# Patient Record
Sex: MALE | Race: WHITE
[De-identification: names, ages, dates, MRNs, and addresses within clinical notes are randomized per-mention and may not be internally consistent; named-entity substitution may affect disease eponyms.]

---

## 2017-04-18 ENCOUNTER — CHART COPY (OUTPATIENT)
Age: 62
End: 2017-04-18

## 2017-04-25 ENCOUNTER — APPOINTMENT (OUTPATIENT)
Dept: INTERNAL MEDICINE | Facility: CLINIC | Age: 62
End: 2017-04-25

## 2017-05-16 ENCOUNTER — APPOINTMENT (OUTPATIENT)
Dept: CARDIOLOGY | Facility: CLINIC | Age: 62
End: 2017-05-16

## 2017-06-07 ENCOUNTER — OUTPATIENT (OUTPATIENT)
Dept: OUTPATIENT SERVICES | Facility: HOSPITAL | Age: 62
LOS: 1 days | Discharge: HOME | End: 2017-06-07

## 2017-06-07 DIAGNOSIS — R41.82 ALTERED MENTAL STATUS, UNSPECIFIED: ICD-10-CM

## 2017-06-07 DIAGNOSIS — J15.9 UNSPECIFIED BACTERIAL PNEUMONIA: ICD-10-CM

## 2017-06-28 DIAGNOSIS — F20.9 SCHIZOPHRENIA, UNSPECIFIED: ICD-10-CM

## 2017-06-28 DIAGNOSIS — Z79.899 OTHER LONG TERM (CURRENT) DRUG THERAPY: ICD-10-CM

## 2017-07-05 ENCOUNTER — OUTPATIENT (OUTPATIENT)
Dept: OUTPATIENT SERVICES | Facility: HOSPITAL | Age: 62
LOS: 1 days | Discharge: HOME | End: 2017-07-05

## 2017-07-05 DIAGNOSIS — J15.9 UNSPECIFIED BACTERIAL PNEUMONIA: ICD-10-CM

## 2017-07-05 DIAGNOSIS — F20.9 SCHIZOPHRENIA, UNSPECIFIED: ICD-10-CM

## 2017-07-05 DIAGNOSIS — Z79.899 OTHER LONG TERM (CURRENT) DRUG THERAPY: ICD-10-CM

## 2017-07-05 DIAGNOSIS — R41.82 ALTERED MENTAL STATUS, UNSPECIFIED: ICD-10-CM

## 2017-07-18 ENCOUNTER — APPOINTMENT (OUTPATIENT)
Dept: CARDIOLOGY | Facility: CLINIC | Age: 62
End: 2017-07-18

## 2017-08-02 ENCOUNTER — OUTPATIENT (OUTPATIENT)
Dept: OUTPATIENT SERVICES | Facility: HOSPITAL | Age: 62
LOS: 1 days | Discharge: HOME | End: 2017-08-02

## 2017-08-02 DIAGNOSIS — Z79.899 OTHER LONG TERM (CURRENT) DRUG THERAPY: ICD-10-CM

## 2017-08-02 DIAGNOSIS — J15.9 UNSPECIFIED BACTERIAL PNEUMONIA: ICD-10-CM

## 2017-08-02 DIAGNOSIS — F20.9 SCHIZOPHRENIA, UNSPECIFIED: ICD-10-CM

## 2017-08-02 DIAGNOSIS — R41.82 ALTERED MENTAL STATUS, UNSPECIFIED: ICD-10-CM

## 2017-08-03 ENCOUNTER — INPATIENT (INPATIENT)
Facility: HOSPITAL | Age: 62
LOS: 3 days | Discharge: HOME | End: 2017-08-07
Attending: INTERNAL MEDICINE

## 2017-08-03 DIAGNOSIS — R41.82 ALTERED MENTAL STATUS, UNSPECIFIED: ICD-10-CM

## 2017-08-03 DIAGNOSIS — I63.8 OTHER CEREBRAL INFARCTION: ICD-10-CM

## 2017-08-03 DIAGNOSIS — J15.9 UNSPECIFIED BACTERIAL PNEUMONIA: ICD-10-CM

## 2017-08-09 ENCOUNTER — OUTPATIENT (OUTPATIENT)
Dept: OUTPATIENT SERVICES | Facility: HOSPITAL | Age: 62
LOS: 1 days | Discharge: HOME | End: 2017-08-09

## 2017-08-09 DIAGNOSIS — Z79.899 OTHER LONG TERM (CURRENT) DRUG THERAPY: ICD-10-CM

## 2017-08-09 DIAGNOSIS — J18.9 PNEUMONIA, UNSPECIFIED ORGANISM: ICD-10-CM

## 2017-08-09 DIAGNOSIS — R41.82 ALTERED MENTAL STATUS, UNSPECIFIED: ICD-10-CM

## 2017-08-09 DIAGNOSIS — J15.9 UNSPECIFIED BACTERIAL PNEUMONIA: ICD-10-CM

## 2017-08-09 DIAGNOSIS — F20.9 SCHIZOPHRENIA, UNSPECIFIED: ICD-10-CM

## 2017-08-10 DIAGNOSIS — I10 ESSENTIAL (PRIMARY) HYPERTENSION: ICD-10-CM

## 2017-08-10 DIAGNOSIS — E78.5 HYPERLIPIDEMIA, UNSPECIFIED: ICD-10-CM

## 2017-08-10 DIAGNOSIS — I63.8 OTHER CEREBRAL INFARCTION: ICD-10-CM

## 2017-08-10 DIAGNOSIS — C94.80 OTHER SPECIFIED LEUKEMIAS NOT HAVING ACHIEVED REMISSION: ICD-10-CM

## 2017-08-10 DIAGNOSIS — Z79.84 LONG TERM (CURRENT) USE OF ORAL HYPOGLYCEMIC DRUGS: ICD-10-CM
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
## 2017-08-10 DIAGNOSIS — E11.9 TYPE 2 DIABETES MELLITUS WITHOUT COMPLICATIONS: ICD-10-CM

## 2017-08-10 DIAGNOSIS — J18.9 PNEUMONIA, UNSPECIFIED ORGANISM: ICD-10-CM
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
## 2017-08-10 DIAGNOSIS — F20.9 SCHIZOPHRENIA, UNSPECIFIED: ICD-10-CM
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
## 2017-08-10 DIAGNOSIS — Z91.81 HISTORY OF FALLING: ICD-10-CM

## 2017-08-10 DIAGNOSIS — R47.1 DYSARTHRIA AND ANARTHRIA: ICD-10-CM

## 2017-08-10 DIAGNOSIS — C61 MALIGNANT NEOPLASM OF PROSTATE: ICD-10-CM

## 2017-08-10 DIAGNOSIS — I42.9 CARDIOMYOPATHY, UNSPECIFIED: ICD-10-CM

## 2017-08-10 DIAGNOSIS — C73 MALIGNANT NEOPLASM OF THYROID GLAND: ICD-10-CM

## 2017-08-10 DIAGNOSIS — R53.83 OTHER FATIGUE: ICD-10-CM

## 2017-08-11 ENCOUNTER — OUTPATIENT (OUTPATIENT)
Dept: OUTPATIENT SERVICES | Facility: HOSPITAL | Age: 62
LOS: 1 days | Discharge: HOME | End: 2017-08-11

## 2017-08-11 DIAGNOSIS — J15.9 UNSPECIFIED BACTERIAL PNEUMONIA: ICD-10-CM

## 2017-08-11 DIAGNOSIS — J18.9 PNEUMONIA, UNSPECIFIED ORGANISM: ICD-10-CM

## 2017-08-11 DIAGNOSIS — R41.82 ALTERED MENTAL STATUS, UNSPECIFIED: ICD-10-CM

## 2017-08-30 ENCOUNTER — OUTPATIENT (OUTPATIENT)
Dept: OUTPATIENT SERVICES | Facility: HOSPITAL | Age: 62
LOS: 1 days | Discharge: HOME | End: 2017-08-30

## 2017-08-30 DIAGNOSIS — F20.9 SCHIZOPHRENIA, UNSPECIFIED: ICD-10-CM

## 2017-08-30 DIAGNOSIS — R41.82 ALTERED MENTAL STATUS, UNSPECIFIED: ICD-10-CM

## 2017-08-30 DIAGNOSIS — J15.9 UNSPECIFIED BACTERIAL PNEUMONIA: ICD-10-CM

## 2017-08-30 DIAGNOSIS — Z79.899 OTHER LONG TERM (CURRENT) DRUG THERAPY: ICD-10-CM

## 2017-09-19 ENCOUNTER — OUTPATIENT (OUTPATIENT)
Dept: OUTPATIENT SERVICES | Facility: HOSPITAL | Age: 62
LOS: 1 days | Discharge: HOME | End: 2017-09-19

## 2017-09-19 DIAGNOSIS — C91.Z0 OTHER LYMPHOID LEUKEMIA NOT HAVING ACHIEVED REMISSION: ICD-10-CM

## 2017-09-19 DIAGNOSIS — R41.82 ALTERED MENTAL STATUS, UNSPECIFIED: ICD-10-CM

## 2017-09-19 DIAGNOSIS — J15.9 UNSPECIFIED BACTERIAL PNEUMONIA: ICD-10-CM

## 2017-09-27 ENCOUNTER — OUTPATIENT (OUTPATIENT)
Dept: OUTPATIENT SERVICES | Facility: HOSPITAL | Age: 62
LOS: 1 days | Discharge: HOME | End: 2017-09-27

## 2017-09-27 DIAGNOSIS — R41.82 ALTERED MENTAL STATUS, UNSPECIFIED: ICD-10-CM

## 2017-09-27 DIAGNOSIS — F20.9 SCHIZOPHRENIA, UNSPECIFIED: ICD-10-CM

## 2017-09-27 DIAGNOSIS — Z79.899 OTHER LONG TERM (CURRENT) DRUG THERAPY: ICD-10-CM

## 2017-09-27 DIAGNOSIS — J15.9 UNSPECIFIED BACTERIAL PNEUMONIA: ICD-10-CM

## 2017-09-28 ENCOUNTER — OUTPATIENT (OUTPATIENT)
Dept: OUTPATIENT SERVICES | Facility: HOSPITAL | Age: 62
LOS: 1 days | Discharge: HOME | End: 2017-09-28

## 2017-09-28 ENCOUNTER — APPOINTMENT (OUTPATIENT)
Dept: PODIATRY | Facility: CLINIC | Age: 62
End: 2017-09-28

## 2017-09-28 VITALS — WEIGHT: 180 LBS | BODY MASS INDEX: 28.25 KG/M2 | HEIGHT: 67 IN

## 2017-09-28 VITALS — DIASTOLIC BLOOD PRESSURE: 78 MMHG | HEART RATE: 74 BPM | SYSTOLIC BLOOD PRESSURE: 136 MMHG

## 2017-09-28 DIAGNOSIS — G89.29 PAIN IN RIGHT FOOT: ICD-10-CM

## 2017-09-28 DIAGNOSIS — J15.9 UNSPECIFIED BACTERIAL PNEUMONIA: ICD-10-CM

## 2017-09-28 DIAGNOSIS — G89.29 PAIN IN LEFT FOOT: ICD-10-CM

## 2017-09-28 DIAGNOSIS — C91.10 CHRONIC LYMPHOCYTIC LEUKEMIA OF B-CELL TYPE NOT HAVING ACHIEVED REMISSION: ICD-10-CM

## 2017-09-28 DIAGNOSIS — B35.1 TINEA UNGUIUM: ICD-10-CM

## 2017-09-28 DIAGNOSIS — R41.82 ALTERED MENTAL STATUS, UNSPECIFIED: ICD-10-CM

## 2017-09-28 DIAGNOSIS — M79.671 PAIN IN RIGHT FOOT: ICD-10-CM

## 2017-09-28 DIAGNOSIS — M79.672 PAIN IN LEFT FOOT: ICD-10-CM

## 2017-09-28 DIAGNOSIS — L85.3 XEROSIS CUTIS: ICD-10-CM

## 2017-09-28 DIAGNOSIS — E11.9 TYPE 2 DIABETES MELLITUS W/OUT COMPLICATIONS: ICD-10-CM

## 2017-09-29 DIAGNOSIS — B35.1 TINEA UNGUIUM: ICD-10-CM

## 2017-09-29 DIAGNOSIS — M79.672 PAIN IN LEFT FOOT: ICD-10-CM

## 2017-09-29 DIAGNOSIS — L85.3 XEROSIS CUTIS: ICD-10-CM

## 2017-09-29 DIAGNOSIS — M79.671 PAIN IN RIGHT FOOT: ICD-10-CM

## 2017-10-25 ENCOUNTER — OUTPATIENT (OUTPATIENT)
Dept: OUTPATIENT SERVICES | Facility: HOSPITAL | Age: 62
LOS: 1 days | Discharge: HOME | End: 2017-10-25

## 2017-10-25 DIAGNOSIS — J15.9 UNSPECIFIED BACTERIAL PNEUMONIA: ICD-10-CM

## 2017-10-25 DIAGNOSIS — F20.9 SCHIZOPHRENIA, UNSPECIFIED: ICD-10-CM

## 2017-10-25 DIAGNOSIS — R41.82 ALTERED MENTAL STATUS, UNSPECIFIED: ICD-10-CM

## 2017-10-25 DIAGNOSIS — Z79.899 OTHER LONG TERM (CURRENT) DRUG THERAPY: ICD-10-CM

## 2017-11-08 ENCOUNTER — OUTPATIENT (OUTPATIENT)
Dept: OUTPATIENT SERVICES | Facility: HOSPITAL | Age: 62
LOS: 1 days | Discharge: HOME | End: 2017-11-08

## 2017-11-08 DIAGNOSIS — E55.9 VITAMIN D DEFICIENCY, UNSPECIFIED: ICD-10-CM

## 2017-11-08 DIAGNOSIS — R41.82 ALTERED MENTAL STATUS, UNSPECIFIED: ICD-10-CM

## 2017-11-08 DIAGNOSIS — J15.9 UNSPECIFIED BACTERIAL PNEUMONIA: ICD-10-CM

## 2017-11-21 ENCOUNTER — OUTPATIENT (OUTPATIENT)
Dept: OUTPATIENT SERVICES | Facility: HOSPITAL | Age: 62
LOS: 1 days | Discharge: HOME | End: 2017-11-21

## 2017-11-21 DIAGNOSIS — Z79.899 OTHER LONG TERM (CURRENT) DRUG THERAPY: ICD-10-CM

## 2017-11-21 DIAGNOSIS — F20.9 SCHIZOPHRENIA, UNSPECIFIED: ICD-10-CM

## 2017-11-21 DIAGNOSIS — J15.9 UNSPECIFIED BACTERIAL PNEUMONIA: ICD-10-CM

## 2017-11-21 DIAGNOSIS — R41.82 ALTERED MENTAL STATUS, UNSPECIFIED: ICD-10-CM

## 2017-12-20 ENCOUNTER — OUTPATIENT (OUTPATIENT)
Dept: OUTPATIENT SERVICES | Facility: HOSPITAL | Age: 62
LOS: 1 days | Discharge: HOME | End: 2017-12-20

## 2017-12-20 DIAGNOSIS — R41.82 ALTERED MENTAL STATUS, UNSPECIFIED: ICD-10-CM

## 2017-12-20 DIAGNOSIS — C91.10 CHRONIC LYMPHOCYTIC LEUKEMIA OF B-CELL TYPE NOT HAVING ACHIEVED REMISSION: ICD-10-CM

## 2017-12-20 DIAGNOSIS — J15.9 UNSPECIFIED BACTERIAL PNEUMONIA: ICD-10-CM

## 2017-12-20 DIAGNOSIS — Z79.899 OTHER LONG TERM (CURRENT) DRUG THERAPY: ICD-10-CM

## 2017-12-20 DIAGNOSIS — F20.9 SCHIZOPHRENIA, UNSPECIFIED: ICD-10-CM

## 2018-01-17 ENCOUNTER — OUTPATIENT (OUTPATIENT)
Dept: OUTPATIENT SERVICES | Facility: HOSPITAL | Age: 63
LOS: 1 days | Discharge: HOME | End: 2018-01-17

## 2018-01-17 DIAGNOSIS — R41.82 ALTERED MENTAL STATUS, UNSPECIFIED: ICD-10-CM

## 2018-01-17 DIAGNOSIS — F20.9 SCHIZOPHRENIA, UNSPECIFIED: ICD-10-CM

## 2018-01-17 DIAGNOSIS — Z79.899 OTHER LONG TERM (CURRENT) DRUG THERAPY: ICD-10-CM

## 2018-01-17 DIAGNOSIS — J15.9 UNSPECIFIED BACTERIAL PNEUMONIA: ICD-10-CM

## 2018-02-14 ENCOUNTER — OUTPATIENT (OUTPATIENT)
Dept: OUTPATIENT SERVICES | Facility: HOSPITAL | Age: 63
LOS: 1 days | Discharge: HOME | End: 2018-02-14

## 2018-02-14 DIAGNOSIS — Z79.899 OTHER LONG TERM (CURRENT) DRUG THERAPY: ICD-10-CM

## 2018-02-14 DIAGNOSIS — F20.9 SCHIZOPHRENIA, UNSPECIFIED: ICD-10-CM

## 2018-03-19 ENCOUNTER — INPATIENT (INPATIENT)
Facility: HOSPITAL | Age: 63
LOS: 3 days | Discharge: PSYCHIATRIC OR SUBSTANCE USE FACILITY | End: 2018-03-23
Attending: HOSPITALIST

## 2018-03-19 VITALS
OXYGEN SATURATION: 99 % | SYSTOLIC BLOOD PRESSURE: 125 MMHG | HEART RATE: 94 BPM | TEMPERATURE: 98 F | DIASTOLIC BLOOD PRESSURE: 55 MMHG | RESPIRATION RATE: 18 BRPM

## 2018-03-19 DIAGNOSIS — F20.0 PARANOID SCHIZOPHRENIA: ICD-10-CM

## 2018-03-19 DIAGNOSIS — D72.820 LYMPHOCYTOSIS (SYMPTOMATIC): ICD-10-CM

## 2018-03-19 DIAGNOSIS — Z86.73 PERSONAL HISTORY OF TRANSIENT ISCHEMIC ATTACK (TIA), AND CEREBRAL INFARCTION WITHOUT RESIDUAL DEFICITS: ICD-10-CM

## 2018-03-19 DIAGNOSIS — E11.9 TYPE 2 DIABETES MELLITUS WITHOUT COMPLICATIONS: ICD-10-CM

## 2018-03-19 LAB
ALBUMIN SERPL ELPH-MCNC: 4.3 G/DL — SIGNIFICANT CHANGE UP (ref 3.5–5.2)
ALP SERPL-CCNC: 67 U/L — SIGNIFICANT CHANGE UP (ref 30–115)
ALT FLD-CCNC: 32 U/L — SIGNIFICANT CHANGE UP (ref 0–41)
ANION GAP SERPL CALC-SCNC: 16 MMOL/L — HIGH (ref 7–14)
APAP SERPL-MCNC: <5 UG/ML — LOW (ref 10–30)
APPEARANCE UR: (no result)
AST SERPL-CCNC: 54 U/L — HIGH (ref 0–41)
BACTERIA # UR AUTO: SIGNIFICANT CHANGE UP
BASOPHILS NFR BLD AUTO: 0 % — SIGNIFICANT CHANGE UP (ref 0–1)
BILIRUB SERPL-MCNC: 0.5 MG/DL — SIGNIFICANT CHANGE UP (ref 0.2–1.2)
BILIRUB UR-MCNC: (no result)
BUN SERPL-MCNC: 21 MG/DL — HIGH (ref 10–20)
CALCIUM SERPL-MCNC: 8.7 MG/DL — SIGNIFICANT CHANGE UP (ref 8.5–10.1)
CHLORIDE SERPL-SCNC: 103 MMOL/L — SIGNIFICANT CHANGE UP (ref 98–110)
CO2 SERPL-SCNC: 22 MMOL/L — SIGNIFICANT CHANGE UP (ref 17–32)
COLOR SPEC: SIGNIFICANT CHANGE UP
COMMENT - URINE: SIGNIFICANT CHANGE UP
CREAT SERPL-MCNC: 0.9 MG/DL — SIGNIFICANT CHANGE UP (ref 0.7–1.5)
DIFF PNL FLD: NEGATIVE — SIGNIFICANT CHANGE UP
EOSINOPHIL NFR BLD AUTO: 0 % — SIGNIFICANT CHANGE UP (ref 0–8)
ETHANOL SERPL-MCNC: <10 MG/DL — HIGH
GLUCOSE SERPL-MCNC: 92 MG/DL — SIGNIFICANT CHANGE UP (ref 70–110)
GLUCOSE UR QL: NEGATIVE MG/DL — SIGNIFICANT CHANGE UP
HCT VFR BLD CALC: 34.8 % — LOW (ref 42–52)
HGB BLD-MCNC: 10.5 G/DL — LOW (ref 14–18)
KETONES UR-MCNC: 15
LEUKOCYTE ESTERASE UR-ACNC: NEGATIVE — SIGNIFICANT CHANGE UP
LYMPHOCYTES # BLD AUTO: 73 % — HIGH (ref 20.5–51.1)
LYMPHOCYTES # BLD AUTO: 84.07 K/UL — HIGH (ref 1.2–3.4)
MCHC RBC-ENTMCNC: 29 PG — SIGNIFICANT CHANGE UP (ref 27–31)
MCHC RBC-ENTMCNC: 30.2 G/DL — LOW (ref 32–37)
MCV RBC AUTO: 96.1 FL — HIGH (ref 80–94)
MONOCYTES NFR BLD AUTO: 2 % — SIGNIFICANT CHANGE UP (ref 1.7–9.3)
NEUTROPHILS # BLD AUTO: 19.58 K/UL — HIGH (ref 1.4–6.5)
NEUTROPHILS NFR BLD AUTO: 17 % — LOW (ref 42.2–75.2)
NITRITE UR-MCNC: NEGATIVE — SIGNIFICANT CHANGE UP
NRBC # BLD: 0 /100 — SIGNIFICANT CHANGE UP (ref 0–0)
NRBC # BLD: SIGNIFICANT CHANGE UP /100 WBCS (ref 0–0)
PH UR: 5.5 — SIGNIFICANT CHANGE UP (ref 5–8)
PLAT MORPH BLD: NORMAL — SIGNIFICANT CHANGE UP
PLATELET # BLD AUTO: 170 K/UL — SIGNIFICANT CHANGE UP (ref 130–400)
POTASSIUM SERPL-MCNC: 4.2 MMOL/L — SIGNIFICANT CHANGE UP (ref 3.5–5)
POTASSIUM SERPL-SCNC: 4.2 MMOL/L — SIGNIFICANT CHANGE UP (ref 3.5–5)
PROT SERPL-MCNC: 6 G/DL — SIGNIFICANT CHANGE UP (ref 6–8)
PROT UR-MCNC: NEGATIVE MG/DL — SIGNIFICANT CHANGE UP
RBC # BLD: 3.62 M/UL — LOW (ref 4.7–6.1)
RBC # FLD: 15.7 % — HIGH (ref 11.5–14.5)
RBC BLD AUTO: NORMAL — SIGNIFICANT CHANGE UP
RBC CASTS # UR COMP ASSIST: SIGNIFICANT CHANGE UP /HPF
SALICYLATES SERPL-MCNC: 0.3 MG/DL — LOW (ref 4–30)
SODIUM SERPL-SCNC: 141 MMOL/L — SIGNIFICANT CHANGE UP (ref 135–146)
SP GR SPEC: 1.02 — SIGNIFICANT CHANGE UP (ref 1.01–1.03)
UROBILINOGEN FLD QL: 0.2 MG/DL — SIGNIFICANT CHANGE UP (ref 0.2–0.2)
VARIANT LYMPHS # BLD: 8 % — HIGH (ref 0–5)
WBC # BLD: 115.16 K/UL — CRITICAL HIGH (ref 4.8–10.8)
WBC # FLD AUTO: 115.16 K/UL — CRITICAL HIGH (ref 4.8–10.8)
WBC UR QL: SIGNIFICANT CHANGE UP /HPF

## 2018-03-19 RX ORDER — ASPIRIN/CALCIUM CARB/MAGNESIUM 324 MG
81 TABLET ORAL DAILY
Qty: 0 | Refills: 0 | Status: DISCONTINUED | OUTPATIENT
Start: 2018-03-19 | End: 2018-03-23

## 2018-03-19 RX ORDER — HALOPERIDOL DECANOATE 100 MG/ML
5 INJECTION INTRAMUSCULAR EVERY 6 HOURS
Qty: 0 | Refills: 0 | Status: DISCONTINUED | OUTPATIENT
Start: 2018-03-19 | End: 2018-03-23

## 2018-03-19 RX ORDER — INSULIN LISPRO 100/ML
4 VIAL (ML) SUBCUTANEOUS
Qty: 0 | Refills: 0 | Status: DISCONTINUED | OUTPATIENT
Start: 2018-03-19 | End: 2018-03-23

## 2018-03-19 RX ORDER — DOCUSATE SODIUM 100 MG
300 CAPSULE ORAL DAILY
Qty: 0 | Refills: 0 | Status: DISCONTINUED | OUTPATIENT
Start: 2018-03-19 | End: 2018-03-23

## 2018-03-19 RX ORDER — SODIUM CHLORIDE 9 MG/ML
1000 INJECTION, SOLUTION INTRAVENOUS
Qty: 0 | Refills: 0 | Status: DISCONTINUED | OUTPATIENT
Start: 2018-03-19 | End: 2018-03-23

## 2018-03-19 RX ORDER — LACTULOSE 10 G/15ML
20 SOLUTION ORAL DAILY
Qty: 0 | Refills: 0 | Status: COMPLETED | OUTPATIENT
Start: 2018-03-19 | End: 2018-03-20

## 2018-03-19 RX ORDER — METFORMIN HYDROCHLORIDE 850 MG/1
750 TABLET ORAL
Qty: 0 | Refills: 0 | Status: DISCONTINUED | OUTPATIENT
Start: 2018-03-19 | End: 2018-03-19

## 2018-03-19 RX ORDER — HALOPERIDOL DECANOATE 100 MG/ML
5 INJECTION INTRAMUSCULAR EVERY 12 HOURS
Qty: 0 | Refills: 0 | Status: DISCONTINUED | OUTPATIENT
Start: 2018-03-19 | End: 2018-03-23

## 2018-03-19 RX ORDER — DEXTROSE 50 % IN WATER 50 %
25 SYRINGE (ML) INTRAVENOUS ONCE
Qty: 0 | Refills: 0 | Status: DISCONTINUED | OUTPATIENT
Start: 2018-03-19 | End: 2018-03-23

## 2018-03-19 RX ORDER — ENOXAPARIN SODIUM 100 MG/ML
40 INJECTION SUBCUTANEOUS EVERY 24 HOURS
Qty: 0 | Refills: 0 | Status: DISCONTINUED | OUTPATIENT
Start: 2018-03-19 | End: 2018-03-23

## 2018-03-19 RX ORDER — GLUCAGON INJECTION, SOLUTION 0.5 MG/.1ML
1 INJECTION, SOLUTION SUBCUTANEOUS ONCE
Qty: 0 | Refills: 0 | Status: DISCONTINUED | OUTPATIENT
Start: 2018-03-19 | End: 2018-03-23

## 2018-03-19 RX ORDER — CEFTRIAXONE 500 MG/1
1 INJECTION, POWDER, FOR SOLUTION INTRAMUSCULAR; INTRAVENOUS ONCE
Qty: 0 | Refills: 0 | Status: COMPLETED | OUTPATIENT
Start: 2018-03-19 | End: 2018-03-19

## 2018-03-19 RX ORDER — TRIHEXYPHENIDYL HCL 2 MG
2 TABLET ORAL
Qty: 0 | Refills: 0 | Status: DISCONTINUED | OUTPATIENT
Start: 2018-03-19 | End: 2018-03-23

## 2018-03-19 RX ORDER — INSULIN GLARGINE 100 [IU]/ML
12 INJECTION, SOLUTION SUBCUTANEOUS AT BEDTIME
Qty: 0 | Refills: 0 | Status: DISCONTINUED | OUTPATIENT
Start: 2018-03-19 | End: 2018-03-23

## 2018-03-19 RX ORDER — DEXTROSE 50 % IN WATER 50 %
1 SYRINGE (ML) INTRAVENOUS ONCE
Qty: 0 | Refills: 0 | Status: DISCONTINUED | OUTPATIENT
Start: 2018-03-19 | End: 2018-03-23

## 2018-03-19 RX ORDER — LEVOTHYROXINE SODIUM 125 MCG
137 TABLET ORAL DAILY
Qty: 0 | Refills: 0 | Status: DISCONTINUED | OUTPATIENT
Start: 2018-03-19 | End: 2018-03-23

## 2018-03-19 RX ORDER — DIPHENHYDRAMINE HCL 50 MG
50 CAPSULE ORAL EVERY 6 HOURS
Qty: 0 | Refills: 0 | Status: DISCONTINUED | OUTPATIENT
Start: 2018-03-19 | End: 2018-03-23

## 2018-03-19 RX ORDER — FLUTICASONE PROPIONATE 220 MCG
1 AEROSOL WITH ADAPTER (GRAM) INHALATION
Qty: 0 | Refills: 0 | Status: DISCONTINUED | OUTPATIENT
Start: 2018-03-19 | End: 2018-03-21

## 2018-03-19 RX ORDER — DEXTROSE 50 % IN WATER 50 %
12.5 SYRINGE (ML) INTRAVENOUS ONCE
Qty: 0 | Refills: 0 | Status: DISCONTINUED | OUTPATIENT
Start: 2018-03-19 | End: 2018-03-23

## 2018-03-19 RX ORDER — CLOPIDOGREL BISULFATE 75 MG/1
75 TABLET, FILM COATED ORAL DAILY
Qty: 0 | Refills: 0 | Status: DISCONTINUED | OUTPATIENT
Start: 2018-03-19 | End: 2018-03-23

## 2018-03-19 RX ORDER — AZITHROMYCIN 500 MG/1
500 TABLET, FILM COATED ORAL ONCE
Qty: 0 | Refills: 0 | Status: COMPLETED | OUTPATIENT
Start: 2018-03-19 | End: 2018-03-19

## 2018-03-19 RX ADMIN — AZITHROMYCIN 255 MILLIGRAM(S): 500 TABLET, FILM COATED ORAL at 19:10

## 2018-03-19 RX ADMIN — CEFTRIAXONE 100 GRAM(S): 500 INJECTION, POWDER, FOR SOLUTION INTRAMUSCULAR; INTRAVENOUS at 21:10

## 2018-03-19 NOTE — PROGRESS NOTE ADULT - SUBJECTIVE AND OBJECTIVE BOX
Discussed care with primary ED attending Dr. Segura. Discussed medical clearance for this patient. Upon review of records, patient has history of . At this time, patient has a WBC of 115, up from prior WBC during 8/2017 admission - 8/3/2017 41.23, 8/4/2017 29.51. At that time patient was found to have acute/subacute cortical infarcts left frontal and right posterior parietal on CT Head,  MRI WO Contrast showed the following:  Motion artifiact limits examination.  Third and lateral ventricles mildly enlarged as are the cortical sulci consistent with a mild degree of cortical atrophy. The fourth ventricle is normal in size and position.  There is no shift of the midline structures.  Mild thinning of the body of the corpus callosum consistent with mild corpus callosal atrophy.  Small wedge-shaped areas of abnormal signal intensity in the left frontal and right parietal regions consistent with chronic infarcts.   No MRI evidence of acute ischemic change.  Multiple lesions arising in the parotid glands incompletely evaluated on this examination.   The left petrous apex is not pneumatized.   IMPRESSION: Study limited by motion artifact. No MRI evidence to suggest acute ischemic change.    At time of admission, patient was found to have bacterial pneumonia along with chronic infarcts, patient was to follow-up with neurology.    Discussed results with Dr. Segura. Given that patient has WBC count significantly elevated from prior admission and a history of chronic infarcts, Dr. Segura will continue medical workup at this time. Dr. Segura reports that she will likely admit patient for further workup.    Discussed case and plan with Dr. Alexander Rider. Discussed care with primary ED attending Dr. Segura. Discussed medical clearance for this patient. Upon review of records, patient presented to hospital in August 2017. At this time, patient has a WBC of 115, up from prior WBC during 8/2017 admission - 8/3/2017 41.23, 8/4/2017 29.51. At that time patient was found to have acute/subacute cortical infarcts left frontal and right posterior parietal on CT Head,  MRI WO Contrast showed the following:  Motion artifiact limits examination.  Third and lateral ventricles mildly enlarged as are the cortical sulci consistent with a mild degree of cortical atrophy. The fourth ventricle is normal in size and position.  There is no shift of the midline structures.  Mild thinning of the body of the corpus callosum consistent with mild corpus callosal atrophy.  Small wedge-shaped areas of abnormal signal intensity in the left frontal and right parietal regions consistent with chronic infarcts.   No MRI evidence of acute ischemic change.  Multiple lesions arising in the parotid glands incompletely evaluated on this examination.   The left petrous apex is not pneumatized.   IMPRESSION: Study limited by motion artifact. No MRI evidence to suggest acute ischemic change.    At time of admission, patient was found to have bacterial pneumonia along with chronic infarcts, patient was to follow-up with neurology.    Discussed results with Dr. Segura. Given that patient has WBC count significantly elevated from prior admission and a history of chronic infarcts, Dr. Segura will continue medical workup at this time. Dr. Segura reports that she will likely admit patient for further workup.    Discussed case and plan with Dr. Alexander Rider. Discussed care with primary ED attending Dr. Segura. Discussed medical clearance for this patient. Upon review of records, patient presented to hospital in August 2017. At this time, patient has a WBC of 115, up from prior WBC during 8/2017 admission - 8/3/2017 41.23, 8/4/2017 29.51. At that time patient was found to have acute/subacute cortical infarcts left frontal and right posterior parietal on CT Head,  MRI WO Contrast showed the following:  Motion artifiact limits examination.  Third and lateral ventricles mildly enlarged as are the cortical sulci consistent with a mild degree of cortical atrophy. The fourth ventricle is normal in size and position.  There is no shift of the midline structures.  Mild thinning of the body of the corpus callosum consistent with mild corpus callosal atrophy.  Small wedge-shaped areas of abnormal signal intensity in the left frontal and right parietal regions consistent with chronic infarcts.   No MRI evidence of acute ischemic change.  Multiple lesions arising in the parotid glands incompletely evaluated on this examination.   The left petrous apex is not pneumatized.   IMPRESSION: Study limited by motion artifact. No MRI evidence to suggest acute ischemic change.    At time of admission, patient was found to have bacterial pneumonia along with chronic infarcts, patient was to follow-up with neurology.    Discussed results with Dr. Segura. Given that patient has WBC count significantly elevated from prior admission and a history of chronic infarcts, Dr. Segura will continue medical workup at this time. Dr. Segura reports that she may admit patient for further workup.    Discussed case and plan with Dr. Alexander Rider.

## 2018-03-19 NOTE — ED PROVIDER NOTE - PHYSICAL EXAMINATION
VITAL SIGNS: AFebrile, vital signs stable  CONSTITUTIONAL: Well-developed; well-nourished; in no acute distress. Odd affect.  SKIN: Skin exam is warm and dry, no acute rash.  HEAD: Normocephalic; atraumatic.  EYES: Pupils equal round reactive to light, Extraocular movements intact; conjunctiva and sclera clear.  ENT: No nasal discharge; airway clear. Moist mucus membranes.  NECK: Supple; non tender. No rigidity  CARD: Regular rate and rhythm. Normal S1, S2; no murmurs, gallops, or rubs.  RESP: Lungs clear to auscultation bilaterally. No wheezes, rales or rhonchi.  ABD: Abdomen soft; non-tender; non-distended;  no hepatosplenomegaly. No costovertebral angle tenderness.   EXT: Normal ROM. No clubbing, cyanosis or edema. No calf tenderness to palpation.  NEURO: Alert, cn 2-12 intact, 5/5 motor x 4 ext, silt x 4 ext, normal gait, no nystagmus, no slurred speech or facial droop  PSYCH: Odd affect, occasionally will answer appropriately, sometimes will not answer at all, just stares, sometimes makes inappropriate comments, not violent in ED, calm, no innapropriate behavior, sits quietly with 1:1 if not bothered.

## 2018-03-19 NOTE — ED PROVIDER NOTE - CARE PLAN
Principal Discharge DX:	Pneumonia  Secondary Diagnosis:	Lymphocytosis  Secondary Diagnosis:	Paranoid schizophrenia

## 2018-03-19 NOTE — H&P ADULT - ATTENDING COMMENTS
Pt seen and examined independently.  He ate lunch and had no complaints.  He is now compliant with his medications - actually requesting Artane.   EMR reviewed.  He is on ASA and Plavix for chronic infarcts seen on CT scan (he was already on Plavix for cardiomyopathy).   Leukocytosis more significant now (>100K) and with lymphocyte predominance - rule out worsening CLL vs infection.   Check blood cultures. CXR officially no infiltrate. U/A unremarkable.   No abx for now.   Awaiting [facility] eval.  Psychiatry following - pt will need IPP when medically stable.  I reviewed the resident's note and I agree with the physical exam, assessment and plan with additions as above/below.   Resume oral diabetic meds if pt refuses insulin - DM type 2 well controlled.  Can hold metformin if contrast studies are planned.

## 2018-03-19 NOTE — ED PROVIDER NOTE - MEDICAL DECISION MAKING DETAILS
AMS, psychosis, CLL, psych consulted and would like to admit to IPP however needs to be stabilized first from medical perspective, leukocytosis likely 2/2 CLL however worsened from 8/17, noted to have infiltrate L base lung, covered for PNA, CTH no acute infarcts like last admission, will admit to medicine, needs heme consult, ?ID consult, psych to follow and admit when medically cleared

## 2018-03-19 NOTE — ED PROVIDER NOTE - PROGRESS NOTE DETAILS
Dr Antoine consulted of psychiatry. Pt not taking meds x1 mo per [place] paperwork, will order basic labs, EKG as pt has PMH DM. psych initially wanted to admit however reviewed labs WBC >100k, pt has hx of CLL, however last ED visit 8/2017 had WBC 47, found to have L opacity, acute infarcts, will get CXR, CTH, psych requesting medical admission for stabilization/clearance from hematology before IPP stay. pt unable to provide any reliable hx or prior hx.

## 2018-03-19 NOTE — ED BEHAVIORAL HEALTH ASSESSMENT NOTE - RISK ASSESSMENT
At this time, patient is considered a danger to himself and others and needs inpatient psychiatric hospitalization. Patient will be restarted on Haldol 5mg p.o BID and Artane 1mg BID to target psychotic symptoms . Patient just received invega sustenna about 2 weeks ago and thus has some mood stabilization in his system.

## 2018-03-19 NOTE — ED BEHAVIORAL HEALTH ASSESSMENT NOTE - PSYCHIATRIC ISSUES AND PLAN (INCLUDE STANDING AND PRN MEDICATION)
Psychosis - admit to Inpatient psychiatry involuntarily , Start haldol 5mg p.o Daily, Haldol 5mg p.o Q 6 hrs PRN and Atican 1mg P.O Q 6 hrs PRN for anxiety

## 2018-03-19 NOTE — H&P ADULT - HISTORY OF PRESENT ILLNESS
62 year old M with h/o schizophrenia, parathyriod ca, CLL, sent in from [facility] for worsening mental status since 1 month. Pt reported to have increasing disorganized behaviors, self isolation, and combative at times and was deemed a danger to self and to others, so sent in to ED today for further eval. According to chart, pt had been refusing all his regular medications for past 1 month. Pt found to have elevated WBC count on initial labwork (115 up from 47 on 1/2018). No reports of any other symptoms including fever, chills, cough, chest pain, urinary sx, or diarrhea.

## 2018-03-19 NOTE — ED BEHAVIORAL HEALTH ASSESSMENT NOTE - CURRENT MEDICATION
Patient is reportedly non complaint with his medication;  psychotropics; Lamictal 25mg daily, haldol 10mg P.O BID, Artane 2mg BID, Invega Sustenna 78mg I.M ( 1st dose on March 13th, 2018),  Medical meds: Synthroid 0.137mcg daily, trichor 145mg daily, januvia 100mg daily, ASA 81 mg daily, plavix 75mg daily, Colace 300mg daily, lactulose 30mls bedtime, metformine 750mg BID, Lipitor 20mg bedtime, Flovent 44 mcg daily

## 2018-03-19 NOTE — H&P ADULT - NSHPSOCIALHISTORY_GEN_ALL_CORE
Social History:    Marital Status:  (   )    (  x ) Single    (   )    (  )   Occupation:   Lives with: (  ) alone  (  ) children   (  ) spouse   (  ) parents  ( x ) other    Substance Use (street drugs): ( x ) never used  (  ) other:  Tobacco Usage:  (  x ) never smoked   (   ) former smoker   (   ) current smoker  (     ) pack year  (        ) last cigarette date  Alcohol Usage: none  Sexual History: not sexually active

## 2018-03-19 NOTE — ED BEHAVIORAL HEALTH ASSESSMENT NOTE - SUMMARY
Mr Deshpande is a 62 year old man with a history of Paranoid Schizophrenia who was brought to the ER because of disorganised behavior. Psychiatry consult was called for a mental health evaluation.   Patient is non compliant with medication, is disorganised in behavior and thought , has paranoid delusions and is not able to take care of himself.

## 2018-03-19 NOTE — ED ADULT NURSE NOTE - CHPI ED SYMPTOMS NEG
no paranoia/no change in level of consciousness/no hallucinations/no homicidal/no agitation/no confusion/no suicidal

## 2018-03-19 NOTE — PROGRESS NOTE ADULT - ASSESSMENT
62 year old male domiciled at [facility] with history of schizophrenia, paranoid type and chronic CVA presents for bizarre behavior, disorganized thoughts in the context of nonadherence to psychiatric and medical pharmacotherapy. At this time, plan to admit to psychiatry is not to be continued. Patient has recent noncompliance with psychiatry and medical pharmacotherapy which is likely contributing to current presentation. Further medical evaluation is to be continued at this time given elevated WBC and history of chronic CVA.

## 2018-03-19 NOTE — ED PROVIDER NOTE - OBJECTIVE STATEMENT
62M PMH schizophrenia, DM, prostate/parathyroid ca, CLL, sent from [place] psych for AMS. Per paperwork from [place], pt has refused all meds x 1 mo, has been more isolated, withdrawn. Today was found "naked" "playing with himself" "bathing in Listerine." when asked why pt in ED, pt states "I am dry, I need sperm." Pt admits to dry cough. Denies trauma. Denies pain anywhere to body. Denies ha, cp, sob, abd pain, n/v. pt is poor historian, unable to provide further hx. Per [facility], pt admitted 8/2017 for AMS/lethargy, WBC 47, found to have PNA and acute brain infarcts, was admitted to IPP after stabilized on medicine.

## 2018-03-19 NOTE — ED BEHAVIORAL HEALTH ASSESSMENT NOTE - PAST PSYCHOTROPIC MEDICATION
As per nurse , patient was on Clozaril ( discontinued as patient was non compliant and Prolixin Decanoate in the past

## 2018-03-19 NOTE — ED BEHAVIORAL HEALTH ASSESSMENT NOTE - HPI (INCLUDE ILLNESS QUALITY, SEVERITY, DURATION, TIMING, CONTEXT, MODIFYING FACTORS, ASSOCIATED SIGNS AND SYMPTOMS)
Mr Deshpande is a 62 year old  man, single , unemployed , resides at [facility] in [facility] with a history of Paranoid Schizophrenia who  presented to the ER for disorganised behavior.   On approach of patient , he was sitting calmly , not agitated however , does not appear to be a reliable historian and seems to be minimizing his symptoms . He reports that he does not know why he was brought to the ER but that it must be for a regular check up. he reports that he has never been  but has 233 children . When asked what happened today at his residence , he stated " where is [facility]?" he denies hearing voices but reports that he think another client at the residence wants to harm him. he denies symptoms of acuter josé antonio or major depression at this time. he denies current use of illicit drugs or alcohol.   Collateral information was obtained from nurse Shwetha ( [phone]). She reports that patient has been refusing to take his medications both medical and psychiatric for the past month. She reports that he used to be on Clozaril but it was discontinued because of non compliance. She reports that this morning, patient was found jumping on the washing machine and then sent to his room. She report that following this episode, she and other staff member went to patient's room, knocked  on the door , was asked to come in  and found sitting on the floor, naked , covered with Listerine and pulling on his genitals. She reports that patient has been increasingly disheveled and paranoid , saying that staff is against him and want to harm him. She reports that patient has poor insight into his illness and does not believe that he has a psychiatric illness or medical problems . She reports that patient has this presentation whenever he decompensates.

## 2018-03-19 NOTE — H&P ADULT - NSHPLABSRESULTS_GEN_ALL_CORE
10.5   115.16 )-----------( 170      ( 19 Mar 2018 16:13 )             34.8     141  |  103  |  21<H>  ----------------------------<  92  4.2   |  22  |  0.9    Ca    8.7      19 Mar 2018 16:13    TPro  6.0  /  Alb  4.3  /  TBili  0.5  /  DBili  x   /  AST  54<H>  /  ALT  32  /  AlkPhos  67          Urinalysis Basic - ( 19 Mar 2018 16:13 )    Color: Dark Yellow / Appearance: Cloudy / S.025 / pH: x  Gluc: x / Ketone: 15  / Bili: Small / Urobili: 0.2 mg/dL   Blood: x / Protein: Negative mg/dL / Nitrite: Negative   Leuk Esterase: Negative / RBC: 1-2 /HPF / WBC 3-5 /HPF   Sq Epi: x / Non Sq Epi: x / Bacteria: See Note    < from: CT Head No Cont (18 @ 19:53) >    1. No CT evidence for acute intracranial pathology.      2. Stable chronic infarcts and chronic microvascular ischemic changes.    < end of copied text >    CXR:  questionnable left lower lobe opacity

## 2018-03-19 NOTE — PROGRESS NOTE ADULT - PROBLEM SELECTOR PLAN 1
- further medical evaluation  - on reassessment patient may be determined to meet criteria for inpatient psychiatry hospitalization - further medical evaluation  - on reassessment patient may be determined to meet criteria for inpatient psychiatry hospitalization  - at this time, continue with pharmacologic management as determined during initial psychiatric consult: "Patient will be restarted on Haldol 5mg p.o BID and Artane 1mg BID to target psychotic symptoms."

## 2018-03-19 NOTE — H&P ADULT - PROBLEM SELECTOR PLAN 1
r/o underlying infectious etiology  will treat empirically with IV antibx  urine/blood culture  trend CBC  hematology eval

## 2018-03-19 NOTE — ED BEHAVIORAL HEALTH ASSESSMENT NOTE - DESCRIPTION
Patient reports thart he want to [place] and grew up in [facility] Patient was not agitated Prostate cancer, CLL, Diabetes, hypothyroidism

## 2018-03-19 NOTE — H&P ADULT - NSHPPHYSICALEXAM_GEN_ALL_CORE
PHYSICAL EXAM:    T(F): 100.3, Max: 100.3 (03-19-18 @ 17:14)  HR: 83  BP: 109/60  BP(mean): --  RR: 18  SpO2: 97%  GENERAL: NAD, well-developed  HEAD:  Atraumatic, Normocephalic  EYES: EOMI, PERRLA, conjunctiva and sclera clear  NECK: Supple, No JVD  CHEST/LUNG: Clear to auscultation bilaterally; No wheeze  HEART: Regular rate and rhythm; No murmurs, rubs, or gallops  ABDOMEN: Soft, Nontender, Nondistended; Bowel sounds present  EXTREMITIES:  2+ Peripheral Pulses, No clubbing, cyanosis, or edema  PSYCH: AAOx2,  NEUROLOGY: non-focal  SKIN: No rashes or lesions

## 2018-03-19 NOTE — ED BEHAVIORAL HEALTH ASSESSMENT NOTE - OTHER PAST PSYCHIATRIC HISTORY (INCLUDE DETAILS REGARDING ONSET, COURSE OF ILLNESS, INPATIENT/OUTPATIENT TREATMENT)
As per patient , he has never been admitted to the psychiatric floor in the past nor has he had any past suicide attempts . patient is however a poor historian and minimise his history

## 2018-03-19 NOTE — H&P ADULT - ASSESSMENT
61 yo M with schizophrenia, DM, CLL, admitted for AMS, found to have elevated WBC count r/o underlying infectious etiology

## 2018-03-20 LAB
ALBUMIN SERPL ELPH-MCNC: 4 G/DL — SIGNIFICANT CHANGE UP (ref 3.5–5.2)
ALP SERPL-CCNC: 63 U/L — SIGNIFICANT CHANGE UP (ref 30–115)
ALT FLD-CCNC: 30 U/L — SIGNIFICANT CHANGE UP (ref 0–41)
ANION GAP SERPL CALC-SCNC: 16 MMOL/L — HIGH (ref 7–14)
AST SERPL-CCNC: 44 U/L — HIGH (ref 0–41)
BILIRUB SERPL-MCNC: 0.6 MG/DL — SIGNIFICANT CHANGE UP (ref 0.2–1.2)
BUN SERPL-MCNC: 20 MG/DL — SIGNIFICANT CHANGE UP (ref 10–20)
CALCIUM SERPL-MCNC: 8.2 MG/DL — LOW (ref 8.5–10.1)
CHLORIDE SERPL-SCNC: 103 MMOL/L — SIGNIFICANT CHANGE UP (ref 98–110)
CO2 SERPL-SCNC: 23 MMOL/L — SIGNIFICANT CHANGE UP (ref 17–32)
CREAT SERPL-MCNC: 0.6 MG/DL — LOW (ref 0.7–1.5)
CULTURE RESULTS: SIGNIFICANT CHANGE UP
GLUCOSE SERPL-MCNC: 132 MG/DL — HIGH (ref 70–110)
HCT VFR BLD CALC: 34.8 % — LOW (ref 42–52)
HGB BLD-MCNC: 10.6 G/DL — LOW (ref 14–18)
LDH SERPL L TO P-CCNC: 300 U/L — HIGH (ref 50–242)
MCHC RBC-ENTMCNC: 29 PG — SIGNIFICANT CHANGE UP (ref 27–31)
MCHC RBC-ENTMCNC: 30.5 G/DL — LOW (ref 32–37)
MCV RBC AUTO: 95.3 FL — HIGH (ref 80–94)
NRBC # BLD: 0 /100 WBCS — SIGNIFICANT CHANGE UP (ref 0–0)
PLATELET # BLD AUTO: 155 K/UL — SIGNIFICANT CHANGE UP (ref 130–400)
POTASSIUM SERPL-MCNC: 4.1 MMOL/L — SIGNIFICANT CHANGE UP (ref 3.5–5)
POTASSIUM SERPL-SCNC: 4.1 MMOL/L — SIGNIFICANT CHANGE UP (ref 3.5–5)
PROT SERPL-MCNC: 5.8 G/DL — LOW (ref 6–8)
RBC # BLD: 3.35 M/UL — LOW (ref 4.7–6.1)
RBC # BLD: 3.65 M/UL — LOW (ref 4.7–6.1)
RBC # BLD: 3.65 M/UL — LOW (ref 4.7–6.1)
RBC # FLD: 15.9 % — HIGH (ref 11.5–14.5)
RETICS #: 66 K/UL — SIGNIFICANT CHANGE UP (ref 25–125)
RETICS #: 76.7 K/UL — SIGNIFICANT CHANGE UP (ref 25–125)
RETICS/RBC NFR: 2 % — HIGH (ref 0.5–1.5)
RETICS/RBC NFR: 2.1 % — HIGH (ref 0.5–1.5)
SODIUM SERPL-SCNC: 142 MMOL/L — SIGNIFICANT CHANGE UP (ref 135–146)
SPECIMEN SOURCE: SIGNIFICANT CHANGE UP
URATE SERPL-MCNC: 5.1 MG/DL — SIGNIFICANT CHANGE UP (ref 3.4–8.8)
WBC # BLD: 105.02 K/UL — CRITICAL HIGH (ref 4.8–10.8)
WBC # FLD AUTO: 105.02 K/UL — CRITICAL HIGH (ref 4.8–10.8)

## 2018-03-20 RX ADMIN — Medication 2 MILLIGRAM(S): at 18:34

## 2018-03-20 RX ADMIN — HALOPERIDOL DECANOATE 5 MILLIGRAM(S): 100 INJECTION INTRAMUSCULAR at 06:26

## 2018-03-20 RX ADMIN — CLOPIDOGREL BISULFATE 75 MILLIGRAM(S): 75 TABLET, FILM COATED ORAL at 11:35

## 2018-03-20 RX ADMIN — HALOPERIDOL DECANOATE 5 MILLIGRAM(S): 100 INJECTION INTRAMUSCULAR at 18:34

## 2018-03-20 RX ADMIN — INSULIN GLARGINE 12 UNIT(S): 100 INJECTION, SOLUTION SUBCUTANEOUS at 22:03

## 2018-03-20 RX ADMIN — Medication 4 UNIT(S): at 18:34

## 2018-03-20 RX ADMIN — Medication 2 MILLIGRAM(S): at 06:26

## 2018-03-20 RX ADMIN — Medication 81 MILLIGRAM(S): at 11:35

## 2018-03-20 RX ADMIN — ENOXAPARIN SODIUM 40 MILLIGRAM(S): 100 INJECTION SUBCUTANEOUS at 11:35

## 2018-03-20 RX ADMIN — Medication 300 MILLIGRAM(S): at 11:35

## 2018-03-20 RX ADMIN — Medication 4 UNIT(S): at 08:48

## 2018-03-20 RX ADMIN — Medication 137 MICROGRAM(S): at 06:26

## 2018-03-20 NOTE — PROGRESS NOTE BEHAVIORAL HEALTH - SUMMARY
Mr Deshpande is a 62 yr old man with a history of Paranoid Schizophrenia who presented to the ER for increasing disorganised behavior and thought. Patient was admitted to the inpatient psychiatric floor for medication management and symptoms stabilization, however the psychiatric hospitalization was cancelled as patient was found to have elevated white count , probable a progression of his CLL. Patient continues to be disorganised in thought and behavior with paranoid delusions. He appears to be thought blocking and has unpredictable impulse control.

## 2018-03-20 NOTE — PROGRESS NOTE BEHAVIORAL HEALTH - NSBHCONSULTMEDAGITATION_PSY_A_CORE FT
Haldol 5mg P.o Q 6 hrs PRN for agitation   Ativan 2mg P.o Q 6 hrs PRN for agitation  Benadryl 50mg P.o Q 6 hrs PRN  for agitation

## 2018-03-20 NOTE — CONSULT NOTE ADULT - ASSESSMENT
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   Plan:  -F/U OP  -Immature granulocyte count  -Peripheral smear  -Flow cytometry

## 2018-03-20 NOTE — CONSULT NOTE ADULT - SUBJECTIVE AND OBJECTIVE BOX
Patient is a 62y old male who presents with a chief complaint of Altered mental status (19 Mar 2018 23:15)      HPI:  62 year old M with h/o schizophrenia, parathyriod ca, CLL, sent in from [facility] for worsening mental status since 1 month. Pt reported to have increasing disorganized behaviors, self isolation, and combative at times and was deemed a danger to self and to others, so sent in to ED today for further eval. According to chart, pt had been refusing all his regular medications for past 1 month. Pt found to have elevated WBC count on initial labwork (115 up from 47 on 1/2018). No reports of any other symptoms including fever, chills, cough, chest pain, urinary sx, or diarrhea. (19 Mar 2018 23:15). History obtained from patient who is not a reliable historian. According to patient he only sees a psychiatrist and does not see a PCP or Oncologist. Family could not be reached to verify.       ROS  General: no fever, weight loss or fatigue  Skin/Breast: No itching, burning, rashes or lesions, no pain, masses or nipple discharge  Opthamologic: no eye pain, visual disturbance or discharge  ENMT: no difficulty hearing, tinnitus or vertigo; No sinus or throat pain, no pain or stiffness in neck  Respiratory and Thorax: no current SOB or wheezing  Cardiovascular: No chest pain, palpitations, dizziness or leg swelling  Gastrointestinal: No abdominal or epigastric pain. No nausea, vomiting, hematemesis, diarrhea or constipation. No melena or hematochezia.   Musculoskeletal: No joint pain or swelling; No muscle, back or extremity pain.  Neurological: No headaches, memory loss, loss of strength, numbness or tremors  Hematology/Lymphatics: No enlarged glands, no easy bruising, or bleeding gums  Endocrine: No heat/cold intolerance, no hair loss  Allergic/Immunologic: No hives or eczema  Allergies: NKDA    PAST MEDICAL & SURGICAL HISTORY:  CLL (chronic lymphocytic leukemia)  Parathyroid cancer  DM (diabetes mellitus)  Schizophrenia  No significant past surgical history      SOCIAL HISTORY: No EtOH, no smoke, no illicit drugs    FAMILY HISTORY:  No pertinent family history in first degree relatives      MEDICATIONS  (STANDING):  aspirin  chewable 81 milliGRAM(s) Oral daily  clopidogrel Tablet 75 milliGRAM(s) Oral daily  dextrose 5%. 1000 milliLiter(s) (50 mL/Hr) IV Continuous <Continuous>  dextrose 50% Injectable 12.5 Gram(s) IV Push once  dextrose 50% Injectable 25 Gram(s) IV Push once  dextrose 50% Injectable 25 Gram(s) IV Push once  docusate sodium 300 milliGRAM(s) Oral daily  enoxaparin Injectable 40 milliGRAM(s) SubCutaneous every 24 hours  fluticasone propionate   44 MICROgram(s) HFA Inhaler 1 Puff(s) Inhalation two times a day  haloperidol     Tablet 5 milliGRAM(s) Oral every 12 hours  insulin glargine Injectable (LANTUS) 12 Unit(s) SubCutaneous at bedtime  insulin lispro Injectable (HumaLOG) 4 Unit(s) SubCutaneous three times a day before meals  lactulose Syrup 20 Gram(s) Oral daily  levothyroxine 137 MICROGram(s) Oral daily  trihexyphenidyl 2 milliGRAM(s) Oral two times a day    MEDICATIONS  (PRN):  dextrose Gel 1 Dose(s) Oral once PRN Blood Glucose LESS THAN 70 milliGRAM(s)/deciliter  diphenhydrAMINE   Injectable 50 milliGRAM(s) IntraMuscular every 6 hours PRN Agitation  glucagon  Injectable 1 milliGRAM(s) IntraMuscular once PRN Glucose LESS THAN 70 milligrams/deciliter  haloperidol    Injectable 5 milliGRAM(s) IntraMuscular every 6 hours PRN Agitation  LORazepam   Injectable 2 milliGRAM(s) IntraMuscular every 6 hours PRN Agitation    Height (cm): 170 (03-19-18 @ 23:35)  Weight (kg): 70 (03-19-18 @ 23:35)  BMI (kg/m2): 24.2 (03-19-18 @ 23:35)  BSA (m2): 1.81 (03-19-18 @ 23:35)  Allergies    No Known Allergies    Intolerances        Vital Signs Last 24 Hrs  T(C): 36.6 (20 Mar 2018 07:22), Max: 37.9 (19 Mar 2018 17:14)  T(F): 97.9 (20 Mar 2018 07:22), Max: 100.3 (19 Mar 2018 17:14)  HR: 92 (20 Mar 2018 07:22) (83 - 94)  BP: 133/70 (20 Mar 2018 07:22) (109/60 - 133/70)  BP(mean): --  RR: 18 (20 Mar 2018 07:22) (18 - 18)  SpO2: 94% (20 Mar 2018 07:22) (94% - 99%)    PHYSICAL EXAM  General: NAD, well groomed, well developed  HEENT: Nontraumatic, normocephalic, No tonsillar erythema, exudates or enlargement;  Moist Mucous membranes, good dentition, no lesions  Lymph: no lymphadenopathy noted  Neck: supple, no JVD, normal thyroid  CV: normal S1/S2 with no murmur rubs or gallops  Lungs: Bilateral air entry, clear to auscultation, no wheezes, no rales, no stridor  Abdomen: soft non-tender non-distended, no hepatosplenomegaly, bowel sounds present  Ext: 2+ Peripheral pulses, no clubbing cyanosis or edema  Skin: no rashes, bruising on L knee and elbow from likely fall, patient could not verify  Neuro: alert and oriented X 3, no focal deficits, good concentration      LABS:                          10.6   x     )-----------( 155      ( 20 Mar 2018 09:07 )             34.8         Mean Cell Volume : 95.3 fL  Mean Cell Hemoglobin : 29.0 pg  Mean Cell Hemoglobin Concentration : 30.5 g/dL  Auto Neutrophil # : x  Auto Lymphocyte # : x  Auto Monocyte # : x  Auto Eosinophil # : x  Auto Basophil # : x  Auto Neutrophil % : x  Auto Lymphocyte % : x  Auto Monocyte % : x  Auto Eosinophil % : x  Auto Basophil % : x      Serial CBC's  03-20 @ 09:07  Hct-34.8 / Hgb-10.6 / Plat-155 / RBC-3.65 / WBC---  Serial CBC's  03-19 @ 16:13  Hct-34.8 / Hgb-10.5 / Plat-170 / RBC-3.62 / WBC-115.16      03-19    141  |  103  |  21<H>  ----------------------------<  92  4.2   |  22  |  0.9    Ca    8.7      19 Mar 2018 16:13    TPro  6.0  /  Alb  4.3  /  TBili  0.5  /  DBili  x   /  AST  54<H>  /  ALT  32  /  AlkPhos  67  03-19          Reticulocyte Percent: 2.1 % (03-20 @ 09:07)              BLOOD SMEAR INTERPRETATION:       RADIOLOGY & ADDITIONAL STUDIES:  CT Head No contrast: No acute intracranial pathology  Stable chronic infarcts and chronic microvascular ischemic changes.

## 2018-03-21 LAB
ALBUMIN SERPL ELPH-MCNC: 3.8 G/DL — SIGNIFICANT CHANGE UP (ref 3.5–5.2)
ALP SERPL-CCNC: 63 U/L — SIGNIFICANT CHANGE UP (ref 30–115)
ALT FLD-CCNC: 27 U/L — SIGNIFICANT CHANGE UP (ref 0–41)
ANION GAP SERPL CALC-SCNC: 13 MMOL/L — SIGNIFICANT CHANGE UP (ref 7–14)
AST SERPL-CCNC: 35 U/L — SIGNIFICANT CHANGE UP (ref 0–41)
BASOPHILS NFR BLD AUTO: 2.7 % — HIGH (ref 0–1)
BILIRUB DIRECT SERPL-MCNC: <0.2 MG/DL — SIGNIFICANT CHANGE UP (ref 0–0.2)
BILIRUB INDIRECT FLD-MCNC: >0.2 MG/DL — SIGNIFICANT CHANGE UP (ref 0.2–1.2)
BILIRUB SERPL-MCNC: 0.4 MG/DL — SIGNIFICANT CHANGE UP (ref 0.2–1.2)
BUN SERPL-MCNC: 15 MG/DL — SIGNIFICANT CHANGE UP (ref 10–20)
CALCIUM SERPL-MCNC: 8.2 MG/DL — LOW (ref 8.5–10.1)
CHLORIDE SERPL-SCNC: 102 MMOL/L — SIGNIFICANT CHANGE UP (ref 98–110)
CO2 SERPL-SCNC: 26 MMOL/L — SIGNIFICANT CHANGE UP (ref 17–32)
CREAT SERPL-MCNC: 0.7 MG/DL — SIGNIFICANT CHANGE UP (ref 0.7–1.5)
DIR ANTIGLOB POLYSPECIFIC INTERPRETATION: SIGNIFICANT CHANGE UP
EOSINOPHIL NFR BLD AUTO: 0 % — SIGNIFICANT CHANGE UP (ref 0–8)
FERRITIN SERPL-MCNC: 63 NG/ML — SIGNIFICANT CHANGE UP (ref 30–400)
FOLATE SERPL-MCNC: 11.5 NG/ML — SIGNIFICANT CHANGE UP (ref 4.8–24.2)
GLUCOSE SERPL-MCNC: 223 MG/DL — HIGH (ref 70–110)
HAPTOGLOB SERPL-MCNC: 146 MG/DL — SIGNIFICANT CHANGE UP (ref 34–200)
HCT VFR BLD CALC: 34.8 % — LOW (ref 42–52)
HGB BLD-MCNC: 10.6 G/DL — LOW (ref 14–18)
IRON SATN MFR SERPL: 25 UG/DL — LOW (ref 35–150)
IRON SATN MFR SERPL: 9 % — LOW (ref 15–50)
LDH SERPL L TO P-CCNC: 285 U/L — HIGH (ref 50–242)
LYMPHOCYTES # BLD AUTO: 77 % — HIGH (ref 20.5–51.1)
LYMPHOCYTES # BLD AUTO: 81.85 K/UL — HIGH (ref 1.2–3.4)
MCHC RBC-ENTMCNC: 29.3 PG — SIGNIFICANT CHANGE UP (ref 27–31)
MCHC RBC-ENTMCNC: 30.5 G/DL — LOW (ref 32–37)
MCV RBC AUTO: 96.1 FL — HIGH (ref 80–94)
MONOCYTES NFR BLD AUTO: 0 % — LOW (ref 1.7–9.3)
NEUTROPHILS # BLD AUTO: 11.48 K/UL — HIGH (ref 1.4–6.5)
NEUTROPHILS NFR BLD AUTO: 10.8 % — LOW (ref 42.2–75.2)
PLATELET # BLD AUTO: 157 K/UL — SIGNIFICANT CHANGE UP (ref 130–400)
POTASSIUM SERPL-MCNC: 4.9 MMOL/L — SIGNIFICANT CHANGE UP (ref 3.5–5)
POTASSIUM SERPL-SCNC: 4.9 MMOL/L — SIGNIFICANT CHANGE UP (ref 3.5–5)
PROT SERPL-MCNC: 5.5 G/DL — LOW (ref 6–8)
RBC # BLD: 3.62 M/UL — LOW (ref 4.7–6.1)
RBC # FLD: 15.7 % — HIGH (ref 11.5–14.5)
SODIUM SERPL-SCNC: 141 MMOL/L — SIGNIFICANT CHANGE UP (ref 135–146)
TIBC SERPL-MCNC: 292 UG/DL — SIGNIFICANT CHANGE UP (ref 220–430)
UIBC SERPL-MCNC: 267 UG/DL — SIGNIFICANT CHANGE UP (ref 110–370)
VIT B12 SERPL-MCNC: 1731 PG/ML — HIGH (ref 232–1245)
WBC # BLD: 106.3 K/UL — CRITICAL HIGH (ref 4.8–10.8)
WBC # FLD AUTO: 106.3 K/UL — CRITICAL HIGH (ref 4.8–10.8)

## 2018-03-21 RX ORDER — BUDESONIDE AND FORMOTEROL FUMARATE DIHYDRATE 160; 4.5 UG/1; UG/1
2 AEROSOL RESPIRATORY (INHALATION)
Qty: 0 | Refills: 0 | Status: DISCONTINUED | OUTPATIENT
Start: 2018-03-21 | End: 2018-03-23

## 2018-03-21 RX ADMIN — CLOPIDOGREL BISULFATE 75 MILLIGRAM(S): 75 TABLET, FILM COATED ORAL at 11:58

## 2018-03-21 RX ADMIN — Medication 2 MILLIGRAM(S): at 18:18

## 2018-03-21 RX ADMIN — BUDESONIDE AND FORMOTEROL FUMARATE DIHYDRATE 2 PUFF(S): 160; 4.5 AEROSOL RESPIRATORY (INHALATION) at 08:15

## 2018-03-21 RX ADMIN — Medication 2 MILLIGRAM(S): at 05:20

## 2018-03-21 RX ADMIN — Medication 137 MICROGRAM(S): at 05:19

## 2018-03-21 RX ADMIN — Medication 4 UNIT(S): at 08:15

## 2018-03-21 RX ADMIN — HALOPERIDOL DECANOATE 5 MILLIGRAM(S): 100 INJECTION INTRAMUSCULAR at 17:40

## 2018-03-21 RX ADMIN — Medication 81 MILLIGRAM(S): at 11:58

## 2018-03-21 RX ADMIN — BUDESONIDE AND FORMOTEROL FUMARATE DIHYDRATE 2 PUFF(S): 160; 4.5 AEROSOL RESPIRATORY (INHALATION) at 21:06

## 2018-03-21 RX ADMIN — HALOPERIDOL DECANOATE 5 MILLIGRAM(S): 100 INJECTION INTRAMUSCULAR at 05:20

## 2018-03-21 NOTE — PROGRESS NOTE ADULT - ASSESSMENT
62 year old M with h/o schizophrenia, parathyroid ca, CLL, sent in from [facility] for worsening mental status since 1 month in the context of medication non-adherence. Patient was to be admitted to P, however, was found to have  and admitted for Lymphocytosis.     1) Lymphocytosis:  -Urine Cx negative, CXR unremarkable, no evidence of acute underlying infectious etiology  -Empirically with IV abx  -Trend CBC with diff  -Heme-onc consult appreciated: They evaluated the patient, however, patient was seeing Dr. Clifton at [facility] in the past so consult placed for them.      2) DM II:  -Hold Januvia and Metformin   -Carb consistent diet     3) Schizophrenia, paranoid type:  -C/w with outpatient medications   -Continue with 1:1, Haldol PRN  -Psych consult appreciated, will follow    4) On Plavix/Asa  -Neurology consult to see if these medications are indicated as patient has poor compliance-risk of bleed if patient takes incorrectly 62 year old M with h/o schizophrenia, parathyroid ca, CLL, sent in from [facility] for worsening mental status since 1 month in the context of medication non-adherence. Patient was to be admitted to P, however, was found to have  and admitted for Lymphocytosis.     1) Chronic Lymphocytic Lymphoma:   2) Lymphocytosis:  -Urine Cx negative, CXR unremarkable, no evidence of acute underlying infectious etiology  -Trend CBC with diff  -Heme-onc consult appreciated: They evaluated the patient, however, patient was seeing Dr. Clifton at [facility] in the past so consult placed for them.      2) DM II:  -Hold Januvia and Metformin   -Carb consistent diet     3) Schizophrenia, paranoid type:  -C/w with outpatient medications   -Continue with 1:1, Haldol PRN  -Psych consult appreciated, will follow    4) Hx ofTIA:   -Neurology consult appreciated, Brain Ct showed evidence of microvascular diease,  continue with ASA and Plavix

## 2018-03-21 NOTE — PROGRESS NOTE ADULT - ASSESSMENT
1) Lymphocytosis:  -R/o underlying infectious etiology  -Empirically with IV abx  -F/u urine/blood culture  -Trend CBC with diff  -Heme-onc consult appreciated: They ordered labs, will follow patient     2) DM II:  -Hold Januvia and Metformin   -Carb consistent diet     3) Schizophrenia, paranoid type:  -C/w with outpatient medications   -Continue with 1:1, Haldol PRN  -Psych consult appreciated, will follow    4) On Plavix/Asa  -Neurology consult to see if these medications are indicated as patient has poor compliance-risk of bleed if patient takes incorrectly

## 2018-03-21 NOTE — CONSULT NOTE ADULT - ASSESSMENT
61 yo M with hx of schizophrenia, parathyroid cancer, and CLL who was sent from [facility] facility for worsening mental status of 1 month of duration. While at [facility], he had been refusing his regular meds. Neurology consulted to evaluate for clinical indication for ASA and plavix.     # Hx of CVA   - CT head significant for stable areas of chronic infarction within the right parietal and left frontal lobes. 61 yo M with hx of schizophrenia, parathyroid cancer, and CLL who was sent from [facility] facility for worsening mental status of 1 month of duration. While at [facility], he had been refusing his regular meds. Neurology consulted to evaluate for clinical indication for ASA and plavix.     # Hx of CVA   - CT head significant for stable areas of chronic infarction within the right parietal and left frontal lobes.    - Documents received from [facility] did not reveal any specific clinical reason for plavix and ASA.    - Pt does not have hx of HTN and not currently on any BP medications. Has hx of DLD. Due to CT of head showing multiple chronic infarcts, recommend continuing ASA and plavix for now. Attempt to contact PMD and psychiatrist at [facility] tomorrow for more information. 61 yo M with hx of schizophrenia, parathyroid cancer, and CLL who was sent from [facility] facility for worsening mental status of 1 month of duration. While at [facility], he had been refusing his regular meds. Neurology consulted to evaluate for clinical indication for ASA and plavix.     # Hx of CVA   - CT head significant for stable areas of chronic infarction within the right parietal and left frontal lobes.    - Documents received from [facility] did not reveal any specific clinical reason for plavix and ASA.    - Pt does not have hx of HTN and not currently on any BP medications. Has hx of DLD. Due to CT of head showing multiple chronic infarcts, recommend continuing antiplatelet therapy. Attempt to contact PMD and psychiatrist at [facility] tomorrow for more information.

## 2018-03-21 NOTE — CONSULT NOTE ADULT - ATTENDING COMMENTS
Patient is a 63 yo resident of [facility] who is a very poor historian. He carries prior h/o of CLL and likely was seen by oncology in the past. He appears to be asymptomatic. His WBC count is 105K with lymphocyte predominance. Exam reveals mobile cervical adenopathy.   Recommend obtaining flow and FISH for CLL, US abdomen and work up for anemia, including hemolytic panel, iron studies, B12, folate and MMA.   No indication to treat at this time. Will benefit from outpatient oncology follow up. May follow with oncologist he prior saw.   Recommend clarifying indications for ASA and Plavix and verifying necessity for combined antiplatelet therapy, consider neurology eval.
Patient interviewed and examined.  He is not aware of prior strokes but imaging including recent CT and prior brain MRI show two cortical infarcts in different vascular territories.  I do not have records to know if he failed a single antiplatelet agent and was subsequently started on a second or if he was place on both together.  I do not see mention made of atrial fibrillation but this should be checked for given the distribution of the strokes.  In the interim if there is significant risk for bleeding (theoretical or has he had bleeds in past?) one could give for example aspirin 81 mg daily to minimize bleeding risk, at expense of increased stroke risk.

## 2018-03-21 NOTE — PROGRESS NOTE ADULT - SUBJECTIVE AND OBJECTIVE BOX
SUBJECTIVE:    Patient is a 61 y/o Male who presents with a chief complaint of disorganized behavior, decompensated mental illness in the context of medication non-adherence. Found to have lymphocytosis and admitted to medicine for work up.    Patient is a poor historian due to psychiatric illness, unable to illicit reliable information. No acute overnight events, patient afebrile. Continues to be on 1:1.          PAST MEDICAL & SURGICAL HISTORY  CLL (chronic lymphocytic leukemia)  Parathyroid cancer  DM (diabetes mellitus)  Schizophrenia  No significant past surgical history    SOCIAL HISTORY:  Negative for smoking/alcohol/drug use.     ALLERGIES:  No Known Allergies    MEDICATIONS:  STANDING MEDICATIONS  aspirin  chewable 81 milliGRAM(s) Oral daily  buDESOnide 160 MICROgram(s)/formoterol 4.5 MICROgram(s) Inhaler 2 Puff(s) Inhalation two times a day  clopidogrel Tablet 75 milliGRAM(s) Oral daily  dextrose 5%. 1000 milliLiter(s) IV Continuous <Continuous>  dextrose 50% Injectable 12.5 Gram(s) IV Push once  dextrose 50% Injectable 25 Gram(s) IV Push once  dextrose 50% Injectable 25 Gram(s) IV Push once  docusate sodium 300 milliGRAM(s) Oral daily  enoxaparin Injectable 40 milliGRAM(s) SubCutaneous every 24 hours  haloperidol     Tablet 5 milliGRAM(s) Oral every 12 hours  insulin glargine Injectable (LANTUS) 12 Unit(s) SubCutaneous at bedtime  insulin lispro Injectable (HumaLOG) 4 Unit(s) SubCutaneous three times a day before meals  levothyroxine 137 MICROGram(s) Oral daily  trihexyphenidyl 2 milliGRAM(s) Oral two times a day    PRN MEDICATIONS  dextrose Gel 1 Dose(s) Oral once PRN  diphenhydrAMINE   Injectable 50 milliGRAM(s) IntraMuscular every 6 hours PRN  glucagon  Injectable 1 milliGRAM(s) IntraMuscular once PRN  haloperidol    Injectable 5 milliGRAM(s) IntraMuscular every 6 hours PRN  LORazepam   Injectable 2 milliGRAM(s) IntraMuscular every 6 hours PRN    VITALS:   T(F): 98.4  HR: 88  BP: 131/60  RR: 18  SpO2: 94%    LABS:                        10.6   106.30 )-----------( 157      ( 21 Mar 2018 09:03 )             34.8     03-21    141  |  102  |  15  ----------------------------<  223<H>  4.9   |  26  |  0.7    Ca    8.2<L>      21 Mar 2018 09:03    TPro  5.5<L>  /  Alb  3.8  /  TBili  0.4  /  DBili  <0.2  /  AST  35  /  ALT  27  /  AlkPhos  63  03-21      Urinalysis Basic - ( 19 Mar 2018 16:13 )    Color: Dark Yellow / Appearance: Cloudy / S.025 / pH: x  Gluc: x / Ketone: 15  / Bili: Small / Urobili: 0.2 mg/dL   Blood: x / Protein: Negative mg/dL / Nitrite: Negative   Leuk Esterase: Negative / RBC: 1-2 /HPF / WBC 3-5 /HPF   Sq Epi: x / Non Sq Epi: x / Bacteria: See Note            Culture - Urine (collected 19 Mar 2018 16:13)  Source: .Urine Clean Catch (Midstream)  Final Report (20 Mar 2018 20:49):    <10,000 CFU/ml    Normal Urogenital shimon present          RADIOLOGY:    PHYSICAL EXAM:  GEN: No acute distress  LUNGS: Clear to auscultation bilaterally   HEART: S1/S2 present. RRR.   ABD: Soft, non-tender, non-distended. Bowel sounds present  EXT: NC/NC/NE/2+PP/BOWDEN  NEURO: AAOX3

## 2018-03-21 NOTE — CONSULT NOTE ADULT - SUBJECTIVE AND OBJECTIVE BOX
61 yo M with hx of schizophrenia, parathyroid cancer, and CLL who was sent from [facility] for worsening mental status of 1 month of duration. While at [facility], he had been refusing his regular meds.         PAST MEDICAL & SURGICAL HISTORY:  CLL (chronic lymphocytic leukemia)  Parathyroid cancer  DM (diabetes mellitus)  Schizophrenia  No significant past surgical history      Lab:                          10.6   105.02 )-----------( 155      ( 20 Mar 2018 09:07 )             34.8     03-20    142  |  103  |  20  ----------------------------<  132<H>  4.1   |  23  |  0.6<L>    Ca    8.2<L>      20 Mar 2018 09:07    TPro  5.8<L>  /  Alb  4.0  /  TBili  0.6  /  DBili  x   /  AST  44<H>  /  ALT  30  /  AlkPhos  63  03-20      Imaging:    < from: CT Head No Cont (03.19.18 @ 19:53) >  1. No CT evidence for acute intracranial pathology.      2. Stable chronic infarcts and chronic microvascular ischemic changes.    < end of copied text > 63 yo M with hx of schizophrenia, parathyroid cancer, and CLL who was sent from [facility] for worsening mental status of 1 month of duration. While at [facility], he had been refusing his regular meds. Neurology was consulted to evaluate pt for clinical indication for ASA and plavix.     As per pt, he does not have hx of ischemic strokes or cardiac cath with stent placement; although, pt has been documented to be a poor historian. Also discussed about pt with RN at [facility] at [phone]. Due to inclement weather, was not able to speak with pt's medical doctor or psychiatrist at [facility] regarding the reason why pt was started on ASA and plavix. Documents that were faxed from [facility] also did not reveal any reasons for ASA and plavix.       Physical Exam:  General: Lying comfortably in the bed and in no acute distress.   Neuro: Awake and alert. 5/5 UE and 5/5 LE strength. No dysmetria or pronator drift present.   Cardiac: Regular rate and rhythm  Pulmonary: Clear to auscultation bilaterally.   Abdomen: Non-distended, soft, and nontender.   Peripheral: 2+ radial pulses and no peripheral edema present.     PAST MEDICAL & SURGICAL HISTORY:  CLL (chronic lymphocytic leukemia)  Parathyroid cancer  DM (diabetes mellitus)  Schizophrenia  No significant past surgical history      Lab:                          10.6   105.02 )-----------( 155      ( 20 Mar 2018 09:07 )             34.8     03-20    142  |  103  |  20  ----------------------------<  132<H>  4.1   |  23  |  0.6<L>    Ca    8.2<L>      20 Mar 2018 09:07    TPro  5.8<L>  /  Alb  4.0  /  TBili  0.6  /  DBili  x   /  AST  44<H>  /  ALT  30  /  AlkPhos  63  03-20      Imaging:    < from: CT Head No Cont (03.19.18 @ 19:53) >  1. No CT evidence for acute intracranial pathology.      2. Stable chronic infarcts and chronic microvascular ischemic changes.    < end of copied text >

## 2018-03-22 RX ORDER — NYSTATIN CREAM 100000 [USP'U]/G
1 CREAM TOPICAL
Qty: 0 | Refills: 0 | Status: DISCONTINUED | OUTPATIENT
Start: 2018-03-22 | End: 2018-03-23

## 2018-03-22 RX ADMIN — INSULIN GLARGINE 12 UNIT(S): 100 INJECTION, SOLUTION SUBCUTANEOUS at 21:16

## 2018-03-22 RX ADMIN — Medication 2 MILLIGRAM(S): at 17:17

## 2018-03-22 RX ADMIN — Medication 2 MILLIGRAM(S): at 05:16

## 2018-03-22 RX ADMIN — Medication 4 UNIT(S): at 17:18

## 2018-03-22 RX ADMIN — NYSTATIN CREAM 1 APPLICATION(S): 100000 CREAM TOPICAL at 17:17

## 2018-03-22 RX ADMIN — NYSTATIN CREAM 1 APPLICATION(S): 100000 CREAM TOPICAL at 05:17

## 2018-03-22 NOTE — PROGRESS NOTE ADULT - ASSESSMENT
62 year old M with h/o schizophrenia, parathyroid ca, CLL, sent in from [facility] for worsening mental status since 1 month in the context of medication non-adherence. Patient was to be admitted to P, however, was found to have  and admitted for Lymphocytosis. Hemodynamically stable, refusing meds.     1) Chronic Lymphocytic Lymphoma:   -Urine Cx negative, CXR unremarkable, no evidence of acute underlying infectious etiology  -Trend CBC with diff  -Heme-onc consult with Dr. Clifton's group pending.       2) DM II:  -Hold Januvia and Metformin   -Carb consistent diet     3) Schizophrenia, paranoid type:  -C/w with outpatient medications   -Continue with 1:1, Haldol PRN  -Psych consult appreciated, will follow    4) Hx of TIA:   -Neurology consult appreciated, Brain Ct showed evidence of microvascular diease,  continue with ASA and Plavix

## 2018-03-22 NOTE — PROGRESS NOTE ADULT - ASSESSMENT
1- CLL stage 0 most likely.  recently elev close to 50% in lymphocytosis  PB with blasts few  please send flow cytometry    2- no cytopenia    3- Schizophrenia.. not compliant to meds  on 1 to 1    4- will follow.

## 2018-03-22 NOTE — PROGRESS NOTE ADULT - SUBJECTIVE AND OBJECTIVE BOX
Patient is a 62y old  Male who presents with a chief complaint of Altered mental status (19 Mar 2018 23:15)  and noncompliant to his psychiatric meds. In course of hosptial, pt had elev wbc.  He is known for CLL base line wbc at 50K.. Now elev to >100K  Rest of CBC is unremarkable except PB show blasts.  No anemia or thrombocytopenia noted.  no sign of sepsis    PMHX   CLL (chronic lymphocytic leukemia)  Parathyroid cancer  DM (diabetes mellitus)  Schizophrenia    No Known Allergies     MEDICATIONS  (STANDING):  aspirin  chewable 81 milliGRAM(s) Oral daily  buDESOnide 160 MICROgram(s)/formoterol 4.5 MICROgram(s) Inhaler 2 Puff(s) Inhalation two times a day  clopidogrel Tablet 75 milliGRAM(s) Oral daily  dextrose 5%. 1000 milliLiter(s) (50 mL/Hr) IV Continuous <Continuous>  dextrose 50% Injectable 12.5 Gram(s) IV Push once  dextrose 50% Injectable 25 Gram(s) IV Push once  dextrose 50% Injectable 25 Gram(s) IV Push once  docusate sodium 300 milliGRAM(s) Oral daily  enoxaparin Injectable 40 milliGRAM(s) SubCutaneous every 24 hours  haloperidol     Tablet 5 milliGRAM(s) Oral every 12 hours  insulin glargine Injectable (LANTUS) 12 Unit(s) SubCutaneous at bedtime  insulin lispro Injectable (HumaLOG) 4 Unit(s) SubCutaneous three times a day before meals  levothyroxine 137 MICROGram(s) Oral daily  nystatin Powder 1 Application(s) Topical two times a day  trihexyphenidyl 2 milliGRAM(s) Oral two times a day    MEDICATIONS  (PRN):  dextrose Gel 1 Dose(s) Oral once PRN Blood Glucose LESS THAN 70 milliGRAM(s)/deciliter  diphenhydrAMINE   Injectable 50 milliGRAM(s) IntraMuscular every 6 hours PRN Agitation  glucagon  Injectable 1 milliGRAM(s) IntraMuscular once PRN Glucose LESS THAN 70 milligrams/deciliter  haloperidol    Injectable 5 milliGRAM(s) IntraMuscular every 6 hours PRN Agitation  LORazepam   Injectable 2 milliGRAM(s) IntraMuscular every 6 hours PRN Agitation      PHYSICAL EXAM:      Constitutional:NAD    Eyes: wnl     Respiratory:clear    Cardiovascular:nl s1 and s2     Gastrointestinal: soft     Extremities:no c/c/e    Neurological:    Skin:    Lymph Nodes:    Musculoskeletal:        CBC Full  -  ( 21 Mar 2018 09:03 )  WBC Count : 106.30 K/uL  Hemoglobin : 10.6 g/dL  Hematocrit : 34.8 %  Platelet Count - Automated : 157 K/uL  Mean Cell Volume : 96.1 fL  Mean Cell Hemoglobin : 29.3 pg  Mean Cell Hemoglobin Concentration : 30.5 g/dL  Auto Neutrophil # : 11.48 K/uL  Auto Lymphocyte # : 81.85 K/uL  Auto Monocyte # : x  Auto Eosinophil # : x  Auto Basophil # : x  Auto Neutrophil % : 10.8 %  Auto Lymphocyte % : 77.0 %  Auto Monocyte % : 0.0 %  Auto Eosinophil % : 0.0 %  Auto Basophil % : 2.7 %  03-21    141  |  102  |  15  ----------------------------<  223<H>  4.9   |  26  |  0.7    Ca    8.2<L>      21 Mar 2018 09:03    TPro  5.5<L>  /  Alb  3.8  /  TBili  0.4  /  DBili  <0.2  /  AST  35  /  ALT  27  /  AlkPhos  63  03-21  LIVER FUNCTIONS - ( 21 Mar 2018 09:03 )  Alb: 3.8 g/dL / Pro: 5.5 g/dL / ALK PHOS: 63 U/L / ALT: 27 U/L / AST: 35 U/L / GGT: x

## 2018-03-23 ENCOUNTER — TRANSCRIPTION ENCOUNTER (OUTPATIENT)
Age: 63
End: 2018-03-23

## 2018-03-23 ENCOUNTER — INPATIENT (INPATIENT)
Facility: HOSPITAL | Age: 63
LOS: 31 days | Discharge: HOME | End: 2018-04-24
Attending: PSYCHIATRY & NEUROLOGY | Admitting: PSYCHIATRY & NEUROLOGY

## 2018-03-23 VITALS
DIASTOLIC BLOOD PRESSURE: 72 MMHG | SYSTOLIC BLOOD PRESSURE: 139 MMHG | HEART RATE: 105 BPM | TEMPERATURE: 99 F | RESPIRATION RATE: 18 BRPM

## 2018-03-23 VITALS — TEMPERATURE: 96 F | HEIGHT: 67 IN | WEIGHT: 162.26 LBS | RESPIRATION RATE: 18 BRPM

## 2018-03-23 DIAGNOSIS — E11.9 TYPE 2 DIABETES MELLITUS WITHOUT COMPLICATIONS: ICD-10-CM

## 2018-03-23 DIAGNOSIS — F20.9 SCHIZOPHRENIA, UNSPECIFIED: ICD-10-CM

## 2018-03-23 LAB
ANION GAP SERPL CALC-SCNC: 12 MMOL/L — SIGNIFICANT CHANGE UP (ref 7–14)
ANISOCYTOSIS BLD QL: SLIGHT — SIGNIFICANT CHANGE UP
BASOPHILS # BLD AUTO: 0 K/UL — SIGNIFICANT CHANGE UP (ref 0–0.2)
BASOPHILS NFR BLD AUTO: 0 % — SIGNIFICANT CHANGE UP (ref 0–1)
BLASTS # FLD: 2.1 % — CRITICAL HIGH (ref 0–0)
BUN SERPL-MCNC: 17 MG/DL — SIGNIFICANT CHANGE UP (ref 10–20)
CALCIUM SERPL-MCNC: 8.8 MG/DL — SIGNIFICANT CHANGE UP (ref 8.5–10.1)
CHLORIDE SERPL-SCNC: 104 MMOL/L — SIGNIFICANT CHANGE UP (ref 98–110)
CO2 SERPL-SCNC: 27 MMOL/L — SIGNIFICANT CHANGE UP (ref 17–32)
CREAT SERPL-MCNC: 0.7 MG/DL — SIGNIFICANT CHANGE UP (ref 0.7–1.5)
EOSINOPHIL # BLD AUTO: 0 K/UL — SIGNIFICANT CHANGE UP (ref 0–0.7)
EOSINOPHIL NFR BLD AUTO: 0 % — SIGNIFICANT CHANGE UP (ref 0–8)
GIANT PLATELETS BLD QL SMEAR: PRESENT — SIGNIFICANT CHANGE UP
GLUCOSE SERPL-MCNC: 147 MG/DL — HIGH (ref 70–110)
HCT VFR BLD CALC: 38.2 % — LOW (ref 42–52)
HGB BLD-MCNC: 11.5 G/DL — LOW (ref 14–18)
LYMPHOCYTES # BLD AUTO: 71 % — HIGH (ref 20.5–51.1)
LYMPHOCYTES # BLD AUTO: 79.23 K/UL — HIGH (ref 1.2–3.4)
LYMPHOCYTES # SPEC AUTO: 1.1 % — HIGH (ref 0–0)
MCHC RBC-ENTMCNC: 28.8 PG — SIGNIFICANT CHANGE UP (ref 27–31)
MCHC RBC-ENTMCNC: 30.1 G/DL — LOW (ref 32–37)
MCV RBC AUTO: 95.7 FL — HIGH (ref 80–94)
MICROCYTES BLD QL: SLIGHT — SIGNIFICANT CHANGE UP
MONOCYTES # BLD AUTO: 0 K/UL — LOW (ref 0.1–0.6)
MONOCYTES NFR BLD AUTO: 0 % — LOW (ref 1.7–9.3)
MYELOCYTES NFR BLD: 4.3 % — HIGH (ref 0–0)
NEUTROPHILS # BLD AUTO: 15.62 K/UL — HIGH (ref 1.4–6.5)
NEUTROPHILS NFR BLD AUTO: 14 % — LOW (ref 42.2–75.2)
NRBC # BLD: 0 /100 WBCS — SIGNIFICANT CHANGE UP (ref 0–0)
PLAT MORPH BLD: NORMAL — SIGNIFICANT CHANGE UP
PLATELET # BLD AUTO: 186 K/UL — SIGNIFICANT CHANGE UP (ref 130–400)
POIKILOCYTOSIS BLD QL AUTO: SLIGHT — SIGNIFICANT CHANGE UP
POLYCHROMASIA BLD QL SMEAR: SIGNIFICANT CHANGE UP
POTASSIUM SERPL-MCNC: 5.4 MMOL/L — HIGH (ref 3.5–5)
POTASSIUM SERPL-SCNC: 5.4 MMOL/L — HIGH (ref 3.5–5)
PROMYELOCYTES # FLD: 2.1 % — HIGH (ref 0–0)
RBC # BLD: 3.99 M/UL — LOW (ref 4.7–6.1)
RBC # FLD: 15.8 % — HIGH (ref 11.5–14.5)
RBC BLD AUTO: SIGNIFICANT CHANGE UP
SMUDGE CELLS # BLD: PRESENT — SIGNIFICANT CHANGE UP
SODIUM SERPL-SCNC: 143 MMOL/L — SIGNIFICANT CHANGE UP (ref 135–146)
VARIANT LYMPHS # BLD: 5.4 % — HIGH (ref 0–5)
WBC # BLD: 111.59 K/UL — CRITICAL HIGH (ref 4.8–10.8)
WBC # FLD AUTO: 111.59 K/UL — CRITICAL HIGH (ref 4.8–10.8)

## 2018-03-23 RX ORDER — LEVOTHYROXINE SODIUM 125 MCG
137 TABLET ORAL DAILY
Qty: 0 | Refills: 0 | Status: DISCONTINUED | OUTPATIENT
Start: 2018-03-23 | End: 2018-03-24

## 2018-03-23 RX ORDER — LEVOTHYROXINE SODIUM 125 MCG
1 TABLET ORAL
Qty: 0 | Refills: 0 | COMMUNITY
Start: 2018-03-23

## 2018-03-23 RX ORDER — DOCUSATE SODIUM 100 MG
1 CAPSULE ORAL
Qty: 0 | Refills: 0 | COMMUNITY

## 2018-03-23 RX ORDER — HALOPERIDOL DECANOATE 100 MG/ML
1 INJECTION INTRAMUSCULAR
Qty: 0 | Refills: 0 | COMMUNITY
Start: 2018-03-23

## 2018-03-23 RX ORDER — HALOPERIDOL DECANOATE 100 MG/ML
5 INJECTION INTRAMUSCULAR EVERY 6 HOURS
Qty: 0 | Refills: 0 | Status: DISCONTINUED | OUTPATIENT
Start: 2018-03-23 | End: 2018-03-26

## 2018-03-23 RX ORDER — METOPROLOL TARTRATE 50 MG
25 TABLET ORAL DAILY
Qty: 0 | Refills: 0 | Status: DISCONTINUED | OUTPATIENT
Start: 2018-03-23 | End: 2018-04-24

## 2018-03-23 RX ORDER — INSULIN LISPRO 100/ML
VIAL (ML) SUBCUTANEOUS
Qty: 0 | Refills: 0 | Status: DISCONTINUED | OUTPATIENT
Start: 2018-03-23 | End: 2018-03-23

## 2018-03-23 RX ORDER — TRIHEXYPHENIDYL HCL 2 MG
1 TABLET ORAL
Qty: 0 | Refills: 0 | COMMUNITY
Start: 2018-03-23

## 2018-03-23 RX ORDER — TRIHEXYPHENIDYL HCL 2 MG
2 TABLET ORAL
Qty: 0 | Refills: 0 | Status: DISCONTINUED | OUTPATIENT
Start: 2018-03-23 | End: 2018-03-26

## 2018-03-23 RX ORDER — HALOPERIDOL DECANOATE 100 MG/ML
5 INJECTION INTRAMUSCULAR EVERY 12 HOURS
Qty: 0 | Refills: 0 | Status: DISCONTINUED | OUTPATIENT
Start: 2018-03-23 | End: 2018-03-26

## 2018-03-23 RX ORDER — CLOPIDOGREL BISULFATE 75 MG/1
75 TABLET, FILM COATED ORAL DAILY
Qty: 0 | Refills: 0 | Status: DISCONTINUED | OUTPATIENT
Start: 2018-03-23 | End: 2018-04-24

## 2018-03-23 RX ORDER — BUDESONIDE AND FORMOTEROL FUMARATE DIHYDRATE 160; 4.5 UG/1; UG/1
2 AEROSOL RESPIRATORY (INHALATION)
Qty: 0 | Refills: 0 | COMMUNITY
Start: 2018-03-23

## 2018-03-23 RX ORDER — LEVOTHYROXINE SODIUM 125 MCG
1 TABLET ORAL
Qty: 0 | Refills: 0 | COMMUNITY

## 2018-03-23 RX ORDER — DOCUSATE SODIUM 100 MG
3 CAPSULE ORAL
Qty: 0 | Refills: 0 | COMMUNITY
Start: 2018-03-23

## 2018-03-23 RX ADMIN — BUDESONIDE AND FORMOTEROL FUMARATE DIHYDRATE 2 PUFF(S): 160; 4.5 AEROSOL RESPIRATORY (INHALATION) at 08:25

## 2018-03-23 RX ADMIN — CLOPIDOGREL BISULFATE 75 MILLIGRAM(S): 75 TABLET, FILM COATED ORAL at 12:28

## 2018-03-23 RX ADMIN — Medication 4 UNIT(S): at 08:24

## 2018-03-23 RX ADMIN — NYSTATIN CREAM 1 APPLICATION(S): 100000 CREAM TOPICAL at 17:06

## 2018-03-23 RX ADMIN — Medication 4 UNIT(S): at 17:04

## 2018-03-23 RX ADMIN — HALOPERIDOL DECANOATE 5 MILLIGRAM(S): 100 INJECTION INTRAMUSCULAR at 05:17

## 2018-03-23 RX ADMIN — Medication 137 MICROGRAM(S): at 05:17

## 2018-03-23 RX ADMIN — Medication 2 MILLIGRAM(S): at 05:17

## 2018-03-23 RX ADMIN — Medication 300 MILLIGRAM(S): at 12:28

## 2018-03-23 RX ADMIN — Medication 4 UNIT(S): at 12:29

## 2018-03-23 RX ADMIN — Medication 2 MILLIGRAM(S): at 17:05

## 2018-03-23 RX ADMIN — ENOXAPARIN SODIUM 40 MILLIGRAM(S): 100 INJECTION SUBCUTANEOUS at 12:29

## 2018-03-23 RX ADMIN — Medication 2 MILLIGRAM(S): at 20:59

## 2018-03-23 RX ADMIN — Medication 81 MILLIGRAM(S): at 12:28

## 2018-03-23 RX ADMIN — NYSTATIN CREAM 1 APPLICATION(S): 100000 CREAM TOPICAL at 05:20

## 2018-03-23 NOTE — PROGRESS NOTE BEHAVIORAL HEALTH - NSBHCHARTREVIEWLAB_PSY_A_CORE FT
10.5   115.16 )-----------( 170      ( 19 Mar 2018 16:13 )             34.8     03-    141  |  103  |  21<H>  ----------------------------<  92  4.2   |  22  |  0.9    Ca    8.7      19 Mar 2018 16:13    TPro  6.0  /  Alb  4.3  /  TBili  0.5  /  DBili  x   /  AST  54<H>  /  ALT  32  /  AlkPhos  67        Urinalysis Basic - ( 19 Mar 2018 16:13 )    Color: Dark Yellow / Appearance: Cloudy / S.025 / pH: x  Gluc: x / Ketone: 15  / Bili: Small / Urobili: 0.2 mg/dL   Blood: x / Protein: Negative mg/dL / Nitrite: Negative   Leuk Esterase: Negative / RBC: 1-2 /HPF / WBC 3-5 /HPF   Sq Epi: x / Non Sq Epi: x / Bacteria: See Note
11.5   x     )-----------( 186      ( 23 Mar 2018 08:13 )             38.2

## 2018-03-23 NOTE — PROGRESS NOTE BEHAVIORAL HEALTH - RISK ASSESSMENT
At this time, pt is considered to be at risk to himself and others due to disorganization of thought, paranoia, unpredictable impulse control and poor insight. Pt will be transferred to [facility] when medically cleared pending bed availability.
At this time, patient is considered a danger to himself and others and needs inpatient psychiatric hospitalization upon medical and oncology clearance . In the mean time, patient will continue Haldol 5mg P.O BID for psychosis  and Artane 2mg P.o BID for EPS.

## 2018-03-23 NOTE — DISCHARGE NOTE ADULT - PLAN OF CARE
Heme/Onc evaluation 1) Patient evaluated by Heme/onc, no acute intervention at this time  2) Lymphocytosis without significant organomegaly, likely stage 0  3) Outpatient follow up with heme-onc. Patient used to follow with Dr. Cl[facility]  4) Spoke to Dr. Rivas, who is covering for Dr. Agee. As per her, they can follow up flow cytometry outpatient

## 2018-03-23 NOTE — PROGRESS NOTE BEHAVIORAL HEALTH - CASE SUMMARY
At this time, patient continues to be disorganised in thought and behavior, seems internally preoccupied, responding to internal stimuli . Although patient has not been agitated on the medical floor , he continues to be considered a danger to himself and others as his impulse control is unpredictable. Patient needs inpatient psychiatric hospitalization  for medication management and symptom stabilization.

## 2018-03-23 NOTE — PROGRESS NOTE BEHAVIORAL HEALTH - SUMMARY
62 yr old man with a history of Paranoid Schizophrenia who presented to ED for increasing disorganized behavior and thought in the setting of poor med adherence. Patient was to be admitted to IPP, however, was found to have  and admitted for Lymphocytosis. Per medicine team, pt is now hemodynamically stable. Patient continues to be disorganized in thought and behavior with paranoid delusions. He appears to be thought blocking and has unpredictable impulse control. Pt will be transferred to IPP when medically cleared pending bed availability. Continue Haldol and Artane at current doses. 62 yr old man with a history of Paranoid Schizophrenia who presented to ED for increasing disorganized behavior and thought in the setting of poor med adherence. Patient was to be admitted to IPP, however, was found to have  ( has CLL)  and admitted for Lymphocytosis. Per medicine team, pt is now hemodynamically stable. Patient continues to be disorganized in thought and behavior with paranoid delusions. He appears to be thought blocking and has unpredictable impulse control. Pt will be transferred to [facility] when medically cleared pending bed availability. Continue Haldol and Artane at current doses.

## 2018-03-23 NOTE — PATIENT PROFILE BEHAVIORAL HEALTH - NSBHHALLU__PSY_A_CORE
unable to assess pt. sleeping most of nite wakes up laughing to himself  loudly and then returns to sleep

## 2018-03-23 NOTE — PROGRESS NOTE BEHAVIORAL HEALTH - NSBHFUPINTERVALHXFT_PSY_A_CORE
Patient seen and interviewed. He reports that he had good sleep and ate breakfast this morning. Patient was observed to be rubbing his fingers and when asked why he was doing this , he states " I need penicillin". When asked why he needs penicillin. patient did not give an answer , but continued to stare at writer. Patient did not continue to engage in the interview.  According to medical team, patient was admitted because his white count had increased to 115 from his baseline of 4-60. patient has a diagnosis of CLL and may be having a decompensation or progression and is currently awaiting Hem onc consult.
Pt took Haldol and Artane this morning. Per medicine team, pt is medically stable for transfer to [facility] pending bed availability. Pt remains disorganized, poorly adherent with meds, with loosening of associations and is a poor historian.    Heme onc and Neuro consults reviewed.

## 2018-03-23 NOTE — DISCHARGE NOTE ADULT - CARE PROVIDER_API CALL
Amari Clifton), Hematology; Medical Oncology  [id number]  [place] [address]  Phone: [phone]  Fax: [phone]

## 2018-03-23 NOTE — DISCHARGE NOTE ADULT - HOSPITAL COURSE
62 year old M with h/o schizophrenia, parathyroid ca, CLL, sent in from [facility] for worsening mental status since 1 month in the context of medication non-adherence. Patient was to be admitted to IPP, however, was found to have  and admitted for Lymphocytosis.     1) Lymphocytosis:  -Urine Cx negative, CXR unremarkable, no evidence of acute underlying infectious etiology  -Heme-onc consult appreciated: Cleared by them, can follow up outpatient     2) DM II:  -C/w home medications Januvia and Metformin   -Carb consistent diet     3) Schizophrenia, paranoid type:  -Patient to be transferred to IPP at [facility]     4) H/o CVA:  - C/w  Plavix/Asa given evidence of multiple chronic infarcts on head CT 62 year old M with h/o schizophrenia, parathyroid ca, CLL, sent in from [facility] for worsening mental status since 1 month in the context of medication non-adherence. Patient was to be admitted to IPP, however, was found to have  and admitted for Lymphocytosis.     1) Lymphocytosis:  -Urine Cx negative, CXR unremarkable, no evidence of acute underlying infectious etiology  -Heme-onc consult appreciated: Cleared by them, can follow up outpatient     2) DM II:  -C/w home medications Januvia and Metformin   -Carb consistent diet     3) Schizophrenia, paranoid type:  -Patient to be transferred to IPP at [facility] today     4) H/o CVA:  - C/w  Plavix/Asa given evidence of multiple chronic infarcts on head CT

## 2018-03-23 NOTE — DISCHARGE NOTE ADULT - PATIENT PORTAL LINK FT
You can access the [facility] Patient Portal, offered by [facility], by registering with the following website: [email or web address]

## 2018-03-23 NOTE — H&P ADULT - HISTORY OF PRESENT ILLNESS
62 years old male , patient has pmh of schizophrenia , patient admitted to [facility] for pneumonia and d/zuleyma today to admit to [facility] ipp floor , patient was refused to take all his psychiatric medications for one month prior to admit to [facility] .

## 2018-03-23 NOTE — PROGRESS NOTE ADULT - SUBJECTIVE AND OBJECTIVE BOX
Discharge note  TEDDY ADAIR  [facility] T2-3A 017 [address] ([facility] T2-3A)            Patient was evaluated and examined  by bedside, no active issues, patient remains medically stable.  awaiting to be d/c to inpatient psychiatric facility today        REVIEW OF SYSTEMS:  please see pertinent positives mentioned above, all other 12 ROS negative      T(C): , Max: 37.2 (03-22-18 @ 21:08)  HR: 84 (03-23-18 @ 05:36)  BP: 131/60 (03-23-18 @ 05:36)  RR: 20 (03-23-18 @ 05:36)  SpO2: --  CAPILLARY BLOOD GLUCOSE  99 (23 Mar 2018 11:10)  127 (23 Mar 2018 07:13)  120 (22 Mar 2018 21:08)          PHYSICAL EXAM:  General: NAD, Awake, following verbal commands,  patient is laying comfortably in bed  HEENT: AT, NC, Supple, NO JVD, NO CB  Lungs: CTA B/L, no wheezing, no rhonchi  CVS: normal S1, S2, RRR, NO M/G/R  Abdomen: soft, bowel sounds present, non-tender, non-distended  Extremities: no edema, no clubbing, no cyanosis, positive peripheral pulses b/l  Neuro: no acute focal neurological deficits  Skin: no rush, no ecchymosis      LAB  CBC  Date: 03-23-18 @ 08:13  Mean cell Exjmalthbg27.8  Mean cell Hemoglobin Conc30.1  Mean cell Volum 95.7  Platelet count-Automate 186  RBC Count 3.99  Red Cell Distrib Width15.8  WBC Jfnxs237.59  % Albumin, Urine--  Hematocrit 38.2  Hemoglobin 11.5  CBC  Date: 03-21-18 @ 09:03  Mean cell Mypinlxwnw02.3  Mean cell Hemoglobin Conc30.5  Mean cell Volum 96.1  Platelet count-Automate 157  RBC Count 3.62  Red Cell Distrib Width15.7  WBC Xzimh924.30  % Albumin, Urine--  Hematocrit 34.8  Hemoglobin 10.6  CBC  Date: 03-20-18 @ 23:08  Mean cell Hemoglobin--  Mean cell Hemoglobin Conc--  Mean cell Volum --  Platelet count-Automate --  RBC Count 3.35  Red Cell Distrib Width--  WBC Count--  % Albumin, Urine--  Hematocrit --  Hemoglobin --  CBC  Date: 03-20-18 @ 09:07  Mean cell Nozfknedmn50.0  Mean cell Hemoglobin Conc30.5  Mean cell Volum 95.3  Platelet count-Automate 155  RBC Count 3.65  Red Cell Distrib Width15.9  WBC Ljimk105.02  % Albumin, Urine--  Hematocrit 34.8  Hemoglobin 10.6  CBC  Date: 03-19-18 @ 16:13  Mean cell Ewhbolsgff64.0  Mean cell Hemoglobin Conc30.2  Mean cell Volum 96.1  Platelet count-Automate 170  RBC Count 3.62  Red Cell Distrib Width15.7  WBC Rlacm642.16  % Albumin, Urine--  Hematocrit 34.8  Hemoglobin 10.5    [facility]  03-23-18 @ 08:13  Blood Gas Arterial-Calcium,Ionized--  Blood Urea Nitrogen, Serum 17 mg/dL [10 - 20]  Carbon Dioxide, Serum27 mmol/L [17 - 32]  Chloride, Whvgv661 mmol/L [98 - 110]  Creatinie, Serum0.7 mg/dL [0.7 - 1.5]  Glucose, Gubwj536 mg/dL<H> [70 - 110]  Potassium, Serum5.4 mmol/L<H> [3.5 - 5.0]  Sodium, Serum 143 mmol/L [135 - 146]  [facility]  03-21-18 @ 09:03  Blood Gas Arterial-Calcium,Ionized--  Blood Urea Nitrogen, Serum 15 mg/dL [10 - 20]  Carbon Dioxide, Serum26 mmol/L [17 - 32]  Chloride, Ghvhg244 mmol/L [98 - 110]  Creatinie, Serum0.7 mg/dL [0.7 - 1.5]  Glucose, Fuufd852 mg/dL<H> [70 - 110]  Potassium, Serum4.9 mmol/L [3.5 - 5.0]  Sodium, Serum 141 mmol/L [135 - 146]  [facility]  03-20-18 @ 09:07  Blood Gas Arterial-Calcium,Ionized--  Blood Urea Nitrogen, Serum 20 mg/dL [10 - 20]  Carbon Dioxide, Serum23 mmol/L [17 - 32]  Chloride, Zckzk086 mmol/L [98 - 110]  Creatinie, Serum0.6 mg/dL<L> [0.7 - 1.5]  Glucose, Jkjgv915 mg/dL<H> [70 - 110]  Potassium, Serum4.1 mmol/L [3.5 - 5.0]  Sodium, Serum 142 mmol/L [135 - 146]  [facility]  03-19-18 @ 16:13  Blood Gas Arterial-Calcium,Ionized--  Blood Urea Nitrogen, Serum 21 mg/dL<H> [10 - 20]  Carbon Dioxide, Serum22 mmol/L [17 - 32]  Chloride, Iomnv860 mmol/L [98 - 110]  Creatinie, Serum0.9 mg/dL [0.7 - 1.5]  Glucose, Serum92 mg/dL [70 - 110]  Potassium, Serum4.2 mmol/L [3.5 - 5.0]  Sodium, Serum 141 mmol/L [135 - 146]              Microbiology:    Culture - Blood (collected 03-20-18 @ 09:07)  Source: .Blood None  Preliminary Report (03-21-18 @ 15:04):    No growth to date.    Culture - Urine (collected 03-19-18 @ 16:13)  Source: .Urine Clean Catch (Midstream)  Final Report (03-20-18 @ 20:49):    <10,000 CFU/ml    Normal Urogenital shimon present              Medications:  aspirin  chewable 81 milliGRAM(s) Oral daily  buDESOnide 160 MICROgram(s)/formoterol 4.5 MICROgram(s) Inhaler 2 Puff(s) Inhalation two times a day  clopidogrel Tablet 75 milliGRAM(s) Oral daily  dextrose 5%. 1000 milliLiter(s) IV Continuous <Continuous>  dextrose 50% Injectable 12.5 Gram(s) IV Push once  dextrose 50% Injectable 25 Gram(s) IV Push once  dextrose 50% Injectable 25 Gram(s) IV Push once  dextrose Gel 1 Dose(s) Oral once PRN  diphenhydrAMINE   Injectable 50 milliGRAM(s) IntraMuscular every 6 hours PRN  docusate sodium 300 milliGRAM(s) Oral daily  enoxaparin Injectable 40 milliGRAM(s) SubCutaneous every 24 hours  glucagon  Injectable 1 milliGRAM(s) IntraMuscular once PRN  haloperidol     Tablet 5 milliGRAM(s) Oral every 12 hours  haloperidol    Injectable 5 milliGRAM(s) IntraMuscular every 6 hours PRN  insulin glargine Injectable (LANTUS) 12 Unit(s) SubCutaneous at bedtime  insulin lispro (HumaLOG) corrective regimen sliding scale   SubCutaneous three times a day before meals  insulin lispro Injectable (HumaLOG) 4 Unit(s) SubCutaneous three times a day before meals  levothyroxine 137 MICROGram(s) Oral daily  LORazepam   Injectable 2 milliGRAM(s) IntraMuscular every 6 hours PRN  nystatin Powder 1 Application(s) Topical two times a day  trihexyphenidyl 2 milliGRAM(s) Oral two times a day        Assessment and Plan:  1) Chronic Lymphocytic Lymphoma:   -Urine Cx negative, CXR unremarkable, no evidence of acute underlying infectious etiology  -Trend CBC with diff post d/c   -Heme-onc consult with Dr. Clifton's group has evaluated pt. flow cytometry results pending ,will be followed as outpatient.       2) DM II:  -on insulin tx.  -Carb consistent diet     3) Schizophrenia, paranoid type:  -C/w with outpatient medications   -Continue with 1:1, Haldol PRN  -Psych evaluation completed patient is being d/c to inpatient psychiatric facility.    4) Hx of TIA:   - Brain Ct showed evidence of microvascular diease,  continue with ASA and Plavix    Patient is medically stable to be d/c to inpatient psychiatric facility

## 2018-03-23 NOTE — DISCHARGE NOTE ADULT - CARE PLAN
Principal Discharge DX:	CLL (chronic lymphocytic leukemia)  Goal:	Heme/Onc evaluation  Assessment and plan of treatment:	1) Patient evaluated by Heme/onc, no acute intervention at this time  2) Lymphocytosis without significant organomegaly, likely stage 0  3) Outpatient follow up with heme-onc. Patient used to follow with Dr. Clifton at [facility]  4) Spoke to Dr. Rivas, who is covering for Dr. Agee. As per her, they can follow up flow cytometry outpatient

## 2018-03-23 NOTE — DISCHARGE NOTE ADULT - MEDICATION SUMMARY - MEDICATIONS TO TAKE
I will START or STAY ON the medications listed below when I get home from the hospital:    aspirin 81 mg oral tablet, chewable  -- 1 tab(s) by mouth once a day  -- Indication: For Cerebrovascular accident, old    Januvia 100 mg oral tablet  -- 1 tab(s) by mouth once a day  -- Indication: For DM (diabetes mellitus)    Lipitor 10 mg oral tablet  -- 1 tab(s) by mouth once a day  -- Indication: For HLD    trihexyphenidyl 2 mg oral tablet  -- 1 tab(s) by mouth 2 times a day  -- Indication: For EPS    Plavix 75 mg oral tablet  -- 1 tab(s) by mouth once a day  -- Indication: For Cerebrovascular accident, old    haloperidol 5 mg oral tablet  -- 1 tab(s) by mouth every 12 hours  -- Indication: For Schizophrenia    haloperidol  -- 1 tab(s) by mouth 2 times a day  -- Indication: For Schizophrenia    metoprolol succinate 25 mg oral tablet, extended release  -- 1 tab(s) by mouth once a day  -- Indication: For HTN    budesonide-formoterol 160 mcg-4.5 mcg/inh inhalation aerosol  -- 2 puff(s) inhaled 2 times a day  -- Indication: For Asthma    docusate sodium 100 mg oral capsule  -- 3 cap(s) by mouth once a day  -- Indication: For Stool softner     levothyroxine 137 mcg (0.137 mg) oral tablet  -- 1 tab(s) by mouth once a day  -- Indication: For Hypothyroid

## 2018-03-23 NOTE — PROGRESS NOTE BEHAVIORAL HEALTH - NSBHCHARTREVIEWVS_PSY_A_CORE FT
Vital Signs Last 24 Hrs  T(C): 36.6 (20 Mar 2018 07:22), Max: 37.9 (19 Mar 2018 17:14)  T(F): 97.9 (20 Mar 2018 07:22), Max: 100.3 (19 Mar 2018 17:14)  HR: 92 (20 Mar 2018 07:22) (83 - 94)  BP: 133/70 (20 Mar 2018 07:22) (109/60 - 133/70)  BP(mean): --  RR: 18 (20 Mar 2018 07:22) (18 - 18)  SpO2: 94% (20 Mar 2018 07:22) (94% - 99%)
ICU Vital Signs Last 24 Hrs  T(C): 36.3 (23 Mar 2018 05:36), Max: 37.2 (22 Mar 2018 10:55)  T(F): 97.3 (23 Mar 2018 05:36), Max: 99 (22 Mar 2018 10:55)  HR: 84 (23 Mar 2018 05:36) (84 - 96)  BP: 131/60 (23 Mar 2018 05:36) (113/59 - 158/72)  BP(mean): --  ABP: --  ABP(mean): --  RR: 20 (23 Mar 2018 05:36) (18 - 20)  SpO2: --

## 2018-03-24 DIAGNOSIS — E11.9 TYPE 2 DIABETES MELLITUS WITHOUT COMPLICATIONS: ICD-10-CM

## 2018-03-24 DIAGNOSIS — F20.9 SCHIZOPHRENIA, UNSPECIFIED: ICD-10-CM

## 2018-03-24 DIAGNOSIS — C91.10 CHRONIC LYMPHOCYTIC LEUKEMIA OF B-CELL TYPE NOT HAVING ACHIEVED REMISSION: ICD-10-CM

## 2018-03-24 RX ORDER — LEVOTHYROXINE SODIUM 125 MCG
137 TABLET ORAL DAILY
Qty: 0 | Refills: 0 | Status: DISCONTINUED | OUTPATIENT
Start: 2018-03-24 | End: 2018-04-01

## 2018-03-24 RX ORDER — LEVOTHYROXINE SODIUM 125 MCG
150 TABLET ORAL ONCE
Qty: 0 | Refills: 0 | Status: DISCONTINUED | OUTPATIENT
Start: 2018-03-24 | End: 2018-03-24

## 2018-03-24 RX ADMIN — Medication 2 MILLIGRAM(S): at 09:04

## 2018-03-24 RX ADMIN — Medication 2 MILLIGRAM(S): at 20:20

## 2018-03-24 RX ADMIN — CLOPIDOGREL BISULFATE 75 MILLIGRAM(S): 75 TABLET, FILM COATED ORAL at 09:03

## 2018-03-24 RX ADMIN — Medication 137 MICROGRAM(S): at 06:15

## 2018-03-24 RX ADMIN — Medication 25 MILLIGRAM(S): at 09:04

## 2018-03-24 NOTE — PROGRESS NOTE ADULT - SUBJECTIVE AND OBJECTIVE BOX
seen , discussed with staff, chart reviewed,  unable to document on Behavior health progress note  due computer problem. no acute event overnight   pt is doing well , compliant with medications and denies side effects.    mood is neutral , affect  constricted  .denies suicidal/homicidal thoughts. denies auditory /visual hallucinations   thought process linear    plan : continue current treatment plan .             psychoeducation

## 2018-03-24 NOTE — CONSULT NOTE ADULT - SUBJECTIVE AND OBJECTIVE BOX
TEDDY ADAIR  62y  Male      Patient is a 62y old  Male who presents with a chief complaint of pt. has not taken his meds. at [facility] for 1 month. pt. was violent and aggressive at (23 Mar 2018 21:26)    HPI:  62 years old male , patient has pmh of schizophrenia , patient admitted to [facility] for pneumonia and d/zuleyma today to admit to [facility] ipp floor , patient was refused to take all his psychiatric medications for one month prior to admit to [facility] . (23 Mar 2018 19:56)  stabel sitting up no complinats in nad    INTERVAL HPI/OVERNIGHT EVENTS:  HEALTH ISSUES - PROBLEM Dx:  Schizophrenia: Schizophrenia  DM (diabetes mellitus): DM (diabetes mellitus)        PAST MEDICAL & SURGICAL HISTORY:  CLL (chronic lymphocytic leukemia)  Parathyroid cancer  DM (diabetes mellitus)  Schizophrenia  No significant past surgical history    FAMILY HISTORY:  No pertinent family history in first degree relatives    MEDICATIONS  (STANDING):  clopidogrel Tablet 75 milliGRAM(s) Oral daily  haloperidol     Tablet 5 milliGRAM(s) Oral every 12 hours  metoprolol succinate ER 25 milliGRAM(s) Oral daily  trihexyphenidyl 2 milliGRAM(s) Oral two times a day    MEDICATIONS  (PRN):  haloperidol     Tablet 5 milliGRAM(s) Oral every 6 hours PRN agitation      Allergies    No Known Allergies    Intolerances    REVIEW OF SYSTEMS:  CONSTITUTIONAL: No fever, weight loss, or fatigue  EYES: No eye pain, visual disturbances, or discharge  ENMT:  No difficulty hearing, tinnitus, vertigo; No sinus or throat pain  NECK: No pain or stiffness  BREASTS: No pain, masses, or nipple discharge  RESPIRATORY: No cough, wheezing, chills or hemoptysis; No shortness of breath  CARDIOVASCULAR: No chest pain, palpitations, dizziness, or leg swelling  GASTROINTESTINAL: No abdominal or epigastric pain. No nausea, vomiting, or hematemesis; No diarrhea or constipation. No melena or hematochezia.  GENITOURINARY: No dysuria, frequency, hematuria, or incontinence  NEUROLOGICAL: No headaches, memory loss, loss of strength, numbness, or tremors  SKIN: No itching, burning, rashes, or lesions   LYMPH NODES: No enlarged glands  ENDOCRINE: No heat or cold intolerance; No hair loss  MUSCULOSKELETAL: No joint pain or swelling; No muscle, back, or extremity pain  PSYCHIATRIC: No depression, anxiety, mood swings, or difficulty sleeping  HEME/LYMPH: No easy bruising, or bleeding gums  ALLERY AND IMMUNOLOGIC: No hives or eczema    T(C): 36.9 (03-24-18 @ 06:28), Max: 37.1 (03-23-18 @ 13:26)  HR: 90 (03-24-18 @ 06:28) (90 - 105)  BP: 140/63 (03-24-18 @ 06:28) (139/72 - 140/63)  RR: 20 (03-24-18 @ 06:28) (18 - 20)  SpO2: --  Wt(kg): --Vital Signs Last 24 Hrs  T(C): 36.9 (24 Mar 2018 06:28), Max: 37.1 (23 Mar 2018 13:26)  T(F): 98.4 (24 Mar 2018 06:28), Max: 98.7 (23 Mar 2018 13:26)  HR: 90 (24 Mar 2018 06:28) (90 - 105)  BP: 140/63 (24 Mar 2018 06:28) (139/72 - 140/63)  BP(mean): --  RR: 20 (24 Mar 2018 06:28) (18 - 20)  SpO2: --    PHYSICAL EXAM:  GENERAL: NAD, well-groomed, well-developed  HEAD:  Atraumatic, Normocephalic  EYES: EOMI, PERRLA, conjunctiva and sclera clear  ENMT: No tonsillar erythema, exudates, or enlargement; Moist mucous membranes, Good dentition, No lesions  NECK: Supple, No JVD, Normal thyroid  NERVOUS SYSTEM:  Alert & Oriented X3, Good concentration; Motor Strength 5/5 B/L upper and lower extremities; DTRs 2+ intact and symmetric  CHEST/LUNG: Clear to percussion bilaterally; No rales, rhonchi, wheezing, or rubs  HEART: Regular rate and rhythm; No murmurs, rubs, or gallops  ABDOMEN: Soft, Nontender, Nondistended; Bowel sounds present  EXTREMITIES:  2+ Peripheral Pulses, No clubbing, cyanosis, or edema  LYMPH: No lymphadenopathy noted  SKIN: No rashes or lesions    Consultant(s) Notes Reviewed:  [x ] YES  [ ] NO  Care Discussed with Consultants/Other Providers [ x] YES  [ ] NO    LABS:                        11.5   111.59 )-----------( 186      ( 23 Mar 2018 08:13 )             38.2     03-23    143  |  104  |  17  ----------------------------<  147<H>  5.4<H>   |  27  |  0.7    Ca    8.8      23 Mar 2018 08:13          CAPILLARY BLOOD GLUCOSE  142 (23 Mar 2018 17:00)  99 (23 Mar 2018 11:10)                RADIOLOGY & ADDITIONAL TESTS:    Imaging Personally Reviewed:  [ ] YES  [ ] NO

## 2018-03-25 LAB
CULTURE RESULTS: SIGNIFICANT CHANGE UP
SPECIMEN SOURCE: SIGNIFICANT CHANGE UP

## 2018-03-25 RX ADMIN — Medication 2 MILLIGRAM(S): at 08:20

## 2018-03-25 RX ADMIN — Medication 2 MILLIGRAM(S): at 20:19

## 2018-03-25 RX ADMIN — Medication 25 MILLIGRAM(S): at 08:20

## 2018-03-25 RX ADMIN — Medication 137 MICROGRAM(S): at 06:32

## 2018-03-25 RX ADMIN — CLOPIDOGREL BISULFATE 75 MILLIGRAM(S): 75 TABLET, FILM COATED ORAL at 08:20

## 2018-03-26 RX ORDER — BUDESONIDE AND FORMOTEROL FUMARATE DIHYDRATE 160; 4.5 UG/1; UG/1
2 AEROSOL RESPIRATORY (INHALATION)
Qty: 0 | Refills: 0 | Status: DISCONTINUED | OUTPATIENT
Start: 2018-03-26 | End: 2018-04-24

## 2018-03-26 RX ORDER — OLANZAPINE 15 MG/1
5 TABLET, FILM COATED ORAL AT BEDTIME
Qty: 0 | Refills: 0 | Status: DISCONTINUED | OUTPATIENT
Start: 2018-03-26 | End: 2018-03-28

## 2018-03-26 RX ORDER — ATORVASTATIN CALCIUM 80 MG/1
10 TABLET, FILM COATED ORAL AT BEDTIME
Qty: 0 | Refills: 0 | Status: DISCONTINUED | OUTPATIENT
Start: 2018-03-26 | End: 2018-04-24

## 2018-03-26 RX ORDER — ASPIRIN/CALCIUM CARB/MAGNESIUM 324 MG
81 TABLET ORAL DAILY
Qty: 0 | Refills: 0 | Status: DISCONTINUED | OUTPATIENT
Start: 2018-03-26 | End: 2018-04-24

## 2018-03-26 RX ORDER — DIPHENHYDRAMINE HCL 50 MG
50 CAPSULE ORAL EVERY 4 HOURS
Qty: 0 | Refills: 0 | Status: DISCONTINUED | OUTPATIENT
Start: 2018-03-26 | End: 2018-03-28

## 2018-03-26 RX ORDER — DOCUSATE SODIUM 100 MG
100 CAPSULE ORAL THREE TIMES A DAY
Qty: 0 | Refills: 0 | Status: DISCONTINUED | OUTPATIENT
Start: 2018-03-26 | End: 2018-04-24

## 2018-03-26 RX ORDER — HALOPERIDOL DECANOATE 100 MG/ML
5 INJECTION INTRAMUSCULAR EVERY 6 HOURS
Qty: 0 | Refills: 0 | Status: DISCONTINUED | OUTPATIENT
Start: 2018-03-26 | End: 2018-04-24

## 2018-03-26 RX ADMIN — CLOPIDOGREL BISULFATE 75 MILLIGRAM(S): 75 TABLET, FILM COATED ORAL at 08:49

## 2018-03-26 RX ADMIN — Medication 137 MICROGRAM(S): at 08:49

## 2018-03-26 RX ADMIN — Medication 100 MILLIGRAM(S): at 20:22

## 2018-03-26 RX ADMIN — Medication 2 MILLIGRAM(S): at 08:47

## 2018-03-26 RX ADMIN — OLANZAPINE 5 MILLIGRAM(S): 15 TABLET, FILM COATED ORAL at 20:21

## 2018-03-26 RX ADMIN — ATORVASTATIN CALCIUM 10 MILLIGRAM(S): 80 TABLET, FILM COATED ORAL at 20:21

## 2018-03-26 RX ADMIN — Medication 25 MILLIGRAM(S): at 08:48

## 2018-03-26 NOTE — BEHAVIORAL HEALTH ASSESSMENT NOTE - SUMMARY
62 y male with paranoid schizophrenia, CLL, multiple TIAs, brought in to the ED for disorganized behavior at home, admitted to the medical service for leukocytosis, transferred to [facility] 1 week later. Presently patient shows illogical, disorganized thought process, paranoid delusions, disinhibition and disorientation. He failed a Clozaril trial due to noncompliance, therefore will initiate a zyprexa trial as Zyprexa is similar to clozaril in structure. Will monitor for anticholinergic side effects.     Plan  Paranoid schizophrenia  -start zyprexa 5mg   -prn Haldol 5mg, benadryl 50mg for agitation  -obtain neuro consult for dementia work up as patient is disoriented and not at baseline    CLL  -follow up on flow cytometry    DM  -restart Januvia  finger sticks

## 2018-03-26 NOTE — BEHAVIORAL HEALTH ASSESSMENT NOTE - HPI (INCLUDE ILLNESS QUALITY, SEVERITY, DURATION, TIMING, CONTEXT, MODIFYING FACTORS, ASSOCIATED SIGNS AND SYMPTOMS)
62 Y , single, unemployed male, residing at [facility], with a history of paranoid schizophrenia, CLL, and history of TIA,  brought in to the ER for disorganized behavior in the context of med noncompliance, admitted to the medical service for leukocytosis (wbc of 115), and then transferred to [facility], once medical stable, for disorganized behavior. In the ER, the psychiatry team was told by the [facility] staff that patient stopped his Clozaril 1 month prior, and was seen by staff to be jumping from the washing machine, pulling on his genitals, covering himself in Listerine, and expressing beliefs that the staff would harm him.     On the medical floor, patient was evaluated by heme/onc for leukocytosis, and by neurology for assessment for anticoagulation due to history of strokes. His urine culture was negative, chest x ray was unremarkable, and a flow cytometry was done, results of which are still pending. 62 Y , single, unemployed male, residing at [facility], with a history of paranoid schizophrenia, CLL, and history of TIA, DM  brought in to the ER for disorganized behavior in the context of med noncompliance, admitted to the medical service for leukocytosis (wbc of 115), and then transferred to [facility], once medical stable, for disorganized behavior. In the ER, the psychiatry team was told by the [facility] staff that patient stopped his Clozaril 1 month prior, and was seen by staff to be jumping from the washing machine, pulling on his genitals, covering himself in Listerine, and expressing beliefs that the staff would harm him.     On the medical floor, patient was evaluated by heme/onc for leukocytosis, and by neurology for assessment for anticoagulation due to history of strokes. His urine culture was negative, chest x ray was unremarkable, and a flow cytometry was done, results of which are still pending. neurology recommended plavix and aspirin for prophylaxis due to his TIA.     On present evaluation, patient is oriented x 1. He is not able to say where he is or the year. He denies AH, VH, but endorses thoughts that there is a woman downstairs with a gun who wants to harm him. He denies depression, anxiety, and SI, HI. He says he will be "going home in a week, or maybe two days". On evaluation, he is disorganized and illogical in thought process, disinhibited, shows loose associations.    Collateral obtained from staff at [facility], Kyra, an RN, and Lisbet, the . Staff say that Chicho is oriented at baseline, and while he is isolative, he participates in activities and shows an organized thought process. Lisbet reports that she noted a decline in his mental state after he was diagnosed with CLL, he began to believe that he did not really have CLL, and stopped taking all medications.

## 2018-03-26 NOTE — BEHAVIORAL HEALTH ASSESSMENT NOTE - NSBHCHARTREVIEWVS_PSY_A_CORE FT
Vital Signs Last 24 Hrs  T(C): 35.5 (26 Mar 2018 19:57), Max: 35.9 (26 Mar 2018 06:06)  T(F): 95.9 (26 Mar 2018 19:57), Max: 96.6 (26 Mar 2018 06:06)  HR: 87 (26 Mar 2018 19:57) (78 - 97)  BP: 126/60 (26 Mar 2018 19:57) (118/58 - 142/65)  BP(mean): --  RR: 18 (26 Mar 2018 19:57) (17 - 18)  SpO2: --

## 2018-03-26 NOTE — BEHAVIORAL HEALTH ASSESSMENT NOTE - RISK ASSESSMENT
presently patient is unable to care for self due to disorganized thought process, and disorientation

## 2018-03-26 NOTE — BEHAVIORAL HEALTH ASSESSMENT NOTE - CASE SUMMARY
62 Y , single, unemployed male, residing at [facility], with a history of paranoid schizophrenia, CLL, and history of TIA,  brought in to the ER for disorganized behavior in the context of med noncompliance, admitted to the medical service for leukocytosis (wbc of 115), and then transferred to [facility], once medical stable, for disorganized behavior demonstrated at [facility] marked by jumping on walls, pouring listerine on him self. Today patient continues to exhibit disorganized speech marked by making non sensical statements. He is oriented to name only. Will obtain neuro consult to r/o cognitive impairment.  Will start Zyprexa 5mg QHs for psychosis as patient has been non compliant with Clozapine.

## 2018-03-26 NOTE — PROGRESS NOTE ADULT - SUBJECTIVE AND OBJECTIVE BOX
pt stable alert in NAD  no new complaints  ambualting on unit in nad      SCHIZOPHRENIA  ^SCHIZOPHRENIA  No h/o HF  No pertinent family history in first degree relatives  CLL (chronic lymphocytic leukemia)  Parathyroid cancer  DM (diabetes mellitus)  Schizophrenia  Psychiatric complaint  CLL (chronic lymphocytic leukemia)  Schizophrenia, unspecified type  Type 2 diabetes mellitus without complication, without long-term current use of insulin  Schizophrenia  DM (diabetes mellitus)  No significant past surgical history    HEALTH ISSUES - PROBLEM Dx:  CLL (chronic lymphocytic leukemia): CLL (chronic lymphocytic leukemia)  Schizophrenia, unspecified type: Schizophrenia, unspecified type  Type 2 diabetes mellitus without complication, without long-term current use of insulin: Type 2 diabetes mellitus without complication, without long-term current use of insulin  Schizophrenia: Schizophrenia  DM (diabetes mellitus): DM (diabetes mellitus)        PAST MEDICAL & SURGICAL HISTORY:  CLL (chronic lymphocytic leukemia)  Parathyroid cancer  DM (diabetes mellitus)  Schizophrenia  No significant past surgical history    No Known Allergies      FAMILY HISTORY:  No pertinent family history in first degree relatives      clopidogrel Tablet 75 milliGRAM(s) Oral daily  haloperidol     Tablet 5 milliGRAM(s) Oral every 12 hours  haloperidol     Tablet 5 milliGRAM(s) Oral every 6 hours PRN  levothyroxine 137 MICROGram(s) Oral daily  metoprolol succinate ER 25 milliGRAM(s) Oral daily  trihexyphenidyl 2 milliGRAM(s) Oral two times a day      T(C): 35.9 (03-26-18 @ 06:06), Max: 37.2 (03-25-18 @ 18:00)  HR: 78 (03-26-18 @ 06:06) (78 - 94)  BP: 118/58 (03-26-18 @ 06:06) (118/58 - 131/68)  RR: 17 (03-26-18 @ 06:06) (17 - 18)  SpO2: --    PE;  general:  stable    Lungs:    Heart:    EXT:    Neuro:                          CAPILLARY BLOOD GLUCOSE

## 2018-03-27 RX ADMIN — OLANZAPINE 5 MILLIGRAM(S): 15 TABLET, FILM COATED ORAL at 20:06

## 2018-03-27 RX ADMIN — Medication 25 MILLIGRAM(S): at 08:42

## 2018-03-27 RX ADMIN — BUDESONIDE AND FORMOTEROL FUMARATE DIHYDRATE 2 PUFF(S): 160; 4.5 AEROSOL RESPIRATORY (INHALATION) at 20:05

## 2018-03-27 RX ADMIN — CLOPIDOGREL BISULFATE 75 MILLIGRAM(S): 75 TABLET, FILM COATED ORAL at 08:42

## 2018-03-27 RX ADMIN — Medication 100 MILLIGRAM(S): at 08:42

## 2018-03-27 RX ADMIN — Medication 100 MILLIGRAM(S): at 20:06

## 2018-03-27 RX ADMIN — Medication 100 MILLIGRAM(S): at 13:08

## 2018-03-27 RX ADMIN — ATORVASTATIN CALCIUM 10 MILLIGRAM(S): 80 TABLET, FILM COATED ORAL at 20:05

## 2018-03-27 RX ADMIN — Medication 137 MICROGRAM(S): at 08:42

## 2018-03-27 RX ADMIN — BUDESONIDE AND FORMOTEROL FUMARATE DIHYDRATE 2 PUFF(S): 160; 4.5 AEROSOL RESPIRATORY (INHALATION) at 08:41

## 2018-03-27 RX ADMIN — Medication 81 MILLIGRAM(S): at 08:42

## 2018-03-27 NOTE — CONSULT NOTE ADULT - SUBJECTIVE AND OBJECTIVE BOX
Neurology Consultation note    Name  TEDDY ADAIR    HPI:  62 years old male , patient has pmh of schizophrenia , patient admitted to [facility] for pneumonia and d/zuleyma today to admit to [facility] ipp floor , patient was refused to take all his psychiatric medications for one month prior to admit to [facility] . (23 Mar 2018 19:56)      Interval History:    NEURO CALLED TO EVAL AMS AND DISORIENTATION. PATIENT NOT AT HIS BASELINE  HE HAS HX OF CVA IN THE PAST. NO SZ D/O. SEEN BY NEURO AT [place] FOR ? OF NEED FOR ASA AND PLAVIX AND WAS CONTINUED ON THAT REGIMEN ALTHOUGH E DONT HAVE ENOUGH PAST DOCUMENTATION AS TO THE NEED FOR DUAL ANTIPLATELEYT      Vital Signs Last 24 Hrs  T(C): 36 (27 Mar 2018 06:09), Max: 36 (27 Mar 2018 06:09)  T(F): 96.8 (27 Mar 2018 06:09), Max: 96.8 (27 Mar 2018 06:09)  HR: 83 (27 Mar 2018 06:09) (83 - 97)  BP: 130/58 (27 Mar 2018 06:09) (126/60 - 142/65)  BP(mean): --  RR: 18 (27 Mar 2018 06:09) (17 - 18)  SpO2: --    Neurological Exam:   Mental status: Awake, alert and oriented x SELF AND 2017.  ReG 3/3, RECALL 1/3 WITH CUE.  Naming, repetition and comprehension intact.  Attention/concentration intact.  No dysarthria, no aphasia.  THOUGHT CONTENT ALTERED AND ANSWERS SOME OF MY QUESTIONS INAPPROPRIATELY   Cranial nerves: Pupils equally round and reactive to light, visual fields full, no nystagmus, extraocular muscles intact, V1 through V3 intact bilaterally and symmetric, face symmetric, hearing intact to finger rub, palate elevation symmetric, tongue was midline.  Motor:  MRC grading 5/5 b/l UE/LE.   strength 5/5.  Normal tone and bulk.  No abnormal movements.    Sensation: Intact to light touch, proprioception, and pinprick.   Coordination: No dysmetria on finger-to-nose and heel-to-shin.  No dysdiadokinesia.  Reflexes: 2+ in bilateral UE/LE, downgoing toes bilaterally. (-) [place].  Gait: Narrow and steady. No ataxia.  Romberg negative  NO FRONTAL RELEASE SIGNS      Medications  aspirin  chewable 81 milliGRAM(s) Oral daily  atorvastatin 10 milliGRAM(s) Oral at bedtime  buDESOnide 160 MICROgram(s)/formoterol 4.5 MICROgram(s) Inhaler 2 Puff(s) Inhalation two times a day  clopidogrel Tablet 75 milliGRAM(s) Oral daily  diphenhydrAMINE   Capsule 50 milliGRAM(s) Oral every 4 hours PRN  docusate sodium 100 milliGRAM(s) Oral three times a day  haloperidol     Tablet 5 milliGRAM(s) Oral every 6 hours PRN  levothyroxine 137 MICROGram(s) Oral daily  metoprolol succinate ER 25 milliGRAM(s) Oral daily  OLANZapine 5 milliGRAM(s) Oral at bedtime  sitaGLIPtin 100 milliGRAM(s) Oral daily            Assessment: 61 YO MAN WITH THE AFOREMENTIONED HX WITH DISORIENTATION. PATIENT DOES NOT EXHIBIT ANY PSYCHOSIS AT PRESENT. DO NOT SUSPECT CVA OR TIA. POSSIBLE MILD UNDERLYING DEMENTIA. NON FOCAL EXAM WITHOUT FRONTAL RELEASE SIGNS.    Plan:  CTH NON CONTRAST  CHECK B12, FOLATE, THYROID PANEL  ROUTINE EEG  THE ABOVE WORKUP MAY BE DONE AS OUTPATIENT AS WELL IF D/C IS ANTICIPATED

## 2018-03-27 NOTE — CHART NOTE - NSCHARTNOTEFT_GEN_A_CORE
PE: + area of reddish hemmorhage lateral aspect of the sclera    Not affecting vision  No intervention needed  Will resolve on its own gradually PE: + area of reddish hemmorhage lateral aspect of the sclera of the left eye    Not affecting vision  No intervention needed  Will resolve on its own gradually

## 2018-03-27 NOTE — PROGRESS NOTE BEHAVIORAL HEALTH - CASE SUMMARY
62 Y , single, unemployed male, residing at [facility], with a history of paranoid schizophrenia, CLL, and history of TIA,  brought in to the ER for disorganized behavior in the context of med noncompliance, admitted to the medical service for leukocytosis (wbc of 115), and then transferred to [facility], once medical stable, for disorganized behavior demonstrated at [facility] marked by jumping on walls, pouring listerine on him self. Today patient continues to exhibit disorganized speech marked by making non sensical statements. He is oriented to name only. Will obtain neuro consult to r/o cognitive impairment.  Will increase Zyprexa 10mg QHs for psychosis as patient has been non compliant with Clozapine.

## 2018-03-27 NOTE — PROGRESS NOTE BEHAVIORAL HEALTH - SUMMARY
62 y male with paranoid schizophrenia, CLL, multiple TIAs, brought in to the ED for disorganized behavior at home, admitted to the medical service for leukocytosis, transferred to [facility] 1 week later. Presently patient shows illogical, disorganized thought process, paranoid delusions, disinhibition and disorientation. He failed a Clozaril trial due to noncompliance, therefore initiated a zyprexa trial as Zyprexa is similar to clozaril in structure. Will monitor for anticholinergic side effects.     Plan  Paranoid schizophrenia  - zyprexa 5mg   -prn Haldol 5mg, benadryl 50mg for agitation  -obtain neuro consult for dementia work up as patient is disoriented and not at baseline    CLL  -follow up on flow cytometry    DM  -restart Januvia  finger sticks

## 2018-03-28 DIAGNOSIS — F20.0 PARANOID SCHIZOPHRENIA: ICD-10-CM

## 2018-03-28 DIAGNOSIS — C91.10 CHRONIC LYMPHOCYTIC LEUKEMIA OF B-CELL TYPE NOT HAVING ACHIEVED REMISSION: ICD-10-CM

## 2018-03-28 DIAGNOSIS — J18.9 PNEUMONIA, UNSPECIFIED ORGANISM: ICD-10-CM

## 2018-03-28 DIAGNOSIS — Z85.850 PERSONAL HISTORY OF MALIGNANT NEOPLASM OF THYROID: ICD-10-CM

## 2018-03-28 DIAGNOSIS — E03.9 HYPOTHYROIDISM, UNSPECIFIED: ICD-10-CM

## 2018-03-28 DIAGNOSIS — Z91.14 PATIENT'S OTHER NONCOMPLIANCE WITH MEDICATION REGIMEN: ICD-10-CM

## 2018-03-28 DIAGNOSIS — E11.9 TYPE 2 DIABETES MELLITUS WITHOUT COMPLICATIONS: ICD-10-CM

## 2018-03-28 DIAGNOSIS — Z86.73 PERSONAL HISTORY OF TRANSIENT ISCHEMIC ATTACK (TIA), AND CEREBRAL INFARCTION WITHOUT RESIDUAL DEFICITS: ICD-10-CM

## 2018-03-28 DIAGNOSIS — I67.82 CEREBRAL ISCHEMIA: ICD-10-CM

## 2018-03-28 LAB
FOLATE SERPL-MCNC: 12.6 NG/ML — SIGNIFICANT CHANGE UP (ref 4.8–24.2)
HOMOCYSTEINE LEVEL: 8.6 UMOL/L — SIGNIFICANT CHANGE UP
METHYLMALONIC ACID LEVEL: 125 NMOL/L — SIGNIFICANT CHANGE UP (ref 87–318)
T3 SERPL-MCNC: 79 NG/DL — LOW (ref 80–200)
T4 AB SER-ACNC: 7.3 UG/DL — SIGNIFICANT CHANGE UP (ref 4.6–12)
TSH SERPL-MCNC: 22.74 UIU/ML — HIGH (ref 0.27–4.2)
VIT B12 SERPL-MCNC: 689 PG/ML — SIGNIFICANT CHANGE UP (ref 232–1245)

## 2018-03-28 RX ORDER — OLANZAPINE 15 MG/1
10 TABLET, FILM COATED ORAL AT BEDTIME
Qty: 0 | Refills: 0 | Status: DISCONTINUED | OUTPATIENT
Start: 2018-03-28 | End: 2018-03-30

## 2018-03-28 RX ORDER — DIPHENHYDRAMINE HCL 50 MG
50 CAPSULE ORAL EVERY 4 HOURS
Qty: 0 | Refills: 0 | Status: DISCONTINUED | OUTPATIENT
Start: 2018-03-28 | End: 2018-04-24

## 2018-03-28 RX ADMIN — ATORVASTATIN CALCIUM 10 MILLIGRAM(S): 80 TABLET, FILM COATED ORAL at 20:17

## 2018-03-28 RX ADMIN — CLOPIDOGREL BISULFATE 75 MILLIGRAM(S): 75 TABLET, FILM COATED ORAL at 08:43

## 2018-03-28 RX ADMIN — OLANZAPINE 10 MILLIGRAM(S): 15 TABLET, FILM COATED ORAL at 20:17

## 2018-03-28 RX ADMIN — Medication 81 MILLIGRAM(S): at 08:41

## 2018-03-28 RX ADMIN — BUDESONIDE AND FORMOTEROL FUMARATE DIHYDRATE 2 PUFF(S): 160; 4.5 AEROSOL RESPIRATORY (INHALATION) at 08:48

## 2018-03-28 RX ADMIN — Medication 25 MILLIGRAM(S): at 08:42

## 2018-03-28 RX ADMIN — Medication 100 MILLIGRAM(S): at 20:17

## 2018-03-28 RX ADMIN — Medication 100 MILLIGRAM(S): at 08:43

## 2018-03-28 RX ADMIN — Medication 137 MICROGRAM(S): at 08:45

## 2018-03-28 RX ADMIN — BUDESONIDE AND FORMOTEROL FUMARATE DIHYDRATE 2 PUFF(S): 160; 4.5 AEROSOL RESPIRATORY (INHALATION) at 20:18

## 2018-03-28 RX ADMIN — Medication 100 MILLIGRAM(S): at 12:23

## 2018-03-28 RX ADMIN — BUDESONIDE AND FORMOTEROL FUMARATE DIHYDRATE 2 PUFF(S): 160; 4.5 AEROSOL RESPIRATORY (INHALATION) at 08:39

## 2018-03-28 NOTE — PROGRESS NOTE BEHAVIORAL HEALTH - SUMMARY
62 y male with paranoid schizophrenia, CLL, multiple TIAs, brought in to the ED for disorganized behavior at home, admitted to the medical service for leukocytosis, transferred to [facility] 1 week later. Presently patient continuea to be illogical, disorganized, disinhibited and disorientated. He failed a Clozaril trial due to noncompliance, therefore initiated a zyprexa trial as Zyprexa is similar to clozaril in structure. Will monitor for anticholinergic side effects.     Plan  Paranoid schizophrenia  - zyprexa 5mg   -prn Haldol 5mg, benadryl 50mg for agitation  -neuro consult appreciated    CLL  -follow up on flow cytometry    DM  -restart Januvia  finger sticks  -will restart metformin

## 2018-03-28 NOTE — PROGRESS NOTE ADULT - SUBJECTIVE AND OBJECTIVE BOX
pt stable alert in NAD  no new complaints    SCHIZOPHRENIA  ^SCHIZOPHRENIA  No h/o HF  No pertinent family history in first degree relatives  CLL (chronic lymphocytic leukemia)  Parathyroid cancer  DM (diabetes mellitus)  Schizophrenia  Psychiatric complaint  CLL (chronic lymphocytic leukemia)  Schizophrenia, unspecified type  Type 2 diabetes mellitus without complication, without long-term current use of insulin  Schizophrenia  DM (diabetes mellitus)  No significant past surgical history    HEALTH ISSUES - PROBLEM Dx:  CLL (chronic lymphocytic leukemia): CLL (chronic lymphocytic leukemia)  Schizophrenia, unspecified type: Schizophrenia, unspecified type  Type 2 diabetes mellitus without complication, without long-term current use of insulin: Type 2 diabetes mellitus without complication, without long-term current use of insulin  Schizophrenia: Schizophrenia  DM (diabetes mellitus): DM (diabetes mellitus)        PAST MEDICAL & SURGICAL HISTORY:  CLL (chronic lymphocytic leukemia)  Parathyroid cancer  DM (diabetes mellitus)  Schizophrenia  No significant past surgical history    No Known Allergies      FAMILY HISTORY:  No pertinent family history in first degree relatives      aspirin  chewable 81 milliGRAM(s) Oral daily  atorvastatin 10 milliGRAM(s) Oral at bedtime  buDESOnide 160 MICROgram(s)/formoterol 4.5 MICROgram(s) Inhaler 2 Puff(s) Inhalation two times a day  clopidogrel Tablet 75 milliGRAM(s) Oral daily  diphenhydrAMINE   Capsule 50 milliGRAM(s) Oral every 4 hours PRN  docusate sodium 100 milliGRAM(s) Oral three times a day  haloperidol     Tablet 5 milliGRAM(s) Oral every 6 hours PRN  levothyroxine 137 MICROGram(s) Oral daily  metoprolol succinate ER 25 milliGRAM(s) Oral daily  OLANZapine 5 milliGRAM(s) Oral at bedtime  sitaGLIPtin 100 milliGRAM(s) Oral daily      T(C): 36.2 (03-28-18 @ 06:26), Max: 37.1 (03-27-18 @ 18:29)  HR: 87 (03-28-18 @ 06:26) (80 - 87)  BP: 134/61 (03-28-18 @ 06:26) (117/56 - 141/65)  RR: 18 (03-28-18 @ 06:26) (16 - 18)  SpO2: --    PE;  general:  stabel  Lungs:    Heart:    EXT:    Neuro:                          CAPILLARY BLOOD GLUCOSE      bs as noted in marco

## 2018-03-28 NOTE — PROGRESS NOTE BEHAVIORAL HEALTH - CASE SUMMARY
62 Y , single, unemployed male, residing at [facility], with a history of paranoid schizophrenia, CLL, and history of TIA,  brought in to the ER for disorganized behavior in the context of med noncompliance, admitted to the medical service for leukocytosis (wbc of 115), and then transferred to [facility], once medical stable, for disorganized behavior demonstrated at [facility] marked by jumping on walls, pouring listerine on him self.     Today patient continues to exhibit disorganized speech and behavior marked by making non sensical statements and engaging in self mutilating behavior in the form of scratching his arms.. He is oriented to name only. Will increase Zyprexa 10mg QHs for psychosis as patient has been non compliant with Clozapine. [facility] 62 Y , single, unemployed male, residing at [facility], with a history of paranoid schizophrenia, CLL, and history of TIA,  brought in to the ER for disorganized behavior in the context of med noncompliance, admitted to the medical service for leukocytosis (wbc of 115), and then transferred to [facility], once medical stable, for disorganized behavior demonstrated at [facility] marked by jumping on walls, pouring listerine on him self.     Today patient continues to exhibit disorganized speech and behavior marked by making non sensical statements and engaging in self mutilating behavior in the form of scratching his arms.. He is oriented to name only. Will increase Zyprexa 10mg QHs for psychosis as patient has been non compliant with Clozapine. Will follow graceo reccs. Patient placed on CO to monitor for continued self mutilating behavior.

## 2018-03-29 LAB
CHOLEST SERPL-MCNC: 173 MG/DL — SIGNIFICANT CHANGE UP (ref 100–200)
ESTIMATED AVERAGE GLUCOSE: 134 MG/DL — HIGH (ref 68–114)
HBA1C BLD-MCNC: 6.3 % — HIGH (ref 4–5.6)
HCT VFR BLD CALC: 34.6 % — LOW (ref 42–52)
HDLC SERPL-MCNC: 40 MG/DL — SIGNIFICANT CHANGE UP (ref 40–125)
HGB BLD-MCNC: 10.5 G/DL — LOW (ref 14–18)
LIPID PNL WITH DIRECT LDL SERPL: 120 MG/DL — SIGNIFICANT CHANGE UP (ref 4–129)
MCHC RBC-ENTMCNC: 29.1 PG — SIGNIFICANT CHANGE UP (ref 27–31)
MCHC RBC-ENTMCNC: 30.3 G/DL — LOW (ref 32–37)
MCV RBC AUTO: 95.8 FL — HIGH (ref 80–94)
NRBC # BLD: SIGNIFICANT CHANGE UP /100 WBCS (ref 0–0)
PLATELET # BLD AUTO: 200 K/UL — SIGNIFICANT CHANGE UP (ref 130–400)
RBC # BLD: 3.61 M/UL — LOW (ref 4.7–6.1)
RBC # FLD: 15.8 % — HIGH (ref 11.5–14.5)
TOTAL CHOLESTEROL/HDL RATIO MEASUREMENT: 4.3 RATIO — SIGNIFICANT CHANGE UP (ref 4–5.5)
TRIGL SERPL-MCNC: 150 MG/DL — HIGH (ref 10–149)
WBC # BLD: 108.79 K/UL — CRITICAL HIGH (ref 4.8–10.8)
WBC # FLD AUTO: 108.79 K/UL — CRITICAL HIGH (ref 4.8–10.8)

## 2018-03-29 RX ORDER — BACITRACIN ZINC 500 UNIT/G
1 OINTMENT IN PACKET (EA) TOPICAL DAILY
Qty: 0 | Refills: 0 | Status: COMPLETED | OUTPATIENT
Start: 2018-03-29 | End: 2018-04-01

## 2018-03-29 RX ORDER — METFORMIN HYDROCHLORIDE 850 MG/1
500 TABLET ORAL
Qty: 0 | Refills: 0 | Status: DISCONTINUED | OUTPATIENT
Start: 2018-03-29 | End: 2018-04-24

## 2018-03-29 RX ADMIN — BUDESONIDE AND FORMOTEROL FUMARATE DIHYDRATE 2 PUFF(S): 160; 4.5 AEROSOL RESPIRATORY (INHALATION) at 08:38

## 2018-03-29 RX ADMIN — ATORVASTATIN CALCIUM 10 MILLIGRAM(S): 80 TABLET, FILM COATED ORAL at 20:07

## 2018-03-29 RX ADMIN — Medication 81 MILLIGRAM(S): at 08:37

## 2018-03-29 RX ADMIN — Medication 137 MICROGRAM(S): at 08:36

## 2018-03-29 RX ADMIN — Medication 100 MILLIGRAM(S): at 11:50

## 2018-03-29 RX ADMIN — Medication 100 MILLIGRAM(S): at 20:07

## 2018-03-29 RX ADMIN — Medication 100 MILLIGRAM(S): at 08:37

## 2018-03-29 RX ADMIN — OLANZAPINE 10 MILLIGRAM(S): 15 TABLET, FILM COATED ORAL at 20:07

## 2018-03-29 RX ADMIN — CLOPIDOGREL BISULFATE 75 MILLIGRAM(S): 75 TABLET, FILM COATED ORAL at 08:37

## 2018-03-29 RX ADMIN — BUDESONIDE AND FORMOTEROL FUMARATE DIHYDRATE 2 PUFF(S): 160; 4.5 AEROSOL RESPIRATORY (INHALATION) at 20:08

## 2018-03-29 RX ADMIN — METFORMIN HYDROCHLORIDE 500 MILLIGRAM(S): 850 TABLET ORAL at 20:07

## 2018-03-29 RX ADMIN — Medication 25 MILLIGRAM(S): at 08:36

## 2018-03-29 NOTE — PROGRESS NOTE BEHAVIORAL HEALTH - SUMMARY
62 y male with paranoid schizophrenia, CLL, multiple TIAs, brought in to the ED for disorganized behavior at home, admitted to the medical service for leukocytosis, transferred to [facility] 1 week later. Presently patient shows illogical, disorganized thought process, paranoid delusions, disinhibition and disorientation. He failed a Clozaril trial due to noncompliance, therefore initiated a zyprexa trial as Zyprexa is similar to clozaril in structure. Will monitor for anticholinergic side effects.     Plan  Paranoid schizophrenia  - zyprexa 10mg  -prn Haldol 5mg, benadryl 50mg for agitation  -obtain neuro consult for dementia work up as patient is disoriented and not at baseline    CLL  stable    DM  -restart Januvia and metformin  finger sticks

## 2018-03-29 NOTE — CHART NOTE - NSCHARTNOTEFT_GEN_A_CORE
Called by  lab due to WBC count 108,000  - this is not new, had ,000  on approximately 3/20/18  - seen by Hematologist Dr Fuchs: no need for Rx at that time   - has Hx of CLL  -documentation found in EMR

## 2018-03-30 RX ORDER — OLANZAPINE 15 MG/1
15 TABLET, FILM COATED ORAL AT BEDTIME
Qty: 0 | Refills: 0 | Status: DISCONTINUED | OUTPATIENT
Start: 2018-03-30 | End: 2018-04-05

## 2018-03-30 RX ADMIN — Medication 100 MILLIGRAM(S): at 14:40

## 2018-03-30 RX ADMIN — OLANZAPINE 15 MILLIGRAM(S): 15 TABLET, FILM COATED ORAL at 20:19

## 2018-03-30 RX ADMIN — Medication 81 MILLIGRAM(S): at 08:29

## 2018-03-30 RX ADMIN — Medication 137 MICROGRAM(S): at 08:29

## 2018-03-30 RX ADMIN — Medication 100 MILLIGRAM(S): at 08:29

## 2018-03-30 RX ADMIN — METFORMIN HYDROCHLORIDE 500 MILLIGRAM(S): 850 TABLET ORAL at 08:29

## 2018-03-30 RX ADMIN — Medication 25 MILLIGRAM(S): at 08:29

## 2018-03-30 RX ADMIN — ATORVASTATIN CALCIUM 10 MILLIGRAM(S): 80 TABLET, FILM COATED ORAL at 20:19

## 2018-03-30 RX ADMIN — Medication 1 APPLICATION(S): at 14:40

## 2018-03-30 RX ADMIN — Medication 100 MILLIGRAM(S): at 20:20

## 2018-03-30 RX ADMIN — BUDESONIDE AND FORMOTEROL FUMARATE DIHYDRATE 2 PUFF(S): 160; 4.5 AEROSOL RESPIRATORY (INHALATION) at 08:29

## 2018-03-30 RX ADMIN — METFORMIN HYDROCHLORIDE 500 MILLIGRAM(S): 850 TABLET ORAL at 20:19

## 2018-03-30 RX ADMIN — CLOPIDOGREL BISULFATE 75 MILLIGRAM(S): 75 TABLET, FILM COATED ORAL at 08:29

## 2018-03-30 RX ADMIN — BUDESONIDE AND FORMOTEROL FUMARATE DIHYDRATE 2 PUFF(S): 160; 4.5 AEROSOL RESPIRATORY (INHALATION) at 20:26

## 2018-03-30 NOTE — PROGRESS NOTE BEHAVIORAL HEALTH - CASE SUMMARY
62 Y , single, unemployed male, residing at [facility], with a history of paranoid schizophrenia, CLL, and history of TIA,  brought in to the ER for disorganized behavior in the context of med noncompliance, admitted to the medical service for leukocytosis (wbc of 115), and then transferred to [facility], once medical stable, for disorganized behavior demonstrated at [facility] marked by jumping on walls, pouring listerine on him self. Today patient's behavior and speech is more organized in that he is not self mutilating and is amenable to redirection. He continues to demonstrate some disorganization of speech marked by making non sensical statements.  Will increase Zyprexa 15mg QHs for psychosis as patient has been non compliant with Clozapine. 62 Y , single, unemployed male, residing at [facility], with a history of paranoid schizophrenia, CLL, and history of TIA,  brought in to the ER for disorganized behavior in the context of med noncompliance, admitted to the medical service for leukocytosis (wbc of 115), and then transferred to [facility], once medical stable, for disorganized behavior demonstrated at [facility] marked by jumping on walls, pouring listerine on him self. Today patient's behavior and speech is more organized in that he is not self mutilating and is amenable to redirection. He continues to demonstrate some disorganization of speech marked by making non sensical statements.  Will increase Zyprexa 15mg QHs for psychosis. Patient has history of clozapine non compliance. 62 Y , single, unemployed male, residing at [facility], with a history of paranoid schizophrenia, CLL, and history of TIA, brought in to the ER for disorganized behavior in the context of med noncompliance, admitted to the medical service for leukocytosis (wbc of 115), and then transferred to [facility] once medical stable, for disorganized behavior demonstrated at [facility] marked by jumping on walls, pouring listerine on him self. Today patient's behavior and speech is more organized in that he is not self mutilating and is amenable to redirection. He continues to demonstrate some disorganization of speech marked by making non sensical statements.  Will increase Zyprexa 15mg QHs for psychosis. Patient has history of clozapine non compliance.

## 2018-03-30 NOTE — PROGRESS NOTE BEHAVIORAL HEALTH - SUMMARY
62 y male with paranoid schizophrenia, CLL, multiple TIAs, brought in to the ED for disorganized behavior at home, admitted to the medical service for leukocytosis, transferred to [facility] 1 week later on a 9.27. Patient is responding to the zyprexa trial, he is no longer paranoid, disorganized, disinhibited, and he is compliant with medications. He continues to be disoriented and delusional, therefore he requires further hospitalization for titration for zyprexa.     Plan  Paranoid schizophrenia  - increase zyprexa to 15mg  -prn Haldol 5mg, benadryl 50mg for agitation  discontinue 1;1 and monitor patient for scratching   -obtain neuro consult for dementia work up as patient is disoriented and not at baseline    CLL  stable    DM  -restart Januvia and metformin  finger sticks

## 2018-03-30 NOTE — PROGRESS NOTE ADULT - SUBJECTIVE AND OBJECTIVE BOX
pt stable alert in NAD  no new complaints sitting in day room    SCHIZOPHRENIA  ^SCHIZOPHRENIA  No h/o HF  No pertinent family history in first degree relatives  CLL (chronic lymphocytic leukemia)  Parathyroid cancer  DM (diabetes mellitus)  Schizophrenia  Psychiatric complaint  CLL (chronic lymphocytic leukemia)  Schizophrenia, unspecified type  Type 2 diabetes mellitus without complication, without long-term current use of insulin  Schizophrenia  DM (diabetes mellitus)  No significant past surgical history      No Known Allergies          aspirin  chewable 81 milliGRAM(s) Oral daily  atorvastatin 10 milliGRAM(s) Oral at bedtime  BACItracin   Ointment 1 Application(s) Topical daily  buDESOnide 160 MICROgram(s)/formoterol 4.5 MICROgram(s) Inhaler 2 Puff(s) Inhalation two times a day  clopidogrel Tablet 75 milliGRAM(s) Oral daily  diphenhydrAMINE   Capsule 50 milliGRAM(s) Oral every 4 hours PRN  docusate sodium 100 milliGRAM(s) Oral three times a day  haloperidol     Tablet 5 milliGRAM(s) Oral every 6 hours PRN  levothyroxine 137 MICROGram(s) Oral daily  metFORMIN 500 milliGRAM(s) Oral two times a day  metoprolol succinate ER 25 milliGRAM(s) Oral daily  OLANZapine 10 milliGRAM(s) Oral at bedtime  sitaGLIPtin 100 milliGRAM(s) Oral daily      T(C): 36.7 (03-30-18 @ 06:08), Max: 36.9 (03-29-18 @ 17:40)  HR: 77 (03-30-18 @ 06:08) (77 - 90)  BP: 112/71 (03-30-18 @ 06:08) (112/71 - 131/75)  RR: 18 (03-29-18 @ 17:40) (18 - 18)  SpO2: --    PE;  general:  stabel no acute changes  Lungs:    Heart:    EXT:    Neuro:          cbc noted with eelvatetd wbc                        10.5   108.79 )-----------( 200      ( 29 Mar 2018 09:27 )             34.6                                   10.5   108.79 )-----------( 200      ( 29 Mar 2018 09:27 )             34.6       CAPILLARY BLOOD GLUCOSE

## 2018-03-30 NOTE — CHART NOTE - NSCHARTNOTEFT_GEN_A_CORE
Social work note:    This writer spoke to  at [facility] Lisbet Jennings ([phone]) regarding admission and current treatment plan. PT will return to [facility] when stable. According to Ms. Jennings PT has no known next of kin for collateral contacts or family/social involvement.      will continue to provide support daily and make collateral contacts as necessary.

## 2018-03-31 RX ADMIN — Medication 100 MILLIGRAM(S): at 08:38

## 2018-03-31 RX ADMIN — ATORVASTATIN CALCIUM 10 MILLIGRAM(S): 80 TABLET, FILM COATED ORAL at 20:15

## 2018-03-31 RX ADMIN — Medication 25 MILLIGRAM(S): at 08:38

## 2018-03-31 RX ADMIN — Medication 1 APPLICATION(S): at 08:38

## 2018-03-31 RX ADMIN — BUDESONIDE AND FORMOTEROL FUMARATE DIHYDRATE 2 PUFF(S): 160; 4.5 AEROSOL RESPIRATORY (INHALATION) at 08:39

## 2018-03-31 RX ADMIN — CLOPIDOGREL BISULFATE 75 MILLIGRAM(S): 75 TABLET, FILM COATED ORAL at 08:37

## 2018-03-31 RX ADMIN — METFORMIN HYDROCHLORIDE 500 MILLIGRAM(S): 850 TABLET ORAL at 20:15

## 2018-03-31 RX ADMIN — OLANZAPINE 15 MILLIGRAM(S): 15 TABLET, FILM COATED ORAL at 20:14

## 2018-03-31 RX ADMIN — Medication 81 MILLIGRAM(S): at 08:37

## 2018-03-31 RX ADMIN — METFORMIN HYDROCHLORIDE 500 MILLIGRAM(S): 850 TABLET ORAL at 08:37

## 2018-03-31 RX ADMIN — Medication 137 MICROGRAM(S): at 06:53

## 2018-03-31 RX ADMIN — Medication 100 MILLIGRAM(S): at 20:15

## 2018-03-31 NOTE — PROGRESS NOTE BEHAVIORAL HEALTH - CASE SUMMARY
62 Y , single, unemployed male, residing at [facility], with a history of paranoid schizophrenia, CLL, and history of TIA, brought in to the ER for disorganized behavior in the context of med noncompliance, admitted to the medical service for leukocytosis (wbc of 115), and then transferred to [facility], once medical stable, for disorganized behavior demonstrated at [facility] marked by jumping on walls, pouring listerine on him self. Today patient's behavior and speech is more organized in that he is not self mutilating and is amenable to redirection. He continues to demonstrate some disorganization of speech marked by making non sensical statements.  Will increase Zyprexa 15mg QHs for psychosis. Patient has history of clozapine non compliance.

## 2018-03-31 NOTE — PROGRESS NOTE BEHAVIORAL HEALTH - SUMMARY
62 y male with paranoid schizophrenia, CLL, multiple TIAs, brought in to the ED for disorganized behavior at home, admitted to the medical service for leukocytosis, transferred to [facility] 1 week later on a 9.27. Patient is responding to the zyprexa trial, he is no longer paranoid, disorganized, disinhibited, and he is compliant with medications. He continues to be disoriented and delusional, therefore he requires further hospitalization for titration for zyprexa.     Plan  Paranoid schizophrenia  - increase zyprexa to 15mg  -prn Haldol 5mg, benadryl 50mg for agitation  discontinue 1;1 and monitor patient for scratching   -obtain neuro consult for dementia work up as patient is disoriented and not at baseline    CLL  stable    DM  -restart Januvia and metformin  finger sticks

## 2018-04-01 RX ORDER — LEVOTHYROXINE SODIUM 125 MCG
137 TABLET ORAL DAILY
Qty: 0 | Refills: 0 | Status: DISCONTINUED | OUTPATIENT
Start: 2018-04-01 | End: 2018-04-24

## 2018-04-01 RX ADMIN — Medication 100 MILLIGRAM(S): at 12:40

## 2018-04-01 RX ADMIN — METFORMIN HYDROCHLORIDE 500 MILLIGRAM(S): 850 TABLET ORAL at 20:31

## 2018-04-01 RX ADMIN — Medication 100 MILLIGRAM(S): at 10:37

## 2018-04-01 RX ADMIN — BUDESONIDE AND FORMOTEROL FUMARATE DIHYDRATE 2 PUFF(S): 160; 4.5 AEROSOL RESPIRATORY (INHALATION) at 10:35

## 2018-04-01 RX ADMIN — Medication 100 MILLIGRAM(S): at 20:31

## 2018-04-01 RX ADMIN — Medication 137 MICROGRAM(S): at 10:37

## 2018-04-01 RX ADMIN — Medication 25 MILLIGRAM(S): at 10:36

## 2018-04-01 RX ADMIN — Medication 1 APPLICATION(S): at 10:34

## 2018-04-01 RX ADMIN — METFORMIN HYDROCHLORIDE 500 MILLIGRAM(S): 850 TABLET ORAL at 10:35

## 2018-04-01 RX ADMIN — BUDESONIDE AND FORMOTEROL FUMARATE DIHYDRATE 2 PUFF(S): 160; 4.5 AEROSOL RESPIRATORY (INHALATION) at 20:32

## 2018-04-01 RX ADMIN — OLANZAPINE 15 MILLIGRAM(S): 15 TABLET, FILM COATED ORAL at 20:31

## 2018-04-01 RX ADMIN — CLOPIDOGREL BISULFATE 75 MILLIGRAM(S): 75 TABLET, FILM COATED ORAL at 10:37

## 2018-04-01 RX ADMIN — Medication 81 MILLIGRAM(S): at 10:38

## 2018-04-01 RX ADMIN — ATORVASTATIN CALCIUM 10 MILLIGRAM(S): 80 TABLET, FILM COATED ORAL at 20:31

## 2018-04-01 NOTE — PROGRESS NOTE BEHAVIORAL HEALTH - NSBHCONSFOLLOWNEEDS_PSY_A_CORE
Patient needs further psychiatric safety assessment prior to discharge

## 2018-04-01 NOTE — PROGRESS NOTE BEHAVIORAL HEALTH - SUMMARY
62 y male with paranoid schizophrenia, CLL, multiple TIAs, brought in to the ED for disorganized behavior at home, admitted to the medical service for leukocytosis, transferred to [facility] 1 week later on a 9.27. Patient is responding to the zyprexa trial, he is no longer paranoid, disorganized, disinhibited, and he is compliant with medications. He continues to be disoriented and delusional, therefore he requires further hospitalization for titration for zyprexa.     Plan  Paranoid schizophrenia  - increase zyprexa to 15mg  -prn Haldol 5mg, benadryl 50mg for agitation  discontinue 1;1 and monitor patient for scratching   -obtain neuro consult for dementia work up as patient is disoriented and not at baseline    CLL  stable    DM  -restart Januvia and metformin  finger sticks

## 2018-04-01 NOTE — PROGRESS NOTE BEHAVIORAL HEALTH - NSBHCONSULTOBS_PSY_A_CORE
Constant observation

## 2018-04-01 NOTE — PROGRESS NOTE BEHAVIORAL HEALTH - NSBHCHARTREVIEWLAB_PSY_A_CORE FT
a1c: 6.3  LDL; 151
a1c: 6.3  LDL; 151
CBC Full  -  ( 29 Mar 2018 09:27 )  WBC Count : 108.79 K/uL  Hemoglobin : 10.5 g/dL  Hematocrit : 34.6 %  Platelet Count - Automated : 200 K/uL  Mean Cell Volume : 95.8 fL  Mean Cell Hemoglobin : 29.1 pg  Mean Cell Hemoglobin Concentration : 30.3 g/dL  Auto Neutrophil # : x  Auto Lymphocyte # : x  Auto Monocyte # : x  Auto Eosinophil # : x  Auto Basophil # : x  Auto Neutrophil % : x  Auto Lymphocyte % : x  Auto Monocyte % : x  Auto Eosinophil % : x  Auto Basophil % : x
a1c: 6.3  LDL; 151

## 2018-04-02 RX ADMIN — Medication 137 MICROGRAM(S): at 08:42

## 2018-04-02 RX ADMIN — Medication 100 MILLIGRAM(S): at 12:03

## 2018-04-02 RX ADMIN — Medication 25 MILLIGRAM(S): at 08:46

## 2018-04-02 RX ADMIN — BUDESONIDE AND FORMOTEROL FUMARATE DIHYDRATE 2 PUFF(S): 160; 4.5 AEROSOL RESPIRATORY (INHALATION) at 20:37

## 2018-04-02 RX ADMIN — METFORMIN HYDROCHLORIDE 500 MILLIGRAM(S): 850 TABLET ORAL at 08:46

## 2018-04-02 RX ADMIN — METFORMIN HYDROCHLORIDE 500 MILLIGRAM(S): 850 TABLET ORAL at 20:37

## 2018-04-02 RX ADMIN — Medication 100 MILLIGRAM(S): at 20:37

## 2018-04-02 RX ADMIN — OLANZAPINE 15 MILLIGRAM(S): 15 TABLET, FILM COATED ORAL at 20:37

## 2018-04-02 RX ADMIN — Medication 100 MILLIGRAM(S): at 08:42

## 2018-04-02 RX ADMIN — Medication 81 MILLIGRAM(S): at 08:41

## 2018-04-02 RX ADMIN — BUDESONIDE AND FORMOTEROL FUMARATE DIHYDRATE 2 PUFF(S): 160; 4.5 AEROSOL RESPIRATORY (INHALATION) at 08:48

## 2018-04-02 RX ADMIN — ATORVASTATIN CALCIUM 10 MILLIGRAM(S): 80 TABLET, FILM COATED ORAL at 20:37

## 2018-04-02 RX ADMIN — CLOPIDOGREL BISULFATE 75 MILLIGRAM(S): 75 TABLET, FILM COATED ORAL at 08:42

## 2018-04-02 NOTE — PROGRESS NOTE BEHAVIORAL HEALTH - SUMMARY
62 Y , single, unemployed male, residing at [facility], with a history of paranoid schizophrenia, CLL, and history of TIA, brought in to the ER for disorganized behavior in the context of med noncompliance, admitted to the medical service for leukocytosis (wbc of 115), and then transferred to [facility], once medical stable, for disorganized behavior demonstrated at [facility] marked by jumping on walls, pouring listerine on him self. Today patient's behavior and speech is more organized in that he is not self mutilating and is amenable to redirection. He continues to demonstrate some disorganization of speech marked by making non sensical statements.    Will continue to discuss treatment options which patient including long lasting injectible as patient has history of noncompliance with medications at [facility]. 62 Y , single, unemployed male, residing at [facility], with a history of paranoid schizophrenia, CLL, and history of TIA, brought in to the ER for disorganized behavior in the context of med noncompliance, admitted to the medical service for leukocytosis (wbc of 115), and then transferred to [facility], once medical stable, for disorganized behavior demonstrated at [facility] marked by jumping on walls, pouring Listerine on him self. Today patient's behavior and speech is more organized in that he is not self mutilating and is amenable to redirection. He continues to demonstrate some disorganization of speech marked by making nonsensical statements.    Will continue to discuss treatment options which patient including long lasting injectable as patient has history of noncompliance with medications at [facility].

## 2018-04-02 NOTE — PROGRESS NOTE BEHAVIORAL HEALTH - CASE SUMMARY
Mr Deshpande is a 62 year old man with a history of paranoid schizophrenia  who was admitted  because of increasing paranoia and disorganised behavior and non compliance with medication. Patient is taking his medication on the in-patient floor  however , continue to have bizarre behavior at times. He is currently refusing Invega sustenna which his Outpatient psychiatrist Dr Solis had initiated because of his history of non compliance. Patient is also said to have poor insight into his medical illness thus causing non compliance with outpatient medical services.   We will continue Zyprexa 15mg bedtime for now but will continue to encourage patient to start long acting injectable for better control of psychosis and prevention of psychiatric  decompensation. Will continue to follow.

## 2018-04-02 NOTE — CHART NOTE - NSCHARTNOTEFT_GEN_A_CORE
Social Work Note:    Treatment team met with patient earlier today.  He has been isolative to his room.  Chicho is from [facility].  Plan is for return to same.      At this time patient is not psychiatrically stable for discharge.

## 2018-04-03 RX ORDER — PALIPERIDONE 1.5 MG/1
234 TABLET, EXTENDED RELEASE ORAL ONCE
Qty: 0 | Refills: 0 | Status: DISCONTINUED | OUTPATIENT
Start: 2018-04-03 | End: 2018-04-24

## 2018-04-03 RX ADMIN — METFORMIN HYDROCHLORIDE 500 MILLIGRAM(S): 850 TABLET ORAL at 08:24

## 2018-04-03 RX ADMIN — Medication 100 MILLIGRAM(S): at 20:31

## 2018-04-03 RX ADMIN — Medication 81 MILLIGRAM(S): at 08:24

## 2018-04-03 RX ADMIN — METFORMIN HYDROCHLORIDE 500 MILLIGRAM(S): 850 TABLET ORAL at 20:31

## 2018-04-03 RX ADMIN — CLOPIDOGREL BISULFATE 75 MILLIGRAM(S): 75 TABLET, FILM COATED ORAL at 08:28

## 2018-04-03 RX ADMIN — Medication 100 MILLIGRAM(S): at 08:24

## 2018-04-03 RX ADMIN — OLANZAPINE 15 MILLIGRAM(S): 15 TABLET, FILM COATED ORAL at 20:32

## 2018-04-03 RX ADMIN — BUDESONIDE AND FORMOTEROL FUMARATE DIHYDRATE 2 PUFF(S): 160; 4.5 AEROSOL RESPIRATORY (INHALATION) at 08:30

## 2018-04-03 RX ADMIN — Medication 137 MICROGRAM(S): at 08:24

## 2018-04-03 RX ADMIN — BUDESONIDE AND FORMOTEROL FUMARATE DIHYDRATE 2 PUFF(S): 160; 4.5 AEROSOL RESPIRATORY (INHALATION) at 20:31

## 2018-04-03 RX ADMIN — ATORVASTATIN CALCIUM 10 MILLIGRAM(S): 80 TABLET, FILM COATED ORAL at 20:31

## 2018-04-03 RX ADMIN — Medication 25 MILLIGRAM(S): at 08:24

## 2018-04-03 NOTE — PROGRESS NOTE BEHAVIORAL HEALTH - SUMMARY
62 Y , single, unemployed male, residing at [facility], with a history of paranoid schizophrenia, CLL, and history of TIA, brought in to the ER for disorganized behavior in the context of med noncompliance, admitted to the medical service for leukocytosis (wbc of 115), and then transferred to [facility], once medical stable, for disorganized behavior demonstrated at [facility] marked by jumping on walls, pouring Listerine on him self. Today patient's behavior and speech is more organized and  logical.     Patient agreeable to Invega Sustenna long lasting injectable.

## 2018-04-03 NOTE — PROGRESS NOTE BEHAVIORAL HEALTH - CASE SUMMARY
Mr Deshpande is a 62 year old man with a history of paranoid schizophrenia who was admitted because of increasing paranoia and disorganized behavior and noncompliance with medication. We met with patient at the team meeting today. He continues to be compliant with his Zyprexa 15mg at bedtime and today agreed to receive the Invega Sustenna. Patient continues to have bizarre behavior at time, although he is getting better appears less psychotic , is interacting better with peers . He continues to have poor insight to his medical problems and limited insight into the indication for the psychotropic medication.

## 2018-04-03 NOTE — PROGRESS NOTE BEHAVIORAL HEALTH - PROBLEM SELECTOR PLAN 1
1. Continue Zyprexa 15mg QHs for psychosis.   2. Patient to receive invega sustena 234 mg IM once today

## 2018-04-04 DIAGNOSIS — F20.0 PARANOID SCHIZOPHRENIA: ICD-10-CM

## 2018-04-04 RX ORDER — PALIPERIDONE 1.5 MG/1
234 TABLET, EXTENDED RELEASE ORAL ONCE
Qty: 0 | Refills: 0 | Status: COMPLETED | OUTPATIENT
Start: 2018-04-04 | End: 2018-04-04

## 2018-04-04 RX ADMIN — Medication 137 MICROGRAM(S): at 08:17

## 2018-04-04 RX ADMIN — ATORVASTATIN CALCIUM 10 MILLIGRAM(S): 80 TABLET, FILM COATED ORAL at 21:30

## 2018-04-04 RX ADMIN — Medication 100 MILLIGRAM(S): at 08:16

## 2018-04-04 RX ADMIN — Medication 100 MILLIGRAM(S): at 13:50

## 2018-04-04 RX ADMIN — CLOPIDOGREL BISULFATE 75 MILLIGRAM(S): 75 TABLET, FILM COATED ORAL at 08:16

## 2018-04-04 RX ADMIN — BUDESONIDE AND FORMOTEROL FUMARATE DIHYDRATE 2 PUFF(S): 160; 4.5 AEROSOL RESPIRATORY (INHALATION) at 08:15

## 2018-04-04 RX ADMIN — Medication 25 MILLIGRAM(S): at 08:17

## 2018-04-04 RX ADMIN — OLANZAPINE 15 MILLIGRAM(S): 15 TABLET, FILM COATED ORAL at 21:30

## 2018-04-04 RX ADMIN — Medication 81 MILLIGRAM(S): at 08:16

## 2018-04-04 RX ADMIN — BUDESONIDE AND FORMOTEROL FUMARATE DIHYDRATE 2 PUFF(S): 160; 4.5 AEROSOL RESPIRATORY (INHALATION) at 21:30

## 2018-04-04 RX ADMIN — PALIPERIDONE 234 MILLIGRAM(S): 1.5 TABLET, EXTENDED RELEASE ORAL at 14:26

## 2018-04-04 RX ADMIN — METFORMIN HYDROCHLORIDE 500 MILLIGRAM(S): 850 TABLET ORAL at 08:17

## 2018-04-04 RX ADMIN — METFORMIN HYDROCHLORIDE 500 MILLIGRAM(S): 850 TABLET ORAL at 21:30

## 2018-04-04 NOTE — CHART NOTE - NSCHARTNOTEFT_GEN_A_CORE
Social Work Note:    Treatment team yesterday with patient.  At that time patient was able to engage appropriately.  Treatment plan, medications, and discharge plan discussed.  Patient is now agreeable to an injectable medication.      No barriers to discharge identified at this time.  Chicho is from [facility] and will return when stable for discharge from the hospital.      At this time patient is not psychiatrically stable for discharge.

## 2018-04-04 NOTE — PROGRESS NOTE BEHAVIORAL HEALTH - PROBLEM SELECTOR PLAN 1
Continue Zyprexa 15mg P.o Bedtime for now  Please give Invega susstenelizabeth 234 I.M X 1 dose today

## 2018-04-04 NOTE — PROGRESS NOTE BEHAVIORAL HEALTH - SUMMARY
Mr Deshpande is a 62 year old man with a history of paranoid schizophrenia who was admitted because of increasing paranoia and disorganized behavior and noncompliance with medication. Patient continues to be disorganised in behavior and thought. He is ambivalent about taking the invega susstenna recommended because of his medication m=non compliance.  . Will continue to encourage then medication.

## 2018-04-05 RX ORDER — OLANZAPINE 15 MG/1
10 TABLET, FILM COATED ORAL AT BEDTIME
Qty: 0 | Refills: 0 | Status: DISCONTINUED | OUTPATIENT
Start: 2018-04-05 | End: 2018-04-06

## 2018-04-05 RX ADMIN — METFORMIN HYDROCHLORIDE 500 MILLIGRAM(S): 850 TABLET ORAL at 08:11

## 2018-04-05 RX ADMIN — METFORMIN HYDROCHLORIDE 500 MILLIGRAM(S): 850 TABLET ORAL at 20:16

## 2018-04-05 RX ADMIN — ATORVASTATIN CALCIUM 10 MILLIGRAM(S): 80 TABLET, FILM COATED ORAL at 20:16

## 2018-04-05 RX ADMIN — Medication 100 MILLIGRAM(S): at 17:14

## 2018-04-05 RX ADMIN — Medication 25 MILLIGRAM(S): at 08:11

## 2018-04-05 RX ADMIN — BUDESONIDE AND FORMOTEROL FUMARATE DIHYDRATE 2 PUFF(S): 160; 4.5 AEROSOL RESPIRATORY (INHALATION) at 08:12

## 2018-04-05 RX ADMIN — Medication 137 MICROGRAM(S): at 08:11

## 2018-04-05 RX ADMIN — CLOPIDOGREL BISULFATE 75 MILLIGRAM(S): 75 TABLET, FILM COATED ORAL at 08:11

## 2018-04-05 RX ADMIN — Medication 100 MILLIGRAM(S): at 08:11

## 2018-04-05 RX ADMIN — BUDESONIDE AND FORMOTEROL FUMARATE DIHYDRATE 2 PUFF(S): 160; 4.5 AEROSOL RESPIRATORY (INHALATION) at 20:16

## 2018-04-05 RX ADMIN — Medication 81 MILLIGRAM(S): at 08:11

## 2018-04-05 RX ADMIN — Medication 100 MILLIGRAM(S): at 20:16

## 2018-04-05 RX ADMIN — OLANZAPINE 10 MILLIGRAM(S): 15 TABLET, FILM COATED ORAL at 20:16

## 2018-04-05 NOTE — PROGRESS NOTE BEHAVIORAL HEALTH - SUMMARY
Patient is a 62 Y , single, unemployed male, residing at [facility], with a history of paranoid schizophrenia, CLL, and history of TIA, brought in to the ER for disorganized behavior in the context of med noncompliance, admitted to the medical service for leukocytosis (wbc of 115), and then transferred to [facility], once medical stable, for disorganized behavior demonstrated at [facility] marked by jumping on walls, pouring Listerine on him self. Today patient's behavior and speech is more organized and logical.

## 2018-04-05 NOTE — PROGRESS NOTE BEHAVIORAL HEALTH - PROBLEM SELECTOR PLAN 1
1. Decrease Zyprexa to 10mg QHs, cross taper.   (Patient to received invega sustena 234 mg IM 4/3/2018)

## 2018-04-05 NOTE — PROGRESS NOTE BEHAVIORAL HEALTH - CASE SUMMARY
Mr Deshpande is a 62 year old man with a history of paranoid schizophrenia who was admitted because of increasing paranoia and disorganized behavior and noncompliance with medication. Patient continues to be disorganized in behavior and thought. He received his Invega Susstenna 234mg  I.M X 1 dose yesterday, to good effect. Patient denies any side effects today ( this medication was given at this does as outpatient psychiatrist had given patient 78mg invega sustenna  about 3 weeks ago which patient tolerated well).  Will start to wean patient off Zyprexa at this time. Mr Deshpande is a 62 year old man with a history of paranoid schizophrenia who was admitted because of increasing paranoia and disorganized behavior and noncompliance with medication. Patient continues to be disorganized in behavior and thought. He received his Invega Susstenna 234mg  I.M X 1 dose yesterday, to good effect. Patient denies any side effects today ( this medication was given at this does as outpatient psychiatrist had given patient 78mg invega sustenna  about 3 weeks ago which patient tolerated well).  Will start to wean patient off Zyprexa at this time. today patient will have Zyprexa 10mg P.o bedtime .

## 2018-04-06 RX ORDER — OLANZAPINE 15 MG/1
5 TABLET, FILM COATED ORAL AT BEDTIME
Qty: 0 | Refills: 0 | Status: COMPLETED | OUTPATIENT
Start: 2018-04-06 | End: 2018-04-06

## 2018-04-06 RX ADMIN — Medication 81 MILLIGRAM(S): at 10:05

## 2018-04-06 RX ADMIN — Medication 137 MICROGRAM(S): at 10:06

## 2018-04-06 RX ADMIN — Medication 100 MILLIGRAM(S): at 10:05

## 2018-04-06 RX ADMIN — Medication 25 MILLIGRAM(S): at 10:06

## 2018-04-06 RX ADMIN — BUDESONIDE AND FORMOTEROL FUMARATE DIHYDRATE 2 PUFF(S): 160; 4.5 AEROSOL RESPIRATORY (INHALATION) at 10:12

## 2018-04-06 RX ADMIN — METFORMIN HYDROCHLORIDE 500 MILLIGRAM(S): 850 TABLET ORAL at 10:06

## 2018-04-06 RX ADMIN — ATORVASTATIN CALCIUM 10 MILLIGRAM(S): 80 TABLET, FILM COATED ORAL at 20:34

## 2018-04-06 RX ADMIN — METFORMIN HYDROCHLORIDE 500 MILLIGRAM(S): 850 TABLET ORAL at 20:34

## 2018-04-06 RX ADMIN — OLANZAPINE 5 MILLIGRAM(S): 15 TABLET, FILM COATED ORAL at 20:35

## 2018-04-06 RX ADMIN — CLOPIDOGREL BISULFATE 75 MILLIGRAM(S): 75 TABLET, FILM COATED ORAL at 10:05

## 2018-04-06 NOTE — PROGRESS NOTE BEHAVIORAL HEALTH - SUMMARY
Patient is a 62 year old man with a history of paranoid schizophrenia who was admitted because of increasing paranoia and disorganized behavior and noncompliance with medication. Patient continues to be disorganised in behavior and thought. Patient is adherent with medications at this time and is being monitored for any side effects. Patient is a 62 Y , single, unemployed male, residing at [facility], with a history of paranoid schizophrenia, CLL, and history of TIA, brought in to the ER for disorganized behavior in the context of med noncompliance, admitted to the medical service for leukocytosis (wbc of 115), and then transferred to [facility], once medical stable, for disorganized behavior demonstrated at [facility] marked by jumping on walls, pouring Listerine on him self. Today patient's behavior and speech is more organized and logical.

## 2018-04-06 NOTE — PROGRESS NOTE BEHAVIORAL HEALTH - CASE SUMMARY
Mr Deshpande is a 62 year old man with a history of paranoid schizophrenia who was admitted because of increasing paranoia and disorganized behavior and noncompliance with medication. Patient received his Invega Susstenna 234mg  I.M X 1 dose on 4/4/18. Patient denies any side effects today (this medication was given at this does as outpatient psychiatrist had given patient 78mg Invega sustenna  about 3 weeks ago which patient tolerated well).  Patient has been in good behavioral control. He continues to somewhat disorganized however is less paranoid .  Today patient will have Zyprexa 5mg P.O bedtime with the plan to discontinue.

## 2018-04-06 NOTE — CHART NOTE - NSCHARTNOTEFT_GEN_A_CORE
Social work note:    Staff reports no acute events over night, patient is compliant with medications and unit routines, his thoughts are more organized, he is visible on the unit, attending groups and engaging with peers, no behavioral issues. This writer will reach out to [facility] [place] [facility] to discuss current progress as patient is approaching baseline.  will continue to offer support daily and out reach [facility] [place] [facility] again on 4/9.

## 2018-04-06 NOTE — PROGRESS NOTE BEHAVIORAL HEALTH - PROBLEM SELECTOR PLAN 1
Decrease Zyprexa 5mg P.O. at bedtime for cross taper, this is last dose Decrease Zyprexa 5mg P.O. at bedtime for cross taper, this is last dose of taper.  (Patient to received invega sustena 234 mg IM 4/3/2018). Decrease Zyprexa 5mg P.O. at bedtime for cross taper, this is last dose of taper.  (Patient to received invega sustena 234 mg IM 4/4/2018).

## 2018-04-07 RX ADMIN — Medication 137 MICROGRAM(S): at 06:55

## 2018-04-07 RX ADMIN — BUDESONIDE AND FORMOTEROL FUMARATE DIHYDRATE 2 PUFF(S): 160; 4.5 AEROSOL RESPIRATORY (INHALATION) at 08:35

## 2018-04-07 RX ADMIN — BUDESONIDE AND FORMOTEROL FUMARATE DIHYDRATE 2 PUFF(S): 160; 4.5 AEROSOL RESPIRATORY (INHALATION) at 20:10

## 2018-04-07 RX ADMIN — Medication 25 MILLIGRAM(S): at 08:34

## 2018-04-07 RX ADMIN — Medication 100 MILLIGRAM(S): at 08:35

## 2018-04-07 RX ADMIN — Medication 100 MILLIGRAM(S): at 20:11

## 2018-04-07 RX ADMIN — ATORVASTATIN CALCIUM 10 MILLIGRAM(S): 80 TABLET, FILM COATED ORAL at 20:11

## 2018-04-07 RX ADMIN — CLOPIDOGREL BISULFATE 75 MILLIGRAM(S): 75 TABLET, FILM COATED ORAL at 08:34

## 2018-04-07 RX ADMIN — METFORMIN HYDROCHLORIDE 500 MILLIGRAM(S): 850 TABLET ORAL at 20:11

## 2018-04-07 RX ADMIN — Medication 81 MILLIGRAM(S): at 08:35

## 2018-04-07 RX ADMIN — METFORMIN HYDROCHLORIDE 500 MILLIGRAM(S): 850 TABLET ORAL at 08:34

## 2018-04-07 NOTE — PROGRESS NOTE BEHAVIORAL HEALTH - SUMMARY
Patient is a 62 Y , single, unemployed male, residing at [facility], with a history of paranoid schizophrenia, CLL, and history of TIA, brought in to the ER for disorganized behavior in the context of med noncompliance, admitted to the medical service for leukocytosis (wbc of 115), and then transferred to [facility], once medical stable, for disorganized behavior demonstrated at [facility] marked by jumping on walls, pouring Listerine on him self. Today patient's behavior and speech is more organized and logical.

## 2018-04-07 NOTE — PROGRESS NOTE BEHAVIORAL HEALTH - PROBLEM SELECTOR PLAN 1
Decrease Zyprexa 5mg P.O. at bedtime for cross taper, this is last dose of taper.  (Patient to received invega sustena 234 mg IM 4/4/2018).

## 2018-04-08 RX ADMIN — Medication 137 MICROGRAM(S): at 09:55

## 2018-04-08 RX ADMIN — METFORMIN HYDROCHLORIDE 500 MILLIGRAM(S): 850 TABLET ORAL at 09:55

## 2018-04-08 RX ADMIN — BUDESONIDE AND FORMOTEROL FUMARATE DIHYDRATE 2 PUFF(S): 160; 4.5 AEROSOL RESPIRATORY (INHALATION) at 09:54

## 2018-04-08 RX ADMIN — Medication 81 MILLIGRAM(S): at 09:54

## 2018-04-08 RX ADMIN — METFORMIN HYDROCHLORIDE 500 MILLIGRAM(S): 850 TABLET ORAL at 21:26

## 2018-04-08 RX ADMIN — Medication 25 MILLIGRAM(S): at 09:54

## 2018-04-08 RX ADMIN — CLOPIDOGREL BISULFATE 75 MILLIGRAM(S): 75 TABLET, FILM COATED ORAL at 09:55

## 2018-04-08 RX ADMIN — BUDESONIDE AND FORMOTEROL FUMARATE DIHYDRATE 2 PUFF(S): 160; 4.5 AEROSOL RESPIRATORY (INHALATION) at 21:08

## 2018-04-08 RX ADMIN — ATORVASTATIN CALCIUM 10 MILLIGRAM(S): 80 TABLET, FILM COATED ORAL at 21:26

## 2018-04-08 RX ADMIN — Medication 100 MILLIGRAM(S): at 09:54

## 2018-04-08 NOTE — PROGRESS NOTE ADULT - SUBJECTIVE AND OBJECTIVE BOX
pt stable alert in NAD  no new complaints  Lying in bed  [facility] ok  SCHIZOPHRENIA  ^SCHIZOPHRENIA  No h/o HF  No pertinent family history in first degree relatives  CLL (chronic lymphocytic leukemia)  Parathyroid cancer  DM (diabetes mellitus)  Schizophrenia  Psychiatric complaint  Paranoid schizophrenia  Paranoid schizophrenia  CLL (chronic lymphocytic leukemia)  Schizophrenia, unspecified type  Type 2 diabetes mellitus without complication, without long-term current use of insulin  Schizophrenia  DM (diabetes mellitus)  No significant past surgical history      No Known Allergies          aspirin  chewable 81 milliGRAM(s) Oral daily  atorvastatin 10 milliGRAM(s) Oral at bedtime  buDESOnide 160 MICROgram(s)/formoterol 4.5 MICROgram(s) Inhaler 2 Puff(s) Inhalation two times a day  clopidogrel Tablet 75 milliGRAM(s) Oral daily  diphenhydrAMINE   Capsule 50 milliGRAM(s) Oral every 4 hours PRN  docusate sodium 100 milliGRAM(s) Oral three times a day  haloperidol     Tablet 5 milliGRAM(s) Oral every 6 hours PRN  levothyroxine 137 MICROGram(s) Oral daily  metFORMIN 500 milliGRAM(s) Oral two times a day  metoprolol succinate ER 25 milliGRAM(s) Oral daily  paliperidone Injectable, Long Acting 234 milliGRAM(s) IntraMuscular once  sitaGLIPtin 100 milliGRAM(s) Oral daily      T(C): 36.4 (04-08-18 @ 06:00), Max: 36.4 (04-08-18 @ 06:00)  HR: 72 (04-08-18 @ 06:00) (72 - 82)  BP: 122/58 (04-08-18 @ 06:00) (118/60 - 122/58)  RR: 16 (04-08-18 @ 06:00) (16 - 16)  SpO2: --    PE;  general:  stabel in nad    Lungs:    Heart:    EXT:   from al ext  Neuro: no deficits                          CAPILLARY BLOOD GLUCOSE  120 (08 Apr 2018 06:00)

## 2018-04-09 RX ADMIN — Medication 137 MICROGRAM(S): at 08:16

## 2018-04-09 RX ADMIN — METFORMIN HYDROCHLORIDE 500 MILLIGRAM(S): 850 TABLET ORAL at 20:23

## 2018-04-09 RX ADMIN — BUDESONIDE AND FORMOTEROL FUMARATE DIHYDRATE 2 PUFF(S): 160; 4.5 AEROSOL RESPIRATORY (INHALATION) at 20:26

## 2018-04-09 RX ADMIN — ATORVASTATIN CALCIUM 10 MILLIGRAM(S): 80 TABLET, FILM COATED ORAL at 20:23

## 2018-04-09 RX ADMIN — Medication 81 MILLIGRAM(S): at 08:16

## 2018-04-09 RX ADMIN — BUDESONIDE AND FORMOTEROL FUMARATE DIHYDRATE 2 PUFF(S): 160; 4.5 AEROSOL RESPIRATORY (INHALATION) at 08:16

## 2018-04-09 RX ADMIN — METFORMIN HYDROCHLORIDE 500 MILLIGRAM(S): 850 TABLET ORAL at 08:16

## 2018-04-09 RX ADMIN — Medication 25 MILLIGRAM(S): at 08:16

## 2018-04-09 RX ADMIN — CLOPIDOGREL BISULFATE 75 MILLIGRAM(S): 75 TABLET, FILM COATED ORAL at 08:16

## 2018-04-09 NOTE — PROGRESS NOTE BEHAVIORAL HEALTH - CASE SUMMARY
Mr Deshpande is a 62 year old man with a history of paranoid schizophrenia who was admitted because of increasing paranoia and disorganized behavior and noncompliance with medication. Patient continues to be somewhat isolated, less disorganised  and less paranoid. He received his Invega Sustenna 234mg  I.M X 1 dose on 4/4/18. We discussed with patient the need to give him a lover dose on April 11th. patient verbalized understanding.    Will continue to monitor  AIMS score - 0

## 2018-04-09 NOTE — PROGRESS NOTE BEHAVIORAL HEALTH - SUMMARY
Patient is a 62 Y , single, unemployed male, residing at [facility], with a history of paranoid schizophrenia, CLL, and history of TIA, brought in to the ER for disorganized behavior in the context of med noncompliance, admitted to the medical service for leukocytosis (wbc of 115), and then transferred to [facility], once medical stable, for disorganized behavior demonstrated at [facility] marked by jumping on walls, pouring Listerine on him self. Today patient's behavior and speech is more organized and logical. 62 Y , single, unemployed male, residing at [facility], with a history of paranoid schizophrenia, CLL, and history of TIA, brought in to the ER for disorganized behavior in the context of med noncompliance, admitted to the medical service for leukocytosis (wbc of 115), and then transferred to [facility], once medical stable, for disorganized behavior demonstrated at [facility] marked by jumping on walls, pouring Listerine on himself. Today patient's behavior and speech is more organized and logical, however, patient does not engage well with examination and is unable to provide information about his own care.

## 2018-04-09 NOTE — PROGRESS NOTE BEHAVIORAL HEALTH - PROBLEM SELECTOR PLAN 1
Decrease Zyprexa 5mg P.O. at bedtime for cross taper, this is last dose of taper.  (Patient to received invega sustena 234 mg IM 4/4/2018). - Last dose of Invega sustenna 234 mg IM 4/4/2018  - next dose of Invega sustenna 156mg IM 4/11/2018

## 2018-04-10 RX ORDER — PALIPERIDONE 1.5 MG/1
156 TABLET, EXTENDED RELEASE ORAL ONCE
Qty: 0 | Refills: 0 | Status: COMPLETED | OUTPATIENT
Start: 2018-04-11 | End: 2018-04-11

## 2018-04-10 RX ADMIN — BUDESONIDE AND FORMOTEROL FUMARATE DIHYDRATE 2 PUFF(S): 160; 4.5 AEROSOL RESPIRATORY (INHALATION) at 20:22

## 2018-04-10 RX ADMIN — METFORMIN HYDROCHLORIDE 500 MILLIGRAM(S): 850 TABLET ORAL at 08:33

## 2018-04-10 RX ADMIN — ATORVASTATIN CALCIUM 10 MILLIGRAM(S): 80 TABLET, FILM COATED ORAL at 20:22

## 2018-04-10 RX ADMIN — Medication 81 MILLIGRAM(S): at 08:33

## 2018-04-10 RX ADMIN — CLOPIDOGREL BISULFATE 75 MILLIGRAM(S): 75 TABLET, FILM COATED ORAL at 08:33

## 2018-04-10 RX ADMIN — METFORMIN HYDROCHLORIDE 500 MILLIGRAM(S): 850 TABLET ORAL at 20:22

## 2018-04-10 RX ADMIN — BUDESONIDE AND FORMOTEROL FUMARATE DIHYDRATE 2 PUFF(S): 160; 4.5 AEROSOL RESPIRATORY (INHALATION) at 08:33

## 2018-04-10 RX ADMIN — Medication 137 MICROGRAM(S): at 08:33

## 2018-04-10 RX ADMIN — Medication 25 MILLIGRAM(S): at 08:33

## 2018-04-10 NOTE — PROGRESS NOTE BEHAVIORAL HEALTH - SUMMARY
62 year old , single, unemployed male, residing at [facility], with a history of paranoid schizophrenia, CLL, and history of TIA, brought in to the ER for disorganized behavior in the context of med noncompliance, admitted to the medical service for leukocytosis (wbc of 115), and then transferred to [facility], once medical stable, for disorganized behavior demonstrated at [facility] marked by jumping on walls, pouring Listerine on himself. Patient is irritable on exam today and refused extended exam. Per staff, patient had no difficulties overnight and behaved appropriately with staff.

## 2018-04-10 NOTE — CHART NOTE - NSCHARTNOTEFT_GEN_A_CORE
Social work update:    Patient continue to make progress toward baseline, compliant with medications and unit routines, will interact with treatment team with prompting. Message left on 4/10 with [facility] unit manager Lisbet Jennings [phone] to discuss readiness for discharge and process for return to TLR.  will continue to offer support daily and make collateral contacts as necessary.

## 2018-04-10 NOTE — PROGRESS NOTE BEHAVIORAL HEALTH - CASE SUMMARY
Mr Deshpande is a 62 year old man with a history of paranoid schizophrenia who was admitted because of increasing paranoia and disorganized behavior and noncompliance with medication. Patient continues to be somewhat isolated, less disorganized and less paranoid. He received his Invega Sustenna 234mg  I.M X 1 dose on 4/4/18. Patient will receive Invega sustenna 156mg I.M X 1 dose tomorrow.  Will continue to monitor  AIMS score - 0

## 2018-04-10 NOTE — PROGRESS NOTE BEHAVIORAL HEALTH - PROBLEM SELECTOR PLAN 1
- last dose of Invega sustenna 234 mg IM 4/4/2018  - next dose of Invega sustenna 156mg IM 4/11/2018 (tomorrow AM)

## 2018-04-11 RX ADMIN — Medication 25 MILLIGRAM(S): at 08:17

## 2018-04-11 RX ADMIN — CLOPIDOGREL BISULFATE 75 MILLIGRAM(S): 75 TABLET, FILM COATED ORAL at 08:17

## 2018-04-11 RX ADMIN — ATORVASTATIN CALCIUM 10 MILLIGRAM(S): 80 TABLET, FILM COATED ORAL at 20:25

## 2018-04-11 RX ADMIN — Medication 100 MILLIGRAM(S): at 20:25

## 2018-04-11 RX ADMIN — BUDESONIDE AND FORMOTEROL FUMARATE DIHYDRATE 2 PUFF(S): 160; 4.5 AEROSOL RESPIRATORY (INHALATION) at 20:24

## 2018-04-11 RX ADMIN — BUDESONIDE AND FORMOTEROL FUMARATE DIHYDRATE 2 PUFF(S): 160; 4.5 AEROSOL RESPIRATORY (INHALATION) at 08:22

## 2018-04-11 RX ADMIN — METFORMIN HYDROCHLORIDE 500 MILLIGRAM(S): 850 TABLET ORAL at 20:25

## 2018-04-11 RX ADMIN — Medication 81 MILLIGRAM(S): at 08:16

## 2018-04-11 RX ADMIN — Medication 137 MICROGRAM(S): at 08:17

## 2018-04-11 RX ADMIN — METFORMIN HYDROCHLORIDE 500 MILLIGRAM(S): 850 TABLET ORAL at 08:17

## 2018-04-11 NOTE — PROGRESS NOTE BEHAVIORAL HEALTH - SUMMARY
Mr Deshpande is a 62 year old man with a history of paranoid schizophrenia who was admitted because of increasing paranoia and disorganized behavior and noncompliance with medication. Patient's disorganised thought appears to be chronic . he is however no longer paranoid and is not disorganised in behavior. He received his Invega Sustenna 234mg  I.M X 1 dose on 4/4/18. Patient will receive Invega sustenna 156mg I.M X 1 dose today with the plan to maintain patient on a dose of 117mg monthly. Discharge planning ongoing.   Will continue to monitor  AIMS score - 0

## 2018-04-11 NOTE — PROGRESS NOTE ADULT - SUBJECTIVE AND OBJECTIVE BOX
pt stable alert in NAD  no new complaints    SCHIZOPHRENIA  ^SCHIZOPHRENIA  No h/o HF  No pertinent family history in first degree relatives  Handoff  CLL (chronic lymphocytic leukemia)  Parathyroid cancer  DM (diabetes mellitus)  Schizophrenia  Psychiatric complaint  Paranoid schizophrenia  Paranoid schizophrenia  CLL (chronic lymphocytic leukemia)  Schizophrenia, unspecified type  Type 2 diabetes mellitus without complication, without long-term current use of insulin  Schizophrenia  DM (diabetes mellitus)  No significant past surgical history    HEALTH ISSUES - PROBLEM Dx:  Paranoid schizophrenia  Paranoid schizophrenia: Paranoid schizophrenia  CLL (chronic lymphocytic leukemia): CLL (chronic lymphocytic leukemia)  Schizophrenia, unspecified type: Schizophrenia, unspecified type  Type 2 diabetes mellitus without complication, without long-term current use of insulin: Type 2 diabetes mellitus without complication, without long-term current use of insulin  Schizophrenia: Schizophrenia  DM (diabetes mellitus): DM (diabetes mellitus)        PAST MEDICAL & SURGICAL HISTORY:  CLL (chronic lymphocytic leukemia)  Parathyroid cancer  DM (diabetes mellitus)  Schizophrenia  No significant past surgical history    No Known Allergies      FAMILY HISTORY:  No pertinent family history in first degree relatives      aspirin  chewable 81 milliGRAM(s) Oral daily  atorvastatin 10 milliGRAM(s) Oral at bedtime  buDESOnide 160 MICROgram(s)/formoterol 4.5 MICROgram(s) Inhaler 2 Puff(s) Inhalation two times a day  clopidogrel Tablet 75 milliGRAM(s) Oral daily  diphenhydrAMINE   Capsule 50 milliGRAM(s) Oral every 4 hours PRN  docusate sodium 100 milliGRAM(s) Oral three times a day  haloperidol     Tablet 5 milliGRAM(s) Oral every 6 hours PRN  levothyroxine 137 MICROGram(s) Oral daily  metFORMIN 500 milliGRAM(s) Oral two times a day  metoprolol succinate ER 25 milliGRAM(s) Oral daily  paliperidone Injectable, Long Acting 156 milliGRAM(s) IntraMuscular once  paliperidone Injectable, Long Acting 234 milliGRAM(s) IntraMuscular once  sitaGLIPtin 100 milliGRAM(s) Oral daily      T(C): 37.4 (04-10-18 @ 18:00), Max: 37.4 (04-10-18 @ 18:00)  HR: 92 (04-10-18 @ 18:00) (91 - 92)  BP: 135/69 (04-10-18 @ 18:00) (110/59 - 135/69)  RR: 18 (04-10-18 @ 18:00) (16 - 18)  SpO2: --    PE;  general:   sttable  Lungs:    Heart:    EXT:    Neuro:  no deficits                          CAPILLARY BLOOD GLUCOSE

## 2018-04-12 RX ADMIN — Medication 100 MILLIGRAM(S): at 20:21

## 2018-04-12 RX ADMIN — Medication 137 MICROGRAM(S): at 08:16

## 2018-04-12 RX ADMIN — BUDESONIDE AND FORMOTEROL FUMARATE DIHYDRATE 2 PUFF(S): 160; 4.5 AEROSOL RESPIRATORY (INHALATION) at 20:36

## 2018-04-12 RX ADMIN — Medication 25 MILLIGRAM(S): at 08:16

## 2018-04-12 RX ADMIN — METFORMIN HYDROCHLORIDE 500 MILLIGRAM(S): 850 TABLET ORAL at 08:16

## 2018-04-12 RX ADMIN — BUDESONIDE AND FORMOTEROL FUMARATE DIHYDRATE 2 PUFF(S): 160; 4.5 AEROSOL RESPIRATORY (INHALATION) at 08:18

## 2018-04-12 RX ADMIN — Medication 81 MILLIGRAM(S): at 08:17

## 2018-04-12 RX ADMIN — CLOPIDOGREL BISULFATE 75 MILLIGRAM(S): 75 TABLET, FILM COATED ORAL at 08:17

## 2018-04-12 RX ADMIN — ATORVASTATIN CALCIUM 10 MILLIGRAM(S): 80 TABLET, FILM COATED ORAL at 20:21

## 2018-04-12 RX ADMIN — METFORMIN HYDROCHLORIDE 500 MILLIGRAM(S): 850 TABLET ORAL at 20:21

## 2018-04-12 NOTE — PROGRESS NOTE BEHAVIORAL HEALTH - SUMMARY
Mr Deshpande is a 62 year old man with a history of paranoid schizophrenia who was admitted because of increasing paranoia and disorganized behavior and noncompliance with medication. Patient appears to be at his baseline, with chronic disorganised thoughts . He is however no longer paranoid and is not disorganised in behavior. He received his Invega Sustenna 234mg  I.M X 1 dose on 4/4/18. Patient will receive Invega sustenna 156mg I.M X 1 dose today with the plan to maintain patient on a dose of 117mg monthly. Discharge planning ongoing.   Will continue to monitor  AIMS score - 0

## 2018-04-13 RX ORDER — HALOPERIDOL DECANOATE 100 MG/ML
5 INJECTION INTRAMUSCULAR ONCE
Qty: 0 | Refills: 0 | Status: COMPLETED | OUTPATIENT
Start: 2018-04-13 | End: 2018-04-13

## 2018-04-13 RX ORDER — DIPHENHYDRAMINE HCL 50 MG
50 CAPSULE ORAL ONCE
Qty: 0 | Refills: 0 | Status: COMPLETED | OUTPATIENT
Start: 2018-04-13 | End: 2018-04-13

## 2018-04-13 RX ADMIN — Medication 100 MILLIGRAM(S): at 08:18

## 2018-04-13 RX ADMIN — PALIPERIDONE 156 MILLIGRAM(S): 1.5 TABLET, EXTENDED RELEASE ORAL at 16:46

## 2018-04-13 RX ADMIN — Medication 50 MILLIGRAM(S): at 12:25

## 2018-04-13 RX ADMIN — BUDESONIDE AND FORMOTEROL FUMARATE DIHYDRATE 2 PUFF(S): 160; 4.5 AEROSOL RESPIRATORY (INHALATION) at 08:19

## 2018-04-13 RX ADMIN — Medication 81 MILLIGRAM(S): at 08:18

## 2018-04-13 RX ADMIN — Medication 137 MICROGRAM(S): at 08:18

## 2018-04-13 RX ADMIN — CLOPIDOGREL BISULFATE 75 MILLIGRAM(S): 75 TABLET, FILM COATED ORAL at 08:18

## 2018-04-13 RX ADMIN — BUDESONIDE AND FORMOTEROL FUMARATE DIHYDRATE 2 PUFF(S): 160; 4.5 AEROSOL RESPIRATORY (INHALATION) at 20:39

## 2018-04-13 RX ADMIN — HALOPERIDOL DECANOATE 5 MILLIGRAM(S): 100 INJECTION INTRAMUSCULAR at 12:25

## 2018-04-13 RX ADMIN — Medication 2 MILLIGRAM(S): at 12:25

## 2018-04-13 RX ADMIN — ATORVASTATIN CALCIUM 10 MILLIGRAM(S): 80 TABLET, FILM COATED ORAL at 20:39

## 2018-04-13 RX ADMIN — Medication 25 MILLIGRAM(S): at 08:18

## 2018-04-13 RX ADMIN — METFORMIN HYDROCHLORIDE 500 MILLIGRAM(S): 850 TABLET ORAL at 08:18

## 2018-04-13 RX ADMIN — METFORMIN HYDROCHLORIDE 500 MILLIGRAM(S): 850 TABLET ORAL at 20:39

## 2018-04-13 NOTE — CHART NOTE - NSCHARTNOTEFT_GEN_A_CORE
Social work update:    Patient is alert, he is irritable and often refusing to engage appropriately with treatment team despite encouragement. This writer spoke to manager of TLR Lisbet Jennings [phone] on 4/13 to discuss current presentation, treatment plan and barriers to discharge.  informed that patient is not currently ready for TLR evaluation, will contact the residence once the team feels that patient is approaching readiness for discharge.     will continue to offer support, encourage compliance and make collateral contacts as necessary.

## 2018-04-13 NOTE — PROGRESS NOTE BEHAVIORAL HEALTH - SUMMARY
Mr Deshpande is a 62 year old man with a history of paranoid schizophrenia who was admitted because of increasing paranoia and disorganized behavior and noncompliance with medication. we are awaiting patient's invega andrea,. he continues to be somewhat disorganised and at times paranoid.   Will continue to monitor  AIMS score - 0

## 2018-04-14 RX ADMIN — METFORMIN HYDROCHLORIDE 500 MILLIGRAM(S): 850 TABLET ORAL at 09:05

## 2018-04-14 RX ADMIN — Medication 81 MILLIGRAM(S): at 09:05

## 2018-04-14 RX ADMIN — CLOPIDOGREL BISULFATE 75 MILLIGRAM(S): 75 TABLET, FILM COATED ORAL at 09:05

## 2018-04-14 RX ADMIN — Medication 25 MILLIGRAM(S): at 09:05

## 2018-04-14 RX ADMIN — BUDESONIDE AND FORMOTEROL FUMARATE DIHYDRATE 2 PUFF(S): 160; 4.5 AEROSOL RESPIRATORY (INHALATION) at 20:27

## 2018-04-14 RX ADMIN — ATORVASTATIN CALCIUM 10 MILLIGRAM(S): 80 TABLET, FILM COATED ORAL at 20:26

## 2018-04-14 RX ADMIN — Medication 137 MICROGRAM(S): at 09:05

## 2018-04-14 RX ADMIN — METFORMIN HYDROCHLORIDE 500 MILLIGRAM(S): 850 TABLET ORAL at 20:26

## 2018-04-14 RX ADMIN — BUDESONIDE AND FORMOTEROL FUMARATE DIHYDRATE 2 PUFF(S): 160; 4.5 AEROSOL RESPIRATORY (INHALATION) at 09:06

## 2018-04-15 RX ADMIN — CLOPIDOGREL BISULFATE 75 MILLIGRAM(S): 75 TABLET, FILM COATED ORAL at 08:48

## 2018-04-15 RX ADMIN — Medication 137 MICROGRAM(S): at 08:47

## 2018-04-15 RX ADMIN — METFORMIN HYDROCHLORIDE 500 MILLIGRAM(S): 850 TABLET ORAL at 08:47

## 2018-04-15 RX ADMIN — Medication 81 MILLIGRAM(S): at 08:48

## 2018-04-15 RX ADMIN — Medication 25 MILLIGRAM(S): at 08:47

## 2018-04-15 RX ADMIN — BUDESONIDE AND FORMOTEROL FUMARATE DIHYDRATE 2 PUFF(S): 160; 4.5 AEROSOL RESPIRATORY (INHALATION) at 20:17

## 2018-04-15 RX ADMIN — BUDESONIDE AND FORMOTEROL FUMARATE DIHYDRATE 2 PUFF(S): 160; 4.5 AEROSOL RESPIRATORY (INHALATION) at 08:47

## 2018-04-16 RX ADMIN — Medication 81 MILLIGRAM(S): at 09:11

## 2018-04-16 RX ADMIN — ATORVASTATIN CALCIUM 10 MILLIGRAM(S): 80 TABLET, FILM COATED ORAL at 20:09

## 2018-04-16 RX ADMIN — CLOPIDOGREL BISULFATE 75 MILLIGRAM(S): 75 TABLET, FILM COATED ORAL at 09:11

## 2018-04-16 RX ADMIN — Medication 137 MICROGRAM(S): at 09:12

## 2018-04-16 RX ADMIN — Medication 100 MILLIGRAM(S): at 13:20

## 2018-04-16 RX ADMIN — BUDESONIDE AND FORMOTEROL FUMARATE DIHYDRATE 2 PUFF(S): 160; 4.5 AEROSOL RESPIRATORY (INHALATION) at 09:12

## 2018-04-16 RX ADMIN — BUDESONIDE AND FORMOTEROL FUMARATE DIHYDRATE 2 PUFF(S): 160; 4.5 AEROSOL RESPIRATORY (INHALATION) at 20:09

## 2018-04-16 RX ADMIN — METFORMIN HYDROCHLORIDE 500 MILLIGRAM(S): 850 TABLET ORAL at 20:09

## 2018-04-16 RX ADMIN — Medication 25 MILLIGRAM(S): at 09:11

## 2018-04-16 RX ADMIN — Medication 100 MILLIGRAM(S): at 09:11

## 2018-04-16 RX ADMIN — METFORMIN HYDROCHLORIDE 500 MILLIGRAM(S): 850 TABLET ORAL at 09:11

## 2018-04-16 NOTE — PROGRESS NOTE ADULT - SUBJECTIVE AND OBJECTIVE BOX
pt stable alert in NAD  no new complaints    SCHIZOPHRENIA  ^SCHIZOPHRENIA  No h/o HF  No pertinent family history in first degree relatives  Handoff  CLL (chronic lymphocytic leukemia)  Parathyroid cancer  DM (diabetes mellitus)  Schizophrenia  Psychiatric complaint  Paranoid schizophrenia  Paranoid schizophrenia  CLL (chronic lymphocytic leukemia)  Schizophrenia, unspecified type  Type 2 diabetes mellitus without complication, without long-term current use of insulin  Schizophrenia  DM (diabetes mellitus)  No significant past surgical history    HEALTH ISSUES - PROBLEM Dx:  Paranoid schizophrenia  Paranoid schizophrenia: Paranoid schizophrenia  CLL (chronic lymphocytic leukemia): CLL (chronic lymphocytic leukemia)  Schizophrenia, unspecified type: Schizophrenia, unspecified type  Type 2 diabetes mellitus without complication, without long-term current use of insulin: Type 2 diabetes mellitus without complication, without long-term current use of insulin  Schizophrenia: Schizophrenia  DM (diabetes mellitus): DM (diabetes mellitus)        PAST MEDICAL & SURGICAL HISTORY:  CLL (chronic lymphocytic leukemia)  Parathyroid cancer  DM (diabetes mellitus)  Schizophrenia  No significant past surgical history    No Known Allergies      FAMILY HISTORY:  No pertinent family history in first degree relatives      aspirin  chewable 81 milliGRAM(s) Oral daily  atorvastatin 10 milliGRAM(s) Oral at bedtime  buDESOnide 160 MICROgram(s)/formoterol 4.5 MICROgram(s) Inhaler 2 Puff(s) Inhalation two times a day  clopidogrel Tablet 75 milliGRAM(s) Oral daily  diphenhydrAMINE   Capsule 50 milliGRAM(s) Oral every 4 hours PRN  docusate sodium 100 milliGRAM(s) Oral three times a day  haloperidol     Tablet 5 milliGRAM(s) Oral every 6 hours PRN  levothyroxine 137 MICROGram(s) Oral daily  metFORMIN 500 milliGRAM(s) Oral two times a day  metoprolol succinate ER 25 milliGRAM(s) Oral daily  paliperidone Injectable, Long Acting 234 milliGRAM(s) IntraMuscular once  sitaGLIPtin 100 milliGRAM(s) Oral daily      T(C): 36.9 (04-16-18 @ 06:13), Max: 36.9 (04-16-18 @ 06:13)  HR: 86 (04-16-18 @ 06:13) (78 - 92)  BP: 131/67 (04-16-18 @ 06:13) (118/59 - 138/63)  RR: 20 (04-16-18 @ 06:13) (16 - 20)  SpO2: --    PE;  general:  remains stable    Lungs:    Heart:    EXT:    Neuro:  no deficits                        CAPILLARY BLOOD GLUCOSE  117 (16 Apr 2018 06:13)  102 (15 Apr 2018 16:40)

## 2018-04-16 NOTE — PROGRESS NOTE BEHAVIORAL HEALTH - SUMMARY
Mr Deshpande is a 62 year old man with a history of paranoid schizophrenia who was admitted because of increasing paranoia and disorganized behavior and noncompliance with medication. . he continues to be somewhat disorganised and at times paranoid. Patient however was in good behavioral control over the weekend.   Will continue to monitor  AIMS score - 0

## 2018-04-17 RX ORDER — PALIPERIDONE 1.5 MG/1
3 TABLET, EXTENDED RELEASE ORAL DAILY
Qty: 0 | Refills: 0 | Status: DISCONTINUED | OUTPATIENT
Start: 2018-04-17 | End: 2018-04-19

## 2018-04-17 RX ADMIN — BUDESONIDE AND FORMOTEROL FUMARATE DIHYDRATE 2 PUFF(S): 160; 4.5 AEROSOL RESPIRATORY (INHALATION) at 08:35

## 2018-04-17 RX ADMIN — CLOPIDOGREL BISULFATE 75 MILLIGRAM(S): 75 TABLET, FILM COATED ORAL at 08:33

## 2018-04-17 RX ADMIN — Medication 25 MILLIGRAM(S): at 08:33

## 2018-04-17 RX ADMIN — Medication 81 MILLIGRAM(S): at 08:33

## 2018-04-17 RX ADMIN — METFORMIN HYDROCHLORIDE 500 MILLIGRAM(S): 850 TABLET ORAL at 20:03

## 2018-04-17 RX ADMIN — Medication 137 MICROGRAM(S): at 08:33

## 2018-04-17 RX ADMIN — Medication 100 MILLIGRAM(S): at 08:33

## 2018-04-17 RX ADMIN — BUDESONIDE AND FORMOTEROL FUMARATE DIHYDRATE 2 PUFF(S): 160; 4.5 AEROSOL RESPIRATORY (INHALATION) at 20:02

## 2018-04-17 RX ADMIN — METFORMIN HYDROCHLORIDE 500 MILLIGRAM(S): 850 TABLET ORAL at 08:33

## 2018-04-17 RX ADMIN — ATORVASTATIN CALCIUM 10 MILLIGRAM(S): 80 TABLET, FILM COATED ORAL at 20:02

## 2018-04-17 NOTE — PROGRESS NOTE BEHAVIORAL HEALTH - SUMMARY
Mr Deshpande is a 62 year old man with a history of paranoid schizophrenia who was admitted because of increasing paranoia and disorganized behavior and noncompliance with medication. Patient seems to be paranoid with disorganised behavior and thought process , he seems to be internally preoccupoied and responding to internal stimuli. We will restart oral invega for better control of his psychosis .

## 2018-04-17 NOTE — CHART NOTE - NSCHARTNOTEFT_GEN_A_CORE
Social work update:    PT continues to present with irritability, unpredictable behavior and behavioral outbursts. He is compliant with medications with maximum encouragement from staff, he needs prompting for unit routines. PT attended team meeting today but was unable to participate fully in engage in goal setting, MD will consider medication changes.  will contact [facility] when patient is stable for discharge, continue to offer support and encouragement daily and make collateral contacts as necessary.

## 2018-04-18 RX ADMIN — Medication 100 MILLIGRAM(S): at 20:54

## 2018-04-18 RX ADMIN — BUDESONIDE AND FORMOTEROL FUMARATE DIHYDRATE 2 PUFF(S): 160; 4.5 AEROSOL RESPIRATORY (INHALATION) at 20:57

## 2018-04-18 RX ADMIN — Medication 137 MICROGRAM(S): at 08:34

## 2018-04-18 RX ADMIN — CLOPIDOGREL BISULFATE 75 MILLIGRAM(S): 75 TABLET, FILM COATED ORAL at 08:34

## 2018-04-18 RX ADMIN — METFORMIN HYDROCHLORIDE 500 MILLIGRAM(S): 850 TABLET ORAL at 20:54

## 2018-04-18 RX ADMIN — Medication 81 MILLIGRAM(S): at 08:34

## 2018-04-18 RX ADMIN — Medication 100 MILLIGRAM(S): at 12:13

## 2018-04-18 RX ADMIN — PALIPERIDONE 3 MILLIGRAM(S): 1.5 TABLET, EXTENDED RELEASE ORAL at 08:34

## 2018-04-18 RX ADMIN — Medication 25 MILLIGRAM(S): at 08:34

## 2018-04-18 RX ADMIN — BUDESONIDE AND FORMOTEROL FUMARATE DIHYDRATE 2 PUFF(S): 160; 4.5 AEROSOL RESPIRATORY (INHALATION) at 08:36

## 2018-04-18 RX ADMIN — METFORMIN HYDROCHLORIDE 500 MILLIGRAM(S): 850 TABLET ORAL at 08:34

## 2018-04-18 RX ADMIN — ATORVASTATIN CALCIUM 10 MILLIGRAM(S): 80 TABLET, FILM COATED ORAL at 20:54

## 2018-04-18 RX ADMIN — Medication 100 MILLIGRAM(S): at 08:34

## 2018-04-18 NOTE — PROGRESS NOTE ADULT - SUBJECTIVE AND OBJECTIVE BOX
pt stable alert in NAD  no new complaints    SCHIZOPHRENIA  ^SCHIZOPHRENIA  No h/o HF  No pertinent family history in first degree relatives  Handoff  CLL (chronic lymphocytic leukemia)  Parathyroid cancer  DM (diabetes mellitus)  Schizophrenia  Psychiatric complaint  Paranoid schizophrenia  Paranoid schizophrenia  CLL (chronic lymphocytic leukemia)  Schizophrenia, unspecified type  Type 2 diabetes mellitus without complication, without long-term current use of insulin  Schizophrenia  DM (diabetes mellitus)  No significant past surgical history    HEALTH ISSUES - PROBLEM Dx:  Paranoid schizophrenia  Paranoid schizophrenia: Paranoid schizophrenia  CLL (chronic lymphocytic leukemia): CLL (chronic lymphocytic leukemia)  Schizophrenia, unspecified type: Schizophrenia, unspecified type  Type 2 diabetes mellitus without complication, without long-term current use of insulin: Type 2 diabetes mellitus without complication, without long-term current use of insulin  Schizophrenia: Schizophrenia  DM (diabetes mellitus): DM (diabetes mellitus)        PAST MEDICAL & SURGICAL HISTORY:  CLL (chronic lymphocytic leukemia)  Parathyroid cancer  DM (diabetes mellitus)  Schizophrenia  No significant past surgical history    No Known Allergies      FAMILY HISTORY:  No pertinent family history in first degree relatives      aspirin  chewable 81 milliGRAM(s) Oral daily  atorvastatin 10 milliGRAM(s) Oral at bedtime  buDESOnide 160 MICROgram(s)/formoterol 4.5 MICROgram(s) Inhaler 2 Puff(s) Inhalation two times a day  clopidogrel Tablet 75 milliGRAM(s) Oral daily  diphenhydrAMINE   Capsule 50 milliGRAM(s) Oral every 4 hours PRN  docusate sodium 100 milliGRAM(s) Oral three times a day  haloperidol     Tablet 5 milliGRAM(s) Oral every 6 hours PRN  levothyroxine 137 MICROGram(s) Oral daily  metFORMIN 500 milliGRAM(s) Oral two times a day  metoprolol succinate ER 25 milliGRAM(s) Oral daily  paliperidone ER 3 milliGRAM(s) Oral daily  paliperidone Injectable, Long Acting 234 milliGRAM(s) IntraMuscular once  sitaGLIPtin 100 milliGRAM(s) Oral daily      T(C): 36.5 (04-18-18 @ 05:41), Max: 36.5 (04-18-18 @ 05:41)  HR: 97 (04-18-18 @ 05:41) (88 - 99)  BP: 158/72 (04-18-18 @ 05:41) (127/56 - 158/72)  RR: 17 (04-18-18 @ 05:41) (17 - 18)  SpO2: --    PE;  general:  stabel in bed  no acuete changes    Lungs:    Heart:    EXT:    Neuro:                          CAPILLARY BLOOD GLUCOSE

## 2018-04-18 NOTE — PROGRESS NOTE BEHAVIORAL HEALTH - SUMMARY
Mr Deshpande is a 62 year old man with a history of paranoid schizophrenia who was admitted because of increasing paranoia and disorganized behavior and noncompliance with medication. Patient seems to be paranoid with disorganised behavior and thought process. Patient appears to be tolerating the oral Invega  with no side effects.

## 2018-04-19 RX ORDER — PALIPERIDONE 1.5 MG/1
6 TABLET, EXTENDED RELEASE ORAL DAILY
Qty: 0 | Refills: 0 | Status: DISCONTINUED | OUTPATIENT
Start: 2018-04-19 | End: 2018-04-24

## 2018-04-19 RX ADMIN — BUDESONIDE AND FORMOTEROL FUMARATE DIHYDRATE 2 PUFF(S): 160; 4.5 AEROSOL RESPIRATORY (INHALATION) at 20:08

## 2018-04-19 RX ADMIN — METFORMIN HYDROCHLORIDE 500 MILLIGRAM(S): 850 TABLET ORAL at 08:45

## 2018-04-19 RX ADMIN — Medication 137 MICROGRAM(S): at 08:45

## 2018-04-19 RX ADMIN — BUDESONIDE AND FORMOTEROL FUMARATE DIHYDRATE 2 PUFF(S): 160; 4.5 AEROSOL RESPIRATORY (INHALATION) at 08:46

## 2018-04-19 RX ADMIN — METFORMIN HYDROCHLORIDE 500 MILLIGRAM(S): 850 TABLET ORAL at 20:08

## 2018-04-19 RX ADMIN — CLOPIDOGREL BISULFATE 75 MILLIGRAM(S): 75 TABLET, FILM COATED ORAL at 08:45

## 2018-04-19 RX ADMIN — Medication 25 MILLIGRAM(S): at 08:45

## 2018-04-19 RX ADMIN — ATORVASTATIN CALCIUM 10 MILLIGRAM(S): 80 TABLET, FILM COATED ORAL at 20:09

## 2018-04-19 RX ADMIN — Medication 81 MILLIGRAM(S): at 08:46

## 2018-04-19 RX ADMIN — PALIPERIDONE 3 MILLIGRAM(S): 1.5 TABLET, EXTENDED RELEASE ORAL at 08:46

## 2018-04-19 NOTE — PROGRESS NOTE BEHAVIORAL HEALTH - PROBLEM SELECTOR PLAN 1
-Invega sustenna 156mg IM received on 4/16/2018, due for next dose on 5/16/18  - increase to  Invega 6mg p.o daily

## 2018-04-19 NOTE — PROGRESS NOTE BEHAVIORAL HEALTH - SUMMARY
Mr Deshpande is a 62 year old man with a history of paranoid schizophrenia who was admitted because of increasing paranoia and disorganized behavior and noncompliance with medication. Patient 's symptoms of disorganization and some paranoia appear to be chronic . He is however in good behavioral control . Patient appears to be tolerating the oral Invega  with no side effects. Will increase to invega 6 mg daily for better mood stabilization.

## 2018-04-20 RX ADMIN — CLOPIDOGREL BISULFATE 75 MILLIGRAM(S): 75 TABLET, FILM COATED ORAL at 08:35

## 2018-04-20 RX ADMIN — PALIPERIDONE 6 MILLIGRAM(S): 1.5 TABLET, EXTENDED RELEASE ORAL at 13:57

## 2018-04-20 RX ADMIN — Medication 81 MILLIGRAM(S): at 08:35

## 2018-04-20 RX ADMIN — Medication 137 MICROGRAM(S): at 08:35

## 2018-04-20 RX ADMIN — Medication 25 MILLIGRAM(S): at 08:35

## 2018-04-20 RX ADMIN — Medication 100 MILLIGRAM(S): at 20:06

## 2018-04-20 RX ADMIN — BUDESONIDE AND FORMOTEROL FUMARATE DIHYDRATE 2 PUFF(S): 160; 4.5 AEROSOL RESPIRATORY (INHALATION) at 08:34

## 2018-04-20 RX ADMIN — METFORMIN HYDROCHLORIDE 500 MILLIGRAM(S): 850 TABLET ORAL at 08:35

## 2018-04-20 RX ADMIN — METFORMIN HYDROCHLORIDE 500 MILLIGRAM(S): 850 TABLET ORAL at 20:06

## 2018-04-20 RX ADMIN — BUDESONIDE AND FORMOTEROL FUMARATE DIHYDRATE 2 PUFF(S): 160; 4.5 AEROSOL RESPIRATORY (INHALATION) at 20:06

## 2018-04-20 RX ADMIN — ATORVASTATIN CALCIUM 10 MILLIGRAM(S): 80 TABLET, FILM COATED ORAL at 20:06

## 2018-04-20 NOTE — CHART NOTE - NSCHARTNOTEFT_GEN_A_CORE
Social work update:    PT is alert and oriented, he is more visible on the unit, less irritable and able to engage with treatment team with encouragement. PT is compliant with medications and unit routines.    This writer contacted unit manager of [facility] to inform that PT is approaching readiness for discharge, Lisbet Jennings [phone], she will come to evaluate the patient on 4/23.  will continue to offer support and make collateral contacts as necessary.

## 2018-04-20 NOTE — PROGRESS NOTE BEHAVIORAL HEALTH - PROBLEM SELECTOR PLAN 1
-Invega sustenna 156mg IM received on 4/16/2018, due for next dose on 5/16/18 when he should receive 117mg as a maintenance dose   - increase to  Invega 6mg p.o daily

## 2018-04-20 NOTE — PROGRESS NOTE BEHAVIORAL HEALTH - SUMMARY
Mr Deshpande is a 62 year old man with a history of paranoid schizophrenia who was admitted because of increasing paranoia and disorganized behavior and noncompliance with medication. Patient 's symptoms are considered chronic at this time. He is however in good behavioral control . Patient appears to be tolerating the oral Invega  with no side effects.

## 2018-04-21 RX ADMIN — PALIPERIDONE 6 MILLIGRAM(S): 1.5 TABLET, EXTENDED RELEASE ORAL at 08:58

## 2018-04-21 RX ADMIN — METFORMIN HYDROCHLORIDE 500 MILLIGRAM(S): 850 TABLET ORAL at 20:20

## 2018-04-21 RX ADMIN — CLOPIDOGREL BISULFATE 75 MILLIGRAM(S): 75 TABLET, FILM COATED ORAL at 08:56

## 2018-04-21 RX ADMIN — BUDESONIDE AND FORMOTEROL FUMARATE DIHYDRATE 2 PUFF(S): 160; 4.5 AEROSOL RESPIRATORY (INHALATION) at 20:20

## 2018-04-21 RX ADMIN — Medication 100 MILLIGRAM(S): at 20:20

## 2018-04-21 RX ADMIN — METFORMIN HYDROCHLORIDE 500 MILLIGRAM(S): 850 TABLET ORAL at 08:56

## 2018-04-21 RX ADMIN — Medication 81 MILLIGRAM(S): at 08:56

## 2018-04-21 RX ADMIN — Medication 100 MILLIGRAM(S): at 08:56

## 2018-04-21 RX ADMIN — ATORVASTATIN CALCIUM 10 MILLIGRAM(S): 80 TABLET, FILM COATED ORAL at 20:20

## 2018-04-21 RX ADMIN — BUDESONIDE AND FORMOTEROL FUMARATE DIHYDRATE 2 PUFF(S): 160; 4.5 AEROSOL RESPIRATORY (INHALATION) at 08:59

## 2018-04-21 RX ADMIN — Medication 137 MICROGRAM(S): at 06:40

## 2018-04-21 RX ADMIN — Medication 25 MILLIGRAM(S): at 08:56

## 2018-04-21 NOTE — PROGRESS NOTE ADULT - SUBJECTIVE AND OBJECTIVE BOX
pt stable alert in NAD  no new complaints    SCHIZOPHRENIA  ^SCHIZOPHRENIA  No h/o HF  No pertinent family history in first degree relatives  Handoff  CLL (chronic lymphocytic leukemia)  Parathyroid cancer  DM (diabetes mellitus)  Schizophrenia  Psychiatric complaint  Paranoid schizophrenia  Paranoid schizophrenia  CLL (chronic lymphocytic leukemia)  Schizophrenia, unspecified type  Type 2 diabetes mellitus without complication, without long-term current use of insulin  Schizophrenia  DM (diabetes mellitus)  No significant past surgical history    HEALTH ISSUES - PROBLEM Dx:  Paranoid schizophrenia  Paranoid schizophrenia: Paranoid schizophrenia  CLL (chronic lymphocytic leukemia): CLL (chronic lymphocytic leukemia)  Schizophrenia, unspecified type: Schizophrenia, unspecified type  Type 2 diabetes mellitus without complication, without long-term current use of insulin: Type 2 diabetes mellitus without complication, without long-term current use of insulin  Schizophrenia: Schizophrenia  DM (diabetes mellitus): DM (diabetes mellitus)        PAST MEDICAL & SURGICAL HISTORY:  CLL (chronic lymphocytic leukemia)  Parathyroid cancer  DM (diabetes mellitus)  Schizophrenia  No significant past surgical history    No Known Allergies      FAMILY HISTORY:  No pertinent family history in first degree relatives      aspirin  chewable 81 milliGRAM(s) Oral daily  atorvastatin 10 milliGRAM(s) Oral at bedtime  buDESOnide 160 MICROgram(s)/formoterol 4.5 MICROgram(s) Inhaler 2 Puff(s) Inhalation two times a day  clopidogrel Tablet 75 milliGRAM(s) Oral daily  diphenhydrAMINE   Capsule 50 milliGRAM(s) Oral every 4 hours PRN  docusate sodium 100 milliGRAM(s) Oral three times a day  haloperidol     Tablet 5 milliGRAM(s) Oral every 6 hours PRN  levothyroxine 137 MICROGram(s) Oral daily  metFORMIN 500 milliGRAM(s) Oral two times a day  metoprolol succinate ER 25 milliGRAM(s) Oral daily  paliperidone ER 6 milliGRAM(s) Oral daily  paliperidone Injectable, Long Acting 234 milliGRAM(s) IntraMuscular once  sitaGLIPtin 100 milliGRAM(s) Oral daily      T(C): 36.5 (04-21-18 @ 06:23), Max: 37 (04-20-18 @ 18:15)  HR: 91 (04-21-18 @ 06:23) (91 - 115)  BP: 128/60 (04-21-18 @ 06:23) (110/62 - 136/59)  RR: 16 (04-21-18 @ 06:23) (16 - 17)  SpO2: --    PE;  general:  remains stabel no acue changes    Lungs:    Heart:    EXT:    Neuro:                          CAPILLARY BLOOD GLUCOSE  106 (20 Apr 2018 16:00)  95 (20 Apr 2018 11:00)

## 2018-04-21 NOTE — PROGRESS NOTE BEHAVIORAL HEALTH - NS ED [facility] REVIEW OF ED CHART AVAILABLE LABS REVIEWED
Yes
None available
Yes
Yes
None available
Yes
Yes
None available
Yes
Yes

## 2018-04-21 NOTE — PROGRESS NOTE BEHAVIORAL HEALTH - NSBHCHARTREVIEWVS_PSY_A_CORE FT
Vital Signs Last 24 Hrs  T(C): 36.4 (30 Mar 2018 12:00), Max: 36.9 (29 Mar 2018 17:40)  T(F): 97.5 (30 Mar 2018 12:00), Max: 98.4 (29 Mar 2018 17:40)  HR: 101 (30 Mar 2018 12:00) (77 - 101)  BP: 130/70 (30 Mar 2018 12:00) (112/71 - 130/70)  BP(mean): --  RR: 18 (30 Mar 2018 12:00) (18 - 18)  SpO2: --
Vital Signs Last 24 Hrs  T(C): 36.1 (05 Apr 2018 12:00), Max: 36.1 (05 Apr 2018 12:00)  T(F): 96.9 (05 Apr 2018 12:00), Max: 96.9 (05 Apr 2018 12:00)  HR: 94 (05 Apr 2018 12:00) (75 - 94)  BP: 158/74 (05 Apr 2018 12:00) (109/52 - 158/74)  BP(mean): --  RR: 18 (05 Apr 2018 12:00) (16 - 18)  SpO2: --
Vital Signs Last 24 Hrs  T(C): 36.3 (09 Apr 2018 10:17), Max: 37.1 (08 Apr 2018 17:47)  T(F): 97.4 (09 Apr 2018 10:17), Max: 98.8 (08 Apr 2018 17:47)  HR: 86 (09 Apr 2018 10:17) (72 - 86)  BP: 140/63 (09 Apr 2018 10:17) (111/58 - 148/71)  BP(mean): --  RR: 17 (09 Apr 2018 10:17) (16 - 18)  SpO2: --
Vital Signs Last 24 Hrs  T(C): 36.2 (21 Apr 2018 10:09), Max: 37 (20 Apr 2018 18:15)  T(F): 97.2 (21 Apr 2018 10:09), Max: 98.6 (20 Apr 2018 18:15)  HR: 98 (21 Apr 2018 10:09) (91 - 115)  BP: 143/72 (21 Apr 2018 10:09) (110/62 - 143/72)  BP(mean): --  RR: 16 (21 Apr 2018 10:09) (16 - 17)  SpO2: --
T(C): 36.8 (04-15-18 @ 05:58), Max: 37.2 (04-14-18 @ 09:30)  HR: 77 (04-15-18 @ 05:58) (77 - 90)  BP: 124/63 (04-15-18 @ 05:58) (124/63 - 151/65)  RR: 18 (04-15-18 @ 05:58) (17 - 18)  SpO2: --
Vital Signs Last 24 Hrs  T(C): 35.6 (17 Apr 2018 09:48), Max: 36.7 (17 Apr 2018 06:00)  T(F): 96 (17 Apr 2018 09:48), Max: 98 (17 Apr 2018 06:00)  HR: 99 (17 Apr 2018 09:48) (84 - 99)  BP: 127/56 (17 Apr 2018 09:48) (100/54 - 127/56)  BP(mean): --  RR: 18 (17 Apr 2018 06:00) (16 - 18)  SpO2: --
Vital Signs Last 24 Hrs  T(C): 35.8 (18 Apr 2018 10:24), Max: 36.5 (18 Apr 2018 05:41)  T(F): 96.4 (18 Apr 2018 10:24), Max: 97.7 (18 Apr 2018 05:41)  HR: 115 (18 Apr 2018 10:24) (88 - 115)  BP: 132/71 (18 Apr 2018 10:24) (130/58 - 158/72)  BP(mean): --  RR: 18 (18 Apr 2018 10:24) (17 - 18)  SpO2: --
Vital Signs Last 24 Hrs  T(C): 35.9 (01 Apr 2018 10:01), Max: 37.2 (31 Mar 2018 19:09)  T(F): 96.6 (01 Apr 2018 10:01), Max: 99 (31 Mar 2018 19:09)  HR: 88 (01 Apr 2018 10:01) (76 - 90)  BP: 146/76 (01 Apr 2018 10:01) (126/58 - 146/76)  BP(mean): --  RR: 17 (01 Apr 2018 10:01) (17 - 18)  SpO2: --
Vital Signs Last 24 Hrs  T(C): 36.1 (11 Apr 2018 12:08), Max: 37.4 (10 Apr 2018 18:00)  T(F): 96.9 (11 Apr 2018 12:08), Max: 99.4 (10 Apr 2018 18:00)  HR: 102 (11 Apr 2018 12:08) (92 - 102)  BP: 143/73 (11 Apr 2018 12:08) (135/69 - 143/73)  BP(mean): --  RR: 17 (11 Apr 2018 12:08) (17 - 18)  SpO2: --
Vital Signs Last 24 Hrs  T(C): 36.1 (12 Apr 2018 10:00), Max: 36.2 (11 Apr 2018 18:10)  T(F): 97 (12 Apr 2018 10:00), Max: 97.1 (11 Apr 2018 18:10)  HR: 86 (12 Apr 2018 10:00) (80 - 90)  BP: 128/58 (12 Apr 2018 10:00) (128/58 - 139/67)  BP(mean): --  RR: 18 (12 Apr 2018 10:00) (16 - 18)  SpO2: --
Vital Signs Last 24 Hrs  T(C): 36.2 (02 Apr 2018 11:45), Max: 36.7 (01 Apr 2018 18:38)  T(F): 97.1 (02 Apr 2018 11:45), Max: 98.1 (01 Apr 2018 18:38)  HR: 96 (02 Apr 2018 11:45) (76 - 96)  BP: 157/68 (02 Apr 2018 11:45) (115/78 - 157/68)  RR: 17 (02 Apr 2018 11:45) (17 - 18)
Vital Signs Last 24 Hrs  T(C): 36.6 (19 Apr 2018 10:00), Max: 36.6 (19 Apr 2018 10:00)  T(F): 97.9 (19 Apr 2018 10:00), Max: 97.9 (19 Apr 2018 10:00)  HR: 115 (19 Apr 2018 10:00) (91 - 115)  BP: 142/67 (19 Apr 2018 10:00) (120/59 - 142/67)  BP(mean): --  RR: 17 (19 Apr 2018 10:00) (17 - 18)  SpO2: --
Vital Signs Last 24 Hrs  T(C): 36.8 (13 Apr 2018 06:00), Max: 36.8 (12 Apr 2018 18:59)  T(F): 98.2 (13 Apr 2018 06:00), Max: 98.3 (12 Apr 2018 18:59)  HR: 82 (13 Apr 2018 06:00) (77 - 82)  BP: 147/66 (13 Apr 2018 06:00) (134/67 - 147/66)  BP(mean): --  RR: 18 (13 Apr 2018 06:00) (18 - 20)  SpO2: --
Vital Signs Last 24 Hrs  T(C): 36.8 (13 Apr 2018 06:00), Max: 36.8 (12 Apr 2018 18:59)  T(F): 98.2 (13 Apr 2018 06:00), Max: 98.3 (12 Apr 2018 18:59)  HR: 82 (13 Apr 2018 06:00) (77 - 82)  BP: 147/66 (13 Apr 2018 06:00) (134/67 - 147/66)  BP(mean): --  RR: 18 (13 Apr 2018 06:00) (18 - 20)  SpO2: --
Vital Signs Last 24 Hrs  T(C): 37.4 (06 Apr 2018 11:29), Max: 37.4 (06 Apr 2018 11:29)  T(F): 99.3 (06 Apr 2018 11:29), Max: 99.3 (06 Apr 2018 11:29)  HR: 89 (06 Apr 2018 11:29) (73 - 89)  BP: 133/66 (06 Apr 2018 11:29) (122/58 - 153/74)  RR: 17 (06 Apr 2018 11:29) (17 - 18)
Vital Signs Last 24 Hrs  T(C): 36.2 (27 Mar 2018 11:26), Max: 36.2 (27 Mar 2018 11:26)  T(F): 97.1 (27 Mar 2018 11:26), Max: 97.1 (27 Mar 2018 11:26)  HR: 80 (27 Mar 2018 11:26) (80 - 87)  BP: 141/65 (27 Mar 2018 11:26) (126/60 - 141/65)  BP(mean): --  RR: 16 (27 Mar 2018 11:26) (16 - 18)  SpO2: --
Vital Signs Last 24 Hrs  T(C): 36.2 (28 Mar 2018 06:26), Max: 37.1 (27 Mar 2018 18:29)  T(F): 97.1 (28 Mar 2018 06:26), Max: 98.7 (27 Mar 2018 18:29)  HR: 87 (28 Mar 2018 06:26) (80 - 87)  BP: 134/61 (28 Mar 2018 06:26) (117/56 - 141/65)  BP(mean): --  RR: 18 (28 Mar 2018 06:26) (16 - 18)  SpO2: --
Vital Signs Last 24 Hrs  T(C): 36.6 (29 Mar 2018 06:27), Max: 36.6 (29 Mar 2018 06:27)  T(F): 97.8 (29 Mar 2018 06:27), Max: 97.8 (29 Mar 2018 06:27)  HR: 81 (29 Mar 2018 06:27) (81 - 88)  BP: 136/68 (29 Mar 2018 06:27) (127/62 - 136/68)  BP(mean): --  RR: 18 (29 Mar 2018 06:27) (18 - 18)  SpO2: --
Vital Signs Last 24 Hrs  T(C): 36.8 (16 Apr 2018 10:46), Max: 36.9 (16 Apr 2018 06:13)  T(F): 98.2 (16 Apr 2018 10:46), Max: 98.4 (16 Apr 2018 06:13)  HR: 86 (16 Apr 2018 06:13) (78 - 86)  BP: 124/80 (16 Apr 2018 10:46) (124/80 - 138/63)  BP(mean): --  RR: 20 (16 Apr 2018 06:13) (18 - 20)  SpO2: --
Vital Signs Last 24 Hrs  T(C): 36.8 (20 Apr 2018 11:00), Max: 36.8 (20 Apr 2018 11:00)  T(F): 98.2 (20 Apr 2018 11:00), Max: 98.2 (20 Apr 2018 11:00)  HR: 115 (20 Apr 2018 11:00) (113 - 115)  BP: 136/59 (20 Apr 2018 11:00) (136/59 - 155/65)  BP(mean): --  RR: 17 (20 Apr 2018 11:00) (17 - 18)  SpO2: --
Vital Signs Last 24 Hrs  T(C): 36.4 (03 Apr 2018 06:03), Max: 36.4 (03 Apr 2018 06:03)  T(F): 97.5 (03 Apr 2018 06:03), Max: 97.5 (03 Apr 2018 06:03)  HR: 73 (03 Apr 2018 06:03) (73 - 96)  BP: 118/59 (03 Apr 2018 06:03) (116/58 - 157/68)  RR: 18 (03 Apr 2018 06:03) (16 - 18)
Vital Signs Last 24 Hrs  T(C): 36.4 (30 Mar 2018 12:00), Max: 36.9 (29 Mar 2018 17:40)  T(F): 97.5 (30 Mar 2018 12:00), Max: 98.4 (29 Mar 2018 17:40)  HR: 101 (30 Mar 2018 12:00) (77 - 101)  BP: 130/70 (30 Mar 2018 12:00) (112/71 - 130/70)  BP(mean): --  RR: 18 (30 Mar 2018 12:00) (18 - 18)  SpO2: --
Vital Signs Last 24 Hrs  T(C): 36.7 (10 Apr 2018 10:18), Max: 37.2 (09 Apr 2018 18:16)  T(F): 98 (10 Apr 2018 10:18), Max: 98.9 (09 Apr 2018 18:16)  HR: 91 (10 Apr 2018 10:18) (79 - 91)  BP: 110/59 (10 Apr 2018 10:18) (110/59 - 129/62)  BP(mean): --  RR: 16 (10 Apr 2018 10:18) (16 - 20)  SpO2: --
Vital Signs Last 24 Hrs  T(C): 37.4 (06 Apr 2018 11:29), Max: 37.4 (06 Apr 2018 11:29)  T(F): 99.3 (06 Apr 2018 11:29), Max: 99.3 (06 Apr 2018 11:29)  HR: 89 (06 Apr 2018 11:29) (73 - 89)  BP: 133/66 (06 Apr 2018 11:29) (122/58 - 153/74)  RR: 17 (06 Apr 2018 11:29) (17 - 18)

## 2018-04-21 NOTE — PROGRESS NOTE BEHAVIORAL HEALTH - THOUGHT PROCESS
patient was critically ill... Patient was critically ill with a high probability of imminent or life threatening deterioration. Disorganized

## 2018-04-22 RX ADMIN — Medication 137 MICROGRAM(S): at 08:33

## 2018-04-22 RX ADMIN — METFORMIN HYDROCHLORIDE 500 MILLIGRAM(S): 850 TABLET ORAL at 20:23

## 2018-04-22 RX ADMIN — Medication 25 MILLIGRAM(S): at 08:33

## 2018-04-22 RX ADMIN — Medication 100 MILLIGRAM(S): at 08:33

## 2018-04-22 RX ADMIN — METFORMIN HYDROCHLORIDE 500 MILLIGRAM(S): 850 TABLET ORAL at 08:33

## 2018-04-22 RX ADMIN — CLOPIDOGREL BISULFATE 75 MILLIGRAM(S): 75 TABLET, FILM COATED ORAL at 08:33

## 2018-04-22 RX ADMIN — PALIPERIDONE 6 MILLIGRAM(S): 1.5 TABLET, EXTENDED RELEASE ORAL at 08:34

## 2018-04-22 RX ADMIN — ATORVASTATIN CALCIUM 10 MILLIGRAM(S): 80 TABLET, FILM COATED ORAL at 20:23

## 2018-04-22 RX ADMIN — Medication 81 MILLIGRAM(S): at 08:33

## 2018-04-22 RX ADMIN — BUDESONIDE AND FORMOTEROL FUMARATE DIHYDRATE 2 PUFF(S): 160; 4.5 AEROSOL RESPIRATORY (INHALATION) at 08:34

## 2018-04-23 RX ADMIN — BUDESONIDE AND FORMOTEROL FUMARATE DIHYDRATE 2 PUFF(S): 160; 4.5 AEROSOL RESPIRATORY (INHALATION) at 08:57

## 2018-04-23 RX ADMIN — Medication 25 MILLIGRAM(S): at 08:57

## 2018-04-23 RX ADMIN — Medication 81 MILLIGRAM(S): at 08:56

## 2018-04-23 RX ADMIN — CLOPIDOGREL BISULFATE 75 MILLIGRAM(S): 75 TABLET, FILM COATED ORAL at 08:57

## 2018-04-23 RX ADMIN — ATORVASTATIN CALCIUM 10 MILLIGRAM(S): 80 TABLET, FILM COATED ORAL at 20:19

## 2018-04-23 RX ADMIN — PALIPERIDONE 6 MILLIGRAM(S): 1.5 TABLET, EXTENDED RELEASE ORAL at 08:57

## 2018-04-23 RX ADMIN — METFORMIN HYDROCHLORIDE 500 MILLIGRAM(S): 850 TABLET ORAL at 08:57

## 2018-04-23 RX ADMIN — METFORMIN HYDROCHLORIDE 500 MILLIGRAM(S): 850 TABLET ORAL at 20:19

## 2018-04-23 RX ADMIN — Medication 137 MICROGRAM(S): at 06:31

## 2018-04-23 RX ADMIN — Medication 100 MILLIGRAM(S): at 20:19

## 2018-04-23 NOTE — PROGRESS NOTE BEHAVIORAL HEALTH - NSBHFUPINTERVALCCFT_PSY_A_CORE
" " Patient had his back turned to the treatment team and did not respond when interview was attempted
" Are you better"
" What is TLR"
" im fine"
" will it make me homosexual"
"I am feeling better."
"Is my possessions in [place]?'
"Leave me alone ?'
"What do you want?"
"im fine"
"ok"
"ok"
I am doing good. He recognized from previous hosp[facility].
Pt seen lying on bed in his room and states "He's alright with no complaints.'
I feel good
Im fine
Im ok
"okay"
Pt states that "He does not know why he is her"
I am feeling better.
Pt states "He's  fine."
"I'm brushing my teeth!"
"okay"
"I got my rest"
"Nothing, Im great"
This is my , he walks me to the bathroom
" " Patient walked up to treatment team , nodded when asked how he was feeling and did not speak .
" I don't want to talk now"
"I am doing fine"

## 2018-04-23 NOTE — PROGRESS NOTE ADULT - SUBJECTIVE AND OBJECTIVE BOX
pt stable alert in NAD  no new complaints    SCHIZOPHRENIA  ^SCHIZOPHRENIA  No h/o HF  No pertinent family history in first degree relatives  Handoff  CLL (chronic lymphocytic leukemia)  Parathyroid cancer  DM (diabetes mellitus)  Schizophrenia  Psychiatric complaint  Paranoid schizophrenia  Paranoid schizophrenia  CLL (chronic lymphocytic leukemia)  Schizophrenia, unspecified type  Type 2 diabetes mellitus without complication, without long-term current use of insulin  Schizophrenia  DM (diabetes mellitus)  No significant past surgical history    HEALTH ISSUES - PROBLEM Dx:  Paranoid schizophrenia  Paranoid schizophrenia: Paranoid schizophrenia  CLL (chronic lymphocytic leukemia): CLL (chronic lymphocytic leukemia)  Schizophrenia, unspecified type: Schizophrenia, unspecified type  Type 2 diabetes mellitus without complication, without long-term current use of insulin: Type 2 diabetes mellitus without complication, without long-term current use of insulin  Schizophrenia: Schizophrenia  DM (diabetes mellitus): DM (diabetes mellitus)        PAST MEDICAL & SURGICAL HISTORY:  CLL (chronic lymphocytic leukemia)  Parathyroid cancer  DM (diabetes mellitus)  Schizophrenia  No significant past surgical history    No Known Allergies      FAMILY HISTORY:  No pertinent family history in first degree relatives      aspirin  chewable 81 milliGRAM(s) Oral daily  atorvastatin 10 milliGRAM(s) Oral at bedtime  buDESOnide 160 MICROgram(s)/formoterol 4.5 MICROgram(s) Inhaler 2 Puff(s) Inhalation two times a day  clopidogrel Tablet 75 milliGRAM(s) Oral daily  diphenhydrAMINE   Capsule 50 milliGRAM(s) Oral every 4 hours PRN  docusate sodium 100 milliGRAM(s) Oral three times a day  haloperidol     Tablet 5 milliGRAM(s) Oral every 6 hours PRN  levothyroxine 137 MICROGram(s) Oral daily  metFORMIN 500 milliGRAM(s) Oral two times a day  metoprolol succinate ER 25 milliGRAM(s) Oral daily  paliperidone ER 6 milliGRAM(s) Oral daily  paliperidone Injectable, Long Acting 234 milliGRAM(s) IntraMuscular once  sitaGLIPtin 100 milliGRAM(s) Oral daily      T(C): 36.2 (04-23-18 @ 05:53), Max: 36.8 (04-22-18 @ 17:00)  HR: 81 (04-23-18 @ 05:53) (81 - 104)  BP: 115/56 (04-23-18 @ 05:53) (115/56 - 136/61)  RR: 20 (04-23-18 @ 05:53) (16 - 20)  SpO2: --    PE;  general: stable    Lungs:    Heart:    EXT:    Neuro:                          CAPILLARY BLOOD GLUCOSE

## 2018-04-23 NOTE — PROGRESS NOTE BEHAVIORAL HEALTH - NSBHCONSORIP_PSY_A_CORE
Consult...
Inpatient Admission...
Consult...
Inpatient Admission...
Inpatient Admission...
Consult...
Inpatient Admission...
Inpatient Admission...
Consult...
Inpatient Admission...
Consult...
Inpatient Admission...
Consult...
Inpatient Admission...

## 2018-04-23 NOTE — PROGRESS NOTE BEHAVIORAL HEALTH - NSBHADMITIPDSM_PSY_A_CORE
see above for Axis I, II, III

## 2018-04-23 NOTE — PROGRESS NOTE BEHAVIORAL HEALTH - NSBHADMITIPREASON_PSY_A_CORE
Danger to self; mental illness expected to respond to inpatient care
Danger to others; mental illness expected to respond to inpatient care
Danger to self; mental illness expected to respond to inpatient care

## 2018-04-23 NOTE — PROGRESS NOTE BEHAVIORAL HEALTH - ORIENTED TO PLACE
Other
No
Other
No
Other
Other
No
Other
Other
No
Other
No
Other
No
Yes
Other
No

## 2018-04-23 NOTE — PROGRESS NOTE BEHAVIORAL HEALTH - NSBHLOC_PSY_A_CORE
Alert

## 2018-04-23 NOTE — PROGRESS NOTE BEHAVIORAL HEALTH - NSBHADMITIPOBSFT_PSY_A_CORE
safety
clinically indicated
clinically indicated
safety
ppsychosis
safety

## 2018-04-23 NOTE — PROGRESS NOTE BEHAVIORAL HEALTH - AFFECT QUALITY
Irritable
Other
Irritable

## 2018-04-23 NOTE — PROGRESS NOTE BEHAVIORAL HEALTH - NSBHFUPTYPE_PSY_A_CORE
Inpatient
Inpatient-On Service Note
Inpatient

## 2018-04-23 NOTE — PROGRESS NOTE BEHAVIORAL HEALTH - NSBHADMITIPOBS_PSY_A_CORE
Routine observation
Constant observation
Routine observation

## 2018-04-23 NOTE — PROGRESS NOTE BEHAVIORAL HEALTH - NSBHADMITDANGERSELF_PSY_A_CORE
unable to care for self

## 2018-04-23 NOTE — PROGRESS NOTE BEHAVIORAL HEALTH - NS ED [facility] MSE SPEECH SPONTANEITY
Normal
Increased latency
Normal
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
## 2018-04-23 NOTE — PROGRESS NOTE BEHAVIORAL HEALTH - NSBHADMITIPBHPROVFT_PSY_A_CORE
as per chart
Dr. oSlis contacted by Dr. Yuan on 4/2
as per chart

## 2018-04-23 NOTE — PROGRESS NOTE BEHAVIORAL HEALTH - AXIS III
CLL, TIAs, chronic infarcts, dm
Prostate cancer, CLL, Diabetes, hypothyroidism

## 2018-04-23 NOTE — PROGRESS NOTE BEHAVIORAL HEALTH - BEHAVIOR
Cooperative
Cooperative
Other
Cooperative
Hostile/Uncooperative
Uncooperative/Hostile
Cooperative
Hostile/Uncooperative
Uncooperative/Hostile
Uncooperative/Hostile
Cooperative
Hostile/Uncooperative
Hostile/Uncooperative
Uncooperative/Hostile
Cooperative
Hostile/Uncooperative
Cooperative
Hostile/Uncooperative
Uncooperative/Hostile
Uncooperative/Hostile

## 2018-04-23 NOTE — PROGRESS NOTE BEHAVIORAL HEALTH - BODY HABITUS
Well nourished
Average build
Well nourished
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
## 2018-04-23 NOTE — PROGRESS NOTE BEHAVIORAL HEALTH - HYGIENE
Good
Good
Poor
Good
Fair
Fair
Good
Fair
Good
Fair
Good
Good
Fair

## 2018-04-23 NOTE — PROGRESS NOTE BEHAVIORAL HEALTH - NSBHFUPMEDSE_PSY_A_CORE
None known

## 2018-04-23 NOTE — PROGRESS NOTE BEHAVIORAL HEALTH - JUDGMENT (REGARDING EVERYDAY EVENTS)
Poor
Fair
Poor
Fair
Fair
Poor
Fair
Fair
Poor

## 2018-04-23 NOTE — PROGRESS NOTE BEHAVIORAL HEALTH - AFFECT CONGRUENCE
Congruent

## 2018-04-23 NOTE — PROGRESS NOTE BEHAVIORAL HEALTH - ORIENTED TO PERSON
Other
Yes
Other
Yes
Other
Other
Yes
Other
Yes
Other
Yes

## 2018-04-23 NOTE — PROGRESS NOTE BEHAVIORAL HEALTH - NS ED BHA MSE SPEECH VOLUME
Loud
Loud
Normal
Loud
Loud
Normal
Loud
Normal
Loud
Normal
Normal
Loud

## 2018-04-23 NOTE — PROGRESS NOTE BEHAVIORAL HEALTH - THOUGHT ASSOCIATIONS
Loose
Normal
Loose

## 2018-04-23 NOTE — PROGRESS NOTE BEHAVIORAL HEALTH - NSBHADMITMEDEDU_PSY_A_CORE
yes...
no
no
yes...

## 2018-04-23 NOTE — PROGRESS NOTE BEHAVIORAL HEALTH - GROOMING
Good
Good
Poor/Fair
Good
Fair
Fair
Good
Fair
Good
Fair
Good
Fair

## 2018-04-23 NOTE — PROGRESS NOTE BEHAVIORAL HEALTH - NSBHATTESTSEENBY_PSY_A_CORE
Attending Psychiatrist supervising NP/Trainee, meeting pt...
attending Psychiatrist without NP/Trainee
attending Psychiatrist without NP/Trainee
Attending Psychiatrist supervising NP/Trainee, meeting pt...
Attending Psychiatrist supervising NP/Trainee, meeting pt...
attending Psychiatrist without NP/Trainee
Attending Psychiatrist supervising NP/Trainee, meeting pt...
attending Psychiatrist without NP/Trainee
Attending Psychiatrist supervising NP/Trainee, meeting pt...
Attending Psychiatrist supervising NP/Trainee, meeting pt...
attending Psychiatrist without NP/Trainee
Attending Psychiatrist supervising NP/Trainee, meeting pt...
attending Psychiatrist without NP/Trainee
Attending Psychiatrist supervising NP/Trainee, meeting pt...
attending Psychiatrist without NP/Trainee
attending Psychiatrist without NP/Trainee
Attending Psychiatrist supervising NP/Trainee, meeting pt...
attending Psychiatrist without NP/Trainee

## 2018-04-23 NOTE — PROGRESS NOTE BEHAVIORAL HEALTH - NSBHPTASSESSDT_PSY_A_CORE
01-Apr-2018 13:42
02-Apr-2018 14:16
04-Apr-2018
06-Apr-2018 14:25
07-Apr-2018 12:04
08-Apr-2018 23:22
11-Apr-2018 09:30
12-Apr-2018 09:30
13-Apr-2018 09:30
14-Apr-2018 18:28
15-Apr-2018 07:20
17-Apr-2018 10:30
18-Apr-2018 14:00
19-Apr-2018 11:20
23-Apr-2018 14:51
27-Mar-2018 14:09
28-Mar-2018 08:57
29-Mar-2018 13:57
30-Mar-2018 00:00
20-Apr-2018 10:30
25-Mar-2018 22:01
31-Mar-2018 12:21
22-Apr-2018 21:44
10-Apr-2018 13:32
21-Apr-2018 10:31
06-Apr-2018 14:25
05-Apr-2018 15:11
16-Apr-2018 10:00
03-Apr-2018 11:36

## 2018-04-23 NOTE — PROGRESS NOTE BEHAVIORAL HEALTH - NSBHLEGALSTATUS_PSY_A_CORE
9.27 (2PC)
9.39 (Emergency)
9.27 (2PC)

## 2018-04-24 ENCOUNTER — EMERGENCY (EMERGENCY)
Facility: HOSPITAL | Age: 63
LOS: 0 days | Discharge: HOME | End: 2018-04-24
Attending: EMERGENCY MEDICINE

## 2018-04-24 VITALS
TEMPERATURE: 98 F | HEART RATE: 100 BPM | RESPIRATION RATE: 18 BRPM | SYSTOLIC BLOOD PRESSURE: 144 MMHG | DIASTOLIC BLOOD PRESSURE: 74 MMHG

## 2018-04-24 VITALS
RESPIRATION RATE: 16 BRPM | SYSTOLIC BLOOD PRESSURE: 148 MMHG | HEART RATE: 95 BPM | OXYGEN SATURATION: 100 % | TEMPERATURE: 98 F | DIASTOLIC BLOOD PRESSURE: 76 MMHG

## 2018-04-24 DIAGNOSIS — F20.0 PARANOID SCHIZOPHRENIA: ICD-10-CM

## 2018-04-24 RX ORDER — BUDESONIDE AND FORMOTEROL FUMARATE DIHYDRATE 160; 4.5 UG/1; UG/1
2 AEROSOL RESPIRATORY (INHALATION)
Qty: 1 | Refills: 0
Start: 2018-04-24

## 2018-04-24 RX ORDER — SITAGLIPTIN 50 MG/1
1 TABLET, FILM COATED ORAL
Qty: 0 | Refills: 0 | COMMUNITY

## 2018-04-24 RX ORDER — ATORVASTATIN CALCIUM 80 MG/1
1 TABLET, FILM COATED ORAL
Qty: 30 | Refills: 0
Start: 2018-04-24 | End: 2018-05-23

## 2018-04-24 RX ORDER — ASPIRIN/CALCIUM CARB/MAGNESIUM 324 MG
1 TABLET ORAL
Qty: 0 | Refills: 0 | COMMUNITY

## 2018-04-24 RX ORDER — DOCUSATE SODIUM 100 MG
3 CAPSULE ORAL
Qty: 90 | Refills: 0
Start: 2018-04-24 | End: 2018-05-23

## 2018-04-24 RX ORDER — SITAGLIPTIN 50 MG/1
1 TABLET, FILM COATED ORAL
Qty: 30 | Refills: 0
Start: 2018-04-24 | End: 2018-05-23

## 2018-04-24 RX ORDER — PALIPERIDONE 1.5 MG/1
1 TABLET, EXTENDED RELEASE ORAL
Qty: 1 | Refills: 0
Start: 2018-04-24

## 2018-04-24 RX ORDER — ATORVASTATIN CALCIUM 80 MG/1
1 TABLET, FILM COATED ORAL
Qty: 0 | Refills: 0 | COMMUNITY

## 2018-04-24 RX ORDER — METFORMIN HYDROCHLORIDE 850 MG/1
1 TABLET ORAL
Qty: 60 | Refills: 0
Start: 2018-04-24 | End: 2018-05-23

## 2018-04-24 RX ORDER — PALIPERIDONE 1.5 MG/1
1 TABLET, EXTENDED RELEASE ORAL
Qty: 30 | Refills: 0
Start: 2018-04-24 | End: 2018-05-23

## 2018-04-24 RX ORDER — ASPIRIN/CALCIUM CARB/MAGNESIUM 324 MG
1 TABLET ORAL
Qty: 30 | Refills: 0
Start: 2018-04-24 | End: 2018-05-23

## 2018-04-24 RX ORDER — LEVOTHYROXINE SODIUM 125 MCG
1 TABLET ORAL
Qty: 30 | Refills: 0
Start: 2018-04-24 | End: 2018-05-23

## 2018-04-24 RX ORDER — CLOPIDOGREL BISULFATE 75 MG/1
1 TABLET, FILM COATED ORAL
Qty: 30 | Refills: 0
Start: 2018-04-24 | End: 2018-05-23

## 2018-04-24 RX ORDER — CLOPIDOGREL BISULFATE 75 MG/1
1 TABLET, FILM COATED ORAL
Qty: 0 | Refills: 0 | COMMUNITY

## 2018-04-24 RX ORDER — METOPROLOL TARTRATE 50 MG
1 TABLET ORAL
Qty: 30 | Refills: 0
Start: 2018-04-24 | End: 2018-05-23

## 2018-04-24 RX ORDER — METOPROLOL TARTRATE 50 MG
1 TABLET ORAL
Qty: 0 | Refills: 0 | COMMUNITY

## 2018-04-24 RX ADMIN — CLOPIDOGREL BISULFATE 75 MILLIGRAM(S): 75 TABLET, FILM COATED ORAL at 08:43

## 2018-04-24 RX ADMIN — METFORMIN HYDROCHLORIDE 500 MILLIGRAM(S): 850 TABLET ORAL at 08:44

## 2018-04-24 RX ADMIN — Medication 25 MILLIGRAM(S): at 08:44

## 2018-04-24 RX ADMIN — Medication 137 MICROGRAM(S): at 08:43

## 2018-04-24 RX ADMIN — Medication 81 MILLIGRAM(S): at 08:43

## 2018-04-24 RX ADMIN — PALIPERIDONE 6 MILLIGRAM(S): 1.5 TABLET, EXTENDED RELEASE ORAL at 08:44

## 2018-04-24 RX ADMIN — BUDESONIDE AND FORMOTEROL FUMARATE DIHYDRATE 2 PUFF(S): 160; 4.5 AEROSOL RESPIRATORY (INHALATION) at 08:44
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
## 2018-04-24 NOTE — BEHAVIORAL HEALTH ASSESSMENT NOTE - RISK ASSESSMENT
Patient is future oriented and identifies reasons for living. Patient has recent change in residence and a lack of insight into his violence risk. At this time, patient has been calm and cooperative in the ED with each healthcare provider he has encountered. Patient is not considered to be an imminent risk to harm himself or others.

## 2018-04-24 NOTE — ED PROVIDER NOTE - PSYCHIATRIC, MLM
Alert and oriented to person, place, time/situation. calm and cooperative. flat mood and affect. no apparent risk to self or others.
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
## 2018-04-24 NOTE — ED PROVIDER NOTE - MEDICAL DECISION MAKING DETAILS
patient cleared by psych attending who knows patient well, had seen him in [facility]. + cooperative. will send back to [facility]. patient cleared by psych who knows patient well, had seen him in [facility]. + cooperative. will send back to [facility].
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
## 2018-04-24 NOTE — DISCHARGE NOTE BEHAVIORAL HEALTH - MEDICATION SUMMARY - MEDICATIONS TO TAKE
I will START or STAY ON the medications listed below when I get home from the hospital:    aspirin 81 mg oral tablet, chewable  -- 1 tab(s) by mouth once a day  -- Indication: For CAD    metFORMIN 500 mg oral tablet  -- 1 tab(s) by mouth 2 times a day  -- Indication: For DM (diabetes mellitus)    Januvia 100 mg oral tablet  -- 1 tab(s) by mouth once a day  -- Indication: For DM (diabetes mellitus)    Lipitor 10 mg oral tablet  -- 1 tab(s) by mouth once a day  -- Indication: For hyperlipidemia    Plavix 75 mg oral tablet  -- 1 tab(s) by mouth once a day  -- Indication: For CAD    Invega Sustenna 117 mg/0.75 mL intramuscular suspension, extended release  -- 1 application intramuscular every 30 days, next dose due 5/16  -- Indication: For Schizophrenia    Invega 6 mg oral tablet, extended release  -- 1 tab(s) by mouth once a day  -- Indication: For Schizophrenia    metoprolol succinate 25 mg oral tablet, extended release  -- 1 tab(s) by mouth once a day  -- Indication: For hypertension    budesonide-formoterol 160 mcg-4.5 mcg/inh inhalation aerosol  -- 2 puff(s) inhaled 2 times a day  -- Indication: For asthma    docusate sodium 100 mg oral capsule  -- 3 cap(s) by mouth once a day  -- Indication: For Constipation    levothyroxine 137 mcg (0.137 mg) oral tablet  -- 1 tab(s) by mouth once a day  -- Indication: For hypothyroidism

## 2018-04-24 NOTE — BEHAVIORAL HEALTH ASSESSMENT NOTE - SUMMARY
62 year old male domiciled at [facility] with longstanding history of paranoid schizophrenia, history of TIA, CLL, prostate CA who presents for agitation. Patient is not agitated on exam. Patient is calm and cooperative throughout exam and this is consistent with exams from SAMMY Calzada and Dr. Figueroa. Patient refused to take Metformin because he believed that he had taken it earlier in day. Patient had appropriate behavior on IPP unit for past two weeks. At this time, patient is denying symptoms consistent with josé antonio, psychosis. Patient denies SI/HI as well. At this time, inpatient psychiatric admission is not indicated for this patient.

## 2018-04-24 NOTE — BEHAVIORAL HEALTH ASSESSMENT NOTE - NSBHMEDSOTHERFT_PSY_A_CORE
Invega Sustenna 117mg/0.75mL IM next dose 5/16  Invega 6mg oral q24h  Levothyroxine 137mcg oral q24h  Colace 300mg oral q24h  Metoprolol succinate  Budesonide-formoterol

## 2018-04-24 NOTE — DISCHARGE NOTE BEHAVIORAL HEALTH - NSBHDCDXVALIDYESFT_PSY_A_CORE
patient presented for [facility] for disorganized thoughts. unpredictable/aggressive behavior. Pt found to have pna and was admitted for medicine, then transferred to psychiatry once he was medically cleared for safety, psychiatric stabilization and medication optimization.

## 2018-04-24 NOTE — BEHAVIORAL HEALTH ASSESSMENT NOTE - OTHER PAST PSYCHIATRIC HISTORY (INCLUDE DETAILS REGARDING ONSET, COURSE OF ILLNESS, INPATIENT/OUTPATIENT TREATMENT)
Patient is poor historian.  Multiple prior IPP admissions - most recently discharged earlier today. Was admitted for disorganized behavior.  Per chart, patient has history of medication noncompliance and bizarre/disorganized behavior when decompensating.   Chart review from chart 2017 and earlier indicate that patient was not admitted or seen in ED by psychiatry prior to 2018.

## 2018-04-24 NOTE — DISCHARGE NOTE BEHAVIORAL HEALTH - MEDICATION SUMMARY - MEDICATIONS TO STOP TAKING
I will STOP taking the medications listed below when I get home from the hospital:    haloperidol  -- 1 tab(s) by mouth 2 times a day

## 2018-04-24 NOTE — DISCHARGE NOTE BEHAVIORAL HEALTH - NSBHDCIDCAREGVRNAMEFT_PSY_A_CORE
[facility] TLR manager Lisbet Jennings came to unit on 4/23, PT approved for return to residence

## 2018-04-24 NOTE — BEHAVIORAL HEALTH ASSESSMENT NOTE - PROBLEM SELECTOR PLAN 1
- continue medications as prescribed on discharge from IPP this afternoon (Invega 6mg oral q24h and Invega sustenna IM suspension due 5/16)

## 2018-04-24 NOTE — CHART NOTE - NSCHARTNOTEFT_GEN_A_CORE
Social work discharge note:    PT alert and oriented, denies homicidal or suicidal ideation- plan ot intent, compliant with medications and unit routines, impulse control is improved. [facility] TLR manager Lisbet Jennings [phone] came to visit the PT on 4/23, they agree that the patient is at baseline and approve the discharge, [facility] also informed of discharge [phone], appointment for follow up provided.

## 2018-04-25 DIAGNOSIS — Z02.9 ENCOUNTER FOR ADMINISTRATIVE EXAMINATIONS, UNSPECIFIED: ICD-10-CM

## 2018-04-27 DIAGNOSIS — Z79.84 LONG TERM (CURRENT) USE OF ORAL HYPOGLYCEMIC DRUGS: ICD-10-CM

## 2018-04-27 DIAGNOSIS — Z91.14 PATIENT'S OTHER NONCOMPLIANCE WITH MEDICATION REGIMEN: ICD-10-CM

## 2018-04-27 DIAGNOSIS — Z79.52 LONG TERM (CURRENT) USE OF SYSTEMIC STEROIDS: ICD-10-CM

## 2018-04-27 DIAGNOSIS — F20.9 SCHIZOPHRENIA, UNSPECIFIED: ICD-10-CM

## 2018-04-27 DIAGNOSIS — Z85.89 PERSONAL HISTORY OF MALIGNANT NEOPLASM OF OTHER ORGANS AND SYSTEMS: ICD-10-CM

## 2018-04-27 DIAGNOSIS — Z79.82 LONG TERM (CURRENT) USE OF ASPIRIN: ICD-10-CM

## 2018-04-27 DIAGNOSIS — C91.10 CHRONIC LYMPHOCYTIC LEUKEMIA OF B-CELL TYPE NOT HAVING ACHIEVED REMISSION: ICD-10-CM

## 2018-04-27 DIAGNOSIS — Z87.891 PERSONAL HISTORY OF NICOTINE DEPENDENCE: ICD-10-CM

## 2018-04-27 DIAGNOSIS — E11.9 TYPE 2 DIABETES MELLITUS WITHOUT COMPLICATIONS: ICD-10-CM

## 2018-04-27 DIAGNOSIS — Z79.02 LONG TERM (CURRENT) USE OF ANTITHROMBOTICS/ANTIPLATELETS: ICD-10-CM

## 2018-04-27 DIAGNOSIS — Z86.73 PERSONAL HISTORY OF TRANSIENT ISCHEMIC ATTACK (TIA), AND CEREBRAL INFARCTION WITHOUT RESIDUAL DEFICITS: ICD-10-CM

## 2018-04-27 DIAGNOSIS — F20.0 PARANOID SCHIZOPHRENIA: ICD-10-CM

## 2018-05-23 ENCOUNTER — OUTPATIENT (OUTPATIENT)
Dept: OUTPATIENT SERVICES | Facility: HOSPITAL | Age: 63
LOS: 1 days | Discharge: HOME | End: 2018-05-23

## 2018-05-23 DIAGNOSIS — F20.9 SCHIZOPHRENIA, UNSPECIFIED: ICD-10-CM

## 2018-05-23 DIAGNOSIS — Z79.899 OTHER LONG TERM (CURRENT) DRUG THERAPY: ICD-10-CM

## 2018-08-07 NOTE — PROGRESS NOTE ADULT - PROBLEM SELECTOR PLAN 2
no pam current tx    daibeltes stabel on metformin Same Histology In Subsequent Stages Text: The pattern and morphology of the tumor is as described in the first stage.

## 2019-10-03 NOTE — PROGRESS NOTE ADULT - SUBJECTIVE AND OBJECTIVE BOX
SUBJECTIVE:    Patient is a 62y old Male who presents with a chief complaint of decompensated psychiatric illness in the context of medication non-adherence, found to have lymphocytosis and admitted to medicine. Today is hospital day 3d. This morning he is resting comfortably in bed. Patient poor historian due to psychiatric illness. Patient continues to refuse most medications.     PAST MEDICAL & SURGICAL HISTORY  CLL (chronic lymphocytic leukemia)  Parathyroid cancer  DM (diabetes mellitus)  Schizophrenia  No significant past surgical history    SOCIAL HISTORY:  Negative for smoking/alcohol/drug use.     ALLERGIES:  No Known Allergies    MEDICATIONS:  STANDING MEDICATIONS  aspirin  chewable 81 milliGRAM(s) Oral daily  buDESOnide 160 MICROgram(s)/formoterol 4.5 MICROgram(s) Inhaler 2 Puff(s) Inhalation two times a day  clopidogrel Tablet 75 milliGRAM(s) Oral daily  dextrose 5%. 1000 milliLiter(s) IV Continuous <Continuous>  dextrose 50% Injectable 12.5 Gram(s) IV Push once  dextrose 50% Injectable 25 Gram(s) IV Push once  dextrose 50% Injectable 25 Gram(s) IV Push once  docusate sodium 300 milliGRAM(s) Oral daily  enoxaparin Injectable 40 milliGRAM(s) SubCutaneous every 24 hours  haloperidol     Tablet 5 milliGRAM(s) Oral every 12 hours  insulin glargine Injectable (LANTUS) 12 Unit(s) SubCutaneous at bedtime  insulin lispro Injectable (HumaLOG) 4 Unit(s) SubCutaneous three times a day before meals  levothyroxine 137 MICROGram(s) Oral daily  nystatin Powder 1 Application(s) Topical two times a day  trihexyphenidyl 2 milliGRAM(s) Oral two times a day    PRN MEDICATIONS  dextrose Gel 1 Dose(s) Oral once PRN  diphenhydrAMINE   Injectable 50 milliGRAM(s) IntraMuscular every 6 hours PRN  glucagon  Injectable 1 milliGRAM(s) IntraMuscular once PRN  haloperidol    Injectable 5 milliGRAM(s) IntraMuscular every 6 hours PRN  LORazepam   Injectable 2 milliGRAM(s) IntraMuscular every 6 hours PRN    VITALS:   T(F): 98.4  HR: 78  BP: 118/58  RR: 18  SpO2: --    LABS:                        10.6   106.30 )-----------( 157      ( 21 Mar 2018 09:03 )             34.8     03-21    141  |  102  |  15  ----------------------------<  223<H>  4.9   |  26  |  0.7    Ca    8.2<L>      21 Mar 2018 09:03    TPro  5.5<L>  /  Alb  3.8  /  TBili  0.4  /  DBili  <0.2  /  AST  35  /  ALT  27  /  AlkPhos  63  03-21              Culture - Blood (collected 20 Mar 2018 09:07)  Source: .Blood None  Preliminary Report (21 Mar 2018 15:04):    No growth to date.    Culture - Urine (collected 19 Mar 2018 16:13)  Source: .Urine Clean Catch (Midstream)  Final Report (20 Mar 2018 20:49):    <10,000 CFU/ml    Normal Urogenital shimon present          RADIOLOGY:    PHYSICAL EXAM:  GEN: No acute distress  LUNGS: Clear to auscultation bilaterally   HEART: S1/S2 present. RRR.   ABD: Soft, non-tender, non-distended. Bowel sounds present  EXT: NC/NC/NE/2+PP/BOWDEN  NEURO: AAOX3 retired

## 2020-04-09 ENCOUNTER — INPATIENT (INPATIENT)
Facility: HOSPITAL | Age: 65
LOS: 2 days | Discharge: PSYCHIATRIC OR SUBSTANCE USE FACILITY | End: 2020-04-12
Attending: SURGERY | Admitting: SURGERY
Payer: MEDICAID

## 2020-04-09 VITALS
DIASTOLIC BLOOD PRESSURE: 56 MMHG | RESPIRATION RATE: 19 BRPM | SYSTOLIC BLOOD PRESSURE: 115 MMHG | OXYGEN SATURATION: 92 % | HEART RATE: 92 BPM | TEMPERATURE: 102 F

## 2020-04-09 DIAGNOSIS — S06.6X0S TRAUMATIC SUBARACHNOID HEMORRHAGE WITHOUT LOSS OF CONSCIOUSNESS, SEQUELA: ICD-10-CM

## 2020-04-09 DIAGNOSIS — S01.81XS LACERATION WITHOUT FOREIGN BODY OF OTHER PART OF HEAD, SEQUELA: ICD-10-CM
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
## 2020-04-09 PROBLEM — C75.0 MALIGNANT NEOPLASM OF PARATHYROID GLAND: Chronic | Status: ACTIVE | Noted: 2018-03-19

## 2020-04-09 PROBLEM — F20.9 SCHIZOPHRENIA, UNSPECIFIED: Chronic | Status: ACTIVE | Noted: 2018-03-19

## 2020-04-09 PROBLEM — E11.9 TYPE 2 DIABETES MELLITUS WITHOUT COMPLICATIONS: Chronic | Status: ACTIVE | Noted: 2018-03-19

## 2020-04-09 PROBLEM — C91.10 CHRONIC LYMPHOCYTIC LEUKEMIA OF B-CELL TYPE NOT HAVING ACHIEVED REMISSION: Chronic | Status: ACTIVE | Noted: 2018-03-19

## 2020-04-09 LAB
ALBUMIN SERPL ELPH-MCNC: 3.4 G/DL — LOW (ref 3.5–5.2)
ALP SERPL-CCNC: 78 U/L — SIGNIFICANT CHANGE UP (ref 30–115)
ALT FLD-CCNC: 26 U/L — SIGNIFICANT CHANGE UP (ref 0–41)
ANION GAP SERPL CALC-SCNC: 14 MMOL/L — SIGNIFICANT CHANGE UP (ref 7–14)
APTT BLD: 30.2 SEC — SIGNIFICANT CHANGE UP (ref 27–39.2)
AST SERPL-CCNC: 53 U/L — HIGH (ref 0–41)
BASOPHILS # BLD AUTO: 0 K/UL — SIGNIFICANT CHANGE UP (ref 0–0.2)
BASOPHILS NFR BLD AUTO: 0 % — SIGNIFICANT CHANGE UP (ref 0–1)
BILIRUB SERPL-MCNC: 0.7 MG/DL — SIGNIFICANT CHANGE UP (ref 0.2–1.2)
BUN SERPL-MCNC: 27 MG/DL — HIGH (ref 10–20)
BURR CELLS BLD QL SMEAR: SLIGHT — SIGNIFICANT CHANGE UP
CALCIUM SERPL-MCNC: 7.3 MG/DL — LOW (ref 8.5–10.1)
CHLORIDE SERPL-SCNC: 97 MMOL/L — LOW (ref 98–110)
CO2 SERPL-SCNC: 22 MMOL/L — SIGNIFICANT CHANGE UP (ref 17–32)
CREAT SERPL-MCNC: 0.9 MG/DL — SIGNIFICANT CHANGE UP (ref 0.7–1.5)
EOSINOPHIL # BLD AUTO: 0 K/UL — SIGNIFICANT CHANGE UP (ref 0–0.7)
EOSINOPHIL NFR BLD AUTO: 0 % — SIGNIFICANT CHANGE UP (ref 0–8)
GIANT PLATELETS BLD QL SMEAR: PRESENT — SIGNIFICANT CHANGE UP
GLUCOSE SERPL-MCNC: 103 MG/DL — HIGH (ref 70–99)
HCT VFR BLD CALC: 31 % — LOW (ref 42–52)
HGB BLD-MCNC: 9.7 G/DL — LOW (ref 14–18)
INR BLD: 1.31 RATIO — HIGH (ref 0.65–1.3)
LACTATE SERPL-SCNC: 2.7 MMOL/L — HIGH (ref 0.7–2)
LYMPHOCYTES # BLD AUTO: 30.19 K/UL — HIGH (ref 1.2–3.4)
LYMPHOCYTES # BLD AUTO: 45.5 % — SIGNIFICANT CHANGE UP (ref 20.5–51.1)
MANUAL SMEAR VERIFICATION: SIGNIFICANT CHANGE UP
MCHC RBC-ENTMCNC: 30 PG — SIGNIFICANT CHANGE UP (ref 27–31)
MCHC RBC-ENTMCNC: 31.3 G/DL — LOW (ref 32–37)
MCV RBC AUTO: 96 FL — HIGH (ref 80–94)
MONOCYTES # BLD AUTO: 0.66 K/UL — HIGH (ref 0.1–0.6)
MONOCYTES NFR BLD AUTO: 1 % — LOW (ref 1.7–9.3)
MYELOCYTES NFR BLD: 1 % — HIGH (ref 0–0)
NEUTROPHILS # BLD AUTO: 19.04 K/UL — HIGH (ref 1.4–6.5)
NEUTROPHILS NFR BLD AUTO: 27.7 % — LOW (ref 42.2–75.2)
NEUTS BAND # BLD: 1 % — SIGNIFICANT CHANGE UP (ref 0–6)
OVALOCYTES BLD QL SMEAR: SLIGHT — SIGNIFICANT CHANGE UP
PLAT MORPH BLD: NORMAL — SIGNIFICANT CHANGE UP
PLATELET # BLD AUTO: 92 K/UL — LOW (ref 130–400)
POIKILOCYTOSIS BLD QL AUTO: SIGNIFICANT CHANGE UP
POTASSIUM SERPL-MCNC: 4.5 MMOL/L — SIGNIFICANT CHANGE UP (ref 3.5–5)
POTASSIUM SERPL-SCNC: 4.5 MMOL/L — SIGNIFICANT CHANGE UP (ref 3.5–5)
PROT SERPL-MCNC: 5.6 G/DL — LOW (ref 6–8)
PROTHROM AB SERPL-ACNC: 15.1 SEC — HIGH (ref 9.95–12.87)
RBC # BLD: 3.23 M/UL — LOW (ref 4.7–6.1)
RBC # FLD: 14.6 % — HIGH (ref 11.5–14.5)
RBC BLD AUTO: ABNORMAL
SMUDGE CELLS # BLD: PRESENT — SIGNIFICANT CHANGE UP
SODIUM SERPL-SCNC: 133 MMOL/L — LOW (ref 135–146)
VARIANT LYMPHS # BLD: 23.8 % — HIGH (ref 0–5)
WBC # BLD: 66.35 K/UL — CRITICAL HIGH (ref 4.8–10.8)
WBC # FLD AUTO: 66.35 K/UL — CRITICAL HIGH (ref 4.8–10.8)

## 2020-04-09 PROCEDURE — 99222 1ST HOSP IP/OBS MODERATE 55: CPT | Mod: AI

## 2020-04-09 PROCEDURE — 70450 CT HEAD/BRAIN W/O DYE: CPT | Mod: 26

## 2020-04-09 PROCEDURE — 72125 CT NECK SPINE W/O DYE: CPT | Mod: 26

## 2020-04-09 PROCEDURE — 93010 ELECTROCARDIOGRAM REPORT: CPT

## 2020-04-09 PROCEDURE — 99251: CPT

## 2020-04-09 PROCEDURE — 99232 SBSQ HOSP IP/OBS MODERATE 35: CPT

## 2020-04-09 PROCEDURE — 71045 X-RAY EXAM CHEST 1 VIEW: CPT | Mod: 26

## 2020-04-09 PROCEDURE — 72170 X-RAY EXAM OF PELVIS: CPT | Mod: 26

## 2020-04-09 PROCEDURE — 99285 EMERGENCY DEPT VISIT HI MDM: CPT

## 2020-04-09 RX ORDER — CHLORHEXIDINE GLUCONATE 213 G/1000ML
1 SOLUTION TOPICAL
Refills: 0 | Status: DISCONTINUED | OUTPATIENT
Start: 2020-04-09 | End: 2020-04-12

## 2020-04-09 RX ORDER — ATORVASTATIN CALCIUM 80 MG/1
10 TABLET, FILM COATED ORAL ONCE
Refills: 0 | Status: COMPLETED | OUTPATIENT
Start: 2020-04-09 | End: 2020-04-09

## 2020-04-09 RX ORDER — LEVOTHYROXINE SODIUM 125 MCG
137 TABLET ORAL DAILY
Refills: 0 | Status: DISCONTINUED | OUTPATIENT
Start: 2020-04-09 | End: 2020-04-12

## 2020-04-09 RX ORDER — METOPROLOL TARTRATE 50 MG
25 TABLET ORAL DAILY
Refills: 0 | Status: DISCONTINUED | OUTPATIENT
Start: 2020-04-09 | End: 2020-04-12

## 2020-04-09 RX ORDER — SODIUM CHLORIDE 9 MG/ML
1000 INJECTION INTRAMUSCULAR; INTRAVENOUS; SUBCUTANEOUS
Refills: 0 | Status: DISCONTINUED | OUTPATIENT
Start: 2020-04-09 | End: 2020-04-12

## 2020-04-09 RX ORDER — HYDROXYCHLOROQUINE SULFATE 200 MG
400 TABLET ORAL EVERY 24 HOURS
Refills: 0 | Status: DISCONTINUED | OUTPATIENT
Start: 2020-04-10 | End: 2020-04-12

## 2020-04-09 RX ORDER — ACETAMINOPHEN 500 MG
650 TABLET ORAL ONCE
Refills: 0 | Status: COMPLETED | OUTPATIENT
Start: 2020-04-09 | End: 2020-04-09

## 2020-04-09 RX ORDER — BUDESONIDE AND FORMOTEROL FUMARATE DIHYDRATE 160; 4.5 UG/1; UG/1
2 AEROSOL RESPIRATORY (INHALATION)
Refills: 0 | Status: DISCONTINUED | OUTPATIENT
Start: 2020-04-09 | End: 2020-04-12

## 2020-04-09 RX ORDER — GUAIFENESIN/DEXTROMETHORPHAN 600MG-30MG
10 TABLET, EXTENDED RELEASE 12 HR ORAL EVERY 4 HOURS
Refills: 0 | Status: DISCONTINUED | OUTPATIENT
Start: 2020-04-09 | End: 2020-04-12

## 2020-04-09 RX ORDER — ACETAMINOPHEN 500 MG
650 TABLET ORAL EVERY 4 HOURS
Refills: 0 | Status: DISCONTINUED | OUTPATIENT
Start: 2020-04-09 | End: 2020-04-10

## 2020-04-09 RX ORDER — HYDROXYCHLOROQUINE SULFATE 200 MG
800 TABLET ORAL EVERY 24 HOURS
Refills: 0 | Status: COMPLETED | OUTPATIENT
Start: 2020-04-09 | End: 2020-04-09

## 2020-04-09 RX ORDER — HYDROXYCHLOROQUINE SULFATE 200 MG
TABLET ORAL
Refills: 0 | Status: DISCONTINUED | OUTPATIENT
Start: 2020-04-09 | End: 2020-04-12

## 2020-04-09 RX ORDER — LEVETIRACETAM 250 MG/1
500 TABLET, FILM COATED ORAL
Refills: 0 | Status: DISCONTINUED | OUTPATIENT
Start: 2020-04-09 | End: 2020-04-12

## 2020-04-09 RX ORDER — PANTOPRAZOLE SODIUM 20 MG/1
40 TABLET, DELAYED RELEASE ORAL DAILY
Refills: 0 | Status: DISCONTINUED | OUTPATIENT
Start: 2020-04-09 | End: 2020-04-12

## 2020-04-09 RX ORDER — SENNA PLUS 8.6 MG/1
2 TABLET ORAL AT BEDTIME
Refills: 0 | Status: DISCONTINUED | OUTPATIENT
Start: 2020-04-09 | End: 2020-04-12

## 2020-04-09 RX ADMIN — BUDESONIDE AND FORMOTEROL FUMARATE DIHYDRATE 2 PUFF(S): 160; 4.5 AEROSOL RESPIRATORY (INHALATION) at 21:59

## 2020-04-09 RX ADMIN — Medication 650 MILLIGRAM(S): at 16:15

## 2020-04-09 RX ADMIN — SENNA PLUS 2 TABLET(S): 8.6 TABLET ORAL at 22:00

## 2020-04-09 RX ADMIN — Medication 800 MILLIGRAM(S): at 22:01

## 2020-04-09 RX ADMIN — ATORVASTATIN CALCIUM 10 MILLIGRAM(S): 80 TABLET, FILM COATED ORAL at 21:59

## 2020-04-09 NOTE — ED PROVIDER NOTE - PHYSICAL EXAMINATION
GEN: Alert & Oriented x 2, No acute distress. Calm, appropriate.  Head and Neck: Normocephalic, atraumatic. No cervical lymphadenopathy.   ENT: Oral mucosa pink, moist without lesions. No pharyngeal injection noted. No exudate.  Eyes: PERRL.  No conjunctival injection. No scleral icterus.   RESP: Lungs clear to auscult bilat. no wheezes, rhonchi or rales. No retractions. Equal air entry.  CARDIO: regular rate and rhythm, no murmurs, rubs or gallops. Normal S1, S2. Radial pulses 2+ bilaterally. No lower extremity edema.  ABD: Soft, Nondistended. No rebound tenderness/guarding. No pulsatile mass. No tenderness with palpation x 4 quadrants.  MS: No midline tenderness throughout. Full ROM of neck. Moving all ext. no obvious swelling or deformities. no tenderness with palpation of extremities.   SKIN: Ecchymosis noted to right cheek. no rashes/lesions, no petechiae.  Neuro: A&O x3, CN II- XII intact, strength 5/5 throughout extremities, sensation intact. No drooping of eye or mouth. Without dysarthria or aphasia. off balance.

## 2020-04-09 NOTE — H&P ADULT - ASSESSMENT
64M with PMH of CLL, DM, parathyroid cancer, and schizophrenia presenting to ED from [facility] s/p unwitnessed fall last night w/ +HT, ?LOC, +ASA and plavix.     PLAN:   - per NSX: holding Aspirin and Plavix, Q4H neuro checks, Keppra 500mg Q12 for 7 days   - Repeat CTH if mental status changes  - c/s ID for r/o COVID  - start hydroxychloroquine -- FU qTC 64M with PMH of CLL, DM, parathyroid cancer, and schizophrenia presenting to ED from [facility] s/p unwitnessed fall last night w/ +HT, ?LOC, +ASA and plavix.     PLAN:   - per NSX: holding Aspirin and Plavix, Q4H neuro checks (ok to go to floor), Keppra 500mg Q12 for 7 days  - FU Cspine read  - Repeat CTH if mental status changes  - c/s ID for r/o COVID  - start hydroxychloroquine -- FU qTC

## 2020-04-09 NOTE — ED ADULT NURSE NOTE - CHIEF COMPLAINT QUOTE
pt BIBA from [facility]. as per EMS pt fell last night and hit his head. pt on aspirin. bruise noted to right cheek. pt also noted to have fever today, sent in for covid eval. Denies SOB.

## 2020-04-09 NOTE — ED PROVIDER NOTE - ATTENDING CONTRIBUTION TO CARE
63 yo m with pmh of dm, schizophrenia, cll, sent by [facility] for evaluation of fall.  pt had unwitnessed fall last night, noted with bruising on the R cheek today.  pt is poor historian and denies fall.  pt also found to be febrile and borderline hypotensive.  pt admits he has been coughing.  no headache, neck pain, cp, sob, abd pain, n/v/d.  exam: nad, right facial ecchymosis, small infraorbital, perrl, eomi, mmm, rrr, ctab, abd soft, nt,nd aox2, imp: pt with psych disorder, poor historian, sent for eval of fall, found febrile with cough, r/o covid, CT to eval fall

## 2020-04-09 NOTE — ED ADULT TRIAGE NOTE - CHIEF COMPLAINT QUOTE
pt BIBA from [facility]. as per EMS pt fell last night and hit his head. pt on aspirin. bruise noted to right cheek. pt also noted to have fever today, sent in for covid eval. Denies SOB. pt BIBA from [facility]. as per EMS pt fell last night and hit his head. pt on aspirin. bruise noted to right eye pt also noted to have fever today, sent in for covid eval. Denies SOB.

## 2020-04-09 NOTE — CONSULT NOTE ADULT - SUBJECTIVE AND OBJECTIVE BOX
HISTORY OF PRESENT ILLNESS:   · HPI Objective Statement: The pt is a 64y M with PMH CLL, DM, Parathyroid Ca and schizophrenia presenting to ED from [facility] with fall x 1 day. Pt is poor historian. per [place] facility pt had unwitnessed fall last night and obtained bruising to right cheek. pt denies falling. pt also found to have fever T-max 102 with BP 90/60. Pt endorsing nonproductive cough x 6 days. no aggravating or reliving factors. associated with some dizziness that started today. Pt denies n/v/d, abd pain, urinary symptoms, neck pain, HA, blurry vision, sob, cp, palpitations, back pain, ext pain and swelling.	    PAST MEDICAL & SURGICAL HISTORY:  CLL (chronic lymphocytic leukemia)  Parathyroid cancer  DM (diabetes mellitus)  Schizophrenia  No significant past surgical history    FAMILY HISTORY:  No pertinent family history in first degree relatives      SOCIAL HISTORY:  Tobacco Use:  EtOH use:   Substance:    Allergies  -No Known Allergies    Intolerances    REVIEW OF SYSTEMS  · Review of Systems:    GEN: (+) fever, (-) night sweats, (-) malaise  HEENT: (-) vision changes, (-) HA, (-) sore throat, (-) ear pain  CV: (-) chest pain, (-) palpitations, (-) edema  PULM: (+) cough, (-) wheezing, (-) dyspnea, (-) orthopnea  GI: (-) abdominal pain,(-) Nausea, (-) Vomiting, (-) Diarrhea, (-) Melena  NEURO: (-) weakness, (-) paresthesias, (-) syncope, (-) seizure, (+) Dizziness  : (-) dysuria, (-) frequency, (-) urgency  MS: (-) back pain, (-) joint pain, (-)myalgias, (-) swelling  SKIN: (-) rashes, (-) new lesions HEME: (-) bleeding, (+) ecchymosis	    MEDICATIONS:  Antibiotics:    Neuro:    Anticoagulation: Aspirin 81mg     Vital Signs Last 24 Hrs :   T(C): 38.9 (09 Apr 2020 15:11), Max: 38.9 (09 Apr 2020 15:11)  T(F): 102 (09 Apr 2020 15:11), Max: 102 (09 Apr 2020 15:11)  HR: 92 (09 Apr 2020 15:11) (92 - 92)  BP: 115/56 (09 Apr 2020 15:11) (115/56 - 115/56)  BP(mean): --  RR: 19 (09 Apr 2020 15:11) (19 - 19)  SpO2: 92% (09 Apr 2020 15:11) (92% - 92%)     Physical Examination:  General :   -A&OX2 , In no acute distress.   - Pt verbal, language appropriate   - Follows all commands   - Tongue midline, facial features symmetric, no droop  Neuro :   Eyes:   -PERRLA, EOMI, No conjunctival injection.   - NML tracking  -Bruising to the left periorbital region  Neck  - No cervical tenderness,   - Full ROM of neck  - No point tenderness   Motor :  -MAEX4  -Power 5/5 in UE & LE   Pronator Drift : absent       LABS:                        9.7    x     )-----------( x        ( 09 Apr 2020 16:08 )             31.0     04-09    133<L>  |  97<L>  |  27<H>  ----------------------------<  103<H>  4.5   |  22  |  0.9    Ca    7.3<L>      09 Apr 2020 16:08    TPro  5.6<L>  /  Alb  3.4<L>  /  TBili  0.7  /  DBili  x   /  AST  53<H>  /  ALT  26  /  AlkPhos  78  04-09    PT/INR - ( 09 Apr 2020 16:13 )   PT: 15.10 sec;   INR: 1.31 ratio         PTT - ( 09 Apr 2020 16:13 )  PTT:30.2 sec      RADIOLOGY & ADDITIONAL STUDIES:   from: CT Head No Cont (04.09.20 @ 17:03)   IMPRESSION:     1.  Acute subarachnoid hemorrhage overlying the left frontoparietal and temporal lobes. No hydrocephalus. No midline shift.  2.  Chronic infarcts.    Dr. Finn Rojas discussed preliminary findings with Dr. Starr on 4/9/2020 5:19 PM with readback.    FINN ROJAS M.D., RESIDENT RADIOLOGIST  This document has been electronically signed.  LIBRA MAC M.D., ATTENDING RADIOLOGIST  This document has been electronically signed. Apr 9 2020  5:52PM      < end of copied text > HISTORY OF PRESENT ILLNESS:   · HPI Objective Statement:        64y M on Aspririn and Plavix with PMHx of CLL, DM, Parathyroid Ca and schizophrenia presenting to ED from [facility] with fall x 1 day. Pt is poor historian. per [place] facility pt had unwitnessed fall last night and  bruising to right cheek. Unsure of LOC, pt denies falling. Pt also found to have fever of 102. Pt reports cough x 6 days and some dizziness that started today. Pt denies n/v/d,  urinary symptoms, neck pain, HA, blurry vision, sob, back pain, ext pain. 	  	    PAST MEDICAL & SURGICAL HISTORY:  CLL (chronic lymphocytic leukemia)  Parathyroid cancer  DM (diabetes mellitus)  Schizophrenia  No significant past surgical history    FAMILY HISTORY:  No pertinent family history in first degree relatives    SOCIAL HISTORY:  Tobacco Use:  EtOH use:   Substance:    Allergies  -No Known Allergies    · Review of Systems:    GEN: (+) fever, (-) night sweats, (-) malaise  HEENT: (-) vision changes, (-) HA, (-) sore throat, (-) ear pain  CV: (-) chest pain, (-) palpitations, (-) edema  PULM: (+) cough, (-) wheezing, (-) dyspnea, (-) orthopnea  GI: (-) abdominal pain,(-) Nausea, (-) Vomiting, (-) Diarrhea, (-) Melena  NEURO: (-) weakness, (-) paresthesias, (-) syncope, (-) seizure, (+) Dizziness  : (-) dysuria, (-) frequency, (-) urgency  MS: (-) back pain, (-) joint pain, (-)myalgias, (-) swelling  SKIN: (-) rashes, (-) new lesions HEME: (-) bleeding, (+) ecchymosis	    MEDICATIONS:  Antibiotics:      Anticoagulation: Aspirin 81mg, Plavix     Vital Signs Last 24 Hrs :   T(C): 38.9 (09 Apr 2020 15:11), Max: 38.9 (09 Apr 2020 15:11)  T(F): 102 (09 Apr 2020 15:11), Max: 102 (09 Apr 2020 15:11)  HR: 92 (09 Apr 2020 15:11) (92 - 92)  BP: 115/56 (09 Apr 2020 15:11) (115/56 - 115/56)  BP(mean): --  RR: 19 (09 Apr 2020 15:11) (19 - 19)  SpO2: 92% (09 Apr 2020 15:11) (92% - 92%)     Physical Examination:  General :   -A&OX2, incorrect year / time   - In no acute distress.   - Pt verbal, language appropriate   - Follows all commands   - Tongue midline, facial features symmetric, no droop  Neuro :   Eyes:   -PERRLA, EOMI, No conjunctival injection.   - NML tracking  -Bruising to the left periorbital region  Neck  - No cervical tenderness  - Full ROM of neck  - No point tenderness of neck or back  Motor :  -MAEX4  -Power 5/5 in UE & LE   -Normal tone / muscle   Pronator Drift : absent   Hoffmans : negative       LABS:                        9.7    x     )-----------( x        ( 09 Apr 2020 16:08 )             31.0     04-09    133<L>  |  97<L>  |  27<H>  ----------------------------<  103<H>  4.5   |  22  |  0.9    Ca    7.3<L>      09 Apr 2020 16:08    TPro  5.6<L>  /  Alb  3.4<L>  /  TBili  0.7  /  DBili  x   /  AST  53<H>  /  ALT  26  /  AlkPhos  78  04-09    PT/INR - ( 09 Apr 2020 16:13 )   PT: 15.10 sec;   INR: 1.31 ratio         PTT - ( 09 Apr 2020 16:13 )  PTT:30.2 sec      RADIOLOGY & ADDITIONAL STUDIES:   from: CT Head No Cont (04.09.20 @ 17:03)   IMPRESSION:     1.  Acute subarachnoid hemorrhage overlying the left frontoparietal and temporal lobes. No hydrocephalus. No midline shift.  2.  Chronic infarcts.    Dr. Finn Rojas discussed preliminary findings with Dr. Starr on 4/9/2020 5:19 PM with readback.    FINN ROJAS M.D., RESIDENT RADIOLOGIST  This document has been electronically signed.  LIBRA MAC M.D., ATTENDING RADIOLOGIST  This document has been electronically signed. Apr 9 2020  5:52PM      < end of copied text > HISTORY OF PRESENT ILLNESS:        64y M on Aspririn and Plavix with PMHx of CLL, DM, Parathyroid Ca and schizophrenia presenting to ED from [facility] with fall x 1 day. Pt is poor historian. per [place] facility pt had unwitnessed fall last night and  bruising to right cheek. Unsure of LOC, pt denies falling. Pt also found to have fever of 102. Pt reports cough x 6 days and some dizziness that started today. Pt denies n/v/d,  urinary symptoms, neck pain, HA, blurry vision, sob, back pain, ext pain. 	  	    PAST MEDICAL & SURGICAL HISTORY:  CLL (chronic lymphocytic leukemia)  Parathyroid cancer  DM (diabetes mellitus)  Schizophrenia  No significant past surgical history    FAMILY HISTORY:  No pertinent family history in first degree relatives    SOCIAL HISTORY:  Tobacco Use:  EtOH use:   Substance:    Allergies  -No Known Allergies    · Review of Systems:    GEN: (+) fever, (-) night sweats, (-) malaise  HEENT: (-) vision changes, (-) HA, (-) sore throat, (-) ear pain  CV: (-) chest pain, (-) palpitations, (-) edema  PULM: (+) cough, (-) wheezing, (-) dyspnea, (-) orthopnea  GI: (-) abdominal pain,(-) Nausea, (-) Vomiting, (-) Diarrhea, (-) Melena  NEURO: (-) weakness, (-) paresthesias, (-) syncope, (-) seizure, (+) Dizziness  : (-) dysuria, (-) frequency, (-) urgency  MS: (-) back pain, (-) joint pain, (-)myalgias, (-) swelling  SKIN: (-) rashes, (-) new lesions HEME: (-) bleeding, (+) ecchymosis	    MEDICATIONS:  levothyroxine 137 mcg (0.137 mg) oral tablet: 1 tab(s) orally once a day  docusate sodium 100 mg oral capsule: 3 cap(s) orally once a day  budesonide-formoterol 160 mcg-4.5 mcg/inh inhalation aerosol: 2 puff(s) inhaled 2 times a day  metoprolol succinate 25 mg oral tablet, extended release: 1 tab(s) orally once a day  Plavix 75 mg oral tablet: 1 tab(s) orally once a day  aspirin 81 mg oral tablet, chewable: 1 tab(s) orally once a day  Lipitor 10 mg oral tablet: 1 tab(s) orally once a day  Januvia 100 mg oral tablet: 1 tab(s) orally once a day  Invega Sustenna 117 mg/0.75 mL intramuscular suspension, extended release: 1 application intramuscular every 30 days, next dose due 5/16  metFORMIN 500 mg oral tablet: 1 tab(s) orally 2 times a day  Invega 6 mg oral tablet, extended release: 1 tab(s) orally once a day    Anticoagulation: Aspirin 81mg, Plavix     Vital Signs Last 24 Hrs :   T(C): 38.9 (09 Apr 2020 15:11), Max: 38.9 (09 Apr 2020 15:11)  T(F): 102 (09 Apr 2020 15:11), Max: 102 (09 Apr 2020 15:11)  HR: 92 (09 Apr 2020 15:11) (92 - 92)  BP: 115/56 (09 Apr 2020 15:11) (115/56 - 115/56)  BP(mean): --  RR: 19 (09 Apr 2020 15:11) (19 - 19)  SpO2: 92% (09 Apr 2020 15:11) (92% - 92%)     Physical Examination:  General :   -A&OX2, incorrect year / time   - In no acute distress.   - Pt verbal, language appropriate   - Follows all commands   - Tongue midline, facial features symmetric, no droop  Neuro :   Eyes:   -PERRLA, EOMI, No conjunctival injection.   - NML tracking  -Bruising to the left periorbital region  Neck  - No cervical tenderness  - Full ROM of neck  - No point tenderness of neck or back  Motor :  -MAEX4  -Power 5/5 in UE & LE   -Normal tone / muscle   Pronator Drift : absent   Hoffmans : negative     LABS:                        9.7    x     )-----------( x        ( 09 Apr 2020 16:08 )             31.0     04-09    133<L>  |  97<L>  |  27<H>  ----------------------------<  103<H>  4.5   |  22  |  0.9    Ca    7.3<L>      09 Apr 2020 16:08    TPro  5.6<L>  /  Alb  3.4<L>  /  TBili  0.7  /  DBili  x   /  AST  53<H>  /  ALT  26  /  AlkPhos  78  04-09    PT/INR - ( 09 Apr 2020 16:13 )   PT: 15.10 sec;   INR: 1.31 ratio         PTT - ( 09 Apr 2020 16:13 )  PTT:30.2 sec      RADIOLOGY & ADDITIONAL STUDIES:   from: CT Head No Cont (04.09.20 @ 17:03)   IMPRESSION:     1.  Acute subarachnoid hemorrhage overlying the left frontoparietal and temporal lobes. No hydrocephalus. No midline shift.  2.  Chronic infarcts.    Dr. Finn Rojas discussed preliminary findings with Dr. Starr on 4/9/2020 5:19 PM with readback.    FINN ROJAS M.D., RESIDENT RADIOLOGIST  This document has been electronically signed.  LIBRA MAC M.D., ATTENDING RADIOLOGIST  This document has been electronically signed. Apr 9 2020  5:52PM      < end of copied text > HISTORY OF PRESENT ILLNESS:        64y M on Aspririn and Plavix with PMHx of CLL, DM, Parathyroid Ca and schizophrenia presenting to ED from [facility] with fall x 1 day. Pt is poor historian. per [place] facility pt had unwitnessed fall last night and  bruising to right cheek. Unsure of LOC, pt denies falling. Pt also found to have fever of 102. Pt reports cough x 6 days and some dizziness that started today. Pt denies n/v/d,  urinary symptoms, neck pain, HA, blurry vision, sob, back pain, ext pain. 	  	    PAST MEDICAL & SURGICAL HISTORY:  CLL (chronic lymphocytic leukemia)  Parathyroid cancer  DM (diabetes mellitus)  Schizophrenia  history    FAMILY HISTORY:  No pertinent family history in first degree relatives    SOCIAL HISTORY:  Tobacco Use:  EtOH use:   Substance:    Allergies  -No Known Allergies    Review of Systems:    GEN: (+) fever, (-) night sweats, (-) malaise  HEENT: (-) vision changes, (-) HA, (-) sore throat, (-) ear pain  CV: (-) chest pain, (-) palpitations, (-) edema  PULM: (+) cough, (-) wheezing, (-) dyspnea, (-) orthopnea  GI: (-) abdominal pain,(-) Nausea, (-) Vomiting, (-) Diarrhea, (-) Melena  NEURO: (-) weakness, (-) paresthesias, (-) syncope, (-) seizure, (+) Dizziness  : (-) dysuria, (-) frequency, (-) urgency  MS: (-) back pain, (-) joint pain, (-)myalgias, (-) swelling  SKIN: (-) rashes, (-) new lesions HEME: (-) bleeding, (+) ecchymosis	    MEDICATIONS:  levothyroxine 137 mcg (0.137 mg) oral tablet: 1 tab(s) orally once a day  docusate sodium 100 mg oral capsule: 3 cap(s) orally once a day  budesonide-formoterol 160 mcg-4.5 mcg/inh inhalation aerosol: 2 puff(s) inhaled 2 times a day  metoprolol succinate 25 mg oral tablet, extended release: 1 tab(s) orally once a day  Plavix 75 mg oral tablet: 1 tab(s) orally once a day  aspirin 81 mg oral tablet, chewable: 1 tab(s) orally once a day  Lipitor 10 mg oral tablet: 1 tab(s) orally once a day  Januvia 100 mg oral tablet: 1 tab(s) orally once a day  Invega Sustenna 117 mg/0.75 mL intramuscular suspension, extended release: 1 application intramuscular every 30 days, next dose due 5/16  metFORMIN 500 mg oral tablet: 1 tab(s) orally 2 times a day  Invega 6 mg oral tablet, extended release: 1 tab(s) orally once a day    Anticoagulation: Aspirin 81mg, Plavix     Vital Signs Last 24 Hrs :   T(C): 38.9 (09 Apr 2020 15:11), Max: 38.9 (09 Apr 2020 15:11)  T(F): 102 (09 Apr 2020 15:11), Max: 102 (09 Apr 2020 15:11)  HR: 92 (09 Apr 2020 15:11) (92 - 92)  BP: 115/56 (09 Apr 2020 15:11) (115/56 - 115/56)  BP(mean): --  RR: 19 (09 Apr 2020 15:11) (19 - 19)  SpO2: 92% (09 Apr 2020 15:11) (92% - 92%)     Physical Examination:  General :   -A&OX2, incorrect year / time   - In no acute distress.   - Pt verbal, language appropriate   - Follows all commands   - Tongue midline, facial features symmetric, no droop  Neuro :   Eyes:   -PERRLA, EOMI, No conjunctival injection.   - NML tracking  -Bruising to the left periorbital region  Neck  - No cervical tenderness  - Full ROM of neck  - No point tenderness of neck or back  Motor :  -MAEX4  -Power 5/5 in UE & LE   -Normal tone / muscle   Pronator Drift : absent   Hoffmans : negative     LABS:                9.7    x     )-----------( x        ( 09 Apr 2020 16:08 )             31.0     04-09    133<L>  |  97<L>  |  27<H>  ----------------------------<  103<H>  4.5   |  22  |  0.9    Ca    7.3<L>      09 Apr 2020 16:08    TPro  5.6<L>  /  Alb  3.4<L>  /  TBili  0.7  /  DBili  x   /  AST  53<H>  /  ALT  26  /  AlkPhos  78  04-09    PT/INR - ( 09 Apr 2020 16:13 )   PT: 15.10 sec;   INR: 1.31 ratio      PTT - ( 09 Apr 2020 16:13 )  PTT:30.2 sec    RADIOLOGY & ADDITIONAL STUDIES:   from: CT Head No Cont (04.09.20 @ 17:03)   IMPRESSION:     1.  Acute subarachnoid hemorrhage overlying the left frontoparietal and temporal lobes. No hydrocephalus. No midline shift.  2.  Chronic infarcts.    Dr. Finn Rojas discussed preliminary findings with Dr. Starr on 4/9/2020 5:19 PM with readback.    FINN ROJAS M.D., RESIDENT RADIOLOGIST  This document has been electronically signed.  LIBRA MAC M.D., ATTENDING RADIOLOGIST  This document has been electronically signed. Apr 9 2020  5:52PM      < end of copied text >

## 2020-04-09 NOTE — H&P ADULT - NSICDXPASTMEDICALHX_GEN_ALL_CORE_FT
PAST MEDICAL HISTORY:  CLL (chronic lymphocytic leukemia)     DM (diabetes mellitus)     Parathyroid cancer     Schizophrenia

## 2020-04-09 NOTE — CONSULT NOTE ADULT - ASSESSMENT
Assessment / Plan :   - Hold Aspirin  - Keppra 500mg q 12hrs X 7 days   - Repeat CTH if mental status changes Assessment / Plan :   - Hold Aspirin and Plavix   - Serial Neuro checks q4   - Keppra 500mg q 12hrs X 7 days   - Repeat CTH if mental status changes Assessment / Plan :   - Hold Aspirin and Plavix   - Serial Neuro checks q4   - Keppra 500mg q 12hrs X 7 days   - Repeat CTH w/o contrast if mental status changes Assessment / Plan :   - Hold Aspirin and Plavix   - Serial Neuro checks q4   - Keppra 500mg q 12hrs X 7 days   - Repeat CTH w/o contrast if mental status changes   - D/W attending Assessment / Plan :   - No acute neurosurgical intervention indicated at this time   - Hold Aspirin and Plavix   - Serial Neuro checks q4   - Keppra 500mg q 12hrs X 7 days   - Repeat CTH w/o contrast if mental status changes   - D/W attending

## 2020-04-09 NOTE — CONSULT NOTE ADULT - ATTENDING COMMENTS
No neurosurgical intervention recommended for small left parietal traumatic subarachnoid hemorrhage after a fall. Keppra for a week.

## 2020-04-09 NOTE — H&P ADULT - ATTENDING COMMENTS
64M with PMH of CLL, DM, parathyroid cancer, and schizophrenia presenting as a level 2 trauma after unwitnessed fall last night w/ +HT, ?LOC, +ASA and plavix. Primary survey intact, secondary survey significant for ecchymosis around the right eye, small laceration on forehead. Febrile to 102. Labs were significant for WBC of 66. Imaging significant for patchy, bilateral, reticular and airspace opacities compatible with a viral pneumonia, such as COVID-19, in the appropriate clinical setting. Deformity left seventh rib compatible with old fracture, acute subarachnoid hemorrhage overlying the left frontoparietal and temporal lobes. No hydrocephalus. No midline shift. Chronic infarcts. Patient to seen by neurosurgery - holding Aspirin and Plavix, Q4H neuro checks, Keppra 500mg Q12 for 7 days.  Admit to trauma.

## 2020-04-09 NOTE — ED PROVIDER NOTE - OBJECTIVE STATEMENT
The pt is a 64y M with PMH CLL, DM, Parathyroid Ca and schizophrenia presenting to ED from [facility] with fall x 1 day. Pt is poor historian. per [place] facility pt had unwitnessed fall last night and obtained bruising to right cheek. pt denies falling. pt also found to have fever T-max 102 with BP 90/60. Pt endorsing nonproductive cough x 6 days. no aggravating or reliving factors. associated with some dizziness that started today. Pt denies n/v/d, abd pain, urinary symptoms, neck pain, HA, blurry vision, sob, cp, palpitations, back pain, ext pain and swelling.

## 2020-04-09 NOTE — H&P ADULT - NSHPLABSRESULTS_GEN_ALL_CORE
Labs:  CAPILLARY BLOOD GLUCOSE                     9.7    66.35 )-----------( 92       ( 09 Apr 2020 16:08 )             31.0       Auto Neutrophil %: 27.7 % (04-09-20 @ 16:08)  Band Neutrophils %: 1.0 % (04-09-20 @ 16:08)    04-09    133<L>  |  97<L>  |  27<H>  ----------------------------<  103<H>  4.5   |  22  |  0.9      Calcium, Total Serum: 7.3 mg/dL (04-09-20 @ 16:08)      LFTs:             5.6  | 0.7  | 53       ------------------[78      ( 09 Apr 2020 16:08 )  3.4  | x    | 26          Lipase:x      Amylase:x         Lactate, Blood: 2.7 mmol/L (04-09-20 @ 16:08)      Coags:     15.10  ----< 1.31    ( 09 Apr 2020 16:13 )     30.2      RADIOLOGY:     < from: Xray Chest 1 View- PORTABLE-Urgent (04.09.20 @ 16:46) >  Findings:    Support devices: None.    Cardiac/mediastinum/hilum: The lung volumes are low. Heart size appears within normal limits.    Lung parenchyma/Pleura: There are patchy,  bilateral, reticular and airspace opacities compatible with a viral pneumonia, such as COVID-19, in the appropriate clinical setting..     No pneumothorax. The costophrenic angles are clear.    Skeleton/soft tissues: Deformity left seventh rib compatible with old fracture..    Impression:    There are patchy,  bilateral, reticular and airspace opacities compatible with a viral pneumonia, such as COVID-19, in the appropriate clinical setting.      < from: Xray Pelvis AP only (04.09.20 @ 16:46) >    Findings/impression:   No evidence of acute fracture.   Degenerative changes of the spine and hips. Linear radiodense foci overlying the right lower quadrant, may represent surgical clips. Vascular calcifications.      < from: CT Head No Cont (04.09.20 @ 17:03) >    Findings:  Acute subarachnoid hemorrhage overlying the left frontoparietal and left temporal lobes. No midline shift. The ventricles and cortical sulci are mildly prominent, compatible with central parenchymal volume loss.No hydrocephalus.    Redemonstration of chronic infarcts within the right parietal and left frontal lobes.  Gray-white matter differentiation is maintained.     No displaced calvarial fracture.. The visualized paranasal sinuses and mastoids are well-aerated.    IMPRESSION:   1.  Acute subarachnoid hemorrhage overlying the left frontoparietal and temporal lobes. No hydrocephalus. No midline shift.  2.  Chronic infarcts. Labs:  CAPILLARY BLOOD GLUCOSE                     9.7    66.35 )-----------( 92       ( 09 Apr 2020 16:08 )             31.0       Auto Neutrophil %: 27.7 % (04-09-20 @ 16:08)  Band Neutrophils %: 1.0 % (04-09-20 @ 16:08)    04-09    133<L>  |  97<L>  |  27<H>  ----------------------------<  103<H>  4.5   |  22  |  0.9    Calcium, Total Serum: 7.3 mg/dL (04-09-20 @ 16:08)    LFTs:             5.6  | 0.7  | 53       ------------------[78      ( 09 Apr 2020 16:08 )  3.4  | x    | 26          Lactate, Blood: 2.7 mmol/L (04-09-20 @ 16:08)    Coags:     15.10  ----< 1.31    ( 09 Apr 2020 16:13 )     30.2        RADIOLOGY:     < from: Xray Chest 1 View- PORTABLE-Urgent (04.09.20 @ 16:46) >  Findings:  Cardiac/mediastinum/hilum: The lung volumes are low. Heart size appears within normal limits.    Lung parenchyma/Pleura: There are patchy, bilateral, reticular and airspace opacities compatible with a viral pneumonia, such as COVID-19, in the appropriate clinical setting..     No pneumothorax. The costophrenic angles are clear.    Skeleton/soft tissues: Deformity left seventh rib compatible with old fracture..    Impression:    There are patchy,  bilateral, reticular and airspace opacities compatible with a viral pneumonia, such as COVID-19, in the appropriate clinical setting.      < from: Xray Pelvis AP only (04.09.20 @ 16:46) >  Findings/impression:   No evidence of acute fracture.   Degenerative changes of the spine and hips. Linear radiodense foci overlying the right lower quadrant, may represent surgical clips. Vascular calcifications.      < from: CT Head No Cont (04.09.20 @ 17:03) >  Findings:  Acute subarachnoid hemorrhage overlying the left frontoparietal and left temporal lobes. No midline shift. The ventricles and cortical sulci are mildly prominent, compatible with central parenchymal volume loss.No hydrocephalus.    Redemonstration of chronic infarcts within the right parietal and left frontal lobes.  Gray-white matter differentiation is maintained.     No displaced calvarial fracture.. The visualized paranasal sinuses and mastoids are well-aerated.    IMPRESSION:   1.  Acute subarachnoid hemorrhage overlying the left frontoparietal and temporal lobes. No hydrocephalus. No midline shift.  2.  Chronic infarcts.

## 2020-04-09 NOTE — H&P ADULT - NSHPPHYSICALEXAM_GEN_ALL_CORE
PHYSICAL EXAM:  GENERAL: NAD, well-appearing  HEENT: bruising under R eye, small laceration on forehead  CHEST/LUNG: Clear to auscultation bilaterally  HEART: Regular rate and rhythm  ABDOMEN: Soft, Nontender, Nondistended;   EXTREMITIES: No clubbing, cyanosis, or edema

## 2020-04-09 NOTE — ED PROVIDER NOTE - NS ED ROS FT
GEN: (+) fever, (-) night sweats, (-) malaise  HEENT: (-) vision changes, (-) HA, (-) sore throat, (-) ear pain  CV: (-) chest pain, (-) palpitations, (-) edema  PULM: (+) cough, (-) wheezing, (-) dyspnea, (-) orthopnea  GI: (-) abdominal pain,(-) Nausea, (-) Vomiting, (-) Diarrhea, (-) Melena  NEURO: (-) weakness, (-) paresthesias, (-) syncope, (-) seizure, (+) Dizziness  : (-) dysuria, (-) frequency, (-) urgency  MS: (-) back pain, (-) joint pain, (-)myalgias, (-) swelling  SKIN: (-) rashes, (-) new lesions  HEME: (-) bleeding, (+) ecchymosis

## 2020-04-09 NOTE — ED ADULT NURSE NOTE - OBJECTIVE STATEMENT
Pt brought in from [facility] for eval of unwitnessed fall last night, unknown LOC, bruising noted to right cheek. [facility] also endorses pt has had fevers and cough x1 week prior. Pt also endorses dizziness that started today. Denies cp, sob, n/v/d, neck pain, headache, blurry vision, back pain, abd pain, urinary symptoms.

## 2020-04-09 NOTE — ED PROVIDER NOTE - CLINICAL SUMMARY MEDICAL DECISION MAKING FREE TEXT BOX
pt with psych disorder, poor historian, sent for eval of fall, found febrile with cough, r/o covid, CT to eval fall showed subarachnoid bleed, neurosurg and trauma surg consulted, pt admitted to trauma service

## 2020-04-09 NOTE — ED PROVIDER NOTE - CARE PLAN
Principal Discharge DX:	Fall  Secondary Diagnosis:	Subarachnoid bleed  Secondary Diagnosis:	Fever  Secondary Diagnosis:	Cough

## 2020-04-10 LAB
ANION GAP SERPL CALC-SCNC: 14 MMOL/L — SIGNIFICANT CHANGE UP (ref 7–14)
APPEARANCE UR: CLEAR — SIGNIFICANT CHANGE UP
BASOPHILS # BLD AUTO: 0.05 K/UL — SIGNIFICANT CHANGE UP (ref 0–0.2)
BASOPHILS NFR BLD AUTO: 0.1 % — SIGNIFICANT CHANGE UP (ref 0–1)
BILIRUB UR-MCNC: NEGATIVE — SIGNIFICANT CHANGE UP
BUN SERPL-MCNC: 20 MG/DL — SIGNIFICANT CHANGE UP (ref 10–20)
CALCIUM SERPL-MCNC: 7.2 MG/DL — LOW (ref 8.5–10.1)
CHLORIDE SERPL-SCNC: 104 MMOL/L — SIGNIFICANT CHANGE UP (ref 98–110)
CO2 SERPL-SCNC: 20 MMOL/L — SIGNIFICANT CHANGE UP (ref 17–32)
COLOR SPEC: YELLOW — SIGNIFICANT CHANGE UP
CREAT SERPL-MCNC: 0.7 MG/DL — SIGNIFICANT CHANGE UP (ref 0.7–1.5)
DIFF PNL FLD: NEGATIVE — SIGNIFICANT CHANGE UP
EOSINOPHIL # BLD AUTO: 0 K/UL — SIGNIFICANT CHANGE UP (ref 0–0.7)
EOSINOPHIL NFR BLD AUTO: 0 % — SIGNIFICANT CHANGE UP (ref 0–8)
GLUCOSE BLDC GLUCOMTR-MCNC: 102 MG/DL — HIGH (ref 70–99)
GLUCOSE BLDC GLUCOMTR-MCNC: 102 MG/DL — HIGH (ref 70–99)
GLUCOSE BLDC GLUCOMTR-MCNC: 115 MG/DL — HIGH (ref 70–99)
GLUCOSE BLDC GLUCOMTR-MCNC: 54 MG/DL — LOW (ref 70–99)
GLUCOSE BLDC GLUCOMTR-MCNC: 92 MG/DL — SIGNIFICANT CHANGE UP (ref 70–99)
GLUCOSE SERPL-MCNC: 103 MG/DL — HIGH (ref 70–99)
GLUCOSE UR QL: NEGATIVE — SIGNIFICANT CHANGE UP
HCT VFR BLD CALC: 30.9 % — LOW (ref 42–52)
HCV AB S/CO SERPL IA: 0.04 COI — SIGNIFICANT CHANGE UP
HCV AB SERPL-IMP: SIGNIFICANT CHANGE UP
HGB BLD-MCNC: 10 G/DL — LOW (ref 14–18)
IMM GRANULOCYTES NFR BLD AUTO: 0.2 % — SIGNIFICANT CHANGE UP (ref 0.1–0.3)
KETONES UR-MCNC: NEGATIVE — SIGNIFICANT CHANGE UP
LACTATE SERPL-SCNC: 1.3 MMOL/L — SIGNIFICANT CHANGE UP (ref 0.7–2)
LEUKOCYTE ESTERASE UR-ACNC: NEGATIVE — SIGNIFICANT CHANGE UP
LYMPHOCYTES # BLD AUTO: 30.53 K/UL — HIGH (ref 1.2–3.4)
LYMPHOCYTES # BLD AUTO: 86.8 % — HIGH (ref 20.5–51.1)
MAGNESIUM SERPL-MCNC: 2.1 MG/DL — SIGNIFICANT CHANGE UP (ref 1.8–2.4)
MCHC RBC-ENTMCNC: 31.7 PG — HIGH (ref 27–31)
MCHC RBC-ENTMCNC: 32.4 G/DL — SIGNIFICANT CHANGE UP (ref 32–37)
MCV RBC AUTO: 98.1 FL — HIGH (ref 80–94)
MONOCYTES # BLD AUTO: 1.17 K/UL — HIGH (ref 0.1–0.6)
MONOCYTES NFR BLD AUTO: 3.3 % — SIGNIFICANT CHANGE UP (ref 1.7–9.3)
NEUTROPHILS # BLD AUTO: 3.38 K/UL — SIGNIFICANT CHANGE UP (ref 1.4–6.5)
NEUTROPHILS NFR BLD AUTO: 9.6 % — LOW (ref 42.2–75.2)
NITRITE UR-MCNC: NEGATIVE — SIGNIFICANT CHANGE UP
NRBC # BLD: 0 /100 WBCS — SIGNIFICANT CHANGE UP (ref 0–0)
PH UR: 6 — SIGNIFICANT CHANGE UP (ref 5–8)
PHOSPHATE SERPL-MCNC: 3.8 MG/DL — SIGNIFICANT CHANGE UP (ref 2.1–4.9)
PLATELET # BLD AUTO: 70 K/UL — LOW (ref 130–400)
POTASSIUM SERPL-MCNC: 4.1 MMOL/L — SIGNIFICANT CHANGE UP (ref 3.5–5)
POTASSIUM SERPL-SCNC: 4.1 MMOL/L — SIGNIFICANT CHANGE UP (ref 3.5–5)
PROT UR-MCNC: SIGNIFICANT CHANGE UP
RBC # BLD: 3.15 M/UL — LOW (ref 4.7–6.1)
RBC # FLD: 14.7 % — HIGH (ref 11.5–14.5)
SARS-COV-2 RNA SPEC QL NAA+PROBE: DETECTED
SODIUM SERPL-SCNC: 138 MMOL/L — SIGNIFICANT CHANGE UP (ref 135–146)
SP GR SPEC: 1.02 — SIGNIFICANT CHANGE UP (ref 1.01–1.02)
UROBILINOGEN FLD QL: SIGNIFICANT CHANGE UP
WBC # BLD: 35.19 K/UL — HIGH (ref 4.8–10.8)
WBC # FLD AUTO: 35.19 K/UL — HIGH (ref 4.8–10.8)

## 2020-04-10 PROCEDURE — 93010 ELECTROCARDIOGRAM REPORT: CPT

## 2020-04-10 PROCEDURE — 99233 SBSQ HOSP IP/OBS HIGH 50: CPT

## 2020-04-10 PROCEDURE — 71045 X-RAY EXAM CHEST 1 VIEW: CPT | Mod: 26

## 2020-04-10 RX ORDER — DEXTROSE 50 % IN WATER 50 %
25 SYRINGE (ML) INTRAVENOUS ONCE
Refills: 0 | Status: DISCONTINUED | OUTPATIENT
Start: 2020-04-10 | End: 2020-04-12

## 2020-04-10 RX ORDER — INSULIN LISPRO 100/ML
VIAL (ML) SUBCUTANEOUS
Refills: 0 | Status: DISCONTINUED | OUTPATIENT
Start: 2020-04-10 | End: 2020-04-12

## 2020-04-10 RX ORDER — DEXTROSE 50 % IN WATER 50 %
12.5 SYRINGE (ML) INTRAVENOUS ONCE
Refills: 0 | Status: DISCONTINUED | OUTPATIENT
Start: 2020-04-10 | End: 2020-04-12

## 2020-04-10 RX ORDER — DEXTROSE 50 % IN WATER 50 %
15 SYRINGE (ML) INTRAVENOUS ONCE
Refills: 0 | Status: DISCONTINUED | OUTPATIENT
Start: 2020-04-10 | End: 2020-04-12

## 2020-04-10 RX ORDER — ACETAMINOPHEN 500 MG
650 TABLET ORAL EVERY 6 HOURS
Refills: 0 | Status: DISCONTINUED | OUTPATIENT
Start: 2020-04-10 | End: 2020-04-12

## 2020-04-10 RX ORDER — GABAPENTIN 400 MG/1
100 CAPSULE ORAL THREE TIMES A DAY
Refills: 0 | Status: DISCONTINUED | OUTPATIENT
Start: 2020-04-10 | End: 2020-04-12

## 2020-04-10 RX ORDER — SODIUM CHLORIDE 9 MG/ML
1000 INJECTION, SOLUTION INTRAVENOUS
Refills: 0 | Status: DISCONTINUED | OUTPATIENT
Start: 2020-04-10 | End: 2020-04-12

## 2020-04-10 RX ORDER — GLUCAGON INJECTION, SOLUTION 0.5 MG/.1ML
1 INJECTION, SOLUTION SUBCUTANEOUS ONCE
Refills: 0 | Status: DISCONTINUED | OUTPATIENT
Start: 2020-04-10 | End: 2020-04-12

## 2020-04-10 RX ADMIN — Medication 650 MILLIGRAM(S): at 12:09

## 2020-04-10 RX ADMIN — Medication 650 MILLIGRAM(S): at 22:00

## 2020-04-10 RX ADMIN — Medication 5 MILLIGRAM(S): at 21:57

## 2020-04-10 RX ADMIN — SENNA PLUS 2 TABLET(S): 8.6 TABLET ORAL at 21:57

## 2020-04-10 RX ADMIN — PANTOPRAZOLE SODIUM 40 MILLIGRAM(S): 20 TABLET, DELAYED RELEASE ORAL at 12:10

## 2020-04-10 RX ADMIN — GABAPENTIN 100 MILLIGRAM(S): 400 CAPSULE ORAL at 21:58

## 2020-04-10 RX ADMIN — LEVETIRACETAM 500 MILLIGRAM(S): 250 TABLET, FILM COATED ORAL at 16:28

## 2020-04-10 RX ADMIN — LEVETIRACETAM 500 MILLIGRAM(S): 250 TABLET, FILM COATED ORAL at 06:12

## 2020-04-10 RX ADMIN — Medication 137 MICROGRAM(S): at 06:12

## 2020-04-10 RX ADMIN — BUDESONIDE AND FORMOTEROL FUMARATE DIHYDRATE 2 PUFF(S): 160; 4.5 AEROSOL RESPIRATORY (INHALATION) at 22:01

## 2020-04-10 RX ADMIN — GABAPENTIN 100 MILLIGRAM(S): 400 CAPSULE ORAL at 13:04

## 2020-04-10 RX ADMIN — Medication 400 MILLIGRAM(S): at 21:57

## 2020-04-10 RX ADMIN — SODIUM CHLORIDE 100 MILLILITER(S): 9 INJECTION INTRAMUSCULAR; INTRAVENOUS; SUBCUTANEOUS at 00:18

## 2020-04-10 RX ADMIN — BUDESONIDE AND FORMOTEROL FUMARATE DIHYDRATE 2 PUFF(S): 160; 4.5 AEROSOL RESPIRATORY (INHALATION) at 12:10

## 2020-04-10 RX ADMIN — Medication 650 MILLIGRAM(S): at 16:27

## 2020-04-10 NOTE — PROGRESS NOTE ADULT - SUBJECTIVE AND OBJECTIVE BOX
GENERAL SURGERY PROGRESS NOTE     TEDDY ADAIR  64y  Male  Hospital day :1d  POD:  Procedure:   OVERNIGHT EVENTS: admitted to trauma, CTH revealed SAH    T(F): 99.9 (04-09-20 @ 17:30), Max: 102 (04-09-20 @ 15:11)  HR: 77 (04-09-20 @ 19:00) (77 - 92)  BP: 99/53 (04-09-20 @ 19:00) (95/50 - 115/56)  ABP: --  ABP(mean): --  RR: 18 (04-09-20 @ 19:00) (18 - 19)  SpO2: 94% (04-09-20 @ 19:00) (92% - 95%)    DIET/FLUIDS: sodium chloride 0.9%. 1000 milliLiter(s) IV Continuous <Continuous>    GI proph:  pantoprazole  Injectable 40 milliGRAM(s) IV Push daily    AC/ proph:   ABx: hydroxychloroquine   Oral   hydroxychloroquine 400 milliGRAM(s) Oral every 24 hours      PHYSICAL EXAM:  GENERAL: Right eye ecchymosis, forehead laceration (small), c-collar  CHEST/LUNG: Clear to auscultation bilaterally  HEART: Regular rate and rhythm  ABDOMEN: Soft, Nontender, Nondistended;   EXTREMITIES:  distal pulses present, no significant traumatic injuries       LABS  Labs:  CAPILLARY BLOOD GLUCOSE                              9.7    66.35 )-----------( 92       ( 09 Apr 2020 16:08 )             31.0       Auto Neutrophil %: 27.7 % (04-09-20 @ 16:08)  Band Neutrophils %: 1.0 % (04-09-20 @ 16:08)    04-09    133<L>  |  97<L>  |  27<H>  ----------------------------<  103<H>  4.5   |  22  |  0.9      Calcium, Total Serum: 7.3 mg/dL (04-09-20 @ 16:08)      LFTs:             5.6  | 0.7  | 53       ------------------[78      ( 09 Apr 2020 16:08 )  3.4  | x    | 26          Lipase:x      Amylase:x         Lactate, Blood: 2.7 mmol/L (04-09-20 @ 16:08)      Coags:     15.10  ----< 1.31    ( 09 Apr 2020 16:13 )     30.2          RADIOLOGY & ADDITIONAL TESTS:  Labs, Radiology, Cardiology, and Other Results: Labs:  CAPILLARY BLOOD GLUCOSE                     9.7    66.35 )-----------( 92       ( 09 Apr 2020 16:08 )             31.0       Auto Neutrophil %: 27.7 % (04-09-20 @ 16:08)  Band Neutrophils %: 1.0 % (04-09-20 @ 16:08)   04-09   133<L>  |  97<L>  |  27<H>  ----------------------------<  103<H>  4.5   |  22  |  0.9   Calcium, Total Serum: 7.3 mg/dL (04-09-20 @ 16:08)   LFTs:             5.6  | 0.7  | 53       ------------------[78      ( 09 Apr 2020 16:08 )  3.4  | x    | 26         Lactate, Blood: 2.7 mmol/L (04-09-20 @ 16:08)   Coags:     15.10  ----< 1.31    ( 09 Apr 2020 16:13 )     30.2       RADIOLOGY:    < from: Xray Chest 1 View- PORTABLE-Urgent (04.09.20 @ 16:46) >  Findings:  Cardiac/mediastinum/hilum: The lung volumes are low. Heart size appears within normal limits.   Lung parenchyma/Pleura: There are patchy, bilateral, reticular and airspace opacities compatible with a viral pneumonia, such as COVID-19, in the appropriate clinical setting..    No pneumothorax. The costophrenic angles are clear.   Skeleton/soft tissues: Deformity left seventh rib compatible with old fracture..   Impression:    There are patchy,  bilateral, reticular and airspace opacities compatible with a viral pneumonia, such as COVID-19, in the appropriate clinical setting.    < from: Xray Pelvis AP only (04.09.20 @ 16:46) >  Findings/impression:   No evidence of acute fracture.   Degenerative changes of the spine and hips. Linear radiodense foci overlying the right lower quadrant, may represent surgical clips. Vascular calcifications.    < from: CT Head No Cont (04.09.20 @ 17:03) >  Findings:  Acute subarachnoid hemorrhage overlying the left frontoparietal and left temporal lobes. No midline shift. The ventricles and cortical sulci are mildly prominent, compatible with central parenchymal volume loss.No hydrocephalus.   Redemonstration of chronic infarcts within the right parietal and left frontal lobes.  Gray-white matter differentiation is maintained.    No displaced calvarial fracture.. The visualized paranasal sinuses and mastoids are well-aerated.   IMPRESSION:   1.  Acute subarachnoid hemorrhage overlying the left frontoparietal and temporal lobes. No hydrocephalus. No midline shift. 2.  Chronic infarcts.	    < from: CT Cervical Spine No Cont (04.09.20 @ 21:47) >  IMPRESSION:    Degenerative changes as described above. No evidence of fracture or facet subluxation.    < end of copied text > GENERAL SURGERY PROGRESS NOTE     TEDDY ADAIR  64y  Male  Hospital day :1d  POD:  Procedure:   OVERNIGHT EVENTS: admitted to trauma, CTH revealed SAH    T(F): 99.9 (04-09-20 @ 17:30), Max: 102 (04-09-20 @ 15:11)  HR: 77 (04-09-20 @ 19:00) (77 - 92)  BP: 99/53 (04-09-20 @ 19:00) (95/50 - 115/56)  RR: 18 (04-09-20 @ 19:00) (18 - 19)  SpO2: 94% (04-09-20 @ 19:00) (92% - 95%)    DIET/FLUIDS: sodium chloride 0.9%. 1000 milliLiter(s) IV Continuous <Continuous>    GI proph:  pantoprazole  Injectable 40 milliGRAM(s) IV Push daily    AC/ proph:   ABx: hydroxychloroquine   Oral   hydroxychloroquine 400 milliGRAM(s) Oral every 24 hours      PHYSICAL EXAM:  GENERAL: Right eye ecchymosis, forehead laceration (small), c-collar  CHEST/LUNG: Clear to auscultation bilaterally  HEART: Regular rate and rhythm  ABDOMEN: Soft, Nontender, Nondistended;   EXTREMITIES:  distal pulses present, no significant traumatic injuries       LABS  Labs:  CAPILLARY BLOOD GLUCOSE                              9.7    66.35 )-----------( 92       ( 09 Apr 2020 16:08 )             31.0       Auto Neutrophil %: 27.7 % (04-09-20 @ 16:08)  Band Neutrophils %: 1.0 % (04-09-20 @ 16:08)    04-09    133<L>  |  97<L>  |  27<H>  ----------------------------<  103<H>  4.5   |  22  |  0.9      Calcium, Total Serum: 7.3 mg/dL (04-09-20 @ 16:08)      LFTs:             5.6  | 0.7  | 53       ------------------[78      ( 09 Apr 2020 16:08 )  3.4  | x    | 26          Lipase:x      Amylase:x         Lactate, Blood: 2.7 mmol/L (04-09-20 @ 16:08)      Coags:     15.10  ----< 1.31    ( 09 Apr 2020 16:13 )     30.2          RADIOLOGY & ADDITIONAL TESTS:  Labs, Radiology, Cardiology, and Other Results: Labs:  CAPILLARY BLOOD GLUCOSE                     9.7    66.35 )-----------( 92       ( 09 Apr 2020 16:08 )             31.0       Auto Neutrophil %: 27.7 % (04-09-20 @ 16:08)  Band Neutrophils %: 1.0 % (04-09-20 @ 16:08)   04-09   133<L>  |  97<L>  |  27<H>  ----------------------------<  103<H>  4.5   |  22  |  0.9   Calcium, Total Serum: 7.3 mg/dL (04-09-20 @ 16:08)   LFTs:             5.6  | 0.7  | 53       ------------------[78      ( 09 Apr 2020 16:08 )  3.4  | x    | 26         Lactate, Blood: 2.7 mmol/L (04-09-20 @ 16:08)   Coags:     15.10  ----< 1.31    ( 09 Apr 2020 16:13 )     30.2       RADIOLOGY:    < from: Xray Chest 1 View- PORTABLE-Urgent (04.09.20 @ 16:46) >  Findings:  Cardiac/mediastinum/hilum: The lung volumes are low. Heart size appears within normal limits.   Lung parenchyma/Pleura: There are patchy, bilateral, reticular and airspace opacities compatible with a viral pneumonia, such as COVID-19, in the appropriate clinical setting..    No pneumothorax. The costophrenic angles are clear.   Skeleton/soft tissues: Deformity left seventh rib compatible with old fracture..   Impression:    There are patchy,  bilateral, reticular and airspace opacities compatible with a viral pneumonia, such as COVID-19, in the appropriate clinical setting.    < from: Xray Pelvis AP only (04.09.20 @ 16:46) >  Findings/impression:   No evidence of acute fracture.   Degenerative changes of the spine and hips. Linear radiodense foci overlying the right lower quadrant, may represent surgical clips. Vascular calcifications.    < from: CT Head No Cont (04.09.20 @ 17:03) >  Findings:  Acute subarachnoid hemorrhage overlying the left frontoparietal and left temporal lobes. No midline shift. The ventricles and cortical sulci are mildly prominent, compatible with central parenchymal volume loss.No hydrocephalus.   Redemonstration of chronic infarcts within the right parietal and left frontal lobes.  Gray-white matter differentiation is maintained.    No displaced calvarial fracture.. The visualized paranasal sinuses and mastoids are well-aerated.   IMPRESSION:   1.  Acute subarachnoid hemorrhage overlying the left frontoparietal and temporal lobes. No hydrocephalus. No midline shift. 2.  Chronic infarcts.	    < from: CT Cervical Spine No Cont (04.09.20 @ 21:47) >  IMPRESSION:    Degenerative changes as described above. No evidence of fracture or facet subluxation.    < end of copied text >

## 2020-04-10 NOTE — PROGRESS NOTE ADULT - ASSESSMENT
64M with PMH of CLL, DM, parathyroid cancer, and schizophrenia presenting to ED from [facility] s/p unwitnessed fall last night w/ +HT, ?LOC, +ASA and plavix. CTH shows frontoparietal/temporal SAH. Cspine negative.     Plan:   - NSx: hold asa and plavix, q4 neuro checks, keppra, repeat CTH if change in neurostatus   - f/u COVID   - hydroxychlorquine  - pain control  - PT/rehab  -

## 2020-04-10 NOTE — CONSULT NOTE ADULT - SUBJECTIVE AND OBJECTIVE BOX
TEDDY ADAIR  64y, Male  Allergy: No Known Allergies      CHIEF COMPLAINT: s/p fall (10 Apr 2020 00:10)      LOS  1d    HPI:  64M with PMH of CLL, DM, parathyroid cancer, and schizophrenia presenting to ED from [facility] s/p unwitnessed fall last night w/ +HT, ?LOC, +ASA and plavix. Of note, pt is a very poor historian and denies falling. In the ED, pt was febrile to 102 with BP 90/60. Endorses 6 days of non-productive cough. Denies nausea, vomiting, diarrhea, any pain, HA, blurry vision, or SOB. (09 Apr 2020 18:05)      INFECTIOUS DISEASE HISTORY:  SARS-CoV-2 POSITIVE  CTH SAH    PAST MEDICAL & SURGICAL HISTORY:  CLL (chronic lymphocytic leukemia)  Parathyroid cancer  DM (diabetes mellitus)  Schizophrenia  No significant past surgical history      FAMILY HISTORY  No pertinent family history in first degree relatives      SOCIAL HISTORY  Social History:  Social History:    Marital Status:  (   )    (  x ) Single    (   )    (  )   Occupation:   Lives with: (  ) alone  (  ) children   (  ) spouse   (  ) parents  ( x ) other    Substance Use (street drugs): ( x ) never used  (  ) other:  Tobacco Usage:  (  x ) never smoked   (   ) former smoker   (   ) current smoker  (     ) pack year  (        ) last cigarette date  Alcohol Usage: none  Sexual History: not sexually active (19 Mar 2018 23:15)        ROS  unable to obtain history secondary to patient's mental status and/or sedation     VITALS:  T(F): 97.5, Max: 102 (04-09-20 @ 15:11)  HR: 74  BP: 129/58  RR: 20Vital Signs Last 24 Hrs  T(C): 36.4 (10 Apr 2020 08:22), Max: 38.9 (09 Apr 2020 15:11)  T(F): 97.5 (10 Apr 2020 08:22), Max: 102 (09 Apr 2020 15:11)  HR: 74 (10 Apr 2020 08:22) (70 - 92)  BP: 129/58 (10 Apr 2020 08:22) (91/52 - 129/58)  BP(mean): --  RR: 20 (10 Apr 2020 08:22) (18 - 20)  SpO2: 97% (10 Apr 2020 08:22) (92% - 100%)    PHYSICAL EXAM:  Gen: on RA  HEENT: NCAT  Resp: b/l chest expansion  Neuro: nonfocal     TESTS & MEASUREMENTS:                        10.0   35.19 )-----------( 70       ( 10 Apr 2020 00:42 )             30.9     04-10    138  |  104  |  20  ----------------------------<  103<H>  4.1   |  20  |  0.7    Ca    7.2<L>      10 Apr 2020 00:42  Phos  3.8     04-10  Mg     2.1     04-10    TPro  5.6<L>  /  Alb  3.4<L>  /  TBili  0.7  /  DBili  x   /  AST  53<H>  /  ALT  26  /  AlkPhos  78  04-09    eGFR if Non African American: 101 mL/min/1.73M2 (04-10-20 @ 00:42)  eGFR if : 117 mL/min/1.73M2 (04-10-20 @ 00:42)  eGFR if Non African American: 91 mL/min/1.73M2 (04-09-20 @ 16:08)  eGFR if : 106 mL/min/1.73M2 (04-09-20 @ 16:08)    LIVER FUNCTIONS - ( 09 Apr 2020 16:08 )  Alb: 3.4 g/dL / Pro: 5.6 g/dL / ALK PHOS: 78 U/L / ALT: 26 U/L / AST: 53 U/L / GGT: x                 Lactate, Blood: 1.3 mmol/L (04-10-20 @ 00:42)  Lactate, Blood: 2.7 mmol/L (04-09-20 @ 16:08)      INFECTIOUS DISEASES TESTING  COVID-19 PCR: Detected (04-09-20 @ 16:20)      RADIOLOGY & ADDITIONAL TESTS:  I have personally reviewed the last Chest xray  CXR  Xray Chest 1 View- PORTABLE-Urgent:   EXAM:  XR CHEST PORTABLE URGENT 1V            PROCEDURE DATE:  04/09/2020            INTERPRETATION:  Clinical History / Reason for exam: Cough    Comparison : Chest radiograph: Portable AP chest dated 3/19/2018    Technique/Positioning:  Portable AP chest    Findings:    Support devices: None.    Cardiac/mediastinum/hilum: The lung volumes are low. Heart size appears within normal limits.      Lung parenchyma/Pleura: There are patchy,  bilateral, reticular and airspace opacities compatible with a viral pneumonia, such as COVID-19, in the appropriate clinical setting..     No pneumothorax. The costophrenic angles are clear.    Skeleton/soft tissues: Deformity left seventh rib compatible with old fracture..    Impression:      There are patchy,  bilateral, reticular and airspace opacities compatible with a viral pneumonia, such as COVID-19, in the appropriate clinical setting.                  REN GALICIA M.D., ATTENDING RADIOLOGIST  This document has been electronically signed. Apr 9 2020  4:55PM             (04-09-20 @ 16:46)      CT      CARDIOLOGY TESTING  12 Lead ECG:   Ventricular Rate 89 BPM    Atrial Rate 89 BPM    P-R Interval 144 ms    QRS Duration 80 ms    Q-T Interval 352 ms    QTC Calculation(Bezet) 428 ms    P Axis 69 degrees    R Axis 90 degrees    T Axis 3 degrees    Diagnosis Line Normal sinus rhythm  Possible Inferior infarct , age undetermined  Anterolateral infarct , age undetermined  Abnormal ECG    Confirmed by KELSEY KIRBY MD ([id number]) on 4/10/2020 6:03:17 AM (04-09-20 @ 16:45)      MEDICATIONS  budesonide 160 MICROgram(s)/formoterol 4.5 MICROgram(s) Inhaler 2  chlorhexidine 4% Liquid 1  dextrose 5%. 1000  dextrose 50% Injectable 12.5  dextrose 50% Injectable 25  dextrose 50% Injectable 25  hydroxychloroquine   hydroxychloroquine 400  insulin lispro (HumaLOG) corrective regimen sliding scale   levETIRAcetam 500  levothyroxine 137  metoprolol succinate ER 25  pantoprazole  Injectable 40  senna 2  sodium chloride 0.9%. 1000      Weight      ANTIBIOTICS:  hydroxychloroquine   Oral   hydroxychloroquine 400 milliGRAM(s) Oral every 24 hours      ALLERGIES:  No Known Allergies

## 2020-04-10 NOTE — CONSULT NOTE ADULT - ASSESSMENT
ASSESSMENT  64M with PMH of CLL, DM, parathyroid cancer, and schizophrenia presenting to ED from [facility] s/p unwitnessed fall last night w/ +HT, ?LOC, +ASA and plavix. Of note, pt is a very poor historian and denies falling.    IMPRESSION  #Severe sepsis on admission secondary to COVID-19 PNA (T>101 P>90), WBC 66 (CLL)    CXR hazy bilateral opacities     no hypoxemia   #Lactic acidosis Lactate, Blood: 2.7 mmol/L (04-09-20 @ 16:08)  #SAH on CTH  #Transaminitis  AST  53<H>   #QTC Calculation(Bezet) 428 ms  #Thrombocytopenia    RECOMMENDATIONS  - Plaquenil 800mg PO x1, then 400mg daily PO x 4 days (we are now able to discharge patients on plaquenil)  - Supportive care  - Prone position if possible   - Trauma following    Spectra [id number]

## 2020-04-11 ENCOUNTER — TRANSCRIPTION ENCOUNTER (OUTPATIENT)
Age: 65
End: 2020-04-11

## 2020-04-11 LAB
ANION GAP SERPL CALC-SCNC: 9 MMOL/L — SIGNIFICANT CHANGE UP (ref 7–14)
BASOPHILS # BLD AUTO: 0.05 K/UL — SIGNIFICANT CHANGE UP (ref 0–0.2)
BASOPHILS NFR BLD AUTO: 0.1 % — SIGNIFICANT CHANGE UP (ref 0–1)
BUN SERPL-MCNC: 15 MG/DL — SIGNIFICANT CHANGE UP (ref 10–20)
CALCIUM SERPL-MCNC: 6.5 MG/DL — LOW (ref 8.5–10.1)
CHLORIDE SERPL-SCNC: 108 MMOL/L — SIGNIFICANT CHANGE UP (ref 98–110)
CO2 SERPL-SCNC: 21 MMOL/L — SIGNIFICANT CHANGE UP (ref 17–32)
CREAT SERPL-MCNC: 0.6 MG/DL — LOW (ref 0.7–1.5)
CULTURE RESULTS: SIGNIFICANT CHANGE UP
EOSINOPHIL # BLD AUTO: 0 K/UL — SIGNIFICANT CHANGE UP (ref 0–0.7)
EOSINOPHIL NFR BLD AUTO: 0 % — SIGNIFICANT CHANGE UP (ref 0–8)
ESTIMATED AVERAGE GLUCOSE: 128 MG/DL — HIGH (ref 68–114)
GLUCOSE BLDC GLUCOMTR-MCNC: 108 MG/DL — HIGH (ref 70–99)
GLUCOSE BLDC GLUCOMTR-MCNC: 127 MG/DL — HIGH (ref 70–99)
GLUCOSE BLDC GLUCOMTR-MCNC: 85 MG/DL — SIGNIFICANT CHANGE UP (ref 70–99)
GLUCOSE BLDC GLUCOMTR-MCNC: 91 MG/DL — SIGNIFICANT CHANGE UP (ref 70–99)
GLUCOSE SERPL-MCNC: 107 MG/DL — HIGH (ref 70–99)
HBA1C BLD-MCNC: 6.1 % — HIGH (ref 4–5.6)
HCT VFR BLD CALC: 27.5 % — LOW (ref 42–52)
HGB BLD-MCNC: 8.7 G/DL — LOW (ref 14–18)
IMM GRANULOCYTES NFR BLD AUTO: 0.1 % — SIGNIFICANT CHANGE UP (ref 0.1–0.3)
LYMPHOCYTES # BLD AUTO: 30.92 K/UL — HIGH (ref 1.2–3.4)
LYMPHOCYTES # BLD AUTO: 87.5 % — HIGH (ref 20.5–51.1)
MAGNESIUM SERPL-MCNC: 2 MG/DL — SIGNIFICANT CHANGE UP (ref 1.8–2.4)
MCHC RBC-ENTMCNC: 31 PG — SIGNIFICANT CHANGE UP (ref 27–31)
MCHC RBC-ENTMCNC: 31.6 G/DL — LOW (ref 32–37)
MCV RBC AUTO: 97.9 FL — HIGH (ref 80–94)
MONOCYTES # BLD AUTO: 1.47 K/UL — HIGH (ref 0.1–0.6)
MONOCYTES NFR BLD AUTO: 4.2 % — SIGNIFICANT CHANGE UP (ref 1.7–9.3)
NEUTROPHILS # BLD AUTO: 2.86 K/UL — SIGNIFICANT CHANGE UP (ref 1.4–6.5)
NEUTROPHILS NFR BLD AUTO: 8.1 % — LOW (ref 42.2–75.2)
NRBC # BLD: 0 /100 WBCS — SIGNIFICANT CHANGE UP (ref 0–0)
PHOSPHATE SERPL-MCNC: 2.5 MG/DL — SIGNIFICANT CHANGE UP (ref 2.1–4.9)
PLATELET # BLD AUTO: 96 K/UL — LOW (ref 130–400)
POTASSIUM SERPL-MCNC: 3.8 MMOL/L — SIGNIFICANT CHANGE UP (ref 3.5–5)
POTASSIUM SERPL-SCNC: 3.8 MMOL/L — SIGNIFICANT CHANGE UP (ref 3.5–5)
RBC # BLD: 2.81 M/UL — LOW (ref 4.7–6.1)
RBC # FLD: 14.8 % — HIGH (ref 11.5–14.5)
SODIUM SERPL-SCNC: 138 MMOL/L — SIGNIFICANT CHANGE UP (ref 135–146)
SPECIMEN SOURCE: SIGNIFICANT CHANGE UP
WBC # BLD: 35.35 K/UL — HIGH (ref 4.8–10.8)
WBC # FLD AUTO: 35.35 K/UL — HIGH (ref 4.8–10.8)

## 2020-04-11 PROCEDURE — 99232 SBSQ HOSP IP/OBS MODERATE 35: CPT | Mod: GC

## 2020-04-11 RX ORDER — CLOPIDOGREL BISULFATE 75 MG/1
1 TABLET, FILM COATED ORAL
Qty: 30 | Refills: 0
Start: 2020-04-11 | End: 2020-05-10

## 2020-04-11 RX ORDER — HYDROXYCHLOROQUINE SULFATE 200 MG
2 TABLET ORAL
Qty: 4 | Refills: 0
Start: 2020-04-11 | End: 2020-04-12

## 2020-04-11 RX ORDER — HYDROXYCHLOROQUINE SULFATE 200 MG
2 TABLET ORAL
Qty: 6 | Refills: 0
Start: 2020-04-11 | End: 2020-04-13

## 2020-04-11 RX ORDER — LEVETIRACETAM 250 MG/1
1 TABLET, FILM COATED ORAL
Qty: 10 | Refills: 0
Start: 2020-04-11

## 2020-04-11 RX ORDER — ASPIRIN/CALCIUM CARB/MAGNESIUM 324 MG
1 TABLET ORAL
Qty: 30 | Refills: 0
Start: 2020-04-11 | End: 2020-05-10

## 2020-04-11 RX ORDER — OXYCODONE HYDROCHLORIDE 5 MG/1
5 TABLET ORAL EVERY 6 HOURS
Refills: 0 | Status: DISCONTINUED | OUTPATIENT
Start: 2020-04-11 | End: 2020-04-12

## 2020-04-11 RX ORDER — SODIUM,POTASSIUM PHOSPHATES 278-250MG
1 POWDER IN PACKET (EA) ORAL ONCE
Refills: 0 | Status: DISCONTINUED | OUTPATIENT
Start: 2020-04-11 | End: 2020-04-12

## 2020-04-11 RX ADMIN — Medication 400 MILLIGRAM(S): at 22:51

## 2020-04-11 RX ADMIN — BUDESONIDE AND FORMOTEROL FUMARATE DIHYDRATE 2 PUFF(S): 160; 4.5 AEROSOL RESPIRATORY (INHALATION) at 08:10

## 2020-04-11 RX ADMIN — GABAPENTIN 100 MILLIGRAM(S): 400 CAPSULE ORAL at 21:08

## 2020-04-11 RX ADMIN — SENNA PLUS 2 TABLET(S): 8.6 TABLET ORAL at 21:08

## 2020-04-11 RX ADMIN — PANTOPRAZOLE SODIUM 40 MILLIGRAM(S): 20 TABLET, DELAYED RELEASE ORAL at 11:23

## 2020-04-11 RX ADMIN — Medication 650 MILLIGRAM(S): at 08:03

## 2020-04-11 RX ADMIN — Medication 5 MILLIGRAM(S): at 21:08

## 2020-04-11 RX ADMIN — BUDESONIDE AND FORMOTEROL FUMARATE DIHYDRATE 2 PUFF(S): 160; 4.5 AEROSOL RESPIRATORY (INHALATION) at 21:07

## 2020-04-11 RX ADMIN — LEVETIRACETAM 500 MILLIGRAM(S): 250 TABLET, FILM COATED ORAL at 08:04

## 2020-04-11 RX ADMIN — Medication 650 MILLIGRAM(S): at 23:52

## 2020-04-11 RX ADMIN — LEVETIRACETAM 500 MILLIGRAM(S): 250 TABLET, FILM COATED ORAL at 16:57

## 2020-04-11 RX ADMIN — GABAPENTIN 100 MILLIGRAM(S): 400 CAPSULE ORAL at 08:03

## 2020-04-11 RX ADMIN — SODIUM CHLORIDE 100 MILLILITER(S): 9 INJECTION INTRAMUSCULAR; INTRAVENOUS; SUBCUTANEOUS at 21:08

## 2020-04-11 RX ADMIN — GABAPENTIN 100 MILLIGRAM(S): 400 CAPSULE ORAL at 11:23

## 2020-04-11 RX ADMIN — Medication 650 MILLIGRAM(S): at 16:57

## 2020-04-11 RX ADMIN — Medication 650 MILLIGRAM(S): at 11:23

## 2020-04-11 RX ADMIN — Medication 137 MICROGRAM(S): at 08:04

## 2020-04-11 RX ADMIN — Medication 25 MILLIGRAM(S): at 08:04

## 2020-04-11 NOTE — DISCHARGE NOTE PROVIDER - NSDCCPCAREPLAN_GEN_ALL_CORE_FT
PRINCIPAL DISCHARGE DIAGNOSIS  Diagnosis: Subarachnoid bleed  Assessment and Plan of Treatment:       SECONDARY DISCHARGE DIAGNOSES  Diagnosis: COVID-19 virus infection  Assessment and Plan of Treatment:     Diagnosis: Cough  Assessment and Plan of Treatment:     Diagnosis: Fever  Assessment and Plan of Treatment:     Diagnosis: Subarachnoid bleed  Assessment and Plan of Treatment:

## 2020-04-11 NOTE — DISCHARGE NOTE PROVIDER - CARE PROVIDER_API CALL
Todd Bejarano)  Neurological Surgery  [address] [place] [address], [address]  [place] [address]  Phone: [phone]  Fax: [phone]  Follow Up Time: 1 week

## 2020-04-11 NOTE — DISCHARGE NOTE PROVIDER - NSFOLLOWUPCLINICS_GEN_ALL_ED_FT
[facility]  Trauma Surgery  [address] [place] [address]  Phone: [phone]  Fax:   Follow Up Time: 2 weeks

## 2020-04-11 NOTE — CONSULT NOTE ADULT - ASSESSMENT
IMPRESSION: Rehab of  traumatic SAH, ataxia, COVID+, viral pneumonia    PRECAUTIONS: [  ] Cardiac  [  ] Respiratory  [  ] Seizures [ x ] Contact Isolation  [ x ] Droplet Isolation  [  ] Other    Weight Bearing Status:     RECOMMENDATION:    Out of Bed to Chair     DVT/Decubiti Prophylaxis    REHAB PLAN:     [ x  ] Bedside P/T 3-5 times a week   [   ]   Bedside O/T  2-3 times a week             [   ] No Rehab Therapy Indicated                   [   ]  Speech Therapy   Conditioning/ROM                                    ADL  Bed Mobility                                               Conditioning/ROM  Transfers                                                     Bed Mobility  Sitting /Standing Balance                         Transfers                                        Gait Training                                               Sitting/Standing Balance  Stair Training [   ]Applicable                    Home equipment Eval                                                                        Splinting  [   ] Only      GOALS:   ADL   [x   ]   Independent                    Transfers  [ x  ] Independent                          Ambulation  [ x  ] Independent     [ x   ] With device                            [   ]  CG                                                         [   ]  CG                                                                  [   ] CG                            [    ] Min A                                                   [   ] Min A                                                              [   ] Min  A          DISCHARGE PLAN:   [   ]  Good candidate for Intensive Rehabilitation/Hospital based-4A [facility]                                             Will tolerate 3hrs Intensive Rehab Daily                                       [ x   ]  Short Term Rehab in Skilled Nursing Facility                                       [    ]  Home with Outpatient or  services                                         [    ]  Possible Candidate for Intensive Hospital based Rehab

## 2020-04-11 NOTE — PROGRESS NOTE ADULT - ASSESSMENT
64M with PMH of CLL, DM, parathyroid cancer, and schizophrenia presenting to ED from [facility] s/p unwitnessed fall last night w/ +HT, ?LOC, +ASA and plavix. CTH shows frontoparietal/temporal SAH. Cspine negative.   COVID-19 +    Plan:   - NSx: hold asa and plavix, q4 neuro checks, keppra, repeat CTH if change in neurostatus   - COVID-19 +  - ID consult- Plaquenil 800mg PO x1, then 400mg daily PO x 4 days (we are now able to discharge patients on plaquenil)  - F/u AM EKG for QTC  - pain control  - PT/rehab ASSESSMENT  64M with PMH of CLL, DM, parathyroid cancer, and schizophrenia presenting to ED from [facility] s/p unwitnessed fall last night w/ +HT, ?LOC, +ASA and plavix. CTH shows frontoparietal/temporal SAH. Cspine negative. COVID-19 +    Plan:   - NSx: hold asa and plavix,, q4 neuro checks, keppra x1 week, repeat CTH if change in neurostatus  - Will f/u NSX today for when to restart ASA/Plavix, DVT ppx   - ID consult- COVID 19+, Plaquenil 400mg daily PO x 4 days; f/u ID when cleared for DC back to [facility]  - F/u AM EKG for QTC - 394  - pain control  - PT/rehab consults pending, dispo accordingly

## 2020-04-11 NOTE — CONSULT NOTE ADULT - SUBJECTIVE AND OBJECTIVE BOX
HPI:  64M with PMH of CLL, DM, parathyroid cancer, and schizophrenia presenting to ED from [facility] s/p unwitnessed fall last night w/ +HT, ?LOC, +ASA and plavix. Of note, pt is a very poor historian and denies falling. In the ED, pt was febrile to 102 with BP 90/60. Endorses 6 days of non-productive cough. Denies nausea, vomiting, diarrhea, any pain, HA, blurry vision, or SOB. (2020 18:05)      PAST MEDICAL & SURGICAL HISTORY:  CLL (chronic lymphocytic leukemia)  Parathyroid cancer  DM (diabetes mellitus)  Schizophrenia  No significant past surgical history      Hospital Course:  He has suffered a traumatic SAH. He has viral pneumonia, COVID+. On Plaquenil. Normally resided at [facility].   TODAY'S SUBJECTIVE & REVIEW OF SYMPTOMS:     Constitutional WNL   Cardio WNL   Resp WNL   GI WNL  Heme WNL  Endo WNL  Skin WNL  MSK WNL  Neuro WNL  Cognitive WNL  Psych schizophrenia      MEDICATIONS  (STANDING):  acetaminophen   Tablet .. 650 milliGRAM(s) Oral every 6 hours  bisacodyl 5 milliGRAM(s) Oral at bedtime  budesonide 160 MICROgram(s)/formoterol 4.5 MICROgram(s) Inhaler 2 Puff(s) Inhalation two times a day  chlorhexidine 4% Liquid 1 Application(s) Topical <User Schedule>  dextrose 5%. 1000 milliLiter(s) (50 mL/Hr) IV Continuous <Continuous>  dextrose 50% Injectable 12.5 Gram(s) IV Push once  dextrose 50% Injectable 25 Gram(s) IV Push once  dextrose 50% Injectable 25 Gram(s) IV Push once  gabapentin 100 milliGRAM(s) Oral three times a day  hydroxychloroquine   Oral   hydroxychloroquine 400 milliGRAM(s) Oral every 24 hours  insulin lispro (HumaLOG) corrective regimen sliding scale   SubCutaneous three times a day before meals  levETIRAcetam 500 milliGRAM(s) Oral two times a day  levothyroxine 137 MICROGram(s) Oral daily  metoprolol succinate ER 25 milliGRAM(s) Oral daily  pantoprazole  Injectable 40 milliGRAM(s) IV Push daily  potassium phosphate / sodium phosphate powder 1 Packet(s) Oral once  senna 2 Tablet(s) Oral at bedtime  sodium chloride 0.9%. 1000 milliLiter(s) (100 mL/Hr) IV Continuous <Continuous>    MEDICATIONS  (PRN):  dextrose 40% Gel 15 Gram(s) Oral once PRN Blood Glucose LESS THAN 70 milliGRAM(s)/deciliter  glucagon  Injectable 1 milliGRAM(s) IntraMuscular once PRN Glucose LESS THAN 70 milligrams/deciliter  guaifenesin/dextromethorphan  Syrup 10 milliLiter(s) Oral every 4 hours PRN Cough  oxyCODONE    IR 5 milliGRAM(s) Oral every 6 hours PRN Severe Pain (7 - 10)      FAMILY HISTORY:      Allergies    No Known Allergies    Intolerances        SOCIAL HISTORY:    [  ] Etoh  [  ] Smoking  [  ] Substance abuse     Home Environment:  [  ] Home Alone  [  ] Lives with Family  [  ] Home Health Aid    Dwelling:  [  ] Apartment  [  ] Private House  [x  ] [facility]  [  ] Skilled Nursing Facility      [  ] Short Term  [  ] Long Term  [  ] [facility]       Elevator [  ]    FUNCTIONAL STATUS PTA: (Check all that apply)  Ambulation: [   ]Independent    [  ] Dependent     [  ] Non-Ambulatory  Assistive Device: [  ] SA Cane  [  ]  Q Cane  [  ] Walker  [  ]  Wheelchair  ADL : [  ] Independent  [  ]  Dependent       Vital Signs Last 24 Hrs  T(C): 36.6 (2020 12:30), Max: 38.9 (10 Apr 2020 16:26)  T(F): 97.8 (2020 12:30), Max: 102 (10 Apr 2020 16:26)  HR: 70 (2020 12:30) (65 - 85)  BP: 112/58 (2020 12:30) (91/54 - 118/59)  BP(mean): --  RR: 20 (2020 12:30) (20 - 21)  SpO2: 98% (2020 12:30) (90% - 99%)    PHYSICAL EXAM:  GENERAL: Right eye ecchymosis, forehead laceration (small), c-collar  CHEST/LUNG: Clear to auscultation bilaterally  HEART: Regular rate and rhythm  ABDOMEN: Soft, Nontender, Nondistended;   EXTREMITIES:  distal pulses present, no significant traumatic injuries       FUNCTIONAL STATUS:  Bed Mobility: Independent [  ]  Supervision [  ]  Needs Assistance [  ]  N/A [  ]  Transfers: Independent [  ]  Supervision [  ]  Needs Assistance [  ]  N/A [  ]   Ambulation: Independent [  ]  Supervision [  ]  Needs Assistance [  ]  N/A [  ]  ADL: Independent [  ] Requires Assistance [  ] N/A [  ]      LABS:                        8.7    35.35 )-----------( 96       ( 10 Apr 2020 23:00 )             27.5     04-10    138  |  108  |  15  ----------------------------<  107<H>  3.8   |  21  |  0.6<L>    Ca    6.5<L>      10 Apr 2020 23:00  Phos  2.5     04-10  Mg     2.0     04-10    TPro  5.6<L>  /  Alb  3.4<L>  /  TBili  0.7  /  DBili  x   /  AST  53<H>  /  ALT  26  /  AlkPhos  78  04-09    PT/INR - ( 2020 16:13 )   PT: 15.10 sec;   INR: 1.31 ratio         PTT - ( 2020 16:13 )  PTT:30.2 sec  Urinalysis Basic - ( 10 Apr 2020 12:45 )    Color: Yellow / Appearance: Clear / S.016 / pH: x  Gluc: x / Ketone: Negative  / Bili: Negative / Urobili: <2 mg/dL   Blood: x / Protein: Trace / Nitrite: Negative   Leuk Esterase: Negative / RBC: x / WBC x   Sq Epi: x / Non Sq Epi: x / Bacteria: x        RADIOLOGY & ADDITIONAL STUDIES:    Assesment:

## 2020-04-11 NOTE — PROGRESS NOTE ADULT - SUBJECTIVE AND OBJECTIVE BOX
GENERAL SURGERY PROGRESS NOTE     TEDDY ADAIR  [address] [place] [address] day :2d  POD:  Procedure:   Surgical Attending: Tavares Ann  Overnight events: Febrile overnight Tmax 102F.    T(F): 100.7 (20 @ 01:00), Max: 102 (04-10-20 @ 16:26)  HR: 75 (20 @ 01:00) (74 - 87)  BP: 94/50 (20 @ 01:00) (94/50 - 129/58)  ABP: --  ABP(mean): --  RR: 20 (20 @ 01:00) (20 - 21)  SpO2: 91% (20 @ 01:00) (81% - 97%)      DIET/FLUIDS: dextrose 5%. 1000 milliLiter(s) IV Continuous <Continuous>  sodium chloride 0.9%. 1000 milliLiter(s) IV Continuous <Continuous>    GI proph:  pantoprazole  Injectable 40 milliGRAM(s) IV Push daily    AC/ proph:   ABx: hydroxychloroquine   Oral   hydroxychloroquine 400 milliGRAM(s) Oral every 24 hours      PHYSICAL EXAM:  GENERAL: NAD, well-appearing  CHEST/LUNG: Clear to auscultation bilaterally  HEART: Regular rate and rhythm  ABDOMEN: Soft, Nontender, Nondistended;   EXTREMITIES:  No clubbing, cyanosis, or edema      LABS  Labs:  CAPILLARY BLOOD GLUCOSE      POCT Blood Glucose.: 115 mg/dL (10 Apr 2020 20:57)  POCT Blood Glucose.: 102 mg/dL (10 Apr 2020 16:20)  POCT Blood Glucose.: 102 mg/dL (10 Apr 2020 11:36)  POCT Blood Glucose.: 92 mg/dL (10 Apr 2020 09:14)  POCT Blood Glucose.: 54 mg/dL (10 Apr 2020 08:04)                          8.7    35.35 )-----------( 96       ( 10 Apr 2020 23:00 )             27.5       Auto Neutrophil %: 8.1 % (04-10-20 @ 23:00)  Auto Immature Granulocyte %: 0.1 % (04-10-20 @ 23:00)    04-10    138  |  108  |  15  ----------------------------<  107<H>  3.8   |  21  |  0.6<L>      Calcium, Total Serum: 6.5 mg/dL (04-10-20 @ 23:00)      LFTs:             5.6  | 0.7  | 53       ------------------[78      ( 2020 16:08 )  3.4  | x    | 26          Lipase:x      Amylase:x         Lactate, Blood: 1.3 mmol/L (04-10-20 @ 00:42)  Lactate, Blood: 2.7 mmol/L (20 @ 16:08)      Coags:     15.10  ----< 1.31    ( 2020 16:13 )     30.2                Urinalysis Basic - ( 10 Apr 2020 12:45 )    Color: Yellow / Appearance: Clear / S.016 / pH: x  Gluc: x / Ketone: Negative  / Bili: Negative / Urobili: <2 mg/dL   Blood: x / Protein: Trace / Nitrite: Negative   Leuk Esterase: Negative / RBC: x / WBC x   Sq Epi: x / Non Sq Epi: x / Bacteria: x        Culture - Blood (collected 2020 16:08)  Source: .Blood Blood  Preliminary Report (2020 01:01):    No growth to date.    Culture - Blood (collected 2020 16:08)  Source: .Blood Blood  Preliminary Report (2020 01:01):    No growth to date.          RADIOLOGY & ADDITIONAL TESTS:      A/P:  TEDDY ADAIR is a 64yMale HD/POD___ from . He is currently    Plan: GENERAL SURGERY PROGRESS NOTE     TEDDY ADAIR  [address] [place] [address] day :2d  POD:  Procedure:   Surgical Attending: Tavares Ann  Overnight events: Febrile overnight Tmax 102F.    T(F): 100.7 (20 @ 01:00), Max: 102 (04-10-20 @ 16:26)  HR: 75 (20 @ 01:00) (74 - 87)  BP: 94/50 (20 @ 01:00) (94/50 - 129/58)  ABP: --  ABP(mean): --  RR: 20 (20 @ 01:00) (20 - 21)  SpO2: 91% (20 @ 01:00) (81% - 97%)      DIET/FLUIDS: dextrose 5%. 1000 milliLiter(s) IV Continuous <Continuous>  sodium chloride 0.9%. 1000 milliLiter(s) IV Continuous <Continuous>    GI proph:  pantoprazole  Injectable 40 milliGRAM(s) IV Push daily    AC/ proph:   ABx: hydroxychloroquine   Oral   hydroxychloroquine 400 milliGRAM(s) Oral every 24 hours      PHYSICAL EXAM:  GENERAL: Right eye ecchymosis, forehead laceration (small), c-collar  CHEST/LUNG: Clear to auscultation bilaterally  HEART: Regular rate and rhythm  ABDOMEN: Soft, Nontender, Nondistended;   EXTREMITIES:  distal pulses present, no significant traumatic injuries     LABS  Labs:  CAPILLARY BLOOD GLUCOSE    POCT Blood Glucose.: 115 mg/dL (10 Apr 2020 20:57)  POCT Blood Glucose.: 102 mg/dL (10 Apr 2020 16:20)  POCT Blood Glucose.: 102 mg/dL (10 Apr 2020 11:36)  POCT Blood Glucose.: 92 mg/dL (10 Apr 2020 09:14)  POCT Blood Glucose.: 54 mg/dL (10 Apr 2020 08:04)                          8.7    35.35 )-----------( 96       ( 10 Apr 2020 23:00 )             27.5       Auto Neutrophil %: 8.1 % (04-10-20 @ 23:00)  Auto Immature Granulocyte %: 0.1 % (04-10-20 @ 23:00)    0410    138  |  108  |  15  ----------------------------<  107<H>  3.8   |  21  |  0.6<L>      Calcium, Total Serum: 6.5 mg/dL (04-10-20 @ 23:00)      LFTs:             5.6  | 0.7  | 53       ------------------[78      ( 2020 16:08 )  3.4  | x    | 26          Lipase:x      Amylase:x         Lactate, Blood: 1.3 mmol/L (04-10-20 @ 00:42)  Lactate, Blood: 2.7 mmol/L (20 @ 16:08)      Coags:     15.10  ----< 1.31    ( 2020 16:13 )     30.2        Urinalysis Basic - ( 10 Apr 2020 12:45 )    Color: Yellow / Appearance: Clear / S.016 / pH: x  Gluc: x / Ketone: Negative  / Bili: Negative / Urobili: <2 mg/dL   Blood: x / Protein: Trace / Nitrite: Negative   Leuk Esterase: Negative / RBC: x / WBC x   Sq Epi: x / Non Sq Epi: x / Bacteria: x      Culture - Blood (collected 2020 16:08)  Source: .Blood Blood  Preliminary Report (2020 01:01):    No growth to date.    Culture - Blood (collected 2020 16:08)  Source: .Blood Blood  Preliminary Report (2020 01:01):    No growth to date.      RADIOLOGY & ADDITIONAL TESTS:  < from: Xray Chest 1 View AP/PA (04.10.20 @ 11:41) >    Stable bilateral opacities.    < end of copied text > GENERAL SURGERY PROGRESS NOTE     TEDDY ADAIR  [address] [place] [address] day :2d  POD:  Procedure/Dx: L frontoparietal/temporal SAH s/p unwitnessed fall at [facility]  Overnight events: Febrile overnight Tmax 102F.    T(F): 100.7 (20 @ 01:00), Max: 102 (04-10-20 @ 16:26)  HR: 75 (20 @ 01:00) (74 - 87)  BP: 94/50 (20 @ 01:00) (94/50 - 129/58)  RR: 20 (20 @ 01:00) (20 - 21)  SpO2: 91% (20 @ 01:00) (81% - 97%)    DIET/FLUIDS: dextrose 5%. 1000 milliLiter(s) IV Continuous <Continuous>  sodium chloride 0.9%. 1000 milliLiter(s) IV Continuous <Continuous>    GI proph:  pantoprazole  Injectable 40 milliGRAM(s) IV Push daily    AC/ proph: None  ABx: hydroxychloroquine   Oral   hydroxychloroquine 400 milliGRAM(s) Oral every 24 hours    PHYSICAL EXAM:  GENERAL: Right eye ecchymosis, forehead laceration (small), c-collar  CHEST/LUNG: Clear to auscultation bilaterally  HEART: Regular rate and rhythm  ABDOMEN: Soft, Nontender, Nondistended;   EXTREMITIES:  distal pulses present, no significant traumatic injuries     LABS  CAPILLARY BLOOD GLUCOSE  POCT Blood Glucose.: 115 mg/dL (10 Apr 2020 20:57)  POCT Blood Glucose.: 102 mg/dL (10 Apr 2020 16:20)  POCT Blood Glucose.: 102 mg/dL (10 Apr 2020 11:36)  POCT Blood Glucose.: 92 mg/dL (10 Apr 2020 09:14)  POCT Blood Glucose.: 54 mg/dL (10 Apr 2020 08:04)                        8.7    35.35 )-----------( 96       ( 10 Apr 2020 23:00 )             27.5       Auto Neutrophil %: 8.1 % (04-10-20 @ 23:00)  Auto Immature Granulocyte %: 0.1 % (04-10-20 @ 23:00)    04-10  138  |  108  |  15  ----------------------------<  107<H>  3.8   |  21  |  0.6<L>    Calcium, Total Serum: 6.5 mg/dL (04-10-20 @ 23:00)    LFTs:          5.6  | 0.7  | 53       ------------------[78      ( 2020 16:08 )  3.4  | x    | 26          Lipase:x      Amylase:x        Lactate, Blood: 1.3 mmol/L (04-10-20 @ 00:42)  Lactate, Blood: 2.7 mmol/L (20 @ 16:08)    Coags:  15.10  ----< 1.31    ( 2020 16:13 )     30.2     Urinalysis Basic - ( 10 Apr 2020 12:45 )  Color: Yellow / Appearance: Clear / S.016 / pH: x  Gluc: x / Ketone: Negative  / Bili: Negative / Urobili: <2 mg/dL   Blood: x / Protein: Trace / Nitrite: Negative   Leuk Esterase: Negative / RBC: x / WBC x   Sq Epi: x / Non Sq Epi: x / Bacteria: x    Culture - Blood (collected 2020 16:08)  Source: .Blood Blood  Preliminary Report (2020 01:01):    No growth to date.    Culture - Blood (collected 2020 16:08)  Source: .Blood Blood  Preliminary Report (2020 01:01):    No growth to date.    RADIOLOGY & ADDITIONAL TESTS:  < from: Xray Chest 1 View AP/PA (04.10.20 @ 11:41) >  Stable bilateral opacities.  < end of copied text >

## 2020-04-11 NOTE — DISCHARGE NOTE PROVIDER - HOSPITAL COURSE
65yo M w/ pmhx of CLL, DM, PTH cancer, and schizophrenia, on plavix and ASA, BIBEMS from [facility] s/p unwitnessed fall with +HT, -LOC. Pt is a poor historian and denied falling. Imaging revealed L frontoparietal/temporal SAH. Neurosurgery was consulted, and recommended holding ASA and plavix, and starting on Keppra x 7 days for seizure ppx s/p SAH. Pt was admitted to the trauma service for observation. He remained stable, without significant change in mental status.         Of note, on DOA, patient reported 6 days of non-productive cough, and was found to be febrile in the ED to 102. COVID testing was performed and was positive. ID was consulted and recommended a 5 day course of plaquenil, which was initiated.         Patient was deemed medically cleared for discharge on . 65yo M w/ pmhx of CLL, DM, PTH cancer, and schizophrenia, on plavix and ASA, BIBEMS from [facility] s/p unwitnessed fall with +HT, -LOC. Pt is a poor historian and denied falling. Imaging revealed L frontoparietal/temporal SAH. Neurosurgery was consulted, and recommended holding ASA and plavix, and starting on Keppra x 7 days for seizure ppx s/p SAH. Pt was admitted to the trauma service for observation. He remained stable, without significant change in mental status.         Of note, on DOA, patient reported 6 days of non-productive cough, and was found to be febrile in the ED to 102. COVID testing was performed and was positive. ID was consulted and recommended a 5 day course of plaquenil, which was initiated.         Patient was deemed medically cleared for discharge on 4/11/2020.

## 2020-04-11 NOTE — DISCHARGE NOTE PROVIDER - NSDCFUADDINST_GEN_ALL_CORE_FT
Please take plaquenil (hydroxychloroquine) in evening on 4/11, and twice per day on 4/12 and 4/13 for treatment of COVID    Please refrain from taking plavix and aspirin for 3 days, re-starting on 4/15. It is important to hold this medication for this time to prevent worsening of head bleed.

## 2020-04-11 NOTE — DISCHARGE NOTE PROVIDER - NSDCMRMEDTOKEN_GEN_ALL_CORE_FT
aspirin 81 mg oral tablet, chewable: 1 tab(s) orally once a day  budesonide-formoterol 160 mcg-4.5 mcg/inh inhalation aerosol: 2 puff(s) inhaled 2 times a day  docusate sodium 100 mg oral capsule: 3 cap(s) orally once a day  Invega Sustenna 117 mg/0.75 mL intramuscular suspension, extended release: 1 application intramuscular every 30 days, next dose due 5/16  Januvia 100 mg oral tablet: 1 tab(s) orally once a day  levETIRAcetam 500 mg oral tablet: 1 tab(s) orally 2 times a day for 5 days, with last dose on 4/16/2020. For seizure prophylaxsis after subarachnoid hemorrhage.  levothyroxine 137 mcg (0.137 mg) oral tablet: 1 tab(s) orally once a day  Lipitor 10 mg oral tablet: 1 tab(s) orally once a day  metFORMIN 500 mg oral tablet: 1 tab(s) orally 2 times a day  metoprolol succinate 25 mg oral tablet, extended release: 1 tab(s) orally once a day  Plavix 75 mg oral tablet: 1 tab(s) orally once a day aspirin 81 mg oral tablet, chewable: 1 tab(s) orally once a day  budesonide-formoterol 160 mcg-4.5 mcg/inh inhalation aerosol: 2 puff(s) inhaled 2 times a day  docusate sodium 100 mg oral capsule: 3 cap(s) orally once a day  hydroxychloroquine 200 mg oral tablet: 2 tab(s) orally once a day for 2 days for COVID viral infection  Invega Sustenna 117 mg/0.75 mL intramuscular suspension, extended release: 1 application intramuscular every 30 days, next dose due 5/16  Januvia 100 mg oral tablet: 1 tab(s) orally once a day  levothyroxine 137 mcg (0.137 mg) oral tablet: 1 tab(s) orally once a day  Lipitor 10 mg oral tablet: 1 tab(s) orally once a day  metFORMIN 500 mg oral tablet: 1 tab(s) orally 2 times a day  metoprolol succinate 25 mg oral tablet, extended release: 1 tab(s) orally once a day  Plavix 75 mg oral tablet: 1 tab(s) orally once a day aspirin 81 mg oral tablet, chewable: 1 tab(s) orally once a day starting on 4/15. It is important that it is NOT re-started before then as it is being held to prevent worsening of the head bleed.  budesonide-formoterol 160 mcg-4.5 mcg/inh inhalation aerosol: 2 puff(s) inhaled 2 times a day  docusate sodium 100 mg oral capsule: 3 cap(s) orally once a day  hydroxychloroquine 200 mg oral tablet: 2 tab(s) orally once a day for 2 days for COVID viral infection  Invega Sustenna 117 mg/0.75 mL intramuscular suspension, extended release: 1 application intramuscular every 30 days, next dose due 5/16  Januvia 100 mg oral tablet: 1 tab(s) orally once a day  levETIRAcetam 500 mg oral tablet: 1 tab(s) orally 2 times a day for 5 days, for seizure prophylaxis after subarachnoid hemorrhage  levothyroxine 137 mcg (0.137 mg) oral tablet: 1 tab(s) orally once a day  Lipitor 10 mg oral tablet: 1 tab(s) orally once a day  metFORMIN 500 mg oral tablet: 1 tab(s) orally 2 times a day  metoprolol succinate 25 mg oral tablet, extended release: 1 tab(s) orally once a day  Plavix 75 mg oral tablet: 1 tab(s) orally once a day starting on 4/15, It is important that it is NOT re-started before then as it is being held to prevent worsening head bleed. aspirin 81 mg oral tablet, chewable: 1 tab(s) orally once a day starting on 4/15. It is important that it is NOT re-started before then as it is being held to prevent worsening of the head bleed.  budesonide-formoterol 160 mcg-4.5 mcg/inh inhalation aerosol: 2 puff(s) inhaled 2 times a day  docusate sodium 100 mg oral capsule: 3 cap(s) orally once a day  Invega Sustenna 117 mg/0.75 mL intramuscular suspension, extended release: 1 application intramuscular every 30 days, next dose due 5/16  Januvia 100 mg oral tablet: 1 tab(s) orally once a day  levETIRAcetam 500 mg oral tablet: 1 tab(s) orally 2 times a day for 5 days, for seizure prophylaxis after subarachnoid hemorrhage  levothyroxine 137 mcg (0.137 mg) oral tablet: 1 tab(s) orally once a day  Lipitor 10 mg oral tablet: 1 tab(s) orally once a day  metFORMIN 500 mg oral tablet: 1 tab(s) orally 2 times a day  metoprolol succinate 25 mg oral tablet, extended release: 1 tab(s) orally once a day  Plavix 75 mg oral tablet: 1 tab(s) orally once a day starting on 4/15, It is important that it is NOT re-started before then as it is being held to prevent worsening head bleed.

## 2020-04-12 ENCOUNTER — TRANSCRIPTION ENCOUNTER (OUTPATIENT)
Age: 65
End: 2020-04-12

## 2020-04-12 VITALS
RESPIRATION RATE: 20 BRPM | OXYGEN SATURATION: 98 % | SYSTOLIC BLOOD PRESSURE: 120 MMHG | DIASTOLIC BLOOD PRESSURE: 58 MMHG | HEART RATE: 57 BPM | TEMPERATURE: 99 F

## 2020-04-12 LAB
ANION GAP SERPL CALC-SCNC: 11 MMOL/L — SIGNIFICANT CHANGE UP (ref 7–14)
BUN SERPL-MCNC: 9 MG/DL — LOW (ref 10–20)
CALCIUM SERPL-MCNC: 6.4 MG/DL — LOW (ref 8.5–10.1)
CHLORIDE SERPL-SCNC: 107 MMOL/L — SIGNIFICANT CHANGE UP (ref 98–110)
CO2 SERPL-SCNC: 19 MMOL/L — SIGNIFICANT CHANGE UP (ref 17–32)
CREAT SERPL-MCNC: 0.5 MG/DL — LOW (ref 0.7–1.5)
GLUCOSE BLDC GLUCOMTR-MCNC: 120 MG/DL — HIGH (ref 70–99)
GLUCOSE BLDC GLUCOMTR-MCNC: 84 MG/DL — SIGNIFICANT CHANGE UP (ref 70–99)
GLUCOSE SERPL-MCNC: 108 MG/DL — HIGH (ref 70–99)
HCT VFR BLD CALC: 28.8 % — LOW (ref 42–52)
HGB BLD-MCNC: 9.3 G/DL — LOW (ref 14–18)
MAGNESIUM SERPL-MCNC: 1.9 MG/DL — SIGNIFICANT CHANGE UP (ref 1.8–2.4)
MCHC RBC-ENTMCNC: 31.5 PG — HIGH (ref 27–31)
MCHC RBC-ENTMCNC: 32.3 G/DL — SIGNIFICANT CHANGE UP (ref 32–37)
MCV RBC AUTO: 97.6 FL — HIGH (ref 80–94)
NRBC # BLD: 0 /100 WBCS — SIGNIFICANT CHANGE UP (ref 0–0)
PHOSPHATE SERPL-MCNC: 2.8 MG/DL — SIGNIFICANT CHANGE UP (ref 2.1–4.9)
PLATELET # BLD AUTO: 123 K/UL — LOW (ref 130–400)
POTASSIUM SERPL-MCNC: 3.6 MMOL/L — SIGNIFICANT CHANGE UP (ref 3.5–5)
POTASSIUM SERPL-SCNC: 3.6 MMOL/L — SIGNIFICANT CHANGE UP (ref 3.5–5)
RBC # BLD: 2.95 M/UL — LOW (ref 4.7–6.1)
RBC # FLD: 14.8 % — HIGH (ref 11.5–14.5)
SODIUM SERPL-SCNC: 137 MMOL/L — SIGNIFICANT CHANGE UP (ref 135–146)
WBC # BLD: 35.36 K/UL — HIGH (ref 4.8–10.8)
WBC # FLD AUTO: 35.36 K/UL — HIGH (ref 4.8–10.8)

## 2020-04-12 PROCEDURE — 99238 HOSP IP/OBS DSCHRG MGMT 30/<: CPT

## 2020-04-12 RX ORDER — HYDROXYCHLOROQUINE SULFATE 200 MG
2 TABLET ORAL
Qty: 4 | Refills: 0
Start: 2020-04-12 | End: 2020-04-13

## 2020-04-12 RX ORDER — HYDROXYCHLOROQUINE SULFATE 200 MG
2 TABLET ORAL
Qty: 2 | Refills: 0
Start: 2020-04-12 | End: 2020-04-12

## 2020-04-12 RX ADMIN — GABAPENTIN 100 MILLIGRAM(S): 400 CAPSULE ORAL at 05:52

## 2020-04-12 RX ADMIN — LEVETIRACETAM 500 MILLIGRAM(S): 250 TABLET, FILM COATED ORAL at 05:52

## 2020-04-12 RX ADMIN — Medication 650 MILLIGRAM(S): at 10:44

## 2020-04-12 RX ADMIN — Medication 137 MICROGRAM(S): at 05:52

## 2020-04-12 RX ADMIN — CHLORHEXIDINE GLUCONATE 1 APPLICATION(S): 213 SOLUTION TOPICAL at 05:55

## 2020-04-12 RX ADMIN — BUDESONIDE AND FORMOTEROL FUMARATE DIHYDRATE 2 PUFF(S): 160; 4.5 AEROSOL RESPIRATORY (INHALATION) at 07:58

## 2020-04-12 RX ADMIN — Medication 650 MILLIGRAM(S): at 05:52

## 2020-04-12 RX ADMIN — PANTOPRAZOLE SODIUM 40 MILLIGRAM(S): 20 TABLET, DELAYED RELEASE ORAL at 10:44

## 2020-04-12 RX ADMIN — GABAPENTIN 100 MILLIGRAM(S): 400 CAPSULE ORAL at 11:15

## 2020-04-12 RX ADMIN — Medication 25 MILLIGRAM(S): at 05:56

## 2020-04-12 NOTE — PROGRESS NOTE ADULT - ATTENDING COMMENTS
As noted above,  awake alert no new complains, looks and feels well.    Tm 100.5 vss    Right eye ecchymosis, forehead laceration    Plan:   - NSx: hold asa and plavix and DVT ppx for 5 days  - ID consult- COVID 19+,   -Plaquenil 400mg daily PO x 4 days;   -f/u ID when cleared for DC back to [facility]  - pain control    - FU SW for dispo back to [facility]
Patient seen and examined with surgery trauma team on morning rounds and discussed management plans. Patient awake and responsive in no acute discomfort, CT scan reviewed mild left SAH only, Tolerating diet, Patient being ruled out for COVID-19. if ruled out may be discharged back to [facility]
Patient seen and examined. Complaining of right frontal headache.  - F/up with neurosurg recommendations for anticoagulation resumption  - SW for dispo planning  - Clear diet, advance as tolerated  - Abx per ID for COVID-19

## 2020-04-12 NOTE — DISCHARGE NOTE NURSING/CASE MANAGEMENT/SOCIAL WORK - PATIENT PORTAL LINK FT
You can access the FollowMyHealth Patient Portal offered by [facility] by registering at the following website: [email or web address]. By joining [employer]’s FollowMyHealth portal, you will also be able to view your health information using other applications (apps) compatible with our system.

## 2020-04-12 NOTE — PROGRESS NOTE ADULT - ASSESSMENT
64M with PMH of CLL, DM, parathyroid cancer, and schizophrenia presenting to ED from [facility] s/p unwitnessed fall last night w/ +HT, ?LOC, +ASA and plavix. CTH shows frontoparietal/temporal SAH. Cspine negative. COVID-19 +    Plan:   - NSx: hold asa and plavix and DVT ppx for 5 days - restart 4/15,, q4 neuro checks, keppra x1 week, repeat CTH if change in neurostatus  - ID consult- COVID 19+, Plaquenil 400mg daily PO x 4 days; f/u ID when cleared for DC back to [facility]  - EKG for QTC - 394  - pain control  - PT/rehab not required, ambulating  - FU SW for dispo back to [facility]

## 2020-04-12 NOTE — PROGRESS NOTE ADULT - SUBJECTIVE AND OBJECTIVE BOX
GENERAL SURGERY PROGRESS NOTE     TEDDY ADAIR  64y  Male  Hospital day :3d  POD:  Procedure:   OVERNIGHT EVENTS: Persistently febrile, although downtrending to normal    T(F): 99.2 (20 @ 01:00), Max: 100.5 (20 @ 05:15)  HR: 88 (20 @ 01:00) (65 - 88)  BP: 102/58 (20 @ 01:00) (91/54 - 135/60)  ABP: --  ABP(mean): --  RR: 18 (20 @ 01:00) (18 - 20)  SpO2: 100% (20 @ 01:00) (92% - 100%)    DIET/FLUIDS: dextrose 5%. 1000 milliLiter(s) IV Continuous <Continuous>  potassium phosphate / sodium phosphate powder 1 Packet(s) Oral once  sodium chloride 0.9%. 1000 milliLiter(s) IV Continuous <Continuous>    NG:                                                                                DRAINS:     BM:     EMESIS:     URINE:      GI proph:  pantoprazole  Injectable 40 milliGRAM(s) IV Push daily    AC/ proph:   ABx: hydroxychloroquine   Oral   hydroxychloroquine 400 milliGRAM(s) Oral every 24 hours      PHYSICAL EXAM:  GENERAL: Right eye ecchymosis, forehead laceration (small), c-collar  CHEST/LUNG: Clear to auscultation bilaterally  HEART: Regular rate and rhythm  ABDOMEN: Soft, Nontender, Nondistended;   EXTREMITIES:  distal pulses present, no significant traumatic injuries       LABS  Labs:  CAPILLARY BLOOD GLUCOSE      POCT Blood Glucose.: 108 mg/dL (2020 21:05)  POCT Blood Glucose.: 91 mg/dL (2020 16:52)  POCT Blood Glucose.: 127 mg/dL (2020 11:51)  POCT Blood Glucose.: 85 mg/dL (2020 07:43)                          9.3    35.36 )-----------( 123      ( 2020 20:00 )             28.8         04-    137  |  107  |  9<L>  ----------------------------<  108<H>  3.6   |  19  |  0.5<L>      Calcium, Total Serum: 6.4 mg/dL (20 @ 20:00)      LFTs:     Lactate, Blood: 1.3 mmol/L (04-10-20 @ 00:42)  Lactate, Blood: 2.7 mmol/L (20 @ 16:08)      Coags:            Urinalysis Basic - ( 10 Apr 2020 12:45 )    Color: Yellow / Appearance: Clear / S.016 / pH: x  Gluc: x / Ketone: Negative  / Bili: Negative / Urobili: <2 mg/dL   Blood: x / Protein: Trace / Nitrite: Negative   Leuk Esterase: Negative / RBC: x / WBC x   Sq Epi: x / Non Sq Epi: x / Bacteria: x        Culture - Urine (collected 10 Apr 2020 12:45)  Source: .Urine Clean Catch (Midstream)  Final Report (2020 17:45):    <10,000 CFU/mL Normal Urogenital Milagros    Culture - Blood (collected 2020 16:08)  Source: .Blood Blood  Preliminary Report (2020 01:01):    No growth to date.    Culture - Blood (collected 2020 16:08)  Source: .Blood Blood  Preliminary Report (2020 01:01):    No growth to date.          RADIOLOGY & ADDITIONAL TESTS:      No new studies

## 2020-04-14 LAB
CULTURE RESULTS: SIGNIFICANT CHANGE UP
CULTURE RESULTS: SIGNIFICANT CHANGE UP
SPECIMEN SOURCE: SIGNIFICANT CHANGE UP
SPECIMEN SOURCE: SIGNIFICANT CHANGE UP

## 2020-04-17 DIAGNOSIS — U07.1 COVID-19: ICD-10-CM
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
## 2020-04-17 DIAGNOSIS — Z85.89 PERSONAL HISTORY OF MALIGNANT NEOPLASM OF OTHER ORGANS AND SYSTEMS: ICD-10-CM

## 2020-04-17 DIAGNOSIS — J12.89 OTHER VIRAL PNEUMONIA: ICD-10-CM

## 2020-04-17 DIAGNOSIS — Z79.84 LONG TERM (CURRENT) USE OF ORAL HYPOGLYCEMIC DRUGS: ICD-10-CM
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
## 2020-04-17 DIAGNOSIS — Z79.82 LONG TERM (CURRENT) USE OF ASPIRIN: ICD-10-CM

## 2020-04-17 DIAGNOSIS — E11.9 TYPE 2 DIABETES MELLITUS WITHOUT COMPLICATIONS: ICD-10-CM

## 2020-04-17 DIAGNOSIS — S06.6X0A TRAUMATIC SUBARACHNOID HEMORRHAGE WITHOUT LOSS OF CONSCIOUSNESS, INITIAL ENCOUNTER: ICD-10-CM

## 2020-04-17 DIAGNOSIS — Z79.02 LONG TERM (CURRENT) USE OF ANTITHROMBOTICS/ANTIPLATELETS: ICD-10-CM

## 2020-04-17 DIAGNOSIS — D69.6 THROMBOCYTOPENIA, UNSPECIFIED: ICD-10-CM

## 2020-04-17 DIAGNOSIS — S01.81XA LACERATION WITHOUT FOREIGN BODY OF OTHER PART OF HEAD, INITIAL ENCOUNTER: ICD-10-CM

## 2020-04-17 DIAGNOSIS — F20.9 SCHIZOPHRENIA, UNSPECIFIED: ICD-10-CM

## 2020-04-17 DIAGNOSIS — C91.10 CHRONIC LYMPHOCYTIC LEUKEMIA OF B-CELL TYPE NOT HAVING ACHIEVED REMISSION: ICD-10-CM

## 2020-04-23 ENCOUNTER — APPOINTMENT (OUTPATIENT)
Dept: NEUROSURGERY | Facility: CLINIC | Age: 65
End: 2020-04-23
Payer: MEDICAID

## 2020-04-23 DIAGNOSIS — S06.6X0A TRAUMATIC SUBARACHNOID HEMORRHAGE W/OUT LOSS OF CONSCIOUSNESS, INITIAL ENCOUNTER: ICD-10-CM

## 2020-04-23 DIAGNOSIS — S06.6X9D TRAUMATIC SUBARACHNOID HEMORRHAGE WITH LOSS OF CONSCIOUSNESS OF UNSPECIFIED DURATION, SUBSEQUENT ENCOUNTER: ICD-10-CM

## 2020-04-23 PROCEDURE — 99421 OL DIG E/M SVC 5-10 MIN: CPT

## 2020-04-23 NOTE — ASSESSMENT
[FreeTextEntry1] : I had a video conference with patient today for 10 minutes.\par  He is about 2 weeks s/p a fall and sustained left cerebral SAH with questionable loss of consciousness. He since has been recovering in a skilled nursing facility. He has been doing quite well. During the video visit today, I also had a chance to discuss with his nursing staff as well. He has no complaints of headaches, dizziness, weakness, He has been walking independently. Good appetite. \par \par Since he is doing well and had only a small amount of SAH in the original head CT, he can be discharged from the SNF from my standpoint. He can also restart Aspirin and Plavix. I will see him again on a as needed basis.

## 2020-09-02 NOTE — ED BEHAVIORAL HEALTH ASSESSMENT NOTE - ACCOMPANIED BY
Detail Level: Generalized Instructions: This plan will send the code FBSE to the PM system.  DO NOT or CHANGE the price. Price (Do Not Change): 0.00 Self

## 2021-04-08 NOTE — H&P ADULT - NSHPPOADEEPVENOUSTHROMB_GEN_A_CORE
Discharge instructions reviewed with patient/responsible adult and understanding verbalized. Discharge instructions signed and copies given. Patient discharged home with belongings. no

## 2022-03-24 NOTE — PROGRESS NOTE ADULT - PROBLEM/PLAN-2
Case Management Initial Discharge Plan  Atilio Patel,             Met with:patient to discuss discharge plans. Information verified: address, contacts, phone number, , insurance Yes  Insurance Provider: Connor Montiel    Emergency Contact/Next of Kin name & number: spouse Kristofer Dao [phone]  Who are involved in patient's support system? family    PCP: Ethel Hendricks II  Date of last visit: 1 month ago      Discharge Planning    Living Arrangements:  Spouse/Significant Maria Esther Jara has 2 stories   stairs to climb to get into front door, 1 flight stairs to climb to reach second floor  Location of bedroom/bathroom in home second floor    Patient able to perform ADL's:Independent    Current Services (outpatient & in home)n/a  DME equipment: walker  DME provider: n/a    Is patient receiving oral anticoagulation therapy? Yes    If indicated: Eliquis  Physician managing anticoagulation treatment: PCP  Where does patient obtain lab work for ATC treatment? n/a      Potential Assistance Needed:  N/A    Patient agreeable to home care: No  [place] of choice provided:  n/a    Prior SNF/Rehab Placement and Facility: no  Agreeable to SNF/Rehab: No  [place] of choice provided: n/a     Evaluation: n/a    Expected Discharge date:  22    Patient expects to be discharged to: home      If home: is the family and/or caregiver wiling & able to provide support at home? yes  Who will be providing this support? family*    Follow Up Appointment: Best Day/ Time: Monday AM    Transportation provider: family  Transportation arrangements needed for discharge: No    Readmission Risk              Risk of Unplanned Readmission:  31             Does patient have a readmission risk score greater than 14?: Yes  If yes, follow-up appointment must be made within 7 days of discharge.      Goals of Care: home with family      Educated patient on transitional options, provided freedom of choice and are agreeable with plan      Discharge Plan: home indepdendent with family          Electronically signed by Jeaneth Duran RN on 3/24/22 at 3:45 PM EDT DISPLAY PLAN FREE TEXT

## 2022-10-03 NOTE — DISCHARGE NOTE ADULT - FUNCTIONAL SCREEN CURRENT LEVEL: BATHING, MLM
-- DO NOT REPLY / DO NOT REPLY ALL --  -- Message is from Engagement Center Operations (ECO) --    General Patient Message Patient is returning call regarding the referral that was submitted to cardiology, she can be reached at [phone].     Caller Information       Type Contact Phone/Fax    10/03/2022 06:02 PM CDT Phone (Incoming) Fallon Abarca (Self) [phone] (M)        Alternative phone number: N/A    Can a detailed message be left? Yes    Message Turnaround: [place]:    Refer to site's KB page for routing instructions    Please give this turnaround time to the caller:   \"You can expect to receive a response 1-3 business days after your provider's clinical team reviews the message\"               (2) assistive person

## 2023-01-19 NOTE — ED ADULT NURSE NOTE - FALL HARM RISK CONCLUSION
Fall Risk Localized Dermabrasion Text: The patient was draped in routine manner.  Localized dermabrasion using 3 x 17 mm wire brush was performed in routine manner to papillary dermis. This spot dermabrasion is being performed to complete skin cancer reconstruction. It also will eliminate the other sun damaged precancerous cells that are known to be part of the regional effect of a lifetime's worth of sun exposure. This localized dermabrasion is therapeutic and should not be considered cosmetic in any regard. Localized Dermabrasion With Wire Brush Text: The patient was draped in routine manner.  Localized dermabrasion using 3 x 17 mm wire brush was performed in routine manner to papillary dermis. This spot dermabrasion is being performed to complete skin cancer reconstruction. It also will eliminate the other sun damaged precancerous cells that are known to be part of the regional effect of a lifetime's worth of sun exposure. This localized dermabrasion is therapeutic and should not be considered cosmetic in any regard.

## 2023-01-24 NOTE — PROGRESS NOTE BEHAVIORAL HEALTH - ORIENTED TO TIME
Outreach attempt was made to schedule a Medicare Wellness Visit. This was the first attempt. Contact was not made, left message.  
Yes
Yes

## 2023-01-26 NOTE — ED PROVIDER NOTE - AXIS
Initial contact with pt. Pt discharged from ED to home. Pt verbalizes understanding to discharge instructions, teach back successful. Pt denies questions at this time. No acute distress noted. Resp even and unlabored. A/ox4. Pt instructed to follow-up as noted - return to ED if symptoms worsen. Pt verbalizes understanding. Discharged per ED MD with discharge instructions. Pt refuses ambulatory assistance to lobby and walks with steady gait.         Ramonita Iqbal RN  01/26/23 7699 Normal
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
## 2023-07-20 ENCOUNTER — APPOINTMENT (OUTPATIENT)
Dept: CARDIOLOGY | Facility: CLINIC | Age: 68
End: 2023-07-20
Payer: MEDICARE

## 2023-07-20 ENCOUNTER — APPOINTMENT (OUTPATIENT)
Dept: CARDIOTHORACIC SURGERY | Facility: CLINIC | Age: 68
End: 2023-07-20

## 2023-07-20 VITALS
WEIGHT: 180 LBS | SYSTOLIC BLOOD PRESSURE: 135 MMHG | RESPIRATION RATE: 12 BRPM | HEART RATE: 94 BPM | DIASTOLIC BLOOD PRESSURE: 82 MMHG | BODY MASS INDEX: 28.25 KG/M2 | HEIGHT: 67 IN | OXYGEN SATURATION: 97 % | TEMPERATURE: 98 F

## 2023-07-20 DIAGNOSIS — Z87.891 PERSONAL HISTORY OF NICOTINE DEPENDENCE: ICD-10-CM

## 2023-07-20 DIAGNOSIS — I35.0 NONRHEUMATIC AORTIC (VALVE) STENOSIS: ICD-10-CM

## 2023-07-20 PROCEDURE — 99204 OFFICE O/P NEW MOD 45 MIN: CPT

## 2023-07-20 RX ORDER — LORAZEPAM 2 MG/1
TABLET ORAL
Refills: 0 | Status: ACTIVE | COMMUNITY

## 2023-07-20 RX ORDER — ALLOPURINOL 100 MG/1
100 TABLET ORAL
Refills: 0 | Status: ACTIVE | COMMUNITY

## 2023-07-20 RX ORDER — METFORMIN HYDROCHLORIDE 625 MG/1
TABLET ORAL
Refills: 0 | Status: ACTIVE | COMMUNITY

## 2023-07-20 RX ORDER — LEVOTHYROXINE SODIUM 0.17 MG/1
TABLET ORAL
Refills: 0 | Status: ACTIVE | COMMUNITY

## 2023-07-20 RX ORDER — ATROPINE SULFATE 0.4 MG/.5ML
INJECTION, SOLUTION INTRAMUSCULAR; INTRAVENOUS; SUBCUTANEOUS
Refills: 0 | Status: ACTIVE | COMMUNITY

## 2023-07-20 RX ORDER — BENZTROPINE MESYLATE 2 MG/1
TABLET ORAL
Refills: 0 | Status: ACTIVE | COMMUNITY

## 2023-07-20 RX ORDER — METOPROLOL SUCCINATE 25 MG/1
25 TABLET, EXTENDED RELEASE ORAL
Refills: 0 | Status: ACTIVE | COMMUNITY

## 2023-07-20 RX ORDER — HALOPERIDOL 5 MG/ML
INJECTION INTRAMUSCULAR
Refills: 0 | Status: ACTIVE | COMMUNITY

## 2023-07-20 RX ORDER — BUDESONIDE 0.25 MG/2ML
0.25 INHALANT ORAL
Refills: 0 | Status: ACTIVE | COMMUNITY

## 2023-07-20 RX ORDER — ATORVASTATIN CALCIUM 80 MG/1
TABLET, FILM COATED ORAL
Refills: 0 | Status: ACTIVE | COMMUNITY

## 2023-07-20 RX ORDER — SITAGLIPTIN 100 MG/1
TABLET, FILM COATED ORAL
Refills: 0 | Status: ACTIVE | COMMUNITY

## 2023-07-20 RX ORDER — CLOZAPINE 200 MG/1
TABLET ORAL
Refills: 0 | Status: ACTIVE | COMMUNITY

## 2023-07-20 NOTE — ASSESSMENT
[FreeTextEntry1] : Echo in 6 months\par F/U after for review\par Needs to establish care with a Cardiologist\par Continue regular PMD Follow Up
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
## 2023-07-20 NOTE — END OF VISIT
[FreeTextEntry3] : Presents with care taker.\par \par Aortic Stenosis (apparently incidental finding / referred by PMD).\par \par Comorbidities as above.  Notable, schizophrenia / resides at [facility].\par \par Feels well.  No clear symptoms.\par BP controlled.\par \par ECHO report suggests nL LVSF with moderate AS.\par \par Establish general cardiology care (arranged).\par Follow-up Valve Clinic / ECHO 6-months.
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
## 2023-07-20 NOTE — END OF VISIT
[FreeTextEntry3] : Presents with care taker.\par \par Aortic Stenosis (apparently incidental finding / referred by PMD).\par \par Comorbidities as above.  Notable, schizophrenia / resides at [place].\par \par Feels well.  No clear symptoms.\par BP controlled.\par \par ECHO report suggests nL LVSF with moderate AS.\par \par Establish general cardiology care (arranged).\par Follow-up Valve Clinic / ECHO 6-months.

## 2023-09-02 NOTE — PROGRESS NOTE BEHAVIORAL HEALTH - NSBHCHARTREVIEWINVESTIGATE_PSY_A_CORE FT
< from: 12 Lead ECG (03.19.18 @ 14:23) >    QTC Calculation(Bezet) 432 ms    < end of copied text >
[id number]
< from: 12 Lead ECG (03.19.18 @ 14:23) >    QTC Calculation(Bezet) 432 ms    < end of copied text >

## 2023-09-12 NOTE — ED ADULT TRIAGE NOTE - MEANS OF ARRIVAL
ambulatory Carac Counseling:  I discussed with the patient the risks of Carac including but not limited to erythema, scaling, itching, weeping, crusting, and pain.
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
## 2023-11-28 ENCOUNTER — EMERGENCY (EMERGENCY)
Facility: HOSPITAL | Age: 68
LOS: 0 days | Discharge: HOME | End: 2023-11-28
Attending: STUDENT IN AN ORGANIZED HEALTH CARE EDUCATION/TRAINING PROGRAM
Payer: MEDICARE

## 2023-11-28 VITALS
RESPIRATION RATE: 19 BRPM | HEART RATE: 80 BPM | SYSTOLIC BLOOD PRESSURE: 149 MMHG | DIASTOLIC BLOOD PRESSURE: 79 MMHG | OXYGEN SATURATION: 100 % | TEMPERATURE: 97 F

## 2023-11-28 VITALS
DIASTOLIC BLOOD PRESSURE: 88 MMHG | SYSTOLIC BLOOD PRESSURE: 132 MMHG | WEIGHT: 160.06 LBS | HEART RATE: 78 BPM | TEMPERATURE: 98 F | RESPIRATION RATE: 19 BRPM | OXYGEN SATURATION: 99 %
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
## 2023-11-28 DIAGNOSIS — R45.1 RESTLESSNESS AND AGITATION: ICD-10-CM

## 2023-11-28 DIAGNOSIS — Z85.858 PERSONAL HISTORY OF MALIGNANT NEOPLASM OF OTHER ENDOCRINE GLANDS: ICD-10-CM

## 2023-11-28 DIAGNOSIS — Z85.6 PERSONAL HISTORY OF LEUKEMIA: ICD-10-CM

## 2023-11-28 DIAGNOSIS — T73.0XXA STARVATION, INITIAL ENCOUNTER: ICD-10-CM

## 2023-11-28 DIAGNOSIS — F20.9 SCHIZOPHRENIA, UNSPECIFIED: ICD-10-CM

## 2023-11-28 DIAGNOSIS — E11.9 TYPE 2 DIABETES MELLITUS WITHOUT COMPLICATIONS: ICD-10-CM

## 2023-11-28 DIAGNOSIS — R51.9 HEADACHE, UNSPECIFIED: ICD-10-CM
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
## 2023-11-28 DIAGNOSIS — Z85.850 PERSONAL HISTORY OF MALIGNANT NEOPLASM OF THYROID: ICD-10-CM

## 2023-11-28 LAB
ACANTHOCYTES BLD QL SMEAR: SLIGHT — SIGNIFICANT CHANGE UP
ACANTHOCYTES BLD QL SMEAR: SLIGHT — SIGNIFICANT CHANGE UP
ANION GAP SERPL CALC-SCNC: 15 MMOL/L — HIGH (ref 7–14)
ANION GAP SERPL CALC-SCNC: 15 MMOL/L — HIGH (ref 7–14)
APAP SERPL-MCNC: <5 UG/ML — LOW (ref 10–30)
APAP SERPL-MCNC: <5 UG/ML — LOW (ref 10–30)
BASOPHILS # BLD AUTO: 0 K/UL — SIGNIFICANT CHANGE UP (ref 0–0.2)
BASOPHILS # BLD AUTO: 0 K/UL — SIGNIFICANT CHANGE UP (ref 0–0.2)
BASOPHILS NFR BLD AUTO: 0 % — SIGNIFICANT CHANGE UP (ref 0–1)
BASOPHILS NFR BLD AUTO: 0 % — SIGNIFICANT CHANGE UP (ref 0–1)
BUN SERPL-MCNC: 18 MG/DL — SIGNIFICANT CHANGE UP (ref 10–20)
BUN SERPL-MCNC: 18 MG/DL — SIGNIFICANT CHANGE UP (ref 10–20)
BURR CELLS BLD QL SMEAR: PRESENT — SIGNIFICANT CHANGE UP
BURR CELLS BLD QL SMEAR: PRESENT — SIGNIFICANT CHANGE UP
CALCIUM SERPL-MCNC: 8.7 MG/DL — SIGNIFICANT CHANGE UP (ref 8.4–10.5)
CALCIUM SERPL-MCNC: 8.7 MG/DL — SIGNIFICANT CHANGE UP (ref 8.4–10.5)
CHLORIDE SERPL-SCNC: 101 MMOL/L — SIGNIFICANT CHANGE UP (ref 98–110)
CHLORIDE SERPL-SCNC: 101 MMOL/L — SIGNIFICANT CHANGE UP (ref 98–110)
CO2 SERPL-SCNC: 23 MMOL/L — SIGNIFICANT CHANGE UP (ref 17–32)
CO2 SERPL-SCNC: 23 MMOL/L — SIGNIFICANT CHANGE UP (ref 17–32)
CREAT SERPL-MCNC: 1 MG/DL — SIGNIFICANT CHANGE UP (ref 0.7–1.5)
CREAT SERPL-MCNC: 1 MG/DL — SIGNIFICANT CHANGE UP (ref 0.7–1.5)
DACRYOCYTES BLD QL SMEAR: SLIGHT — SIGNIFICANT CHANGE UP
DACRYOCYTES BLD QL SMEAR: SLIGHT — SIGNIFICANT CHANGE UP
EGFR: 83 ML/MIN/1.73M2 — SIGNIFICANT CHANGE UP
EGFR: 83 ML/MIN/1.73M2 — SIGNIFICANT CHANGE UP
EOSINOPHIL # BLD AUTO: 0 K/UL — SIGNIFICANT CHANGE UP (ref 0–0.7)
EOSINOPHIL # BLD AUTO: 0 K/UL — SIGNIFICANT CHANGE UP (ref 0–0.7)
EOSINOPHIL NFR BLD AUTO: 0 % — SIGNIFICANT CHANGE UP (ref 0–8)
EOSINOPHIL NFR BLD AUTO: 0 % — SIGNIFICANT CHANGE UP (ref 0–8)
ETHANOL SERPL-MCNC: <10 MG/DL — SIGNIFICANT CHANGE UP
ETHANOL SERPL-MCNC: <10 MG/DL — SIGNIFICANT CHANGE UP
GLUCOSE SERPL-MCNC: 150 MG/DL — HIGH (ref 70–99)
GLUCOSE SERPL-MCNC: 150 MG/DL — HIGH (ref 70–99)
HCT VFR BLD CALC: 39.2 % — LOW (ref 42–52)
HCT VFR BLD CALC: 39.2 % — LOW (ref 42–52)
HGB BLD-MCNC: 12.4 G/DL — LOW (ref 14–18)
HGB BLD-MCNC: 12.4 G/DL — LOW (ref 14–18)
LYMPHOCYTES # BLD AUTO: 1.46 K/UL — SIGNIFICANT CHANGE UP (ref 1.2–3.4)
LYMPHOCYTES # BLD AUTO: 1.46 K/UL — SIGNIFICANT CHANGE UP (ref 1.2–3.4)
LYMPHOCYTES # BLD AUTO: 21 % — SIGNIFICANT CHANGE UP (ref 20.5–51.1)
LYMPHOCYTES # BLD AUTO: 21 % — SIGNIFICANT CHANGE UP (ref 20.5–51.1)
MANUAL SMEAR VERIFICATION: SIGNIFICANT CHANGE UP
MANUAL SMEAR VERIFICATION: SIGNIFICANT CHANGE UP
MCHC RBC-ENTMCNC: 28.4 PG — SIGNIFICANT CHANGE UP (ref 27–31)
MCHC RBC-ENTMCNC: 28.4 PG — SIGNIFICANT CHANGE UP (ref 27–31)
MCHC RBC-ENTMCNC: 31.6 G/DL — LOW (ref 32–37)
MCHC RBC-ENTMCNC: 31.6 G/DL — LOW (ref 32–37)
MCV RBC AUTO: 89.9 FL — SIGNIFICANT CHANGE UP (ref 80–94)
MCV RBC AUTO: 89.9 FL — SIGNIFICANT CHANGE UP (ref 80–94)
MONOCYTES # BLD AUTO: 0.3 K/UL — SIGNIFICANT CHANGE UP (ref 0.1–0.6)
MONOCYTES # BLD AUTO: 0.3 K/UL — SIGNIFICANT CHANGE UP (ref 0.1–0.6)
MONOCYTES NFR BLD AUTO: 4.4 % — SIGNIFICANT CHANGE UP (ref 1.7–9.3)
MONOCYTES NFR BLD AUTO: 4.4 % — SIGNIFICANT CHANGE UP (ref 1.7–9.3)
NEUTROPHILS # BLD AUTO: 3.71 K/UL — SIGNIFICANT CHANGE UP (ref 1.4–6.5)
NEUTROPHILS # BLD AUTO: 3.71 K/UL — SIGNIFICANT CHANGE UP (ref 1.4–6.5)
NEUTROPHILS NFR BLD AUTO: 53.5 % — SIGNIFICANT CHANGE UP (ref 42.2–75.2)
NEUTROPHILS NFR BLD AUTO: 53.5 % — SIGNIFICANT CHANGE UP (ref 42.2–75.2)
OVALOCYTES BLD QL SMEAR: SLIGHT — SIGNIFICANT CHANGE UP
OVALOCYTES BLD QL SMEAR: SLIGHT — SIGNIFICANT CHANGE UP
PLAT MORPH BLD: NORMAL — SIGNIFICANT CHANGE UP
PLAT MORPH BLD: NORMAL — SIGNIFICANT CHANGE UP
PLATELET # BLD AUTO: 124 K/UL — LOW (ref 130–400)
PLATELET # BLD AUTO: 124 K/UL — LOW (ref 130–400)
PMV BLD: 10.6 FL — HIGH (ref 7.4–10.4)
PMV BLD: 10.6 FL — HIGH (ref 7.4–10.4)
POIKILOCYTOSIS BLD QL AUTO: SIGNIFICANT CHANGE UP
POIKILOCYTOSIS BLD QL AUTO: SIGNIFICANT CHANGE UP
POTASSIUM SERPL-MCNC: 4.2 MMOL/L — SIGNIFICANT CHANGE UP (ref 3.5–5)
POTASSIUM SERPL-MCNC: 4.2 MMOL/L — SIGNIFICANT CHANGE UP (ref 3.5–5)
POTASSIUM SERPL-SCNC: 4.2 MMOL/L — SIGNIFICANT CHANGE UP (ref 3.5–5)
POTASSIUM SERPL-SCNC: 4.2 MMOL/L — SIGNIFICANT CHANGE UP (ref 3.5–5)
RBC # BLD: 4.36 M/UL — LOW (ref 4.7–6.1)
RBC # BLD: 4.36 M/UL — LOW (ref 4.7–6.1)
RBC # FLD: 16.2 % — HIGH (ref 11.5–14.5)
RBC # FLD: 16.2 % — HIGH (ref 11.5–14.5)
RBC BLD AUTO: ABNORMAL
RBC BLD AUTO: ABNORMAL
SALICYLATES SERPL-MCNC: <0.3 MG/DL — LOW (ref 4–30)
SALICYLATES SERPL-MCNC: <0.3 MG/DL — LOW (ref 4–30)
SCHISTOCYTES BLD QL AUTO: SLIGHT — SIGNIFICANT CHANGE UP
SCHISTOCYTES BLD QL AUTO: SLIGHT — SIGNIFICANT CHANGE UP
SMUDGE CELLS # BLD: PRESENT — SIGNIFICANT CHANGE UP
SMUDGE CELLS # BLD: PRESENT — SIGNIFICANT CHANGE UP
SODIUM SERPL-SCNC: 139 MMOL/L — SIGNIFICANT CHANGE UP (ref 135–146)
SODIUM SERPL-SCNC: 139 MMOL/L — SIGNIFICANT CHANGE UP (ref 135–146)
VARIANT LYMPHS # BLD: 21.1 % — HIGH (ref 0–5)
VARIANT LYMPHS # BLD: 21.1 % — HIGH (ref 0–5)
WBC # BLD: 6.93 K/UL — SIGNIFICANT CHANGE UP (ref 4.8–10.8)
WBC # BLD: 6.93 K/UL — SIGNIFICANT CHANGE UP (ref 4.8–10.8)
WBC # FLD AUTO: 6.93 K/UL — SIGNIFICANT CHANGE UP (ref 4.8–10.8)
WBC # FLD AUTO: 6.93 K/UL — SIGNIFICANT CHANGE UP (ref 4.8–10.8)

## 2023-11-28 PROCEDURE — 85025 COMPLETE CBC W/AUTO DIFF WBC: CPT

## 2023-11-28 PROCEDURE — 70450 CT HEAD/BRAIN W/O DYE: CPT | Mod: 26,MH

## 2023-11-28 PROCEDURE — 36415 COLL VENOUS BLD VENIPUNCTURE: CPT

## 2023-11-28 PROCEDURE — 93010 ELECTROCARDIOGRAM REPORT: CPT

## 2023-11-28 PROCEDURE — 70450 CT HEAD/BRAIN W/O DYE: CPT

## 2023-11-28 PROCEDURE — L9995: CPT

## 2023-11-28 PROCEDURE — 80048 BASIC METABOLIC PNL TOTAL CA: CPT

## 2023-11-28 PROCEDURE — 80307 DRUG TEST PRSMV CHEM ANLYZR: CPT

## 2023-11-28 PROCEDURE — 99285 EMERGENCY DEPT VISIT HI MDM: CPT

## 2023-11-28 PROCEDURE — 99285 EMERGENCY DEPT VISIT HI MDM: CPT | Mod: 25

## 2023-11-28 PROCEDURE — 93005 ELECTROCARDIOGRAM TRACING: CPT

## 2023-11-28 RX ORDER — ACETAMINOPHEN 500 MG
975 TABLET ORAL ONCE
Refills: 0 | Status: COMPLETED | OUTPATIENT
Start: 2023-11-28 | End: 2023-11-28

## 2023-11-28 RX ADMIN — Medication 975 MILLIGRAM(S): at 12:59

## 2023-11-28 NOTE — ED PROVIDER NOTE - CLINICAL SUMMARY MEDICAL DECISION MAKING FREE TEXT BOX
68-year-old male, past medical history of CLL, diabetes, PTH cancer, schizophrenia, presenting to the ED for hunger.  Patient pats his belly and states that he is hungry and he feels that if he eats he will feel better.  Denies headache, dizziness, chest pain, shortness of breath, nausea, vomiting, abdominal pain.  Patient given food and juice.  He states that he feels better.  Tolerated p.o.  Stable for discharge.

## 2023-11-28 NOTE — ED PROVIDER NOTE - NSFOLLOWUPINSTRUCTIONS_ED_ALL_ED_FT
patient received food in the ED and tolerating po Please follow up with your primary care physician.

## 2023-11-28 NOTE — ED PROVIDER NOTE - CARE PLAN
Principal Discharge DX:	Headache   1 Principal Discharge DX:	Headache  Assessment and plan of treatment:	Plan- labs ekg ct head reassess

## 2023-11-28 NOTE — ED PROVIDER NOTE - CLINICAL SUMMARY MEDICAL DECISION MAKING FREE TEXT BOX
67 yo M presented to ED for eval of headache for one month. Labs and EKG were ordered and reviewed.  Imaging was ordered and reviewed by me. No signs of acute intra-cranial pathology likely tension headache. Appropriate medications for patient's presenting complaints were ordered and effects were reassessed.  Patient's records (prior hospital, ED visit, and/or nursing home notes if available) were reviewed.  Additional history was obtained from EMS, family, and/or PCP (where available).  Escalation to admission/observation was considered.   However patient feels much better and is comfortable with discharge. Appropriate follow-up was arranged. Return precautions discussed in detail.

## 2023-11-28 NOTE — ED PROVIDER NOTE - PROGRESS NOTE DETAILS
Authored by Dr. Sanches: Spoke to nurse Mary from [address] [place] [address] who reports patient walked into the ED today and they did not call for an ambulance.  Patient does not complain of any pain at the facility.  Patient has been intermittently not taking his medications for the past 2 weeks with mild agitation, but easily directable, able to verbally de-escalate, and no signs of physical aggression.  Patient has been sleeping and eating otherwise at baseline with no recent illness or fevers.  Patient is calm and easily directable in the ED. Neurologically intact with normal gait.

## 2023-11-28 NOTE — ED PROVIDER NOTE - NSFOLLOWUPCLINICS_GEN_ALL_ED_FT
[facility]  OP Mental Health  [address] [place] [address]  Phone: [phone]  Fax:   Follow Up Time: 1-3 Days

## 2023-11-28 NOTE — ED PROVIDER NOTE - OBJECTIVE STATEMENT
68-year-old male with past medical history of CLL diabetes PTH cancer and schizophrenia presents to the emergency department from [address] [place] [address] (patient came on his own) with complaints of intermittent headache for about 1 month.  Headache is located in the frontal region.  Non-radiating.  No other associated symptoms.  Denies fever chills chest pain shortness of breath nausea vomiting diarrhea abdominal pain numbness weakness tingling gait abnormalities syncope. Pt is poor historian and unable to provide additional info.

## 2023-11-28 NOTE — ED PROVIDER NOTE - PHYSICAL EXAMINATION
Physical Exam    Vital Signs: I have reviewed the initial vital signs.  Constitutional: well-nourished, appears stated age, no acute distress  Eyes: Conjunctiva pink, Sclera clear, PERRLA, EOMI without pain.  Cardiovascular: S1 and S2, regular rate, regular rhythm, well-perfused extremities, radial pulses equal and 2+ b/l.   Respiratory: unlabored respiratory effort, clear to auscultation bilaterally no wheezing, rales and rhonchi. pt is speaking full sentences. no accessory muscle use.   Gastrointestinal: soft, non-tender, nondistended abdomen, no pulsatile mass, no rebound, no guarding  Musculoskeletal: FROM of b/l upper and lower extremities.   Integumentary: warm, dry, no rash  Neurologic: awake, alert,  steady gait.   Psychiatric: appropriate mood, appropriate affect

## 2023-11-28 NOTE — ED PROVIDER NOTE - WET READ LAUNCH FT
The patient is a little late on his follow-up.  I have placed an order for an echocardiogram as this is a good idea.  I would like to see him after the echo and then we can determine whether or not cardiology consultation is necessary.    Thank you for helping in his care.    Mark Matson    
There are no Wet Read(s) to document.

## 2023-11-28 NOTE — ED ADULT NURSE NOTE - CAS DISCH BELONGINGS RETURNED
Not applicable Azithromycin Counseling:  I discussed with the patient the risks of azithromycin including but not limited to GI upset, allergic reaction, drug rash, diarrhea, and yeast infections.

## 2023-11-28 NOTE — ED PROVIDER NOTE - ATTENDING CONTRIBUTION TO CARE
69 yo M pmhx schizophrenia, CLL, DM, PTH ca presents to ED from [address] for eval of intermittent frontal headaches for one month. No falls or trauma. No vision changes, nausea, vomiting, CP, SOB, fevers. No SI or HI. Denies ingestions.     CONSTITUTIONAL: NAD.   SKIN: warm, dry  HEAD: Normocephalic; atraumatic.  EYES: no conjunctival injection. PERRLA. EOMI.   ENT: MMM.   NECK: Supple.  CARD: RRR.   RESP: No wheezes, rales or rhonchi.  ABD: soft ntnd.   EXT: Normal ROM.  No lower extremity edema.   NEURO: AAOx3, CN 2-12 grossly intact. +5/5 strength and sensation wnl in all extremities. No ataxia.   PSYCH: Cooperative.

## 2023-11-28 NOTE — ED ADULT NURSE NOTE - NSFALLUNIVINTERV_ED_ALL_ED
Bed/Stretcher in lowest position, wheels locked, appropriate side rails in place/Call bell, personal items and telephone in reach/Instruct patient to call for assistance before getting out of bed/chair/stretcher/Non-slip footwear applied when patient is off stretcher/[place] to call system/Physically safe environment - no spills, clutter or unnecessary equipment/Purposeful proactive rounding/Room/bathroom lighting operational, light cord in reach

## 2023-11-28 NOTE — ED PROVIDER NOTE - PHYSICAL EXAMINATION
VITAL SIGNS: I have reviewed nursing notes and confirm.  CONSTITUTIONAL: non-toxic, well appearing  SKIN: no rash, no petechiae.  EYES: PERRL, pink conjunctiva, anicteric  ENT: tongue midline, no exudates, MMM  NECK: Supple; no meningismus, no JVD  CARD: RRR, no murmurs, equal radial pulses bilaterally 2+  RESP: CTAB, no respiratory distress  ABD: Soft, non-tender  EXT: Normal ROM x4. No edema.  NEURO: Alert, oriented. CN2-12 intact, equal strength bilaterally, nl gait, sensation intact, no slurred speech

## 2023-11-28 NOTE — ED PROVIDER NOTE - CARE PROVIDER_API CALL
Rancho Baeza  Neurology  [address] [place] [address], [address]  [place] [address]  Phone: [phone]  Fax: [phone]  Follow Up Time: 1-3 Days

## 2023-11-28 NOTE — ED PROVIDER NOTE - PATIENT PORTAL LINK FT
You can access the FollowMyHealth Patient Portal offered by [facility] by registering at the following website: [email or web address]. By joining [employer] [facility]’s FollowMyHealth portal, you will also be able to view your health information using other applications (apps) compatible with our system.

## 2023-11-28 NOTE — ED PROVIDER NOTE - OBJECTIVE STATEMENT
68-year-old male with a past medical history of CLL, diabetes, PTH cancer, and schizophrenia presents to the ED from [facility] because he is hungry.  Patient is patting his belly during his evaluation in the ED.  Patient reports he would like food.  Patient was seen earlier today in the ED for intermittent headaches for about 1 month.  Patient having labs performed and a head CT which showed no acute intracranial pathology. Patient denies abdominal pain, nausea, vomiting, chest pain, shortness of breath, or fevers.

## 2023-11-28 NOTE — ED PROVIDER NOTE - PATIENT PORTAL LINK FT
You can access the FollowMyHealth Patient Portal offered by [facility] by registering at the following website: [email or web address]. By joining [facility]’s FollowMyHealth portal, you will also be able to view your health information using other applications (apps) compatible with our system.

## 2023-11-28 NOTE — ED PROVIDER NOTE - NPI NUMBER (FOR SYSADMIN USE ONLY) :
How Severe Is It?: mild Is This A New Presentation, Or A Follow-Up?: Follow Up Seborrheic Dermatitis Additional History: History of [[id number]]

## 2023-11-29 ENCOUNTER — EMERGENCY (EMERGENCY)
Facility: HOSPITAL | Age: 68
LOS: 0 days | Discharge: HOME | End: 2023-11-29
Attending: EMERGENCY MEDICINE
Payer: MEDICARE

## 2023-11-29 VITALS
RESPIRATION RATE: 18 BRPM | HEART RATE: 97 BPM | OXYGEN SATURATION: 100 % | TEMPERATURE: 99 F | SYSTOLIC BLOOD PRESSURE: 163 MMHG | DIASTOLIC BLOOD PRESSURE: 74 MMHG

## 2023-11-29 VITALS — DIASTOLIC BLOOD PRESSURE: 67 MMHG | HEART RATE: 79 BPM | SYSTOLIC BLOOD PRESSURE: 138 MMHG

## 2023-11-29 DIAGNOSIS — R68.83 CHILLS (WITHOUT FEVER): ICD-10-CM

## 2023-11-29 DIAGNOSIS — E11.9 TYPE 2 DIABETES MELLITUS WITHOUT COMPLICATIONS: ICD-10-CM

## 2023-11-29 DIAGNOSIS — R19.7 DIARRHEA, UNSPECIFIED: ICD-10-CM

## 2023-11-29 DIAGNOSIS — F20.9 SCHIZOPHRENIA, UNSPECIFIED: ICD-10-CM

## 2023-11-29 DIAGNOSIS — Z79.84 LONG TERM (CURRENT) USE OF ORAL HYPOGLYCEMIC DRUGS: ICD-10-CM

## 2023-11-29 LAB
ALBUMIN SERPL ELPH-MCNC: 4.1 G/DL — SIGNIFICANT CHANGE UP (ref 3.5–5.2)
ALBUMIN SERPL ELPH-MCNC: 4.1 G/DL — SIGNIFICANT CHANGE UP (ref 3.5–5.2)
ALP SERPL-CCNC: 105 U/L — SIGNIFICANT CHANGE UP (ref 30–115)
ALP SERPL-CCNC: 105 U/L — SIGNIFICANT CHANGE UP (ref 30–115)
ALT FLD-CCNC: 22 U/L — SIGNIFICANT CHANGE UP (ref 0–41)
ALT FLD-CCNC: 22 U/L — SIGNIFICANT CHANGE UP (ref 0–41)
ANION GAP SERPL CALC-SCNC: 10 MMOL/L — SIGNIFICANT CHANGE UP (ref 7–14)
ANION GAP SERPL CALC-SCNC: 10 MMOL/L — SIGNIFICANT CHANGE UP (ref 7–14)
ANISOCYTOSIS BLD QL: SLIGHT — SIGNIFICANT CHANGE UP
ANISOCYTOSIS BLD QL: SLIGHT — SIGNIFICANT CHANGE UP
APAP SERPL-MCNC: <5 UG/ML — LOW (ref 10–30)
APAP SERPL-MCNC: <5 UG/ML — LOW (ref 10–30)
AST SERPL-CCNC: 35 U/L — SIGNIFICANT CHANGE UP (ref 0–41)
AST SERPL-CCNC: 35 U/L — SIGNIFICANT CHANGE UP (ref 0–41)
BASOPHILS # BLD AUTO: 0 K/UL — SIGNIFICANT CHANGE UP (ref 0–0.2)
BASOPHILS # BLD AUTO: 0 K/UL — SIGNIFICANT CHANGE UP (ref 0–0.2)
BASOPHILS NFR BLD AUTO: 0 % — SIGNIFICANT CHANGE UP (ref 0–1)
BASOPHILS NFR BLD AUTO: 0 % — SIGNIFICANT CHANGE UP (ref 0–1)
BILIRUB SERPL-MCNC: 0.3 MG/DL — SIGNIFICANT CHANGE UP (ref 0.2–1.2)
BILIRUB SERPL-MCNC: 0.3 MG/DL — SIGNIFICANT CHANGE UP (ref 0.2–1.2)
BUN SERPL-MCNC: 16 MG/DL — SIGNIFICANT CHANGE UP (ref 10–20)
BUN SERPL-MCNC: 16 MG/DL — SIGNIFICANT CHANGE UP (ref 10–20)
CALCIUM SERPL-MCNC: 8.7 MG/DL — SIGNIFICANT CHANGE UP (ref 8.4–10.5)
CALCIUM SERPL-MCNC: 8.7 MG/DL — SIGNIFICANT CHANGE UP (ref 8.4–10.5)
CHLORIDE SERPL-SCNC: 104 MMOL/L — SIGNIFICANT CHANGE UP (ref 98–110)
CHLORIDE SERPL-SCNC: 104 MMOL/L — SIGNIFICANT CHANGE UP (ref 98–110)
CO2 SERPL-SCNC: 27 MMOL/L — SIGNIFICANT CHANGE UP (ref 17–32)
CO2 SERPL-SCNC: 27 MMOL/L — SIGNIFICANT CHANGE UP (ref 17–32)
CREAT SERPL-MCNC: 0.7 MG/DL — SIGNIFICANT CHANGE UP (ref 0.7–1.5)
CREAT SERPL-MCNC: 0.7 MG/DL — SIGNIFICANT CHANGE UP (ref 0.7–1.5)
EGFR: 102 ML/MIN/1.73M2 — SIGNIFICANT CHANGE UP
EGFR: 102 ML/MIN/1.73M2 — SIGNIFICANT CHANGE UP
EOSINOPHIL # BLD AUTO: 0 K/UL — SIGNIFICANT CHANGE UP (ref 0–0.7)
EOSINOPHIL # BLD AUTO: 0 K/UL — SIGNIFICANT CHANGE UP (ref 0–0.7)
EOSINOPHIL NFR BLD AUTO: 0 % — SIGNIFICANT CHANGE UP (ref 0–8)
EOSINOPHIL NFR BLD AUTO: 0 % — SIGNIFICANT CHANGE UP (ref 0–8)
ETHANOL SERPL-MCNC: <10 MG/DL — SIGNIFICANT CHANGE UP
ETHANOL SERPL-MCNC: <10 MG/DL — SIGNIFICANT CHANGE UP
GLUCOSE SERPL-MCNC: 94 MG/DL — SIGNIFICANT CHANGE UP (ref 70–99)
GLUCOSE SERPL-MCNC: 94 MG/DL — SIGNIFICANT CHANGE UP (ref 70–99)
HCT VFR BLD CALC: 40.5 % — LOW (ref 42–52)
HCT VFR BLD CALC: 40.5 % — LOW (ref 42–52)
HGB BLD-MCNC: 13.1 G/DL — LOW (ref 14–18)
HGB BLD-MCNC: 13.1 G/DL — LOW (ref 14–18)
LYMPHOCYTES # BLD AUTO: 1.28 K/UL — SIGNIFICANT CHANGE UP (ref 1.2–3.4)
LYMPHOCYTES # BLD AUTO: 1.28 K/UL — SIGNIFICANT CHANGE UP (ref 1.2–3.4)
LYMPHOCYTES # BLD AUTO: 18.6 % — LOW (ref 20.5–51.1)
LYMPHOCYTES # BLD AUTO: 18.6 % — LOW (ref 20.5–51.1)
MANUAL SMEAR VERIFICATION: SIGNIFICANT CHANGE UP
MANUAL SMEAR VERIFICATION: SIGNIFICANT CHANGE UP
MCHC RBC-ENTMCNC: 29.3 PG — SIGNIFICANT CHANGE UP (ref 27–31)
MCHC RBC-ENTMCNC: 29.3 PG — SIGNIFICANT CHANGE UP (ref 27–31)
MCHC RBC-ENTMCNC: 32.3 G/DL — SIGNIFICANT CHANGE UP (ref 32–37)
MCHC RBC-ENTMCNC: 32.3 G/DL — SIGNIFICANT CHANGE UP (ref 32–37)
MCV RBC AUTO: 90.6 FL — SIGNIFICANT CHANGE UP (ref 80–94)
MCV RBC AUTO: 90.6 FL — SIGNIFICANT CHANGE UP (ref 80–94)
MICROCYTES BLD QL: SLIGHT — SIGNIFICANT CHANGE UP
MICROCYTES BLD QL: SLIGHT — SIGNIFICANT CHANGE UP
MONOCYTES # BLD AUTO: 0.36 K/UL — SIGNIFICANT CHANGE UP (ref 0.1–0.6)
MONOCYTES # BLD AUTO: 0.36 K/UL — SIGNIFICANT CHANGE UP (ref 0.1–0.6)
MONOCYTES NFR BLD AUTO: 5.3 % — SIGNIFICANT CHANGE UP (ref 1.7–9.3)
MONOCYTES NFR BLD AUTO: 5.3 % — SIGNIFICANT CHANGE UP (ref 1.7–9.3)
NEUTROPHILS # BLD AUTO: 3.46 K/UL — SIGNIFICANT CHANGE UP (ref 1.4–6.5)
NEUTROPHILS # BLD AUTO: 3.46 K/UL — SIGNIFICANT CHANGE UP (ref 1.4–6.5)
NEUTROPHILS NFR BLD AUTO: 50.4 % — SIGNIFICANT CHANGE UP (ref 42.2–75.2)
NEUTROPHILS NFR BLD AUTO: 50.4 % — SIGNIFICANT CHANGE UP (ref 42.2–75.2)
OVALOCYTES BLD QL SMEAR: SLIGHT — SIGNIFICANT CHANGE UP
OVALOCYTES BLD QL SMEAR: SLIGHT — SIGNIFICANT CHANGE UP
PLAT MORPH BLD: NORMAL — SIGNIFICANT CHANGE UP
PLAT MORPH BLD: NORMAL — SIGNIFICANT CHANGE UP
PLATELET # BLD AUTO: 143 K/UL — SIGNIFICANT CHANGE UP (ref 130–400)
PLATELET # BLD AUTO: 143 K/UL — SIGNIFICANT CHANGE UP (ref 130–400)
PLATELET COUNT - ESTIMATE: ABNORMAL
PLATELET COUNT - ESTIMATE: ABNORMAL
PMV BLD: 11.7 FL — HIGH (ref 7.4–10.4)
PMV BLD: 11.7 FL — HIGH (ref 7.4–10.4)
POIKILOCYTOSIS BLD QL AUTO: SLIGHT — SIGNIFICANT CHANGE UP
POIKILOCYTOSIS BLD QL AUTO: SLIGHT — SIGNIFICANT CHANGE UP
POTASSIUM SERPL-MCNC: 4.3 MMOL/L — SIGNIFICANT CHANGE UP (ref 3.5–5)
POTASSIUM SERPL-MCNC: 4.3 MMOL/L — SIGNIFICANT CHANGE UP (ref 3.5–5)
POTASSIUM SERPL-SCNC: 4.3 MMOL/L — SIGNIFICANT CHANGE UP (ref 3.5–5)
POTASSIUM SERPL-SCNC: 4.3 MMOL/L — SIGNIFICANT CHANGE UP (ref 3.5–5)
PROT SERPL-MCNC: 6.6 G/DL — SIGNIFICANT CHANGE UP (ref 6–8)
PROT SERPL-MCNC: 6.6 G/DL — SIGNIFICANT CHANGE UP (ref 6–8)
RBC # BLD: 4.47 M/UL — LOW (ref 4.7–6.1)
RBC # BLD: 4.47 M/UL — LOW (ref 4.7–6.1)
RBC # FLD: 16.5 % — HIGH (ref 11.5–14.5)
RBC # FLD: 16.5 % — HIGH (ref 11.5–14.5)
RBC BLD AUTO: ABNORMAL
RBC BLD AUTO: ABNORMAL
SALICYLATES SERPL-MCNC: <0.3 MG/DL — LOW (ref 4–30)
SALICYLATES SERPL-MCNC: <0.3 MG/DL — LOW (ref 4–30)
SMUDGE CELLS # BLD: PRESENT — SIGNIFICANT CHANGE UP
SMUDGE CELLS # BLD: PRESENT — SIGNIFICANT CHANGE UP
SODIUM SERPL-SCNC: 141 MMOL/L — SIGNIFICANT CHANGE UP (ref 135–146)
SODIUM SERPL-SCNC: 141 MMOL/L — SIGNIFICANT CHANGE UP (ref 135–146)
VARIANT LYMPHS # BLD: 25.7 % — HIGH (ref 0–5)
VARIANT LYMPHS # BLD: 25.7 % — HIGH (ref 0–5)
WBC # BLD: 6.87 K/UL — SIGNIFICANT CHANGE UP (ref 4.8–10.8)
WBC # BLD: 6.87 K/UL — SIGNIFICANT CHANGE UP (ref 4.8–10.8)
WBC # FLD AUTO: 6.87 K/UL — SIGNIFICANT CHANGE UP (ref 4.8–10.8)
WBC # FLD AUTO: 6.87 K/UL — SIGNIFICANT CHANGE UP (ref 4.8–10.8)

## 2023-11-29 PROCEDURE — 80307 DRUG TEST PRSMV CHEM ANLYZR: CPT

## 2023-11-29 PROCEDURE — 93010 ELECTROCARDIOGRAM REPORT: CPT

## 2023-11-29 PROCEDURE — 85025 COMPLETE CBC W/AUTO DIFF WBC: CPT

## 2023-11-29 PROCEDURE — 80053 COMPREHEN METABOLIC PANEL: CPT

## 2023-11-29 PROCEDURE — 99283 EMERGENCY DEPT VISIT LOW MDM: CPT | Mod: 25

## 2023-11-29 PROCEDURE — 99285 EMERGENCY DEPT VISIT HI MDM: CPT | Mod: FS

## 2023-11-29 PROCEDURE — 36415 COLL VENOUS BLD VENIPUNCTURE: CPT

## 2023-11-29 PROCEDURE — 93005 ELECTROCARDIOGRAM TRACING: CPT

## 2023-11-29 RX ORDER — METOPROLOL TARTRATE 50 MG
50 TABLET ORAL ONCE
Refills: 0 | Status: COMPLETED | OUTPATIENT
Start: 2023-11-29 | End: 2023-11-29

## 2023-11-29 RX ORDER — CLOZAPINE 150 MG/1
200 TABLET, ORALLY DISINTEGRATING ORAL ONCE
Refills: 0 | Status: DISCONTINUED | OUTPATIENT
Start: 2023-11-29 | End: 2023-11-29

## 2023-11-29 RX ORDER — LEVOTHYROXINE SODIUM 125 MCG
200 TABLET ORAL ONCE
Refills: 0 | Status: COMPLETED | OUTPATIENT
Start: 2023-11-29 | End: 2023-11-29

## 2023-11-29 RX ORDER — METFORMIN HYDROCHLORIDE 850 MG/1
500 TABLET ORAL ONCE
Refills: 0 | Status: COMPLETED | OUTPATIENT
Start: 2023-11-29 | End: 2023-11-29

## 2023-11-29 RX ADMIN — Medication 50 MILLIGRAM(S): at 11:19

## 2023-11-29 RX ADMIN — METFORMIN HYDROCHLORIDE 500 MILLIGRAM(S): 850 TABLET ORAL at 11:19

## 2023-11-29 RX ADMIN — Medication 200 MICROGRAM(S): at 11:19

## 2023-11-29 NOTE — ED PROVIDER NOTE - OBJECTIVE STATEMENT
67 yo M with pmhx of CLL, DM, schizophrenia presenting for evaluation after having chills and diarrhea. Patient not giving much history. No homicidal or suicidal ideations.    I am unable to obtain a comprehensive history, review of systems, past medical history, and/or physical exam due to constraints imposed by the urgency of the patient's clinical condition and/or mental status. 69 yo M with pmhx of CLL, DM, schizophrenia presenting for evaluation after having chills and diarrhea. Patient not giving much history. No homicidal or suicidal ideations.    I am unable to obtain a comprehensive history, review of systems, past medical history, and/or physical exam due to constraints imposed by the urgency of the patient's clinical condition and/or mental status.

## 2023-11-29 NOTE — ED PROVIDER NOTE - NSFOLLOWUPINSTRUCTIONS_ED_ALL_ED_FT
PLEASE FOLLOW UP WITH YOUR ONCOLOGIST AT [facility] AND YOUR PSYCHIATRIST DR. MCMANUS.      PLEASE TAKE YOUR PRESCRIBED MEDICATIONS AT THE FACILITY.      Medical Screening Exam  A medical screening exam helps determine whether or not you need emergency medical treatment.    During the medical screening exam, a health care provider does a short physical exam and medical history to assess:    Your current symptoms.  Your overall health.    Depending on your symptoms, you may need additional tests.    What are the possible outcomes of a medical screening exam?  Your medical screening exam may determine that:    You do not need emergency treatment at this time.  You need treatment right away.  You need to be transferred to another medical center.    When should I seek medical care?  If you have a regular health care provider, make an appointment for a follow-up visit with him or her. If you do not have a regular health care provider, ask about resources in your community.    Get help right away if:  Your condition may change over time. If your condition gets worse or you develop new or troubling symptoms before you see your health care provider, go to an emergency department right away.    In an emergency:     Call 911 or have someone drive you to the nearest hospital.  Do not drive yourself.  This information is not intended to replace advice given to you by your health care provider. Make sure you discuss any questions you have with your health care provider. PLEASE FOLLOW UP WITH YOUR ONCOLOGIST AT [facility] AND YOUR PSYCHIATRIST DR. MCMANUS.      PLEASE TAKE YOUR PRESCRIBED MEDICATIONS AT THE FACILITY.      Medical Screening Exam  A medical screening exam helps determine whether or not you need emergency medical treatment.    During the medical screening exam, a health care provider does a short physical exam and medical history to assess:    Your current symptoms.  Your overall health.    Depending on your symptoms, you may need additional tests.    What are the possible outcomes of a medical screening exam?  Your medical screening exam may determine that:    You do not need emergency treatment at this time.  You need treatment right away.  You need to be transferred to another medical center.    When should I seek medical care?  If you have a regular health care provider, make an appointment for a follow-up visit with him or her. If you do not have a regular health care provider, ask about resources in your community.    Get help right away if:  Your condition may change over time. If your condition gets worse or you develop new or troubling symptoms before you see your health care provider, go to an emergency department right away.    In an emergency:     Call 911 or have someone drive you to the nearest hospital.  Do not drive yourself.  This information is not intended to replace advice given to you by your health care provider. Make sure you discuss any questions you have with your health care provider. PLEASE FOLLOW UP WITH YOUR ONCOLOGIST AT [facility] AND YOUR PSYCHIATRIST DR. MCMANUS.      PLEASE TAKE YOUR PRESCRIBED MEDICATIONS AT THE FACILITY.      Medical Screening Exam  A medical screening exam helps determine whether or not you need emergency medical treatment.    During the medical screening exam, a health care provider does a short physical exam and medical history to assess:    Your current symptoms.  Your overall health.    Depending on your symptoms, you may need additional tests.    What are the possible outcomes of a medical screening exam?  Your medical screening exam may determine that:    You do not need emergency treatment at this time.  You need treatment right away.  You need to be transferred to another medical center.    When should I seek medical care?  If you have a regular health care provider, make an appointment for a follow-up visit with him or her. If you do not have a regular health care provider, ask about resources in your community.    Get help right away if:  Your condition may change over time. If your condition gets worse or you develop new or troubling symptoms before you see your health care provider, go to an emergency department right away.    In an emergency:     Call 911 or have someone drive you to the nearest hospital.  Do not drive yourself.  This information is not intended to replace advice given to you by your health care provider. Make sure you discuss any questions you have with your health care provider.

## 2023-11-29 NOTE — ED PROVIDER NOTE - PATIENT PORTAL LINK FT
You can access the FollowMyHealth Patient Portal offered by [facility] by registering at the following website: [email or web address]. By joining [employer]’s FollowMyHealth portal, you will also be able to view your health information using other applications (apps) compatible with our system. You can access the FollowMyHealth Patient Portal offered by [facility] by registering at the following website: [email or web address]. By joining [employer]’s FollowMyHealth portal, you will also be able to view your health information using other applications (apps) compatible with our system. You can access the FollowMyHealth Patient Portal offered by [facility] by registering at the following website: [email or web address]. By joining [employer]’s FollowMyHealth portal, you will also be able to view your health information using other applications (apps) compatible with our system.

## 2023-11-29 NOTE — ED PROVIDER NOTE - ATTENDING APP SHARED VISIT CONTRIBUTION OF CARE
67 yo m with CLL on brutiniv, dm, schizophrenia presents from transitional living facility at [address] [place] [address] ([phone]) for "evaluation".  pt was not forth coming at triage, but was more conversational during evaluation/exam.  denies si or hi.  says he has been having chills in bed and diarrhea x 18 days.  denies abd pain,cp, sob.  no fever or chills.  pt was in ED yesterday and was c/o headache.  no focal neuro complaints.  spoke to living facility who says pt has not been taking his meds at the facility for 5-6 days.  however, as per dr. mejia his psychiatrist [phone], he was in the office 2 days ago and pt was at his baseline disorganized status.  she states pt is at his baseline and no indication for inpt psych admission.  had labs last week to check his cbc due to clozaril use.  exam: nad, ncat, perrl, eomi, mmm, rrr, ctab, abd soft, nt, nd aox2 imp: pt with schizophrenia, no si or hi, reassuring exam, was able to speak to his psychiatrist.  will give him his doses of missed meds and check labs. 67 yo m with CLL on brutiniv, dm, schizophrenia presents from transitional living facility at [address] [place] [address] ([phone]) for "evaluation".  pt was not forth coming at triage, but was more conversational during evaluation/exam.  denies si or hi.  says he has been having chills in bed and diarrhea x 18 days.  denies abd pain,cp, sob.  no fever or chills.  pt was in ED yesterday and was c/o headache.  no focal neuro complaints.  spoke to living facility who says pt has not been taking his meds at the facility for 5-6 days.  however, as per dr. mejia his psychiatrist [phone], he was in the office 2 days ago and pt was at his baseline disorganized status.  she states pt is at his baseline and no indication for inpt psych admission.  had labs last week to check his cbc due to clozaril use.  exam: nad, ncat, perrl, eomi, mmm, rrr, ctab, abd soft, nt, nd aox2 imp: pt with schizophrenia, no si or hi, reassuring exam, was able to speak to his psychiatrist.  will give him his doses of missed meds and check labs. 67 yo m with CLL on brutiniv, dm, schizophrenia presents from transitional living facility at [address] [place] [address] ([phone]) for "evaluation".  pt was not forth coming at triage, but was more conversational during evaluation/exam.  denies si or hi.  says he has been having chills in bed and diarrhea x 18 days.  denies abd pain,cp, sob.  no fever or chills.  pt was in ED yesterday and was c/o headache.  no focal neuro complaints.  spoke to living facility who says pt has not been taking his meds at the facility for 5-6 days.  however, as per dr. mejia his psychiatrist [phone], he was in the office 2 days ago and pt was at his baseline disorganized status.  she states pt is at his baseline and no indication for inpt psych admission.  had labs last week to check his cbc due to clozaril use.  exam: nad, ncat, perrl, eomi, mmm, rrr, ctab, abd soft, nt, nd aox2 imp: pt with schizophrenia, no si or hi, reassuring exam, was able to speak to his psychiatrist.  will give him his doses of missed meds and check labs.

## 2023-11-29 NOTE — ED PROVIDER NOTE - CLINICAL SUMMARY MEDICAL DECISION MAKING FREE TEXT BOX
pt here with vague symptoms, chills, labs reassuring.  was seen yesterday and had neg ct head.  pt has normal exam and no si or hi.  pt is cleared for dc and to f/u with his onco and psychiatrist who is aware that pt is here.  Any ordered labs and EKG were reviewed.  Any imaging was ordered and reviewed by me.  Appropriate medications for patient's presenting complaints were ordered and effects were reassessed.  Patient's records (prior hospital, ED visit, and/or nursing home notes if available) were reviewed.  Additional history was obtained from EMS, family, and/or PCP (where available).  Escalation to admission/observation was considered.  1) However patient feels much better and is comfortable with discharge.  Appropriate follow-up was arranged.

## 2023-11-29 NOTE — ED ADULT TRIAGE NOTE - CHIEF COMPLAINT QUOTE
Pt here from [address] [place] [address] with hx of schizophrenia- not responding to question of why he is here today. Pt states he does speak english

## 2023-11-29 NOTE — ED ADULT NURSE NOTE - CHIEF COMPLAINT QUOTE
Pt here from [address] [place] [address] with hx of schizophrenia- not responding to question of why he is here today. Pt states he does speak english

## 2023-11-29 NOTE — ED PROVIDER NOTE - PRINCIPAL DIAGNOSIS
Message   Recorded as Task   Date: 02/23/2016 08:55 AM, Created By: Kei Juárez   Task Name: Follow Up   Assigned To: [id number] end procedure,Team   Regarding Patient: Carmen Smith, Status: Active   Comment:    Carissa Londono - 23 Feb 2016 8:55 AM     TASK CREATED  Pt  s/p RT SIJ INJ / COUMADIN on 2/22/16 w/ Dr Gennaro Kelley scheduled on 3/23/16 at 11 am w/ AO    Please contact pt  on 2/29/16  Ann Mendoza - 01 Mar 0362 7:08 PM     TASK EDITED  Pt reports that the first couple of days were good  She states she has alot of burning in her right leg and pain in her right buttock  I confirmed f/u with patient and advised if no other rec's at this time we will see her at f/u   Thanks   Corazon Marmolejo - 01 Mar 2016 2:32 PM     TASK REPLIED TO: Previously Assigned To Corazon Marmolejo  aware agree        Signatures   Electronically signed by : Leola Hartley, ; Mar  1 2016  2:32PM EST                       (Author)
[place]

## 2023-11-29 NOTE — ED ADULT NURSE NOTE - NSSEPSISSUSPECTED_ED_A_ED
Ivy Cheng) Per MD Sam, 12.5 grams/ 25ml Dextrose 50% to be ordered. Will administer per orders, re-assess and continue to monitor. As per PETER Hayden, administer 5mg metoprolol IVP. Report given back to SILVIANO Claros who administered medication. HR running in the 100's. Will continue to monitor. Enhance supervision with sitter PETER Hayden made aware, EKG completed, metoprolol 5mg IV push administered, HR currently 100s-110s, will continue to monitor. PETER Hayden made, no interventions at this time, will continue to monitor. No

## 2023-11-29 NOTE — ED PROVIDER NOTE - PHYSICAL EXAMINATION
VITAL SIGNS: I have reviewed nursing notes and confirm.  CONSTITUTIONAL: Patient is in no acute distress.  SKIN: Skin exam is warm and dry, no acute rash.  HEAD: Normocephalic; atraumatic.  EYES: PERRL, EOM intact; conjunctiva and sclera clear.  ENT: No nasal discharge; airway clear.   NECK: Supple; non tender.  CARD: S1, S2 normal; no murmurs, gallops, or rubs. Regular rate and rhythm.  RESP: Clear to auscultation bilaterally. No wheezes, rales or rhonchi.  ABD: Normal bowel sounds; soft; non-distended; non-tender.   EXT: Normal ROM. No edema.  LYMPH: No acute cervical adenopathy.  NEURO: Alert, oriented. Grossly unremarkable. No focal deficits.  PSYCH: Cooperative.

## 2023-11-29 NOTE — ED PROVIDER NOTE - PROGRESS NOTE DETAILS
labs reassuring.  spoke to facility that pt is cleared for dc and will need to f/u with dr. mejia and his oncologist.  pt is being given a dose of his missed meds except for the chemo med

## 2023-11-30 ENCOUNTER — EMERGENCY (EMERGENCY)
Facility: HOSPITAL | Age: 68
LOS: 1 days | Discharge: HOME | End: 2023-12-02
Attending: EMERGENCY MEDICINE
Payer: MEDICARE

## 2023-11-30 VITALS
RESPIRATION RATE: 18 BRPM | WEIGHT: 171.96 LBS | HEIGHT: 67 IN | TEMPERATURE: 98 F | HEART RATE: 86 BPM | DIASTOLIC BLOOD PRESSURE: 86 MMHG | OXYGEN SATURATION: 97 % | SYSTOLIC BLOOD PRESSURE: 146 MMHG

## 2023-11-30 DIAGNOSIS — E11.9 TYPE 2 DIABETES MELLITUS WITHOUT COMPLICATIONS: ICD-10-CM

## 2023-11-30 DIAGNOSIS — Z23 ENCOUNTER FOR IMMUNIZATION: ICD-10-CM
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
## 2023-11-30 DIAGNOSIS — Z79.82 LONG TERM (CURRENT) USE OF ASPIRIN: ICD-10-CM
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
## 2023-11-30 DIAGNOSIS — Z79.02 LONG TERM (CURRENT) USE OF ANTITHROMBOTICS/ANTIPLATELETS: ICD-10-CM

## 2023-11-30 DIAGNOSIS — S01.81XA LACERATION WITHOUT FOREIGN BODY OF OTHER PART OF HEAD, INITIAL ENCOUNTER: ICD-10-CM
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
## 2023-11-30 DIAGNOSIS — Z85.6 PERSONAL HISTORY OF LEUKEMIA: ICD-10-CM

## 2023-11-30 DIAGNOSIS — F20.9 SCHIZOPHRENIA, UNSPECIFIED: ICD-10-CM

## 2023-11-30 LAB
ALBUMIN SERPL ELPH-MCNC: 4.3 G/DL — SIGNIFICANT CHANGE UP (ref 3.5–5.2)
ALBUMIN SERPL ELPH-MCNC: 4.3 G/DL — SIGNIFICANT CHANGE UP (ref 3.5–5.2)
ALP SERPL-CCNC: 99 U/L — SIGNIFICANT CHANGE UP (ref 30–115)
ALP SERPL-CCNC: 99 U/L — SIGNIFICANT CHANGE UP (ref 30–115)
ALT FLD-CCNC: 20 U/L — SIGNIFICANT CHANGE UP (ref 0–41)
ALT FLD-CCNC: 20 U/L — SIGNIFICANT CHANGE UP (ref 0–41)
ANION GAP SERPL CALC-SCNC: 10 MMOL/L — SIGNIFICANT CHANGE UP (ref 7–14)
ANION GAP SERPL CALC-SCNC: 10 MMOL/L — SIGNIFICANT CHANGE UP (ref 7–14)
AST SERPL-CCNC: 24 U/L — SIGNIFICANT CHANGE UP (ref 0–41)
AST SERPL-CCNC: 24 U/L — SIGNIFICANT CHANGE UP (ref 0–41)
BILIRUB SERPL-MCNC: 0.3 MG/DL — SIGNIFICANT CHANGE UP (ref 0.2–1.2)
BILIRUB SERPL-MCNC: 0.3 MG/DL — SIGNIFICANT CHANGE UP (ref 0.2–1.2)
BUN SERPL-MCNC: 13 MG/DL — SIGNIFICANT CHANGE UP (ref 10–20)
BUN SERPL-MCNC: 13 MG/DL — SIGNIFICANT CHANGE UP (ref 10–20)
CALCIUM SERPL-MCNC: 8.8 MG/DL — SIGNIFICANT CHANGE UP (ref 8.4–10.5)
CALCIUM SERPL-MCNC: 8.8 MG/DL — SIGNIFICANT CHANGE UP (ref 8.4–10.5)
CHLORIDE SERPL-SCNC: 107 MMOL/L — SIGNIFICANT CHANGE UP (ref 98–110)
CHLORIDE SERPL-SCNC: 107 MMOL/L — SIGNIFICANT CHANGE UP (ref 98–110)
CO2 SERPL-SCNC: 29 MMOL/L — SIGNIFICANT CHANGE UP (ref 17–32)
CO2 SERPL-SCNC: 29 MMOL/L — SIGNIFICANT CHANGE UP (ref 17–32)
CREAT SERPL-MCNC: 0.7 MG/DL — SIGNIFICANT CHANGE UP (ref 0.7–1.5)
CREAT SERPL-MCNC: 0.7 MG/DL — SIGNIFICANT CHANGE UP (ref 0.7–1.5)
EGFR: 102 ML/MIN/1.73M2 — SIGNIFICANT CHANGE UP
EGFR: 102 ML/MIN/1.73M2 — SIGNIFICANT CHANGE UP
GLUCOSE SERPL-MCNC: 117 MG/DL — HIGH (ref 70–99)
GLUCOSE SERPL-MCNC: 117 MG/DL — HIGH (ref 70–99)
HCT VFR BLD CALC: 41.6 % — LOW (ref 42–52)
HCT VFR BLD CALC: 41.6 % — LOW (ref 42–52)
HGB BLD-MCNC: 13.2 G/DL — LOW (ref 14–18)
HGB BLD-MCNC: 13.2 G/DL — LOW (ref 14–18)
MAGNESIUM SERPL-MCNC: 1.9 MG/DL — SIGNIFICANT CHANGE UP (ref 1.8–2.4)
MAGNESIUM SERPL-MCNC: 1.9 MG/DL — SIGNIFICANT CHANGE UP (ref 1.8–2.4)
MCHC RBC-ENTMCNC: 28.8 PG — SIGNIFICANT CHANGE UP (ref 27–31)
MCHC RBC-ENTMCNC: 28.8 PG — SIGNIFICANT CHANGE UP (ref 27–31)
MCHC RBC-ENTMCNC: 31.7 G/DL — LOW (ref 32–37)
MCHC RBC-ENTMCNC: 31.7 G/DL — LOW (ref 32–37)
MCV RBC AUTO: 90.8 FL — SIGNIFICANT CHANGE UP (ref 80–94)
MCV RBC AUTO: 90.8 FL — SIGNIFICANT CHANGE UP (ref 80–94)
NRBC # BLD: 0 /100 WBCS — SIGNIFICANT CHANGE UP (ref 0–0)
NRBC # BLD: 0 /100 WBCS — SIGNIFICANT CHANGE UP (ref 0–0)
NT-PROBNP SERPL-SCNC: 734 PG/ML — HIGH (ref 0–300)
NT-PROBNP SERPL-SCNC: 734 PG/ML — HIGH (ref 0–300)
PLATELET # BLD AUTO: 128 K/UL — LOW (ref 130–400)
PLATELET # BLD AUTO: 128 K/UL — LOW (ref 130–400)
PMV BLD: 11.5 FL — HIGH (ref 7.4–10.4)
PMV BLD: 11.5 FL — HIGH (ref 7.4–10.4)
POTASSIUM SERPL-MCNC: 4.4 MMOL/L — SIGNIFICANT CHANGE UP (ref 3.5–5)
POTASSIUM SERPL-MCNC: 4.4 MMOL/L — SIGNIFICANT CHANGE UP (ref 3.5–5)
POTASSIUM SERPL-SCNC: 4.4 MMOL/L — SIGNIFICANT CHANGE UP (ref 3.5–5)
POTASSIUM SERPL-SCNC: 4.4 MMOL/L — SIGNIFICANT CHANGE UP (ref 3.5–5)
PROT SERPL-MCNC: 6.1 G/DL — SIGNIFICANT CHANGE UP (ref 6–8)
PROT SERPL-MCNC: 6.1 G/DL — SIGNIFICANT CHANGE UP (ref 6–8)
RBC # BLD: 4.58 M/UL — LOW (ref 4.7–6.1)
RBC # BLD: 4.58 M/UL — LOW (ref 4.7–6.1)
RBC # FLD: 16.7 % — HIGH (ref 11.5–14.5)
RBC # FLD: 16.7 % — HIGH (ref 11.5–14.5)
SODIUM SERPL-SCNC: 146 MMOL/L — SIGNIFICANT CHANGE UP (ref 135–146)
SODIUM SERPL-SCNC: 146 MMOL/L — SIGNIFICANT CHANGE UP (ref 135–146)
TROPONIN T SERPL-MCNC: <0.01 NG/ML — SIGNIFICANT CHANGE UP
WBC # BLD: 7.94 K/UL — SIGNIFICANT CHANGE UP (ref 4.8–10.8)
WBC # BLD: 7.94 K/UL — SIGNIFICANT CHANGE UP (ref 4.8–10.8)
WBC # FLD AUTO: 7.94 K/UL — SIGNIFICANT CHANGE UP (ref 4.8–10.8)
WBC # FLD AUTO: 7.94 K/UL — SIGNIFICANT CHANGE UP (ref 4.8–10.8)

## 2023-11-30 PROCEDURE — 83735 ASSAY OF MAGNESIUM: CPT

## 2023-11-30 PROCEDURE — 73564 X-RAY EXAM KNEE 4 OR MORE: CPT | Mod: 50

## 2023-11-30 PROCEDURE — 83880 ASSAY OF NATRIURETIC PEPTIDE: CPT

## 2023-11-30 PROCEDURE — 71045 X-RAY EXAM CHEST 1 VIEW: CPT

## 2023-11-30 PROCEDURE — 70486 CT MAXILLOFACIAL W/O DYE: CPT | Mod: 26,MA

## 2023-11-30 PROCEDURE — 90715 TDAP VACCINE 7 YRS/> IM: CPT

## 2023-11-30 PROCEDURE — 70450 CT HEAD/BRAIN W/O DYE: CPT | Mod: MA

## 2023-11-30 PROCEDURE — 90471 IMMUNIZATION ADMIN: CPT

## 2023-11-30 PROCEDURE — 73564 X-RAY EXAM KNEE 4 OR MORE: CPT | Mod: 26,50

## 2023-11-30 PROCEDURE — 71045 X-RAY EXAM CHEST 1 VIEW: CPT | Mod: 26

## 2023-11-30 PROCEDURE — 12011 RPR F/E/E/N/L/M 2.5 CM/<: CPT

## 2023-11-30 PROCEDURE — 85027 COMPLETE CBC AUTOMATED: CPT

## 2023-11-30 PROCEDURE — 70486 CT MAXILLOFACIAL W/O DYE: CPT | Mod: MA

## 2023-11-30 PROCEDURE — 75574 CT ANGIO HRT W/3D IMAGE: CPT | Mod: MA

## 2023-11-30 PROCEDURE — 72125 CT NECK SPINE W/O DYE: CPT | Mod: MA

## 2023-11-30 PROCEDURE — 36415 COLL VENOUS BLD VENIPUNCTURE: CPT

## 2023-11-30 PROCEDURE — 70450 CT HEAD/BRAIN W/O DYE: CPT | Mod: 26,MA

## 2023-11-30 PROCEDURE — G0378: CPT

## 2023-11-30 PROCEDURE — 93005 ELECTROCARDIOGRAM TRACING: CPT | Mod: XU

## 2023-11-30 PROCEDURE — 99223 1ST HOSP IP/OBS HIGH 75: CPT | Mod: FS,25

## 2023-11-30 PROCEDURE — 72125 CT NECK SPINE W/O DYE: CPT | Mod: 26,MA

## 2023-11-30 PROCEDURE — 93010 ELECTROCARDIOGRAM REPORT: CPT | Mod: 76

## 2023-11-30 PROCEDURE — 80053 COMPREHEN METABOLIC PANEL: CPT

## 2023-11-30 PROCEDURE — 73130 X-RAY EXAM OF HAND: CPT | Mod: LT

## 2023-11-30 PROCEDURE — 12051 INTMD RPR FACE/MM 2.5 CM/<: CPT | Mod: GC

## 2023-11-30 PROCEDURE — 84484 ASSAY OF TROPONIN QUANT: CPT | Mod: 59

## 2023-11-30 PROCEDURE — 99285 EMERGENCY DEPT VISIT HI MDM: CPT | Mod: 25

## 2023-11-30 PROCEDURE — 73130 X-RAY EXAM OF HAND: CPT | Mod: 26,LT

## 2023-11-30 RX ORDER — ACETAMINOPHEN 500 MG
650 TABLET ORAL ONCE
Refills: 0 | Status: COMPLETED | OUTPATIENT
Start: 2023-11-30 | End: 2023-11-30

## 2023-11-30 RX ORDER — METOPROLOL TARTRATE 50 MG
100 TABLET ORAL ONCE
Refills: 0 | Status: COMPLETED | OUTPATIENT
Start: 2023-11-30 | End: 2023-11-30

## 2023-11-30 RX ORDER — TETANUS TOXOID, REDUCED DIPHTHERIA TOXOID AND ACELLULAR PERTUSSIS VACCINE, ADSORBED 5; 2.5; 8; 8; 2.5 [IU]/.5ML; [IU]/.5ML; UG/.5ML; UG/.5ML; UG/.5ML
0.5 SUSPENSION INTRAMUSCULAR ONCE
Refills: 0 | Status: COMPLETED | OUTPATIENT
Start: 2023-11-30 | End: 2023-11-30

## 2023-11-30 RX ADMIN — Medication 100 MILLIGRAM(S): at 12:06

## 2023-11-30 RX ADMIN — TETANUS TOXOID, REDUCED DIPHTHERIA TOXOID AND ACELLULAR PERTUSSIS VACCINE, ADSORBED 0.5 MILLILITER(S): 5; 2.5; 8; 8; 2.5 SUSPENSION INTRAMUSCULAR at 09:41

## 2023-11-30 RX ADMIN — Medication 650 MILLIGRAM(S): at 09:40

## 2023-11-30 NOTE — ED PROCEDURE NOTE - CPROC ED LACER REPAIR DETAIL1
The wound was explored to base in bloodless field./All foreign material was removed.
The wound was explored to base in bloodless field./All foreign material was removed.

## 2023-11-30 NOTE — ED CDU PROVIDER INITIAL DAY NOTE - SKIN, MLM
+ abrasions above b/l knees / hands ; + laceration above left eyebrow ; remainder of visualized skin normal color for race, warm, dry and intact. No evidence of rash.

## 2023-11-30 NOTE — ED PROCEDURE NOTE - CPROC ED TIME OUT STATEMENT1
“Patient's name, , procedure and correct site were confirmed during the [place] Timeout.”
“Patient's name, , procedure and correct site were confirmed during the [place] Timeout.”

## 2023-11-30 NOTE — ED PROVIDER NOTE - ATTENDING CONTRIBUTION TO CARE
I personally evaluated the patient. I reviewed the Resident’s or Physician Assistant’s note (as assigned above), and agree with the findings and plan except as documented in my note.  68-year-old man history of CLL diabetes parathyroid cancer schizophrenia from [facility] presents here status post mechanical fall says he was walking and tripped hit his head not on blood thinners no LOC was ambulatory afterwards shortly after evaluating patient patient did endorse he started having left-sided chest pain in the ED nonradiating no shortness of breath no abdominal pain or back pain  CONSTITUTIONAL: WA / WN / NAD  HEAD: +2cm forehead laceration  EYES: PERRL; EOMI; anicteric.  ENT: +nasal abrasion  NECK: Supple; no meningeal signs  CARD: RRR; nl S1/S2; no M/R/G.   RESP: Respiratory rate and effort are normal; breath sounds clear and equal bilaterally.  ABD: Soft, NT ND  MSK/EXT: No gross deformities; full range of motion.  SKIN: abrasion to b/l knees, left hand 2nd digit skin avulsuion  and left hand abrasoin  NEURO: AAOx3  PSYCH: Memory Intact, Normal Affect

## 2023-11-30 NOTE — ED PROVIDER NOTE - NSFOLLOWUPINSTRUCTIONS_ED_ALL_ED_FT
Fall Prevention in the Home, Adult  Falls can cause injuries and can affect people from all age groups. There are many simple things that you can do to make your home safe and to help prevent falls. Ask for help when making these changes, if needed.    What actions can I take to prevent falls?  General instructions     Use good lighting in all rooms. Replace any light bulbs that burn out.  Turn on lights if it is dark. Use night-lights.  Place frequently used items in easy-to-reach places. Lower the shelves around your home if necessary.  Set up furniture so that there are clear paths around it. Avoid moving your furniture around.  Remove throw rugs and other tripping hazards from the floor.  Avoid walking on wet floors.  Fix any uneven floor surfaces.  Add color or contrast paint or tape to grab bars and handrails in your home. Place contrasting color strips on the first and last steps of stairways.  When you use a stepladder, make sure that it is completely opened and that the sides are firmly locked. Have someone hold the ladder while you are using it. Do not climb a closed stepladder.  Be aware of any and all pets.  What can I do in the bathroom?     Keep the floor dry. Immediately clean up any water that spills onto the floor.  Remove soap buildup in the tub or shower on a regular basis.  Use non-skid mats or decals on the floor of the tub or shower.  Attach bath mats securely with double-sided, non-slip rug tape.  If you need to sit down while you are in the shower, use a plastic, non-slip stool.  Image ImageInstall grab bars by the toilet and in the tub and shower. Do not use towel bars as grab bars.  What can I do in the bedroom?     Make sure that a bedside light is easy to reach.  Do not use oversized bedding that drapes onto the floor.  Have a firm chair that has side arms to use for getting dressed.  What can I do in the kitchen?     Clean up any spills right away.  If you need to reach for something above you, use a sturdy step stool that has a grab bar.  Keep electrical cables out of the way.  Do not use floor polish or wax that makes floors slippery. If you must use wax, make sure that it is non-skid floor wax.  What can I do in the stairways?     Do not leave any items on the stairs.  Make sure that you have a light switch at the top of the stairs and the bottom of the stairs. Have them installed if you do not have them.  Make sure that there are handrails on both sides of the stairs. Fix handrails that are broken or loose. Make sure that handrails are as long as the stairways.  Install non-slip stair treads on all stairs in your home.  Avoid having throw rugs at the top or bottom of stairways, or secure the rugs with carpet tape to prevent them from moving.  Choose a carpet design that does not hide the edge of steps on the stairway.  Check any carpeting to make sure that it is firmly attached to the stairs. Fix any carpet that is loose or worn.  What can I do on the outside of my home?     Use bright outdoor lighting.  Regularly repair the edges of walkways and driveways and fix any cracks.  Remove high doorway thresholds.  Trim any shrubbery on the main path into your home.  Regularly check that handrails are securely fastened and in good repair. Both sides of any steps should have handrails.  Install guardrails along the edges of any raised decks or porches.  Clear walkways of debris and clutter, including tools and rocks.  Have leaves, snow, and ice cleared regularly.  Use sand or salt on walkways during winter months.  In the garage, clean up any spills right away, including grease or oil spills.  What other actions can I take?     Wear closed-toe shoes that fit well and support your feet. Wear shoes that have rubber soles or low heels.  Use mobility aids as needed, such as canes, walkers, scooters, and crutches.  Review your medicines with your health care provider. Some medicines can cause dizziness or changes in blood pressure, which increase your risk of falling.  Talk with your health care provider about other ways that you can decrease your risk of falls. This may include working with a physical therapist or  to improve your strength, balance, and endurance.    Where to find more information  Centers for Disease Control and Prevention, [place]: https://www.cdc.gov  [employer] [place] on Aging: https://wu0leey.kimberly.nih.gov  Contact a health care provider if:  You are afraid of falling at home.  You feel weak, drowsy, or dizzy at home.  You fall at home.  Summary  There are many simple things that you can do to make your home safe and to help prevent falls.  Ways to make your home safe include removing tripping hazards and installing grab bars in the bathroom.  Ask for help when making these changes in your home.  This information is not intended to replace advice given to you by your health care provider. Make sure you discuss any questions you have with your health care provider.    Laceration    A laceration is a cut that goes through all of the layers of the skin and into the tissue that is right under the skin. Some lacerations heal on their own. Others need to be closed with stitches (sutures), staples, skin adhesive strips, or skin glue. Proper laceration care minimizes the risk of infection and helps the laceration to heal better.     SEEK IMMEDIATE MEDICAL CARE IF YOU HAVE THE FOLLOWING SYMPTOMS: swelling around the wound, worsening pain, drainage from the wound, red streaking going away from your wound, inability to move finger or toe near the laceration, or discoloration of skin near the laceration.

## 2023-11-30 NOTE — ED CDU PROVIDER INITIAL DAY NOTE - OBJECTIVE STATEMENT
67 y/o M, PMHx CLL, DM, & Schizophrenia, presents to the ED s/p mechanical fall in the parking lot today. He sustained a laceration above left eyebrow and several abrasions along his hands. He complained of chest discomfort whilst in the ED. He denies accompanying LOC, chest trauma, dyspnea, nausea, vomiting, fever, chills, cephalgia, back pain and abdominal pain.

## 2023-11-30 NOTE — ED ADULT NURSE NOTE - CHIEF COMPLAINT QUOTE
Pt BIBA s/p fall outside this morning. Pt denies LOC denies AC/ETOH use. Pt noted with open hematoma to left forehead, abrasion to nose, and left palm. Bleeding controlled in triage.

## 2023-11-30 NOTE — ED PROVIDER NOTE - PHYSICAL EXAMINATION
CONSTITUTIONAL: WDWN. NAD. Speaking in full sentences, moving all extremities.  SKIN: +0.5cm, 1cm laceration to LT eyebrow, superficial, abrasions to b/l anterior knees, LT palm, LT hand 2nd digit with avulsion of superficial skin at finger tip  HEAD: NCAT  EYES: PERRLA, EOMI  ENT: TMs WNL. No hemotympanum noted.  NECK: No posterior midline cervical tenderness  CARD: +S1, S2 no murmurs, gallops, or rubs. Regular rate and rhythm. Radial, DP, PT 2+/4 bilaterally  RESP: LCTAB. No wheezes, rales or rhonchi.  ABD: Abdomen soft, nontender, nondistended.  NEURO: Alert, oriented, grossly unremarkable. Strength 5/5 in bilateral UE and LEs. CN 2-12 grossly intact. Follows commands.  MSK: No TTP in UE and LEs. No chest wall tenderness or crepitus  BACK: No posterior midline tenderness  PSYCH: Cooperative, appropriate.

## 2023-11-30 NOTE — ED PROVIDER NOTE - CLINICAL SUMMARY MEDICAL DECISION MAKING FREE TEXT BOX
68-year-old man history of CLL diabetes parathyroid cancer schizophrenia from [address] [place] [address] presents here status post mechanical fall says he was walking and tripped hit his head not on blood thinners no LOC was ambulatory afterwards shortly after evaluating patient patient did endorse he started having left-sided chest pain in the ED nonradiating no shortness of breath no abdominal pain or back pain vs reviewed labs imaging ekg obtained and reviewed troponin x1 negative. Laceration repaired. Patient placed in obs for further cardiac workup.

## 2023-11-30 NOTE — ED CDU PROVIDER INITIAL DAY NOTE - PRO INTERPRETER NEED 2
Reason for Disposition   [1] Caller has URGENT medicine question about med that PCP or specialist prescribed AND [2] triager unable to answer question    Answer Assessment - Initial Assessment Questions  1  NAME of MEDICATION: "What medicine are you calling about?"      Clenpiq colonoscopy prep    2  QUESTION: "What is your question?" (e g , medication refill, side effect)      Was not called in to pharmacy and I have colonoscopy tomorrow    3  PRESCRIBING HCP: "Who prescribed it?" Reason: if prescribed by specialist, call should be referred to that group  Gastroenterology    4  SYMPTOMS: "Do you have any symptoms?"      Denies    5   SEVERITY: If symptoms are present, ask "Are they mild, moderate or severe?"     N/A    Protocols used: MEDICATION QUESTION CALL-ADULT-
Regarding: Colonoscopy tomorrow No Prep  ----- Message from Amanda Atkins sent at 4/28/2022  8:18 PM EDT -----  "I have a colonoscopy tomorrow at 11 am and when I went to get the stuff to drink they said I need a script, and I don't have one   Now what can I do"
English

## 2023-11-30 NOTE — ED PROVIDER NOTE - OBJECTIVE STATEMENT
Patient is a 68-year-old male with past medical history of CLL, diabetes, PTH cancer, schizophrenia presenting from [facility] after mechanical fall.  Patient says he was walking in the parking lot this morning, tripped and fell, positive head trauma, denies loss of consciousness.  Denies headache, nausea, vomiting, focal numbness or weakness, vision change.  Patient otherwise well Patient is a 68-year-old male with past medical history of CLL, diabetes, PTH cancer, schizophrenia presenting from [address] [place] [address] after mechanical fall.  Patient says he was walking in the parking lot this morning, tripped and fell, positive head trauma, denies loss of consciousness, denies AC.  Denies headache, nausea, vomiting, focal numbness or weakness, vision change. Denies neck or back pain. Patient otherwise well

## 2023-11-30 NOTE — ED ADULT TRIAGE NOTE - CHIEF COMPLAINT QUOTE
Pt BIBA s/p unwitnessed fall outside. Pt denies LOC, AC/ETOH use. Pt noted with hematoma to left forehead, abrasion to left side of nose, and left palm. Bleeding controlled in triage.

## 2023-11-30 NOTE — ED CDU PROVIDER INITIAL DAY NOTE - CLINICAL SUMMARY MEDICAL DECISION MAKING FREE TEXT BOX
68-year-old man, history of CLL, diabetes, schizophrenia, was placed in CDU for workup of chest discomfort discovered on review of systems after he presented 2/2 fall.  He sustained a laceration which was repaired in the ED, the rest of the ED workup was unremarkable.  Patient is a poor historian.  Vital signs, exam as noted, patient is frail, but nontoxic-appearing.  Plan is for telemetry, serial EKG and enzymes, CCTA.

## 2023-11-30 NOTE — ED PROVIDER NOTE - WR ORDER STATUS 3
Chief Complaint   Patient presents with     Consult     New consult on Tomas and his lung issues     Earnest Carr CMA at 1:19 PM on 10/4/2017      Performed

## 2023-12-01 VITALS
RESPIRATION RATE: 18 BRPM | OXYGEN SATURATION: 96 % | DIASTOLIC BLOOD PRESSURE: 59 MMHG | SYSTOLIC BLOOD PRESSURE: 108 MMHG | HEART RATE: 69 BPM | TEMPERATURE: 98 F

## 2023-12-01 PROCEDURE — 75574 CT ANGIO HRT W/3D IMAGE: CPT | Mod: 26,MA

## 2023-12-01 PROCEDURE — 99284 EMERGENCY DEPT VISIT MOD MDM: CPT

## 2023-12-01 PROCEDURE — 99239 HOSP IP/OBS DSCHRG MGMT >30: CPT | Mod: GC,25

## 2023-12-01 RX ORDER — METOPROLOL TARTRATE 50 MG
100 TABLET ORAL ONCE
Refills: 0 | Status: COMPLETED | OUTPATIENT
Start: 2023-12-01 | End: 2023-12-01

## 2023-12-01 RX ORDER — METOPROLOL TARTRATE 50 MG
50 TABLET ORAL ONCE
Refills: 0 | Status: COMPLETED | OUTPATIENT
Start: 2023-12-01 | End: 2023-12-01

## 2023-12-01 RX ORDER — ATORVASTATIN CALCIUM 80 MG/1
1 TABLET, FILM COATED ORAL
Qty: 30 | Refills: 0
Start: 2023-12-01

## 2023-12-01 RX ORDER — ASPIRIN/CALCIUM CARB/MAGNESIUM 324 MG
1 TABLET ORAL
Qty: 30 | Refills: 0
Start: 2023-12-01

## 2023-12-01 RX ADMIN — Medication 100 MILLIGRAM(S): at 07:34

## 2023-12-01 RX ADMIN — Medication 50 MILLIGRAM(S): at 08:35

## 2023-12-01 NOTE — CONSULT NOTE ADULT - SUBJECTIVE AND OBJECTIVE BOX
HPI:  67 yo M only known to have Schizophrenia presents with chest pain reportedly after tripping and falling, sustained L eye injury. Cannot provide much more history and is minimally cooperative with interview/exam. Appears comfortable and denies any chest pain at this time.    PAST MEDICAL & SURGICAL HISTORY  Schizophrenia    DM (diabetes mellitus)    Parathyroid cancer    CLL (chronic lymphocytic leukemia)    No significant past surgical history        FAMILY HISTORY:  FAMILY HISTORY:  No pertinent family history in first degree relatives    ALLERGIES:  No Known Allergies      MEDICATIONS:    PRN:      HOME MEDICATIONS:  Home Medications:      VITALS:   T(F): 98.5 ( @ 16:11), Max: 98.8 ( @ 15:28)  HR: 69 ( @ 16:11) (69 - 97)  BP: 108/59 ( @ 16:11) (108/59 - 163/74)  BP(mean): 95 ( @ 23:29) (95 - 95)  RR: 18 ( @ 16:11) (16 - 19)  SpO2: 96% ( @ 16:11) (95% - 100%)    I&O's Summary      PHYSICAL EXAM:  General: Not in distress.  Non-toxic appearing.   Cardio: regular, S1, S2, no murmur  Pulm: Clear air entry bilaterally, no wheezing, rales, rhonchi  Abdomen: Soft, non-tender, non-distended, BS+  Extremities: No edema in bilateral LE      LABS:                        13.2   7.94  )-----------( 128      ( 2023 09:50 )             41.6         146  |  107  |  13  ----------------------------<  117<H>  4.4   |  29  |  0.7    Ca    8.8      2023 09:50  Mg     1.9         TPro  6.1  /  Alb  4.3  /  TBili  0.3  /  DBili  x   /  AST  24  /  ALT  20  /  AlkPhos  99          CARDIAC MARKERS ( 2023 14:53 )  x     / <0.01 ng/mL / x     / x     / x      CARDIAC MARKERS ( 2023 09:50 )  x     / <0.01 ng/mL / x     / x     / x        EC Lead ECG:   Ventricular Rate 70 BPM    Atrial Rate 70 BPM    P-R Interval 136 ms    QRS Duration 74 ms    Q-T Interval 454 ms    QTC Calculation(Bazett) 490 ms    P Axis 50 degrees    R Axis 22 degrees    T Axis 57 degrees    Diagnosis Line Normal sinus rhythm  Inferior infarct , age undetermined  Anterior infarct , age undetermined  T wave abnormality, consider lateral ischemia  Abnormal ECG    Confirmed by Pedro Murray ([id number]) on 2023 5:35:58 PM ( @ 14:43)       HPI:  69 yo M only known to have Schizophrenia presents with chest pain reportedly after tripping and falling, sustained L eye injury. Cannot provide much more history and is minimally cooperative with interview/exam. Appears comfortable and denies any chest pain at this time.    PAST MEDICAL & SURGICAL HISTORY  Schizophrenia    DM (diabetes mellitus)    Parathyroid cancer    CLL (chronic lymphocytic leukemia)    No significant past surgical history        FAMILY HISTORY:  FAMILY HISTORY:  No pertinent family history in first degree relatives    ALLERGIES:  No Known Allergies      MEDICATIONS:    PRN:      HOME MEDICATIONS:  Home Medications:      VITALS:   T(F): 98.5 ( @ 16:11), Max: 98.8 ( @ 15:28)  HR: 69 ( @ 16:11) (69 - 97)  BP: 108/59 ( @ 16:11) (108/59 - 163/74)  BP(mean): 95 ( @ 23:29) (95 - 95)  RR: 18 ( @ 16:11) (16 - 19)  SpO2: 96% ( @ 16:11) (95% - 100%)    I&O's Summary      PHYSICAL EXAM:  General: Not in distress.  Non-toxic appearing.   Cardio: regular, S1, S2, no murmur  Pulm: Clear air entry bilaterally, no wheezing, rales, rhonchi  Abdomen: Soft, non-tender, non-distended, BS+  Extremities: No edema in bilateral LE      LABS:                        13.2   7.94  )-----------( 128      ( 2023 09:50 )             41.6         146  |  107  |  13  ----------------------------<  117<H>  4.4   |  29  |  0.7    Ca    8.8      2023 09:50  Mg     1.9         TPro  6.1  /  Alb  4.3  /  TBili  0.3  /  DBili  x   /  AST  24  /  ALT  20  /  AlkPhos  99          CARDIAC MARKERS ( 2023 14:53 )  x     / <0.01 ng/mL / x     / x     / x      CARDIAC MARKERS ( 2023 09:50 )  x     / <0.01 ng/mL / x     / x     / x        EC Lead ECG:   Ventricular Rate 70 BPM    Atrial Rate 70 BPM    P-R Interval 136 ms    QRS Duration 74 ms    Q-T Interval 454 ms    QTC Calculation(Bazett) 490 ms    P Axis 50 degrees    R Axis 22 degrees    T Axis 57 degrees    Diagnosis Line Normal sinus rhythm  Inferior infarct , age undetermined  Anterior infarct , age undetermined  T wave abnormality, consider lateral ischemia  Abnormal ECG    Confirmed by Pedro Murray ([id number]) on 2023 5:35:58 PM ( @ 14:43)

## 2023-12-01 NOTE — ED CDU PROVIDER DISPOSITION NOTE - NSFOLLOWUPINSTRUCTIONS_ED_ALL_ED_FT
Follow-up with Dr Poole next week for repeat evaluation and to establish long-term cardiac care.     Medications were sent to your pharmacy: 81mg Aspirin ("baby aspirin") and Atorvastatin 40mg (a lipid/cholesterol lowering medication).     Chest Pain    Chest pain can be caused by many different conditions which may or may not be dangerous. Causes include heartburn, lung infections, heart attack, blood clot in lungs, skin infections, strain or damage to muscle, cartilage, or bones, etc. In addition to a history and physical examination, an electrocardiogram (ECG) or other lab tests may have been performed to determine the cause of your chest pain. Follow up with your primary care provider or with a cardiologist as instructed.     SEEK IMMEDIATE MEDICAL CARE IF YOU HAVE ANY OF THE FOLLOWING SYMPTOMS: worsening chest pain, coughing up blood, unexplained back/neck/jaw pain, severe abdominal pain, dizziness or lightheadedness, fainting, shortness of breath, sweaty or clammy skin, vomiting, or racing heart beat. These symptoms may represent a serious problem that is an emergency. Do not wait to see if the symptoms will go away. Get medical help right away. Call 911 and do not drive yourself to the hospital.

## 2023-12-01 NOTE — ED CDU PROVIDER DISPOSITION NOTE - PROVIDER TOKENS
PROVIDER:[TOKEN:[[id number]],FOLLOWUP:[1-3 Days]] PROVIDER:[TOKEN:[[id number]],FOLLOWUP:[1-3 Days]] PROVIDER:[TOKEN:[[id number]],FOLLOWUP:[1-3 Days]]

## 2023-12-01 NOTE — ED ADULT NURSE REASSESSMENT NOTE - NS ED NURSE REASSESS COMMENT FT1
Patient assessed bedside.  A/O x 3.  No s/s of acute pain or distress at this time.  Pt safety and comfort maintained, call bell within reach, mattress alarm in place.   Pt discharged awaiting transport to facility.

## 2023-12-01 NOTE — ED CDU PROVIDER DISPOSITION NOTE - CARE PROVIDER_API CALL
Juan Poole  Interventional Cardiology  [address] [place] [address], [address]  [place] [address]  Phone: [phone]  Fax: [phone]  Follow Up Time: 1-3 Days   Juan Poole  Interventional Cardiology  [address] [place] [address], [address]  [place] [address]  Phone: [phone]  Fax: [phone]  Follow Up Time: 1-3 Days   Juan Poole  Interventional Cardiology  [address] [place] [address], [address]  [place] [address]  Phone: [phone]  Fax: [phone]  Follow Up Time: 1-3 Days

## 2023-12-01 NOTE — ED CDU PROVIDER DISPOSITION NOTE - PATIENT PORTAL LINK FT
You can access the FollowMyHealth Patient Portal offered by [facility] by registering at the following website: [email or web address]. By joining [employer]’s FollowMyHealth portal, you will also be able to view your health information using other applications (apps) compatible with our system. You can access the FollowMyHealth Patient Portal offered by [facility] by registering at the following website: [email or web address]. By joining [email or web address]’s FollowMyHealth portal, you will also be able to view your health information using other applications (apps) compatible with our system. You can access the FollowMyHealth Patient Portal offered by [facility] by registering at the following website: [email or web address]. By joining [employer]’s FollowMyHealth portal, you will also be able to view your health information using other applications (apps) compatible with our system.

## 2023-12-01 NOTE — CONSULT NOTE ADULT - ASSESSMENT
#Chest pain - resolved, likely noncardiac     - Troponin normal x2, EKG with inferior and lateral TWI, unchanged from prior EKGs  #CAD by CCTA    - Mixed plaque with proximal and mid LAD - moderate narrowing; calcified plaque in LCx - mild narrowing; Ca score 677  #Hx of Schizophrenia    Recommendations:   - Can discharge home  - Discharge on Aspirin 81 mg and Atorvastatin 40 mg once daily

## 2023-12-01 NOTE — ED CDU PROVIDER SUBSEQUENT DAY NOTE - CLINICAL SUMMARY MEDICAL DECISION MAKING FREE TEXT BOX
68-year-old man, history of CLL, diabetes, schizophrenia, was placed in CDU for workup of chest discomfort discovered on review of systems after he presented 2/2 fall.  He sustained a laceration which was repaired in the ED, the rest of the ED workup was unremarkable.  Patient is a poor historian.  Vital signs, exam as noted, patient is frail, but nontoxic-appearing.  Pt was monitored without incident and remained comfortable and chest pain-free. Repeat EKG and enzymes unchanged. CCTA was CAD-RAD 3 for moderate stenosis. Cardiology was consulted, rec d/c with prompt f/u. Pt comfortable with discharge.

## 2023-12-01 NOTE — ED CDU PROVIDER DISPOSITION NOTE - CLINICAL COURSE
Pt presented with chest pain and a fall; initial cardiac w/u negative, including troponin x2. Kept in EDOU for advanced cardiac testing, had CCTA with CAD-RADS of 3; evaluated by cardiology, will Rx baby asa and atorvastatin and give instructions for cardio f/u. Clarified with [address] [place] [address] staff that Rx can be sent to [facility], and will include instructions on DC. Pt presented with chest pain and a fall; initial cardiac w/u negative, including troponin x2. Kept in EDOU for advanced cardiac testing, had CCTA with CAD-RADS of 3; evaluated by cardiology, will Rx baby asa and atorvastatin and give instructions for cardio f/u. Clarified with [address] [place] [address] staff that Rx can be sent to [facility], and will include instructions on DC. Pt presented with chest pain and a fall; initial cardiac w/u negative, including troponin x2. Kept in EDOU for advanced cardiac testing, had CCTA with CAD-RADS of 3; evaluated by cardiology, will Rx baby asa and atorvastatin and give instructions for cardio f/u. Clarified with [address] [place] [address] staff that Rx can be sent to [facility], and will include instructions on DC.

## 2023-12-02 ENCOUNTER — EMERGENCY (EMERGENCY)
Facility: HOSPITAL | Age: 68
LOS: 0 days | Discharge: HOME | End: 2023-12-02
Attending: EMERGENCY MEDICINE
Payer: MEDICARE

## 2023-12-02 VITALS
HEIGHT: 67 IN | WEIGHT: 171.96 LBS | DIASTOLIC BLOOD PRESSURE: 64 MMHG | OXYGEN SATURATION: 97 % | TEMPERATURE: 98 F | RESPIRATION RATE: 17 BRPM | SYSTOLIC BLOOD PRESSURE: 134 MMHG | HEART RATE: 98 BPM

## 2023-12-02 DIAGNOSIS — Z85.858 PERSONAL HISTORY OF MALIGNANT NEOPLASM OF OTHER ENDOCRINE GLANDS: ICD-10-CM

## 2023-12-02 DIAGNOSIS — Z85.6 PERSONAL HISTORY OF LEUKEMIA: ICD-10-CM

## 2023-12-02 DIAGNOSIS — F20.9 SCHIZOPHRENIA, UNSPECIFIED: ICD-10-CM

## 2023-12-02 DIAGNOSIS — E11.9 TYPE 2 DIABETES MELLITUS WITHOUT COMPLICATIONS: ICD-10-CM

## 2023-12-02 PROCEDURE — 99283 EMERGENCY DEPT VISIT LOW MDM: CPT | Mod: FS

## 2023-12-02 NOTE — ED ADULT NURSE NOTE - NSFALLUNIVINTERV_ED_ALL_ED
Bed/Stretcher in lowest position, wheels locked, appropriate side rails in place/Call bell, personal items and telephone in reach/Instruct patient to call for assistance before getting out of bed/chair/stretcher/Non-slip footwear applied when patient is off stretcher/[place] to call system/Physically safe environment - no spills, clutter or unnecessary equipment/Purposeful proactive rounding/Room/bathroom lighting operational, light cord in reach

## 2023-12-02 NOTE — ED ADULT NURSE NOTE - OBJECTIVE STATEMENT
Pt recently discharged stating he needs a shot, unsure of which one. Pt educated on need for following medication regimen at [address] [place] [address]

## 2023-12-02 NOTE — ED ADULT TRIAGE NOTE - CHIEF COMPLAINT QUOTE
Pt states he's here for "2 shots". Pt states he's here for "2 shots". not saying what shots are for. Pt states he was here yesterday but the doctor left

## 2023-12-02 NOTE — ED PROVIDER NOTE - CARE PLAN
Principal Discharge DX:	Evaluation by medical service required  Secondary Diagnosis:	Schizophrenia   1

## 2023-12-02 NOTE — ED PROVIDER NOTE - NSFOLLOWUPINSTRUCTIONS_ED_ALL_ED_FT
Follow-up at [address] [place] [address] for your daily medications. Follow-up at [address] [place] [address] for your daily medications. Follow-up at [address] [place] [address] for your daily medications.

## 2023-12-02 NOTE — ED PROVIDER NOTE - CLINICAL SUMMARY MEDICAL DECISION MAKING FREE TEXT BOX
Patient presented stating that he needs a shot of medication, although is unsure what medication he needs.  No further complaints.  On arrival, patient afebrile, hemodynamically stable, neurovascularly intact, AAOx3, no SI/HI/hallucinations.  Spoke with Lilly Jarvis and they state that patient is due for his morning medications.  Prior chart review reveals the patient has not required any emergency medications in the ED in the past.  Given the above, will discharge back to the facility for his medications with outpatient follow up. Patient agreeable with plan. Agrees to return to ED for any new or worsening symptoms.

## 2023-12-02 NOTE — ED ADULT NURSE NOTE - CHIEF COMPLAINT QUOTE
Pt states he's here for "2 shots". not saying what shots are for. Pt states he was here yesterday but the doctor left

## 2023-12-02 NOTE — ED PROVIDER NOTE - PATIENT PORTAL LINK FT
You can access the FollowMyHealth Patient Portal offered by [facility] by registering at the following website: [email or web address]. By joining [employer]’s FollowMyHealth portal, you will also be able to view your health information using other applications (apps) compatible with our system. You can access the FollowMyHealth Patient Portal offered by [facility] by registering at the following website: [email or web address]. By joining [employer]’s FollowMyHealth portal, you will also be able to view your health information using other applications (apps) compatible with our system. You can access the FollowMyHealth Patient Portal offered by [facility] by registering at the following website: [email or web address]. By joining [employer]’s FollowMyHealth portal, you will also be able to view your health information using other applications (apps) compatible with our system.

## 2023-12-02 NOTE — ED PROVIDER NOTE - OBJECTIVE STATEMENT
68-year-old male past medical history of schizophrenia, CLL, DM, parathyroid cancer presents from [address] [place] [address] for "2 shots". Patient does not know what the shots are or who prescribed them/should administer them. Spoke with  Eliud Poe at [address] [place] [address] who states the patient needs to return for morning medications and incorrectly came to ED for the aforementioned medications.  Patient denies any current complaints. 68-year-old male past medical history of schizophrenia, CLL, DM, parathyroid cancer presents from [address] [place] [address] for "2 shots". Patient does not know what the shots are or who prescribed them/should administer them. Spoke with  Eliud Poe at [address] [place] [address] who states the patient needs to return for morning medications and incorrectly came to ED for the aforementioned medications.  Patient denies any current complaints. 68-year-old male past medical history of schizophrenia, CLL, DM, parathyroid cancer presents from [address] [place] [address] for "2 shots". Patient does not know what the shots are or who prescribed them/should administer them. Spoke with  Eliud Poe at [address] [place] [address] who states the patient needs to return for morning medications and incorrectly came to ED for the aforementioned medications.  Patient denies any current complaints.

## 2023-12-02 NOTE — ED PROVIDER NOTE - PHYSICAL EXAMINATION
Physical Exam    Vital Signs: I have reviewed the initial vital signs.  Constitutional: appears stated age, no acute distress  Eyes: Sclera clear, EOMI.  Cardiovascular: S1 and S2, regular rate, regular rhythm, well-perfused extremities, radial pulses equal and 2+, pedal pulses 2+ and equal  Respiratory: unlabored respiratory effort, clear to auscultation bilaterally no wheezing, rales, or rhonchi  Musculoskeletal: supple neck, no lower extremity edema  Integumentary: warm, dry, no rash  Neurologic: awake, alert, oriented x3, extremities’ motor and sensory functions grossly intact

## 2023-12-05 ENCOUNTER — EMERGENCY (EMERGENCY)
Facility: HOSPITAL | Age: 68
LOS: 0 days | Discharge: HOME | End: 2023-12-05
Attending: EMERGENCY MEDICINE
Payer: MEDICARE

## 2023-12-05 VITALS
SYSTOLIC BLOOD PRESSURE: 131 MMHG | DIASTOLIC BLOOD PRESSURE: 80 MMHG | RESPIRATION RATE: 18 BRPM | TEMPERATURE: 98 F | HEART RATE: 110 BPM | HEIGHT: 67 IN | OXYGEN SATURATION: 98 %

## 2023-12-05 VITALS
HEART RATE: 90 BPM | TEMPERATURE: 97 F | SYSTOLIC BLOOD PRESSURE: 127 MMHG | RESPIRATION RATE: 18 BRPM | DIASTOLIC BLOOD PRESSURE: 67 MMHG | OXYGEN SATURATION: 98 %

## 2023-12-05 DIAGNOSIS — Z85.6 PERSONAL HISTORY OF LEUKEMIA: ICD-10-CM

## 2023-12-05 DIAGNOSIS — E11.9 TYPE 2 DIABETES MELLITUS WITHOUT COMPLICATIONS: ICD-10-CM

## 2023-12-05 DIAGNOSIS — F20.9 SCHIZOPHRENIA, UNSPECIFIED: ICD-10-CM

## 2023-12-05 DIAGNOSIS — Z51.89 ENCOUNTER FOR OTHER SPECIFIED AFTERCARE: ICD-10-CM

## 2023-12-05 DIAGNOSIS — Z85.858 PERSONAL HISTORY OF MALIGNANT NEOPLASM OF OTHER ENDOCRINE GLANDS: ICD-10-CM

## 2023-12-05 PROCEDURE — 99283 EMERGENCY DEPT VISIT LOW MDM: CPT

## 2023-12-05 PROCEDURE — 99281 EMR DPT VST MAYX REQ PHY/QHP: CPT

## 2023-12-05 NOTE — ED PROVIDER NOTE - CLINICAL SUMMARY MEDICAL DECISION MAKING FREE TEXT BOX
Patient here stating he is otherwise here requesting to have his vitals checked and eat food.  Patient has no complaints when I speak to the patient is stable for discharge.  Patient will return back to [facility]. Patient here stating he is otherwise here requesting to have his vitals checked and eat food.  Patient has no complaints when I speak to the patient is stable for discharge.  Patient will return back to [facility].

## 2023-12-05 NOTE — ED PROVIDER NOTE - PATIENT PORTAL LINK FT
You can access the FollowMyHealth Patient Portal offered by [facility] by registering at the following website: [email or web address]. By joining [employer]’s FollowMyHealth portal, you will also be able to view your health information using other applications (apps) compatible with our system. You can access the FollowMyHealth Patient Portal offered by [facility] by registering at the following website: [email or web address]. By joining [employer]’s FollowMyHealth portal, you will also be able to view your health information using other applications (apps) compatible with our system. You can access the FollowMyHealth Patient Portal offered by [facility] by registering at the following website: [email or web address]. By joining [employer]’s FollowMyHealth portal, you will also be able to view your health information using other applications (apps) compatible with our system. You can access the FollowMyHealth Patient Portal offered by [facility] by registering at the following website: [email or web address]. By joining [employer]’s FollowMyHealth portal, you will also be able to view your health information using other applications (apps) compatible with our system.

## 2023-12-05 NOTE — ED PROVIDER NOTE - ATTENDING CONTRIBUTION TO CARE
Patient here stating he is otherwise here requesting to have his vitals checked and eat food.  Patient has no complaints when I speak to the patient is stable for discharge.  Patient will return back to [facility]. Patient here stating he is otherwise here requesting to have his vitals checked and eat food.  Patient has no complaints when I speak to the patient is stable for discharge.  Patient will return back to [facility].

## 2023-12-05 NOTE — ED PROVIDER NOTE - OBJECTIVE STATEMENT
Patient is a 68-year-old male with past medical history of CLL, diabetes, PTH cancer, schizophrenia presenting from [address] [place] [address] for wound check s/p mechanical fall one week prior. Otherwise denies any fever, chills, headache, changes in vision, cough, congestion, cp, palpitations, sob, n/v/d, abd pain, constipation, urinary complaints, lower extremity pain/swelling. Patient is a 68-year-old male with past medical history of CLL, diabetes, PTH cancer, schizophrenia presenting from [address] [place] [address] for wound check s/p mechanical fall one week prior. Otherwise denies any fever, chills, headache, changes in vision, cough, congestion, cp, palpitations, sob, n/v/d, abd pain, constipation, urinary complaints, lower extremity pain/swelling.

## 2023-12-05 NOTE — ED PROVIDER NOTE - PHYSICAL EXAMINATION
CONSTITUTIONAL: well-appearing, in NAD  SKIN: Warm dry, normal skin turgor, one absorbable suture noted to left eyebrow clean dry intact no discharge or erythema.   HEAD: NCAT  EYES: EOMI, PERRLA, no scleral icterus, conjunctiva pink  ENT: normal pharynx with no erythema or exudates  NECK: Supple; non tender. Full ROM.  CARD: RRR, no murmurs.  RESP: clear to ausculation b/l. No crackles or wheezing.  ABD: soft, non-tender, non-distended, no rebound or guarding.  EXT: Full ROM, no bony tenderness, no pedal edema, no calf tenderness  NEURO: normal motor. normal sensory. CN II-XII intact. Cerebellar testing normal. Normal gait.  PSYCH: Cooperative, appropriate.

## 2023-12-12 ENCOUNTER — EMERGENCY (EMERGENCY)
Facility: HOSPITAL | Age: 68
LOS: 0 days | Discharge: HOME | End: 2023-12-12
Attending: EMERGENCY MEDICINE
Payer: MEDICARE

## 2023-12-12 VITALS
HEIGHT: 67 IN | TEMPERATURE: 97 F | DIASTOLIC BLOOD PRESSURE: 70 MMHG | SYSTOLIC BLOOD PRESSURE: 153 MMHG | HEART RATE: 77 BPM | RESPIRATION RATE: 18 BRPM | OXYGEN SATURATION: 99 %

## 2023-12-12 DIAGNOSIS — S01.112D LACERATION WITHOUT FOREIGN BODY OF LEFT EYELID AND PERIOCULAR AREA, SUBSEQUENT ENCOUNTER: ICD-10-CM

## 2023-12-12 PROCEDURE — 99212 OFFICE O/P EST SF 10 MIN: CPT

## 2023-12-12 PROCEDURE — L9995: CPT

## 2023-12-12 NOTE — ED ADULT NURSE NOTE - NSFALLUNIVINTERV_ED_ALL_ED
Bed/Stretcher in lowest position, wheels locked, appropriate side rails in place/Call bell, personal items and telephone in reach/Instruct patient to call for assistance before getting out of bed/chair/stretcher/Non-slip footwear applied when patient is off stretcher/[place] to call system/Physically safe environment - no spills, clutter or unnecessary equipment/Purposeful proactive rounding/Room/bathroom lighting operational, light cord in reach Bed/Stretcher in lowest position, wheels locked, appropriate side rails in place/Call bell, personal items and telephone in reach/Instruct patient to call for assistance before getting out of bed/chair/stretcher/Non-slip footwear applied when patient is off stretcher/[place] to call system/Physically safe environment - no spills, clutter or unnecessary equipment/Purposeful proactive rounding/Room/bathroom lighting operational, light cord in reach

## 2023-12-12 NOTE — ED PROVIDER NOTE - PHYSICAL EXAMINATION
Afebrile, hemodynamically stable, saturating well on room air  NAD, well appearing, sitting comfortably in bed, no WOB, speaking full sentences  Head NCAT, small scab to top L eyebrow, no sutures noted, no swelling/discharge/erythema  EOMI grossly  MMM  Breathing comfortably on RA  AAO, CN's 3-12 grossly intact  BOWDEN spontaneously  Skin warm, well perfused

## 2023-12-12 NOTE — ED PROVIDER NOTE - PATIENT PORTAL LINK FT
You can access the FollowMyHealth Patient Portal offered by [facility] by registering at the following website: [email or web address]. By joining [employer]’s FollowMyHealth portal, you will also be able to view your health information using other applications (apps) compatible with our system. You can access the FollowMyHealth Patient Portal offered by [facility] by registering at the following website: [email or web address]. By joining [employer]’s FollowMyHealth portal, you will also be able to view your health information using other applications (apps) compatible with our system.

## 2023-12-12 NOTE — ED PROVIDER NOTE - OBJECTIVE STATEMENT
68-year-old male with history of CLL, diabetes, schizophrenia, presents for suture removal to his left eyebrow. Patient denies pain, discharge, and all other concerns.

## 2023-12-12 NOTE — ED PROVIDER NOTE - CLINICAL SUMMARY MEDICAL DECISION MAKING FREE TEXT BOX
Review of EMR shows 3 sutures placed to left eyebrow on 11/30. Left eyebrow cleansed well and no evidence of sutures. Only small scab noted. This was reviewed  multiple times including under magnification, and no sutures in place. Discharged with return precautions.

## 2023-12-15 ENCOUNTER — EMERGENCY (EMERGENCY)
Facility: HOSPITAL | Age: 68
LOS: 0 days | Discharge: HOME | End: 2023-12-16
Attending: EMERGENCY MEDICINE
Payer: MEDICARE

## 2023-12-15 ENCOUNTER — EMERGENCY (EMERGENCY)
Facility: HOSPITAL | Age: 68
LOS: 0 days | Discharge: HOME | End: 2023-12-15
Attending: EMERGENCY MEDICINE
Payer: MEDICARE

## 2023-12-15 VITALS
RESPIRATION RATE: 14 BRPM | OXYGEN SATURATION: 98 % | WEIGHT: 171.08 LBS | DIASTOLIC BLOOD PRESSURE: 74 MMHG | HEART RATE: 80 BPM | HEIGHT: 67 IN | TEMPERATURE: 98 F | SYSTOLIC BLOOD PRESSURE: 164 MMHG

## 2023-12-15 VITALS
HEIGHT: 67 IN | OXYGEN SATURATION: 99 % | SYSTOLIC BLOOD PRESSURE: 150 MMHG | HEART RATE: 71 BPM | TEMPERATURE: 98 F | RESPIRATION RATE: 18 BRPM | DIASTOLIC BLOOD PRESSURE: 78 MMHG

## 2023-12-15 DIAGNOSIS — Z87.891 PERSONAL HISTORY OF NICOTINE DEPENDENCE: ICD-10-CM

## 2023-12-15 DIAGNOSIS — F20.9 SCHIZOPHRENIA, UNSPECIFIED: ICD-10-CM

## 2023-12-15 DIAGNOSIS — R45.1 RESTLESSNESS AND AGITATION: ICD-10-CM

## 2023-12-15 DIAGNOSIS — Z76.89 PERSONS ENCOUNTERING HEALTH SERVICES IN OTHER SPECIFIED CIRCUMSTANCES: ICD-10-CM

## 2023-12-15 DIAGNOSIS — E11.9 TYPE 2 DIABETES MELLITUS WITHOUT COMPLICATIONS: ICD-10-CM

## 2023-12-15 PROCEDURE — 99282 EMERGENCY DEPT VISIT SF MDM: CPT | Mod: 25

## 2023-12-15 PROCEDURE — 99284 EMERGENCY DEPT VISIT MOD MDM: CPT | Mod: FS

## 2023-12-15 PROCEDURE — 99284 EMERGENCY DEPT VISIT MOD MDM: CPT

## 2023-12-15 RX ORDER — HYDROXYZINE HCL 10 MG
25 TABLET ORAL ONCE
Refills: 0 | Status: COMPLETED | OUTPATIENT
Start: 2023-12-15 | End: 2023-12-15

## 2023-12-15 RX ADMIN — Medication 25 MILLIGRAM(S): at 19:30

## 2023-12-15 NOTE — ED PROVIDER NOTE - CLINICAL SUMMARY MEDICAL DECISION MAKING FREE TEXT BOX
68 y.o. male, PMH of schizophrenia, CLL, DM, parathyroid cancer presents from [address] [place] [address] for "a shot". Patient does not know what shot, or who prescribed it, or who should administer them. Spoke with  Lilly Jarvis who picked up the pt from the ED and will take him to facility for the medication. Patient denies any current complaints. On exam, pt in NAD, AAOx3, head NC/AT, CN II-XII intact, PEERL, EOMi, neck (-) midline tenderness, lungs CTA B/L, CV S1S2 regular, abdomen soft/NT/ND/(+)BS, ext (-) edema, motor 5/5x4, sensation intact, ambulating with steady gait. Will d/c with chaperone from [address] [place] [address]. 68 y.o. male, PMH of schizophrenia, CLL, DM, parathyroid cancer presents from [address] [place] [address] for "a shot". Patient does not know what shot, or who prescribed it, or who should administer them. Spoke with  Lilly Jarvis who picked up the pt from the ED and will take him to facility for the medication. Patient denies any current complaints. On exam, pt in NAD, AAOx3, head NC/AT, CN II-XII intact, PEERL, EOMi, neck (-) midline tenderness, lungs CTA B/L, CV S1S2 regular, abdomen soft/NT/ND/(+)BS, ext (-) edema, motor 5/5x4, sensation intact, ambulating with steady gait. Will d/c with chaperone from [address] [place] [address].

## 2023-12-15 NOTE — ED ADULT NURSE NOTE - OBJECTIVE STATEMENT
"I need a shot in the butt. I don't know of what" pt from [facility], accompanied by  "I need a shot in the butt. I don't know of what" pt from [facility], accompanied by

## 2023-12-15 NOTE — ED ADULT TRIAGE NOTE - CHIEF COMPLAINT QUOTE
pt bibems. ems states " 777 called 911 bc he was yelling and raised his fist to a staff member." pt is calm and cooperative with staff. pt denies complaints at this time

## 2023-12-15 NOTE — ED PROVIDER NOTE - CLINICAL SUMMARY MEDICAL DECISION MAKING FREE TEXT BOX
68-year-old male with past medical history of schizophrenia, diabetes, CLL, parathyroid cancer, seen in ED earlier today requesting the usual shot in his buttocks but did not know the name, discussion was had with  at [address] [place] [address] who stated that patient can return back and did not require any medication, presents again today requesting the same shot.  Discussion had with Pablo Michaels again and stated that when patient came back he was being aggressive with staff and demanding he returned, did not hurt himself or hurt anyone else.  Patient is cooperative in the ED requesting that shot but again does not know the name, when discussion was had with facility state that patient receives Haldol at the facility no need for Haldol administration here as patient is cooperative.  Patient denies any complaints.  No fever, chills, n/v, cp,  pleuritic cp, sob, palpitations, diaphoresis, cough, ha/lh/dizziness, numbness/tingling, neck pain/ stiffness, abd pain, diarrhea, constipation, melena/brbpr, urinary symptoms, trauma, weakness, edema, calf pain/swelling/erythema, sick contacts, recent travel or rash. No SI/HI. No v/a hallucinations. no IVDA.     On exam:  Vital Signs: I have reviewed the initial vital signs. Constitutional:  pt sitting on stretcher speaking full sentences in nad. Integumentary: No rash. No flushing, dryness or diaphoresis. ENT: MMM. No tongue fasciculations. NECK: Supple, non-tender, no meningeal signs. Cardiovascular: RRR, radial pulses 2/4 b/l. No JVD. Respiratory: BS present b/l, ctabl, no wheezing or crackles, no accessory muscle use, no stridor. Gastrointestinal: BS present throughout all 4 quadrants, soft, nd, nt, no rebound tenderness or guarding, no cvat. Musculoskeletal: FROM, no edema, no calf pain/swelling/erythema. No tremors. Neurologic: AAOx3, motor 5/5 and sensation intact throughout upper and lower ext, CN II-XII intact, No facial droop. No focal deficits. No clonus or rigidity. No hypo or hyperreflexia.    Plan: atarax , reassess.     Appropriate medications for patient's presenting complaints were ordered and effects were reassessed.  Patient's records (prior hospital, ED visit) were reviewed.  Additional history was obtained from [address] [place] [address]. Escalation to admission/observation was considered. However patient feels much better and is comfortable with discharge.  Appropriate follow-up was arranged.  Facility aware of pt being discharged, agree with plan. 68-year-old male with past medical history of schizophrenia, diabetes, CLL, parathyroid cancer, seen in ED earlier today requesting the usual shot in his buttocks but did not know the name, discussion was had with  at [address] [place] [address] who stated that patient can return back and did not require any medication, presents again today requesting the same shot.  Discussion had with Pablo Michaels again and stated that when patient came back he was being aggressive with staff and demanding he returned, did not hurt himself or hurt anyone else.  Patient is cooperative in the ED requesting that shot but again does not know the name, when discussion was had with facility state that patient receives Haldol at the facility no need for Haldol administration here as patient is cooperative.  Patient denies any complaints.  No fever, chills, n/v, cp,  pleuritic cp, sob, palpitations, diaphoresis, cough, ha/lh/dizziness, numbness/tingling, neck pain/ stiffness, abd pain, diarrhea, constipation, melena/brbpr, urinary symptoms, trauma, weakness, edema, calf pain/swelling/erythema, sick contacts, recent travel or rash. No SI/HI. No v/a hallucinations. no IVDA.     On exam:  Vital Signs: I have reviewed the initial vital signs. Constitutional:  pt sitting on stretcher speaking full sentences in nad. Integumentary: No rash. No flushing, dryness or diaphoresis. ENT: MMM. No tongue fasciculations. NECK: Supple, non-tender, no meningeal signs. Cardiovascular: RRR, radial pulses 2/4 b/l. No JVD. Respiratory: BS present b/l, ctabl, no wheezing or crackles, no accessory muscle use, no stridor. Gastrointestinal: BS present throughout all 4 quadrants, soft, nd, nt, no rebound tenderness or guarding, no cvat. Musculoskeletal: FROM, no edema, no calf pain/swelling/erythema. No tremors. Neurologic: AAOx3, motor 5/5 and sensation intact throughout upper and lower ext, CN II-XII intact, No facial droop. No focal deficits. No clonus or rigidity. No hypo or hyperreflexia.    Plan: atarax , reassess.     Appropriate medications for patient's presenting complaints were ordered and effects were reassessed.  Patient's records (prior hospital, ED visit) were reviewed.  Additional history was obtained from [address] [place] [address]. Escalation to admission/observation was considered. However patient feels much better and is comfortable with discharge.  Appropriate follow-up was arranged.  Facility aware of pt being discharged, agree with plan.

## 2023-12-15 NOTE — ED PROVIDER NOTE - PROGRESS NOTE DETAILS
ED Attending AVILA Farfan  pt cooperative, no complaints. =Supportive care and home care discussed in detail. Patient aware they may have to return for re-evaluation and possible admission if outpatient treatment fails. Strict return precautions discussed. Facility made aware of dispo.

## 2023-12-15 NOTE — ED ADULT TRIAGE NOTE - WEIGHT IN KG
Anesthesia Type: 1% lidocaine with 1:100,000 epinephrine and 408mcg clindamycin/ml and a 1:10 solution of 8.4% sodium bicarbonate 77.6

## 2023-12-15 NOTE — ED PROVIDER NOTE - PROGRESS NOTE DETAILS
Spoke with  , Svitlana from [address] [place] [address].   They are aware pt left the residence without a chaperone.   Pt has a history of coming to the ED unattended requesting "shots"  Pt came to wrong medical facility.  Svitlana aware and states she sending someone to  patient from [facility] within the half hour   Spoke with  , Svitlana from [address] [place] [address].   They are aware pt left the residence without a chaperone.   Pt has a history of coming to the ED unattended requesting "shots"  Pt came to wrong medical facility.  Svitlana aware and states she sending someone to  patient from [facility] within the half hour

## 2023-12-15 NOTE — ED PROVIDER NOTE - CARE PLAN
1 Principal Discharge DX:	Evaluation by medical service required  Secondary Diagnosis:	Schizophrenia

## 2023-12-15 NOTE — ED PROVIDER NOTE - OBJECTIVE STATEMENT
68-year-old male history of schizophrenia presenting to ED for evaluation of agitation.  When asked at bedside if patient was irritated or agitated he states that he came in for "his shot" not able to explain what type of shot he is looking for but states that he is wants a shot.  Denies any other concerns or complaints.  Collateral collected from 777 states patient was being aggressive with staff and threatening to punch people which prompted 911 call sending patient to ED.  Here in ED patient has been calm and cooperative

## 2023-12-15 NOTE — ED PROVIDER NOTE - ATTENDING CONTRIBUTION TO CARE
Body Location Override (Optional - Billing Will Still Be Based On Selected Body Map Location If Applicable): right posterior lower leg Detail Level: Simple Depth Of Biopsy: dermis Was A Bandage Applied: Yes Size Of Lesion In Cm: 0.1 X Size Of Lesion In Cm: 0 Biopsy Type: H and E Biopsy Method: Personna blade Anesthesia Type: 1% lidocaine with epinephrine Anesthesia Volume In Cc (Will Not Render If 0): 0.5 Hemostasis: Ofe's Wound Care: Petrolatum Dressing: Band-Aid Destruction After The Procedure: No Type Of Destruction Used: Curettage Curettage Text: The wound bed was treated with curettage after the biopsy was performed. Cryotherapy Text: The wound bed was treated with cryotherapy after the biopsy was performed. Electrodesiccation Text: The wound bed was treated with electrodesiccation after the biopsy was performed. Electrodesiccation And Curettage Text: The wound bed was treated with electrodesiccation and curettage after the biopsy was performed. Silver Nitrate Text: The wound bed was treated with silver nitrate after the biopsy was performed. Lab: -[id number] Consent: Written consent was obtained and risks were reviewed including but not limited to scarring, infection, bleeding, scabbing, incomplete removal, nerve damage and allergy to anesthesia. Post-Care Instructions: I reviewed with the patient in detail post-care instructions. Patient is to keep the biopsy site dry overnight, and then apply bacitracin twice daily until healed. Patient may apply hydrogen peroxide soaks to remove any crusting. Notification Instructions: Patient will be notified of biopsy results. However, patient instructed to call the office if not contacted within 2 weeks. Billing Type: [employer] Information: Selecting Yes will display possible errors in your note based on the variables you have selected. This validation is only offered as a suggestion for you. PLEASE NOTE THAT THE VALIDATION TEXT WILL BE REMOVED WHEN YOU FINALIZE YOUR NOTE. IF YOU WANT TO FAX A PRELIMINARY NOTE YOU WILL NEED TO TOGGLE THIS TO 'NO' IF YOU DO NOT WANT IT IN YOUR FAXED NOTE. 68 y.o. male, PMH of schizophrenia, CLL, DM, parathyroid cancer presents from [address] [place] [address] for "a shot". Patient does not know what shot, or who prescribed it, or who should administer them. Spoke with  Lilly Jarvis who picked up the pt from the ED and will take him to facility for the medication. Patient denies any current complaints. On exam, pt in NAD, AAOx3, head NC/AT, CN II-XII intact, PEERL, EOMi, neck (-) midline tenderness, lungs CTA B/L, CV S1S2 regular, abdomen soft/NT/ND/(+)BS, ext (-) edema, motor 5/5x4, sensation intact, ambulating with steady gait. Will d/c with chaperone from [address] [place] [address]. 68 y.o. male, PMH of schizophrenia, CLL, DM, parathyroid cancer presents from [address] [place] [address] for "a shot". Patient does not know what shot, or who prescribed it, or who should administer them. Spoke with  Lilly Jarvis who picked up the pt from the ED and will take him to facility for the medication. Patient denies any current complaints. On exam, pt in NAD, AAOx3, head NC/AT, CN II-XII intact, PEERL, EOMi, neck (-) midline tenderness, lungs CTA B/L, CV S1S2 regular, abdomen soft/NT/ND/(+)BS, ext (-) edema, motor 5/5x4, sensation intact, ambulating with steady gait. Will d/c with chaperone from [address] [place] [address].

## 2023-12-15 NOTE — ED PROVIDER NOTE - PATIENT PORTAL LINK FT
You can access the FollowMyHealth Patient Portal offered by [facility] by registering at the following website: [email or web address]. By joining [employer]’s FollowMyHealth portal, you will also be able to view your health information using other applications (apps) compatible with our system. You can access the FollowMyHealth Patient Portal offered by [facility] by registering at the following website: [email or web address]. By joining [employer]’s FollowMyHealth portal, you will also be able to view your health information using other applications (apps) compatible with our system.

## 2023-12-15 NOTE — ED ADULT TRIAGE NOTE - BP NONINVASIVE DIASTOLIC (MM HG)
Writer called patient's mother, Deann, to remind them of upcoming appt with Dr. Bansal this afternoon at 2pm. Deann states they will be coming.   
[phone]

## 2023-12-15 NOTE — ED PROVIDER NOTE - OBJECTIVE STATEMENT
Pt is a 68 y.o Male with PMHx of Schizophenia, CLL, DM, Parathyroid cancer who presents from [address] [place] [address] for "the usual  shot in my butt, but I do not know what it is"Pt does not know what the shots are for or what the name of the medication given to him is. Spoke with , Svitlana at [address] [place] [address] who states the pt needs to return and that she will be sending a chaperone to the ED to pick him up to return him home safely and to help him go to the correct medical facility for his scheduled shot. Pt denies any current complaints. Denies any SI/HI auditory or visual hallucinations. Pt is a 68 y.o Male with PMHx of Schizophenia, CLL, DM, Parathyroid cancer who presents from [address] [place] [address] for "the usual  shot in my butt, but I do not know what it is"Pt does not know what the shots are for or what the name of the medication given to him is. Spoke with , Svitlana at [address] [place] [address] who states the pt needs to return and that she will be sending a chaperone to the ED to pick him up to return him home safely and to help him go to the correct medical facility for his scheduled shot. Pt denies any current complaints. Denies any SI/HI auditory or visual hallucinations.

## 2023-12-15 NOTE — ED PROVIDER NOTE - ATTENDING APP SHARED VISIT CONTRIBUTION OF CARE
68-year-old male with past medical history of schizophrenia, diabetes, CLL, parathyroid cancer, seen in ED earlier today requesting the usual shot in his buttocks but did not know the name, discussion was had with  at [address] [place] [address] who stated that patient can return back and did not require any medication, presents again today requesting the same shot.  [facility] and stated that when patient came back he was being aggressive with staff and demanding he returned, did not hurt himself or hurt anyone else.  Patient is cooperative in the ED requesting that shot but again does not know the name, when discussion was had with facility state that patient receives Haldol at the facility no need for Haldol administration here as patient is cooperative.  Patient denies any complaints.  No fever, chills, n/v, cp,  pleuritic cp, sob, palpitations, diaphoresis, cough, ha/lh/dizziness, numbness/tingling, neck pain/ stiffness, abd pain, diarrhea, constipation, melena/brbpr, urinary symptoms, trauma, weakness, edema, calf pain/swelling/erythema, sick contacts, recent travel or rash. No SI/HI. No v/a hallucinations. no IVDA.     On exam:  Vital Signs: I have reviewed the initial vital signs. Constitutional:  pt sitting on stretcher speaking full sentences in nad. Integumentary: No rash. No flushing, dryness or diaphoresis. ENT: MMM. No tongue fasciculations. NECK: Supple, non-tender, no meningeal signs. Cardiovascular: RRR, radial pulses 2/4 b/l. No JVD. Respiratory: BS present b/l, ctabl, no wheezing or crackles, no accessory muscle use, no stridor. Gastrointestinal: BS present throughout all 4 quadrants, soft, nd, nt, no rebound tenderness or guarding, no cvat. Musculoskeletal: FROM, no edema, no calf pain/swelling/erythema. No tremors. Neurologic: AAOx3, motor 5/5 and sensation intact throughout upper and lower ext, CN II-XII intact, No facial droop. No focal deficits. No clonus or rigidity. No hypo or hyperreflexia.    Plan: atarax , reassess. 68-year-old male with past medical history of schizophrenia, diabetes, CLL, parathyroid cancer, seen in ED earlier today requesting the usual shot in his buttocks but did not know the name, discussion was had with  at [address] [place] [address] who stated that patient can return back and did not require any medication, presents again today requesting the same shot.  Discussion [facility] and stated that when patient came back he was being aggressive with staff and demanding he returned, did not hurt himself or hurt anyone else.  Patient is cooperative in the ED requesting that shot but again does not know the name, when discussion was had with facility state that patient receives Haldol at the facility no need for Haldol administration here as patient is cooperative.  Patient denies any complaints.  No fever, chills, n/v, cp,  pleuritic cp, sob, palpitations, diaphoresis, cough, ha/lh/dizziness, numbness/tingling, neck pain/ stiffness, abd pain, diarrhea, constipation, melena/brbpr, urinary symptoms, trauma, weakness, edema, calf pain/swelling/erythema, sick contacts, recent travel or rash. No SI/HI. No v/a hallucinations. no IVDA.     On exam:  Vital Signs: I have reviewed the initial vital signs. Constitutional:  pt sitting on stretcher speaking full sentences in nad. Integumentary: No rash. No flushing, dryness or diaphoresis. ENT: MMM. No tongue fasciculations. NECK: Supple, non-tender, no meningeal signs. Cardiovascular: RRR, radial pulses 2/4 b/l. No JVD. Respiratory: BS present b/l, ctabl, no wheezing or crackles, no accessory muscle use, no stridor. Gastrointestinal: BS present throughout all 4 quadrants, soft, nd, nt, no rebound tenderness or guarding, no cvat. Musculoskeletal: FROM, no edema, no calf pain/swelling/erythema. No tremors. Neurologic: AAOx3, motor 5/5 and sensation intact throughout upper and lower ext, CN II-XII intact, No facial droop. No focal deficits. No clonus or rigidity. No hypo or hyperreflexia.    Plan: atarax , reassess.

## 2023-12-15 NOTE — ED PROVIDER NOTE - PHYSICAL EXAMINATION
VITAL SIGNS: I have reviewed nursing notes and confirm.  CONSTITUTIONAL:  in no acute distress.  SKIN: Skin exam is warm and dry, no acute rash.  HEAD: Normocephalic; atraumatic.  EYES: PERRL, EOM intact; conjunctiva and sclera clear.  ENT: No nasal discharge; airway clear.  NECK: Supple; non tender.  NEURO: Alert, oriented. Grossly unremarkable. No focal deficits.

## 2023-12-15 NOTE — ED PROVIDER NOTE - PATIENT PORTAL LINK FT
You can access the FollowMyHealth Patient Portal offered by [facility] by registering at the following website: [email or web address]. By joining [employer]’s FollowMyHealth portal, you will also be able to view your health information using other applications (apps) compatible with our system. You can access the FollowMyHealth Patient Portal offered by [facility] by registering at the following website: [email or web address]. By joining [employer]’s FollowMyHealth portal, you will also be able to view your health information using other applications (apps) compatible with our system.

## 2023-12-15 NOTE — ED ADULT NURSE NOTE - NSFALLUNIVINTERV_ED_ALL_ED
Bed/Stretcher in lowest position, wheels locked, appropriate side rails in place/Call bell, personal items and telephone in reach/Instruct patient to call for assistance before getting out of bed/chair/stretcher/Non-slip footwear applied when patient is off stretcher/[place] to call system/Physically safe environment - no spills, clutter or unnecessary equipment/Purposeful proactive rounding/Room/bathroom lighting operational, light cord in reach Bed/Stretcher in lowest position, wheels locked, appropriate side rails in place/Call bell, personal items and telephone in reach/Instruct patient to call for assistance before getting out of bed/chair/stretcher/Non-slip footwear applied when patient is off stretcher/[place] to call system/Physically safe environment - no spills, clutter or unnecessary equipment/Purposeful proactive rounding/Room/bathroom lighting operational, light cord in reach

## 2023-12-16 NOTE — ED ADULT NURSE NOTE - NSFALLUNIVINTERV_ED_ALL_ED
Bed/Stretcher in lowest position, wheels locked, appropriate side rails in place/Call bell, personal items and telephone in reach/Instruct patient to call for assistance before getting out of bed/chair/stretcher/Non-slip footwear applied when patient is off stretcher/[place] to call system/Physically safe environment - no spills, clutter or unnecessary equipment/Purposeful proactive rounding/Room/bathroom lighting operational, light cord in reach Bed/Stretcher in lowest position, wheels locked, appropriate side rails in place/Call bell, personal items and telephone in reach/Instruct patient to call for assistance before getting out of bed/chair/stretcher/Non-slip footwear applied when patient is off stretcher/[place] to call system/Physically safe environment - no spills, clutter or unnecessary equipment/Purposeful proactive rounding/Room/bathroom lighting operational, light cord in reach

## 2023-12-20 NOTE — ED PROVIDER NOTE - NS ED ATTENDING STATEMENT MOD
EEG Electrode Removal/Replacement      Reason for Removal:  MRI    Removal Agent:  Collodion Remover (acetone free)    Skin Condition post-electrode removal:  No skin breakdown noted.          Attending with

## 2023-12-21 ENCOUNTER — INPATIENT (INPATIENT)
Facility: HOSPITAL | Age: 68
LOS: 14 days | Discharge: HOME | DRG: 885 | End: 2024-01-05
Attending: PSYCHIATRY & NEUROLOGY | Admitting: PSYCHIATRY & NEUROLOGY
Payer: MEDICARE

## 2023-12-21 VITALS
WEIGHT: 171.08 LBS | DIASTOLIC BLOOD PRESSURE: 78 MMHG | HEART RATE: 77 BPM | SYSTOLIC BLOOD PRESSURE: 172 MMHG | OXYGEN SATURATION: 100 % | HEIGHT: 67 IN | TEMPERATURE: 98 F | RESPIRATION RATE: 20 BRPM

## 2023-12-21 DIAGNOSIS — F20.9 SCHIZOPHRENIA, UNSPECIFIED: ICD-10-CM

## 2023-12-21 LAB
ALBUMIN SERPL ELPH-MCNC: 4.5 G/DL — SIGNIFICANT CHANGE UP (ref 3.5–5.2)
ALBUMIN SERPL ELPH-MCNC: 4.5 G/DL — SIGNIFICANT CHANGE UP (ref 3.5–5.2)
ALP SERPL-CCNC: 152 U/L — HIGH (ref 30–115)
ALP SERPL-CCNC: 152 U/L — HIGH (ref 30–115)
ALT FLD-CCNC: 29 U/L — SIGNIFICANT CHANGE UP (ref 0–41)
ALT FLD-CCNC: 29 U/L — SIGNIFICANT CHANGE UP (ref 0–41)
ANION GAP SERPL CALC-SCNC: 11 MMOL/L — SIGNIFICANT CHANGE UP (ref 7–14)
ANION GAP SERPL CALC-SCNC: 11 MMOL/L — SIGNIFICANT CHANGE UP (ref 7–14)
APAP SERPL-MCNC: <5 UG/ML — LOW (ref 10–30)
APAP SERPL-MCNC: <5 UG/ML — LOW (ref 10–30)
AST SERPL-CCNC: 27 U/L — SIGNIFICANT CHANGE UP (ref 0–41)
AST SERPL-CCNC: 27 U/L — SIGNIFICANT CHANGE UP (ref 0–41)
BASOPHILS # BLD AUTO: 0.08 K/UL — SIGNIFICANT CHANGE UP (ref 0–0.2)
BASOPHILS # BLD AUTO: 0.08 K/UL — SIGNIFICANT CHANGE UP (ref 0–0.2)
BASOPHILS NFR BLD AUTO: 0.9 % — SIGNIFICANT CHANGE UP (ref 0–1)
BASOPHILS NFR BLD AUTO: 0.9 % — SIGNIFICANT CHANGE UP (ref 0–1)
BILIRUB SERPL-MCNC: 0.3 MG/DL — SIGNIFICANT CHANGE UP (ref 0.2–1.2)
BILIRUB SERPL-MCNC: 0.3 MG/DL — SIGNIFICANT CHANGE UP (ref 0.2–1.2)
BUN SERPL-MCNC: 13 MG/DL — SIGNIFICANT CHANGE UP (ref 10–20)
BUN SERPL-MCNC: 13 MG/DL — SIGNIFICANT CHANGE UP (ref 10–20)
CALCIUM SERPL-MCNC: 8.4 MG/DL — SIGNIFICANT CHANGE UP (ref 8.4–10.5)
CALCIUM SERPL-MCNC: 8.4 MG/DL — SIGNIFICANT CHANGE UP (ref 8.4–10.5)
CHLORIDE SERPL-SCNC: 101 MMOL/L — SIGNIFICANT CHANGE UP (ref 98–110)
CHLORIDE SERPL-SCNC: 101 MMOL/L — SIGNIFICANT CHANGE UP (ref 98–110)
CO2 SERPL-SCNC: 26 MMOL/L — SIGNIFICANT CHANGE UP (ref 17–32)
CO2 SERPL-SCNC: 26 MMOL/L — SIGNIFICANT CHANGE UP (ref 17–32)
CREAT SERPL-MCNC: 0.7 MG/DL — SIGNIFICANT CHANGE UP (ref 0.7–1.5)
CREAT SERPL-MCNC: 0.7 MG/DL — SIGNIFICANT CHANGE UP (ref 0.7–1.5)
EGFR: 102 ML/MIN/1.73M2 — SIGNIFICANT CHANGE UP
EGFR: 102 ML/MIN/1.73M2 — SIGNIFICANT CHANGE UP
EOSINOPHIL # BLD AUTO: 0 K/UL — SIGNIFICANT CHANGE UP (ref 0–0.7)
EOSINOPHIL # BLD AUTO: 0 K/UL — SIGNIFICANT CHANGE UP (ref 0–0.7)
EOSINOPHIL NFR BLD AUTO: 0 % — SIGNIFICANT CHANGE UP (ref 0–8)
EOSINOPHIL NFR BLD AUTO: 0 % — SIGNIFICANT CHANGE UP (ref 0–8)
ETHANOL SERPL-MCNC: <10 MG/DL — SIGNIFICANT CHANGE UP
ETHANOL SERPL-MCNC: <10 MG/DL — SIGNIFICANT CHANGE UP
GLUCOSE SERPL-MCNC: 105 MG/DL — HIGH (ref 70–99)
GLUCOSE SERPL-MCNC: 105 MG/DL — HIGH (ref 70–99)
HCT VFR BLD CALC: 42.3 % — SIGNIFICANT CHANGE UP (ref 42–52)
HCT VFR BLD CALC: 42.3 % — SIGNIFICANT CHANGE UP (ref 42–52)
HGB BLD-MCNC: 13.6 G/DL — LOW (ref 14–18)
HGB BLD-MCNC: 13.6 G/DL — LOW (ref 14–18)
LYMPHOCYTES # BLD AUTO: 2.88 K/UL — SIGNIFICANT CHANGE UP (ref 1.2–3.4)
LYMPHOCYTES # BLD AUTO: 2.88 K/UL — SIGNIFICANT CHANGE UP (ref 1.2–3.4)
LYMPHOCYTES # BLD AUTO: 32.2 % — SIGNIFICANT CHANGE UP (ref 20.5–51.1)
LYMPHOCYTES # BLD AUTO: 32.2 % — SIGNIFICANT CHANGE UP (ref 20.5–51.1)
MCHC RBC-ENTMCNC: 29.5 PG — SIGNIFICANT CHANGE UP (ref 27–31)
MCHC RBC-ENTMCNC: 29.5 PG — SIGNIFICANT CHANGE UP (ref 27–31)
MCHC RBC-ENTMCNC: 32.2 G/DL — SIGNIFICANT CHANGE UP (ref 32–37)
MCHC RBC-ENTMCNC: 32.2 G/DL — SIGNIFICANT CHANGE UP (ref 32–37)
MCV RBC AUTO: 91.8 FL — SIGNIFICANT CHANGE UP (ref 80–94)
MCV RBC AUTO: 91.8 FL — SIGNIFICANT CHANGE UP (ref 80–94)
MONOCYTES # BLD AUTO: 0.62 K/UL — HIGH (ref 0.1–0.6)
MONOCYTES # BLD AUTO: 0.62 K/UL — HIGH (ref 0.1–0.6)
MONOCYTES NFR BLD AUTO: 6.9 % — SIGNIFICANT CHANGE UP (ref 1.7–9.3)
MONOCYTES NFR BLD AUTO: 6.9 % — SIGNIFICANT CHANGE UP (ref 1.7–9.3)
NEUTROPHILS # BLD AUTO: 3.81 K/UL — SIGNIFICANT CHANGE UP (ref 1.4–6.5)
NEUTROPHILS # BLD AUTO: 3.81 K/UL — SIGNIFICANT CHANGE UP (ref 1.4–6.5)
NEUTROPHILS NFR BLD AUTO: 42.6 % — SIGNIFICANT CHANGE UP (ref 42.2–75.2)
NEUTROPHILS NFR BLD AUTO: 42.6 % — SIGNIFICANT CHANGE UP (ref 42.2–75.2)
PLATELET # BLD AUTO: 129 K/UL — LOW (ref 130–400)
PLATELET # BLD AUTO: 129 K/UL — LOW (ref 130–400)
PMV BLD: 11.3 FL — HIGH (ref 7.4–10.4)
PMV BLD: 11.3 FL — HIGH (ref 7.4–10.4)
POTASSIUM SERPL-MCNC: 4.1 MMOL/L — SIGNIFICANT CHANGE UP (ref 3.5–5)
POTASSIUM SERPL-MCNC: 4.1 MMOL/L — SIGNIFICANT CHANGE UP (ref 3.5–5)
POTASSIUM SERPL-SCNC: 4.1 MMOL/L — SIGNIFICANT CHANGE UP (ref 3.5–5)
POTASSIUM SERPL-SCNC: 4.1 MMOL/L — SIGNIFICANT CHANGE UP (ref 3.5–5)
PROT SERPL-MCNC: 6.6 G/DL — SIGNIFICANT CHANGE UP (ref 6–8)
PROT SERPL-MCNC: 6.6 G/DL — SIGNIFICANT CHANGE UP (ref 6–8)
RBC # BLD: 4.61 M/UL — LOW (ref 4.7–6.1)
RBC # BLD: 4.61 M/UL — LOW (ref 4.7–6.1)
RBC # FLD: 16.9 % — HIGH (ref 11.5–14.5)
RBC # FLD: 16.9 % — HIGH (ref 11.5–14.5)
SALICYLATES SERPL-MCNC: <0.3 MG/DL — LOW (ref 4–30)
SALICYLATES SERPL-MCNC: <0.3 MG/DL — LOW (ref 4–30)
SARS-COV-2 RNA SPEC QL NAA+PROBE: SIGNIFICANT CHANGE UP
SARS-COV-2 RNA SPEC QL NAA+PROBE: SIGNIFICANT CHANGE UP
SODIUM SERPL-SCNC: 138 MMOL/L — SIGNIFICANT CHANGE UP (ref 135–146)
SODIUM SERPL-SCNC: 138 MMOL/L — SIGNIFICANT CHANGE UP (ref 135–146)
WBC # BLD: 8.94 K/UL — SIGNIFICANT CHANGE UP (ref 4.8–10.8)
WBC # BLD: 8.94 K/UL — SIGNIFICANT CHANGE UP (ref 4.8–10.8)
WBC # FLD AUTO: 8.94 K/UL — SIGNIFICANT CHANGE UP (ref 4.8–10.8)
WBC # FLD AUTO: 8.94 K/UL — SIGNIFICANT CHANGE UP (ref 4.8–10.8)

## 2023-12-21 PROCEDURE — 99223 1ST HOSP IP/OBS HIGH 75: CPT

## 2023-12-21 RX ORDER — DIPHENHYDRAMINE HCL 50 MG
50 CAPSULE ORAL EVERY 6 HOURS
Refills: 0 | Status: DISCONTINUED | OUTPATIENT
Start: 2023-12-21 | End: 2023-12-23

## 2023-12-21 RX ORDER — HALOPERIDOL DECANOATE 100 MG/ML
5 INJECTION INTRAMUSCULAR ONCE
Refills: 0 | Status: COMPLETED | OUTPATIENT
Start: 2023-12-21 | End: 2023-12-21

## 2023-12-21 RX ADMIN — Medication 2 MILLIGRAM(S): at 13:05

## 2023-12-21 RX ADMIN — HALOPERIDOL DECANOATE 5 MILLIGRAM(S): 100 INJECTION INTRAMUSCULAR at 13:05

## 2023-12-21 RX ADMIN — Medication 50 MILLIGRAM(S): at 13:10

## 2023-12-21 NOTE — ED ADULT TRIAGE NOTE - CHIEF COMPLAINT QUOTE
Pt requesting refill of Artane and Mycelex, states he ran out appr 3 days ago, no complaints at this time

## 2023-12-21 NOTE — ED PROVIDER NOTE - ATTENDING CONTRIBUTION TO CARE
69 yo M pmhx CLL, diabetes, parathryroid cancer, schizophrenia presenting from [facility] presents to ED requesting 2 IM injections of "Atrene and Mycelx". Pt does not have med list, unable to provide pharmacist. Denies other complaints.     CONSTITUTIONAL: NAD.   SKIN: warm, dry  HEAD: Normocephalic; atraumatic.  EYES: no conjunctival injection.    ENT: MMM.   NECK: Supple.  CARD: RRR.   RESP: No wheezes, rales or rhonchi.  ABD: soft ntnd.   EXT: Normal ROM.   NEURO: Alert, oriented, grossly unremarkable.  PSYCH: aggressive, demanding medications, flight of ideas 69 yo M pmhx CLL, diabetes, parathryroid cancer, schizophrenia presenting from [facility] presents to ED requesting 2 IM injections of "Atrene and Mycelx". Pt does not have med list, unable to provide pharmacist. Denies other complaints.     CONSTITUTIONAL: NAD.   SKIN: warm, dry  HEAD: Normocephalic; atraumatic.  EYES: no conjunctival injection.    ENT: MMM.   NECK: Supple.  CARD: RRR.   RESP: No wheezes, rales or rhonchi.  ABD: soft ntnd.   EXT: Normal ROM.   NEURO: Alert, oriented, grossly unremarkable.  PSYCH: aggressive, demanding medications, flight of ideas

## 2023-12-21 NOTE — ED BEHAVIORAL HEALTH ASSESSMENT NOTE - DETAILS
denies SW attempted collateral from residence. No one available to provide meaningful history. d/w ED

## 2023-12-21 NOTE — ED BEHAVIORAL HEALTH ASSESSMENT NOTE - NSSUICRSKFACTOR_PSY_ALL_CORE
[Normal Appearance] : normal appearance [Well Groomed] : well groomed [General Appearance - Well Developed] : well developed [General Appearance - Well Nourished] : well nourished [General Appearance - In No Acute Distress] : no acute distress [No Deformities] : no deformities [Eyelids - No Xanthelasma] : the eyelids demonstrated no xanthelasmas [Normal Oral Mucosa] : normal oral mucosa [Normal Conjunctiva] : the conjunctiva exhibited no abnormalities [No Oral Pallor] : no oral pallor [No Oral Cyanosis] : no oral cyanosis [Normal Jugular Venous A Waves Present] : normal jugular venous A waves present [No Jugular Venous Thompson A Waves] : no jugular venous thompson A waves [Normal Jugular Venous V Waves Present] : normal jugular venous V waves present [Respiration, Rhythm And Depth] : normal respiratory rhythm and effort [Exaggerated Use Of Accessory Muscles For Inspiration] : no accessory muscle use [Auscultation Breath Sounds / Voice Sounds] : lungs were clear to auscultation bilaterally [Heart Rate And Rhythm] : heart rate and rhythm were normal [Heart Sounds] : normal S1 and S2 [Murmurs] : no murmurs present [Abdomen Tenderness] : non-tender [Abdomen Soft] : soft [Abdomen Mass (___ Cm)] : no abdominal mass palpated [Abnormal Walk] : normal gait [Gait - Sufficient For Exercise Testing] : the gait was sufficient for exercise testing [Cyanosis, Localized] : no localized cyanosis [Nail Clubbing] : no clubbing of the fingernails [Skin Color & Pigmentation] : normal skin color and pigmentation [] : no ischemic changes [Petechial Hemorrhages (___cm)] : no petechial hemorrhages [No Skin Ulcers] : no skin ulcer [No Venous Stasis] : no venous stasis [Skin Lesions] : no skin lesions [Oriented To Time, Place, And Person] : oriented to person, place, and time [Affect] : the affect was normal [No Xanthoma] : no  xanthoma was observed [No Anxiety] : not feeling anxious [Mood] : the mood was normal Current and Past Psychiatric Diagnoses/Presenting Symptoms

## 2023-12-21 NOTE — ED PROVIDER NOTE - PROGRESS NOTE DETAILS
Authored by Rocio Yang, DO: Spoke w/ patients supervisor at [facility], reports she knows pt for long time, pt not himself over past month or so, non-compliant with his actual medications for past month (psycch and cancer meds), denies SI or attempts, but pt has been combative with staff more than usual baseline. Patient not suitable for urgent care, patient refusing labs/EKG/psych eval, becomes agitated and combative and unable to be de-escalated, requiring sedation, moved to critical care, pt signed out to Dr. Cannon at this time. Pt placed on 1:1. SILVINO: pt upgraded to CC for agitation. Pt is speaking in full sentences, ambulatory with stable gait. Yelling, threatening staff and uncooperative with history or physical exam. Given medication for anxiolysis. Labs/ flu swab sent. Pending lab results, telepsych evaluation and reassessment BF: Signed out to Dr. BOBBY to follow up telepsych recs/eval. Pt is medically cleared for psych. Note authored by Dr. Arias: Patient signed out to me by Dr. Andrade.  Patient pending telepsychiatry evaluation.  In short, 68-year-old with history of schizophrenia questionable medication use and agitation.  Hemodynamically stable.  Labs are ordered and reviewed by me. SILVINO: pt signed out to Dr. West pending telepsych recommendations Note authored by Dr. Arias: Patient signed out to me by Dr. Cannon.  Patient pending telepsychiatry evaluation.  In short, 68-year-old with history of schizophrenia questionable medication use and agitation.  Hemodynamically stable.  Labs are ordered and reviewed by me. LAKESHIA Webster.- Sign out received from Dr. Burden. Pt. resting at this time, 1:1 in place, respirations even and unlabored. Awaiting for telepscyh reccomendations. Note authored by Dr. Arias: I, Dr. Tripp Arias, discussed the case with psychiatry attending -Dr. Callahan, placed in observation for now pending further collaterals and reassessment in the morning.

## 2023-12-21 NOTE — ED PROVIDER NOTE - PHYSICAL EXAMINATION
CONSTITUTIONAL: NAD  SKIN: Warm dry  HEAD: NCAT  EYES: NL inspection  ENT: MMM  NECK: Supple; non tender.  CARD: RRR  RESP: CTAB  EXT: no pedal edema  NEURO: Grossly unremarkable  PSYCH: Uncooperative, inappropriate.

## 2023-12-21 NOTE — ED ADULT NURSE NOTE - NS ED NURSE REPORT GIVEN TO FT
RN  at [facility] waiting for transport RN  at [facility] waiting for transport

## 2023-12-21 NOTE — ED BEHAVIORAL HEALTH ASSESSMENT NOTE - PSYCHIATRIC ISSUES AND PLAN (INCLUDE STANDING AND PRN MEDICATION)
need collateral from residence regarding home psych meds. For acute agitation, Haldol 5mg + Ativan 2mg + Benadryl 50mg q6h PRN

## 2023-12-21 NOTE — ED PROVIDER NOTE - EKG ADDITIONAL INFORMATION FREE TEXT
Brandon CASON, the ED attending independently interpreted the pt's EKG as follows: NSR, rates around 80s, QRS 70s, QTc 480s, lateral TWI unchanged from prior

## 2023-12-21 NOTE — ED BEHAVIORAL HEALTH ASSESSMENT NOTE - NSBHATTESTBILLING_PSY_A_CORE
[id number] diagnostic evaluation with medical services [id number] diagnostic evaluation with medical services

## 2023-12-21 NOTE — ED ADULT NURSE NOTE - NSFALLUNIVINTERV_ED_ALL_ED
Bed/Stretcher in lowest position, wheels locked, appropriate side rails in place/Call bell, personal items and telephone in reach/Instruct patient to call for assistance before getting out of bed/chair/stretcher/Non-slip footwear applied when patient is off stretcher/[place] to call system/Physically safe environment - no spills, clutter or unnecessary equipment/Purposeful proactive rounding/Room/bathroom lighting operational, light cord in reach Bed/Stretcher in lowest position, wheels locked, appropriate side rails in place/Call bell, personal items and telephone in reach/Instruct patient to call for assistance before getting out of bed/chair/stretcher/Non-slip footwear applied when patient is off stretcher/[place] to call system/Physically safe environment - no spills, clutter or unnecessary equipment/Purposeful proactive rounding/Room/bathroom lighting operational, light cord in reach

## 2023-12-21 NOTE — ED ADULT NURSE REASSESSMENT NOTE - NS ED NURSE REASSESS COMMENT FT1
Pt received A+Ox4. VS stable. Pt maintained on 1:1 for risk of harm ot himself. Pt denies any acute distress. Safety and comfort measures maintained.

## 2023-12-21 NOTE — ED CDU PROVIDER INITIAL DAY NOTE - ATTENDING CONTRIBUTION TO CARE
History of schizophrenia questionable compliance agitation.  Seen by psychiatry placed in observation for further management.

## 2023-12-21 NOTE — ED BEHAVIORAL HEALTH ASSESSMENT NOTE - DESCRIPTION
Subjective   Patient ID: Rizwana is a 18 year old female who is accompanied by step-dad        Mad appointment for coccyx pain  Fell 5 years ago. Pain recurred a few months ago  Sits in a hard chair for several hours.   Has not tried stretching  Not doing any kind of intentional exercise.     [employer] in high school. Homeschooling. Does not have a choice about finishing out high school homeschooled because it is already paid for.   Not involved in any activities. Not much social interaction with kids her age. Has 20 yo sister and 5 yo brother at home.   Might sign up for swim classes and [employer] lessons.   Does have a Judaism they go to regularly ([employer])    Mood is okay-- sometimes down, but feels she can manage on own. Has been hard at home because her mom is very depressed and stopped therapy. Talks to her sister a lot about it.    Periods irregular. Wants labs to make sure everything is okay  Diet is fine  No exercise    Sleeps fine, not always enough     Not sexually active.   No smoking, drinking, drugs.     Immunizations- needs menveo and Covid booster          Review of Systems   Constitutional: Negative for activity change and fever.   HENT: Negative for sore throat.    Eyes: Negative for pain and redness.   Respiratory: Negative for cough and shortness of breath.    Cardiovascular: Negative for chest pain and palpitations.   Gastrointestinal: Negative for abdominal pain.   Genitourinary: Positive for menstrual problem. Negative for dysuria.   Musculoskeletal: Negative for myalgias.   Skin: Negative for rash.   Allergic/Immunologic: Negative for immunocompromised state.   Neurological: Negative for headaches.   Hematological: Negative for adenopathy.   Psychiatric/Behavioral: Negative for behavioral problems.       Patient's medications, allergies, past medical, surgical, social and family histories were reviewed and updated as appropriate.    Objective   Vitals: /62 (BP Location: RUE - Right  upper extremity, Patient Position: Sitting)   Pulse 78   Temp 97.4 °F (36.3 °C) (Other (comment))   Resp 18   Ht 5' 2.6\" (1.59 m)   Wt 51.4 kg (113 lb 5.1 oz)   LMP 06/28/2022   BMI 20.33 kg/m²   BSA 1.51 m²   Growth parameters are noted and are appropriate for age.    Physical Exam  Vitals reviewed.   Constitutional:       General: She is not in acute distress.     Appearance: Normal appearance. She is well-developed.   HENT:      Head: Normocephalic and atraumatic.      Right Ear: Tympanic membrane normal.      Left Ear: Tympanic membrane normal.      Neck: Neck supple.   Eyes:      Conjunctiva/sclera: Conjunctivae normal.      Pupils: Pupils are equal, round, and reactive to light.   Cardiovascular:      Rate and Rhythm: Normal rate and regular rhythm.      Heart sounds: Normal heart sounds. No murmur heard.  Pulmonary:      Effort: Pulmonary effort is normal. No respiratory distress.      Breath sounds: Normal breath sounds.   Abdominal:      Palpations: Abdomen is soft.      Tenderness: There is no abdominal tenderness.   Lymphadenopathy:      Cervical: No cervical adenopathy.   Skin:     General: Skin is warm and dry.      Findings: No rash.   Neurological:      General: No focal deficit present.      Mental Status: She is alert and oriented to person, place, and time.   Psychiatric:         Mood and Affect: Mood normal.         Behavior: Behavior normal.         Thought Content: Thought content normal.         Judgment: Judgment normal.        Assessment   Problem List Items Addressed This Visit        Genitourinary and Reproductive    Irregular periods     Likely normal for adolescent. Will check labs            Relevant Orders    THYROID STIMULATING HORMONE REFLEX    VITAMIN D -25 HYDROXY    CBC WITH DIFFERENTIAL    LIPID PANEL WITH REFLEX    COMPREHENSIVE METABOLIC PANEL    GLYCOHEMOGLOBIN       Infectious Diseases    RESOLVED: Need for vaccination    Relevant Orders    MENINGOCOCCAL CONJUGATE  MCV4O VACC (MENVEO) (Completed)    COVID 19 12Y AND OLDER PFIZER-BIONTECH - DO NOT DILUTE (Completed)       Musculoskeletal and Injuries    Coccygeal pain     Exercises discussed. Avoid sitting on hard chair for long periods. Pressure offloading pillows discussed. If not improving, let me know for physical therapy referral               Social    Stress at home     Encouraged patient to find trusted adult to talk to. Consider therapy - not interested at this time. Also encouraged her to get involved in social groups, volunteering, sports or music clubs, etc.              Other Visit Diagnoses     Well adolescent visit    -  Primary    Relevant Orders    THYROID STIMULATING HORMONE REFLEX    VITAMIN D -25 HYDROXY    CBC WITH DIFFERENTIAL    LIPID PANEL WITH REFLEX    COMPREHENSIVE METABOLIC PANEL    GLYCOHEMOGLOBIN        Growth and development appropriate.   Routine anticipatory guidance provided.      Counseling  Assessment and discussion of current Nutrition and physical Activity behaviors performed    Immunizations: per orders.  History of previous adverse reactions to immunizations? no    School physical completed and returned to patient     Follow-up yearly for a well visit, or sooner as needed.   per hpi calm, cooperative, resting comfortably now.  Earlier got Haldol 5mg, Ativan 2mg, Benadryl 50mg

## 2023-12-21 NOTE — ED CDU PROVIDER INITIAL DAY NOTE - PROGRESS NOTE DETAILS
Note authored by Dr. Arias: At this time, patient signout to Dr. Jones to follow up on continue observation and dispo

## 2023-12-21 NOTE — ED BEHAVIORAL HEALTH ASSESSMENT NOTE - SUMMARY
67 yo male domiciled at [facility], with PMH diabetes, CAD, CLL, parathyroid cancer, psych hx of longstanding schizophrenia, multiple state hospitalizations over years, who self-presents with unclear complaint.    Thee patient is very limited, with impoverished thought/speech, and often illogical. However, he remains in good behavioral control. Now he is saying he feels better, denies needing any help, and would like to be discharged. There does not appear to be any acute danger, but patient is acknowledging that he sporadically takes his medications, and only some of them, consistent with conversation ED provider had earlier with . Given it is unclear what his baseline level of functioning is, and he may be non-compliant on important medications, further corroborative from his residence is needed to determine safe disposition. 69 yo male domiciled at [facility], with PMH diabetes, CAD, CLL, parathyroid cancer, psych hx of longstanding schizophrenia, multiple state hospitalizations over years, who self-presents with unclear complaint.    Thee patient is very limited, with impoverished thought/speech, and often illogical. However, he remains in good behavioral control. Now he is saying he feels better, denies needing any help, and would like to be discharged. There does not appear to be any acute danger, but patient is acknowledging that he sporadically takes his medications, and only some of them, consistent with conversation ED provider had earlier with . Given it is unclear what his baseline level of functioning is, and he may be non-compliant on important medications, further corroborative from his residence is needed to determine safe disposition.

## 2023-12-21 NOTE — ED PROVIDER NOTE - CLINICAL SUMMARY MEDICAL DECISION MAKING FREE TEXT BOX
S/o received from Dr. Yang    69 y/o M h/o schizophrenia, CLL, DM, parathyroid cancer, from [address] [place] [address] for medication refill multiple recent ED visits for medication refill, Artane, Mycelex. Prior team obtained collateral from psych facility who noted pt has been noncompliant with psychiatric and cancer medications at least over the past month. They noted he does not take the medications he was requesting. Pt has been combative against staff members, aggressive. No SI/HI/AVH.     Prior team ordered labs for medical clearaance for psychiatric evaluation, Haldol 5 mg IM  and ativan 2 mg IM to treat agitation as team is unable to verbally deescalate patient, I added on benadryl 50 mg. Labs drawn, will follow results and consult psych S/o received from Dr. Yang    69 y/o M h/o schizophrenia, CLL, DM, parathyroid cancer, from [address] [place] [address] for medication refill multiple recent ED visits for medication refill, Artane, Mycelex. Prior team obtained collateral from psych facility who noted pt has been noncompliant with psychiatric and cancer medications at least over the past month. They noted he does not take the medications he was requesting. Pt has been combative against staff members, aggressive. No SI/HI/AVH.     Prior team ordered labs for medical clearaance for psychiatric evaluation, Haldol 5 mg IM  and ativan 2 mg IM to treat agitation as team is unable to verbally deescalate patient, I added on benadryl 50 mg. Labs drawn, will follow results and consult psych S/o received from Dr. Yang    67 y/o M h/o schizophrenia, CLL, DM, parathyroid cancer, from [address] [place] [address] for medication refill multiple recent ED visits for medication refill, Artane, Mycelex. Prior team obtained collateral from psych facility who noted pt has been noncompliant with psychiatric and cancer medications at least over the past month. They noted he does not take the medications he was requesting. Pt has been combative against staff members, aggressive. No SI/HI/AVH.     Prior team ordered labs for medical clearance for psychiatric evaluation, Haldol 5 mg IM  and ativan 2 mg IM to treat agitation as team is unable to verbally deescalate patient, I added on benadryl 50 mg. Labs drawn, will follow results and consult psych S/o received from Dr. Yang    69 y/o M h/o schizophrenia, CLL, DM, parathyroid cancer, from [address] [place] [address] for medication refill multiple recent ED visits for medication refill, Artane, Mycelex. Prior team obtained collateral from psych facility who noted pt has been noncompliant with psychiatric and cancer medications at least over the past month. They noted he does not take the medications he was requesting. Pt has been combative against staff members, aggressive. No SI/HI/AVH.     Prior team ordered labs for medical clearance for psychiatric evaluation, Haldol 5 mg IM  and ativan 2 mg IM to treat agitation as team is unable to verbally deescalate patient, I added on benadryl 50 mg. Labs drawn, will follow results and consult psych

## 2023-12-21 NOTE — ED BEHAVIORAL HEALTH NOTE - BEHAVIORAL HEALTH NOTE
BTCM attempted to reach patient's collateral, Supervisor from [address] [facility] (#: [phone]), however was unable to get in contact with her.  stated Chele will be back from the hours of 8AM-4PM. BTCM attempted to reach patient's collateral, Supervisor from [address] [facility] (#: [phone]), however was unable to get in contact with her.  stated Chlee will be back from the hours of 8AM-4PM.

## 2023-12-21 NOTE — ED BEHAVIORAL HEALTH ASSESSMENT NOTE - NSSUICPROTFACT_PSY_ALL_CORE
Fear of death or the actual act of killing self/Cultural, spiritual and/or moral attitudes against suicide/Positive therapeutic relationships

## 2023-12-21 NOTE — ED PROVIDER NOTE - INPATIENT RESIDENT/ACP NOTIFIED DATE
[Feeds self] : feeds self [Uses verbal exploration] : uses verbal exploration [Uses oral exploration] : uses oral exploration [Beginning to recognize own name] : beginning to recognize own name [Enjoys vocal turn taking] : enjoys vocal turn taking [Shows pleasure from interactions with others] : shows pleasure from interactions with others [Rakes objects] : rakes objects [Rasta] : rasta [Imitate speech/sounds] : imitate speech/sounds [Single syllables (ah,eh,oh)] : single syllables (ah,eh,oh) [Spontaneous Excessive Babbling] : spontaneous excessive babbling [Turns to voices] : turns to voices [Roll over] : roll over [Passed] : passed [Augusto/Mama non-specific] : not augusto/mama specific [Sit - no support, leaning forward] : does not sit - no support, leaning forward 21-Dec-2023 20:10

## 2023-12-21 NOTE — ED CDU PROVIDER INITIAL DAY NOTE - OBJECTIVE STATEMENT
Pt is a 68 y.o Male with PMHx of Schizophrenia, CLL, DM, Parathyroid Cancer who presents from [address] [place] [address] for medication refill. Of note, pt was seen in the ED on the 15th requesting medication. Pt requesting medication of Artane and Mycelex but seems confused on the dosages of each medication and for the reason he is taking the medications. Collateral information was obtained from the supervisor Sailaja at [address] [place] [address] [facility] facility where patient is an outpatient resident, who states that pt has not been his normal self lately. She states she has known the patient for a while but that recently over the past month he is non compliant with his psychiatric, cancer or any of his medications. She also reports pt is not currently taking Artane or Mycelex that they are not part of his medication list. She believes pt keeps coming to the ED because he is in his own way asking for help. She reports a recent episode where pt was combative against a staff member, and that he has been more aggressive lately. She states pt was originally inpatient but now transitioned living residence since October and has not been adapting to the change well. Denies any SI, HI,SA,HA. Denies any other symptoms at this time. Pt is a 68 y.o Male with PMHx of Schizophrenia, CLL, DM, Parathyroid Cancer who presents from [address] [place] [address] for medication refill. Of note, pt was seen in the ED on the 15th requesting medication. Pt requesting medication of Artane and Mycelex but seems confused on the dosages of each medication and for the reason he is taking the medications. Collateral information was obtained from the supervisor Sailaja at [address] [place] [address] [facility] facility where patient is an outpatient resident, who states that pt has not been his normal self lately. She states she has known the patient for a while but that recently over the past month he is non compliant with his psychiatric, cancer or any of his medications. She also reports pt is not currently taking Artane or Mycelex that they are not part of his medication list. She believes pt keeps coming to the ED because he is in his own way asking for help. She reports a recent episode where pt was combative against a staff member, and that he has been more aggressive lately. She states pt was originally inpatient but now transitioned living residence since October and has not been adapting to the change well. Denies any SI, HI,SA,HA. Denies any other symptoms at this time.

## 2023-12-21 NOTE — ED PROVIDER NOTE - OBJECTIVE STATEMENT
Pt is a 68 y.o Male with PMHx of Schizophrenia, CLL, DM, Parathyroid Cancer who presents from [address] [place] [address] for medication refill. Of note, pt was seen in the ED on the 15th requesting medication. Pt requesting medication of Artane and Mycelex but seems confused on the dosages of each medication and for the reason he is taking the medications. Collateral information was obtained from the supervisor Sailaja at [address] [place] [address] [facility] facility where patient is an outpatient resident, who states that pt has not been his normal self lately. She states she has known the patient for a while but that recently over the past month he is non compliant with his psychiatric, cancer or any of his medications. She also reports pt is not currently taking Artane or Mycelex that they are not part of his medication list. She believes pt keeps coming to the ED because he is in his own way asking for help. She reports a recent episode where pt was combative against a staff member, and that he has been more aggressive lately. She states pt was originally inpatient but now transitioned living residence since October and has not been adapting to the change well. Denies any SI, HI,SA,HA. Denies any other symptoms at this time. Pt is a 68 y.o Male with PMHx of Schizophrenia, CLL, DM, Parathyroid Cancer who presents from [address] [place] [address] for medication refill. Of note, pt was seen in the ED on the 15th requesting medication. Pt requesting medication of Artane and Mycelex but seems confused on the dosages of each medication and for the reason he is taking the medications. Collateral information was obtained from the supervisor Sailaja at [address] [place] [address] Psychaitric facility where patient is an outpatient resident, who states that pt has not been his normal self lately. She states she has known the patient for a while but that recently over the past month he is non compliant with his psychiatric, cancer or any of his medications. She also reports pt is not currently taking Artane or Mycelex that they are not part of his medication list. She believes pt keeps coming to the ED because he is in his own way asking for help. She reports a recent episode where pt was combative against a staff member, and that he has been more aggressive lately. She states pt was originally inpatient but now transitioned living residence since October and has not been adapting to the change well. Denies any SI, HI,SA,HA. Denies any other symptoms at this time.

## 2023-12-21 NOTE — ED BEHAVIORAL HEALTH ASSESSMENT NOTE - HPI (INCLUDE ILLNESS QUALITY, SEVERITY, DURATION, TIMING, CONTEXT, MODIFYING FACTORS, ASSOCIATED SIGNS AND SYMPTOMS)
67 yo male domiciled at [facility], with PMH diabetes, CAD, CLL, parathyroid cancer, psych hx of longstanding schizophrenia, multiple state hospitalizations over years, who self-presents with unclear complaint.    On interview the patient is pleasant and in good behavioral control but a very poor historian. He first states he needs Artane and Mycelex but cannot say why he feels this way, then says vaguely that he walked here from [place] because he needed "help," but answers "I don't know" when asked what he needed help with. Now he is saying he feels much better, denies any active complaints both physical and mental, and reports he would like to return to his residence. States he no longer needs help. Often patient is illogical or answers with "I don't know." He says he is prescribed 8 medications but only several of them, whether psychiatric or medical (he cannot specify), and he gives no logical reason why.    Our SW attempted to reach [facility] for collateral. They could not point us to any provider or staff member with knowledge of patient's clinical course. Relevant providers have left for the day. 67 yo male domiciled at [facility], with PMH diabetes, CAD, CLL, parathyroid cancer, psych hx of longstanding schizophrenia, multiple state hospitalizations over years, who self-presents with unclear complaint.    On interview the patient is pleasant and in good behavioral control but a very poor historian. He first states he needs Artane and Mycelex but cannot say why he feels this way, then says vaguely that he walked here from [place] because he needed "help," but answers "I don't know" when asked what he needed help with. Now he is saying he feels much better, denies any active complaints both physical and mental, and reports he would like to return to his residence. States he no longer needs help. Often patient is illogical or answers with "I don't know." He says he is prescribed 8 medications but only several of them, whether psychiatric or medical (he cannot specify), and he gives no logical reason why.    Our SW attempted to reach [facility] for collateral. They could not point us to any provider or staff member with knowledge of patient's clinical course. Relevant providers have left for the day.

## 2023-12-22 DIAGNOSIS — F20.9 SCHIZOPHRENIA, UNSPECIFIED: ICD-10-CM

## 2023-12-22 LAB
GLUCOSE BLDC GLUCOMTR-MCNC: 131 MG/DL — HIGH (ref 70–99)
GLUCOSE BLDC GLUCOMTR-MCNC: 131 MG/DL — HIGH (ref 70–99)
GLUCOSE BLDC GLUCOMTR-MCNC: 151 MG/DL — HIGH (ref 70–99)
GLUCOSE BLDC GLUCOMTR-MCNC: 151 MG/DL — HIGH (ref 70–99)

## 2023-12-22 PROCEDURE — 80061 LIPID PANEL: CPT

## 2023-12-22 PROCEDURE — 80159 DRUG ASSAY CLOZAPINE: CPT

## 2023-12-22 PROCEDURE — 85025 COMPLETE CBC W/AUTO DIFF WBC: CPT

## 2023-12-22 PROCEDURE — 84484 ASSAY OF TROPONIN QUANT: CPT

## 2023-12-22 PROCEDURE — 82962 GLUCOSE BLOOD TEST: CPT

## 2023-12-22 PROCEDURE — 99239 HOSP IP/OBS DSCHRG MGMT >30: CPT

## 2023-12-22 PROCEDURE — 83036 HEMOGLOBIN GLYCOSYLATED A1C: CPT

## 2023-12-22 PROCEDURE — 36415 COLL VENOUS BLD VENIPUNCTURE: CPT

## 2023-12-22 PROCEDURE — 93005 ELECTROCARDIOGRAM TRACING: CPT

## 2023-12-22 PROCEDURE — 84443 ASSAY THYROID STIM HORMONE: CPT

## 2023-12-22 RX ORDER — DEXTROSE 50 % IN WATER 50 %
25 SYRINGE (ML) INTRAVENOUS ONCE
Refills: 0 | Status: DISCONTINUED | OUTPATIENT
Start: 2023-12-22 | End: 2024-01-05

## 2023-12-22 RX ORDER — HALOPERIDOL DECANOATE 100 MG/ML
5 INJECTION INTRAMUSCULAR EVERY 6 HOURS
Refills: 0 | Status: DISCONTINUED | OUTPATIENT
Start: 2023-12-22 | End: 2024-01-05

## 2023-12-22 RX ORDER — GLUCAGON INJECTION, SOLUTION 0.5 MG/.1ML
1 INJECTION, SOLUTION SUBCUTANEOUS ONCE
Refills: 0 | Status: DISCONTINUED | OUTPATIENT
Start: 2023-12-22 | End: 2024-01-05

## 2023-12-22 RX ORDER — INSULIN LISPRO 100/ML
VIAL (ML) SUBCUTANEOUS AT BEDTIME
Refills: 0 | Status: DISCONTINUED | OUTPATIENT
Start: 2023-12-22 | End: 2024-01-05

## 2023-12-22 RX ORDER — SODIUM CHLORIDE 9 MG/ML
1000 INJECTION, SOLUTION INTRAVENOUS
Refills: 0 | Status: DISCONTINUED | OUTPATIENT
Start: 2023-12-22 | End: 2024-01-05

## 2023-12-22 RX ORDER — DEXTROSE 50 % IN WATER 50 %
15 SYRINGE (ML) INTRAVENOUS ONCE
Refills: 0 | Status: DISCONTINUED | OUTPATIENT
Start: 2023-12-22 | End: 2024-01-05

## 2023-12-22 RX ORDER — INSULIN LISPRO 100/ML
VIAL (ML) SUBCUTANEOUS
Refills: 0 | Status: DISCONTINUED | OUTPATIENT
Start: 2023-12-22 | End: 2024-01-05

## 2023-12-22 RX ORDER — DIPHENHYDRAMINE HCL 50 MG
50 CAPSULE ORAL EVERY 6 HOURS
Refills: 0 | Status: DISCONTINUED | OUTPATIENT
Start: 2023-12-22 | End: 2024-01-05

## 2023-12-22 RX ORDER — DEXTROSE 50 % IN WATER 50 %
12.5 SYRINGE (ML) INTRAVENOUS ONCE
Refills: 0 | Status: DISCONTINUED | OUTPATIENT
Start: 2023-12-22 | End: 2024-01-05

## 2023-12-22 NOTE — ED CDU PROVIDER DISPOSITION NOTE - CLINICAL COURSE
68-year-old man, multiple medical problems including diabetes, CLL, parathyroid cancer, CAD being managed medically, schizophrenia, was placed in CDU for telepsych reassessment after he presented requesting medication refills.  On further history, it was discovered that patient, who currently is in the transitional program at [address] [place] [address], has not been taking his medications, psychiatric or other, over the last several weeks, which has led to his multiple visits to the ED for various concerns.  On exam he is well-appearing, alert, cooperative on my eval.  No acute medical issues.  Of note, patient had a recent CDU stay for atypical chest pain, during which a cardiac CT was done.  Result was CAD RADS 3 for moderate CAD.  Patient was evaluated by cardiology at that time as he also has a baseline abnormal EKG, and continued medical management with aspirin and statin was recommended.  Currently, his CAD appears to be stable, without need for further aggressive workup at this time.  He is clinically stable for admission to the psychiatric inpatient unit. 68-year-old man, multiple medical problems including diabetes, CLL, parathyroid cancer, CAD being managed medically, schizophrenia, was placed in CDU for telepsych reassessment after he presented requesting medication refills.  On further history, it was discovered that patient, who currently is in the transitional program at [address] [place] [address], has not been taking his medications, psychiatric or other, over the last several weeks, which has led to his multiple visits to the ED for various concerns.  On exam he is well-appearing, alert, cooperative on my eval.  No acute medical issues.  Of note, patient had a recent CDU stay for atypical chest pain, during which a cardiac CT was done.  Result was CAD RADS 3 for moderate CAD.  Patient was evaluated by cardiology at that time as he also has a baseline abnormal EKG, and continued medical management with aspirin and statin was recommended.  Currently, his CAD appears to be stable, without need for further aggressive workup at this time.  He is clinically stable for admission to the psychiatric inpatient unit.
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
## 2023-12-22 NOTE — ED CDU PROVIDER SUBSEQUENT DAY NOTE - CLINICAL SUMMARY MEDICAL DECISION MAKING FREE TEXT BOX
68-year-old man, multiple medical problems including diabetes, CLL, parathyroid cancer, CAD being managed medically, schizophrenia, was placed in CDU for telepsych reassessment after he presented requesting medication refills.  On further history, it was discovered that patient, who currently is in the transitional program at [address] [place] [address], has not been taking his medications, psychiatric or other, over the last several weeks, which has led to his multiple visits to the ED for various concerns.  On exam he is well-appearing, alert, cooperative on my eval.  No acute medical issues.  Of note, patient had a recent CDU stay for atypical chest pain, during which a cardiac CT was done.  Result was CAD RADS 3 for moderate CAD.  Patient was evaluated by cardiology at that time as he also has a baseline abnormal EKG, and continued medical management with aspirin and statin was recommended.  Currently, his CAD appears to be stable, without need for further aggressive workup at this time.  He is clinically stable for admission to the psychiatric inpatient unit 68-year-old man, multiple medical problems including diabetes, CLL, parathyroid cancer, CAD being managed medically, schizophrenia, was placed in CDU for telepsych reassessment after he presented requesting medication refills.  On further history, it was discovered that patient, who currently is in the transitional program at [address] [place] [address], has not been taking his medications, psychiatric or other, over the last several weeks, which has led to his multiple visits to the ED for various concerns.  On exam he is well-appearing, alert, cooperative on my eval.  No acute medical issues.  Of note, patient had a recent CDU stay for atypical chest pain, during which a cardiac CT was done.  Result was CAD RADS 3 for moderate CAD.  Patient was evaluated by cardiology at that time as he also has a baseline abnormal EKG, and continued medical management with aspirin and statin was recommended.  Currently, his CAD appears to be stable, without need for further aggressive workup at this time.  He is clinically stable for admission to the psychiatric inpatient unit

## 2023-12-22 NOTE — ED BEHAVIORAL HEALTH NOTE - BEHAVIORAL HEALTH NOTE
Re-assessment Note     ================     SOURCE:  RN and secondhand ED documentation.     BEHAVIOR:     RN described the patient to be calm, currently sleeping and under behavioral control. The patient has not been given medication overnight.

## 2023-12-22 NOTE — BH PATIENT PROFILE - FUNCTIONAL ASSESSMENT - DAILY ACTIVITY 5.
DISPLAY PLAN FREE TEXT
4 = No assist / stand by assistance

## 2023-12-22 NOTE — BH PATIENT PROFILE - HOME MEDICATIONS
atorvastatin 40 mg oral tablet , 1 tab(s) orally once a day  aspirin 81 mg oral capsule , 1 cap(s) orally once a day  hydroxychloroquine 200 mg oral tablet , 2 tab(s) orally once a day for 2 days for COVID viral infection  aspirin 81 mg oral tablet, chewable , 1 tab(s) orally once a day starting on 4/15. It is important that it is NOT re-started before then as it is being held to prevent worsening of the head bleed.  Plavix 75 mg oral tablet , 1 tab(s) orally once a day starting on 4/15, It is important that it is NOT re-started before then as it is being held to prevent worsening head bleed.     levETIRAcetam 500 mg oral tablet , 1 tab(s) orally 2 times a day for 5 days, for seizure prophylaxis after subarachnoid hemorrhage  levothyroxine 137 mcg (0.137 mg) oral tablet , 1 tab(s) orally once a day  docusate sodium 100 mg oral capsule , 3 cap(s) orally once a day  budesonide-formoterol 160 mcg-4.5 mcg/inh inhalation aerosol , 2 puff(s) inhaled 2 times a day  metoprolol succinate 25 mg oral tablet, extended release , 1 tab(s) orally once a day  Lipitor 10 mg oral tablet , 1 tab(s) orally once a day  Januvia 100 mg oral tablet , 1 tab(s) orally once a day  metFORMIN 500 mg oral tablet , 1 tab(s) orally 2 times a day  Invega Sustenna 117 mg/0.75 mL intramuscular suspension, extended release , 1 application intramuscular every 30 days, next dose due 5/16

## 2023-12-22 NOTE — BH PATIENT PROFILE - FALL HARM RISK - UNIVERSAL INTERVENTIONS
Bed in lowest position, wheels locked, appropriate side rails in place/Call bell, personal items and telephone in reach/Instruct patient to call for assistance before getting out of bed or chair/Non-slip footwear when patient is out of bed/[place] to call system/Physically safe environment - no spills, clutter or unnecessary equipment/Purposeful Proactive Rounding/Room/bathroom lighting operational, light cord in reach Bed in lowest position, wheels locked, appropriate side rails in place/Call bell, personal items and telephone in reach/Instruct patient to call for assistance before getting out of bed or chair/Non-slip footwear when patient is out of bed/[place] to call system/Physically safe environment - no spills, clutter or unnecessary equipment/Purposeful Proactive Rounding/Room/bathroom lighting operational, light cord in reach

## 2023-12-23 LAB
A1C WITH ESTIMATED AVERAGE GLUCOSE RESULT: 6.5 % — HIGH (ref 4–5.6)
A1C WITH ESTIMATED AVERAGE GLUCOSE RESULT: 6.5 % — HIGH (ref 4–5.6)
CHOLEST SERPL-MCNC: 186 MG/DL — SIGNIFICANT CHANGE UP
CHOLEST SERPL-MCNC: 186 MG/DL — SIGNIFICANT CHANGE UP
ESTIMATED AVERAGE GLUCOSE: 140 MG/DL — HIGH (ref 68–114)
ESTIMATED AVERAGE GLUCOSE: 140 MG/DL — HIGH (ref 68–114)
GLUCOSE BLDC GLUCOMTR-MCNC: 129 MG/DL — HIGH (ref 70–99)
GLUCOSE BLDC GLUCOMTR-MCNC: 129 MG/DL — HIGH (ref 70–99)
GLUCOSE BLDC GLUCOMTR-MCNC: 148 MG/DL — HIGH (ref 70–99)
GLUCOSE BLDC GLUCOMTR-MCNC: 148 MG/DL — HIGH (ref 70–99)
HDLC SERPL-MCNC: 46 MG/DL — SIGNIFICANT CHANGE UP
HDLC SERPL-MCNC: 46 MG/DL — SIGNIFICANT CHANGE UP
LIPID PNL WITH DIRECT LDL SERPL: 100 MG/DL — HIGH
LIPID PNL WITH DIRECT LDL SERPL: 100 MG/DL — HIGH
NON HDL CHOLESTEROL: 140 MG/DL — HIGH
NON HDL CHOLESTEROL: 140 MG/DL — HIGH
T4 FREE+ TSH PNL SERPL: 1.07 UIU/ML — SIGNIFICANT CHANGE UP (ref 0.27–4.2)
T4 FREE+ TSH PNL SERPL: 1.07 UIU/ML — SIGNIFICANT CHANGE UP (ref 0.27–4.2)
TRIGL SERPL-MCNC: 202 MG/DL — HIGH
TRIGL SERPL-MCNC: 202 MG/DL — HIGH
TROPONIN T, HIGH SENSITIVITY RESULT: 17 NG/L — SIGNIFICANT CHANGE UP (ref 6–21)
TROPONIN T, HIGH SENSITIVITY RESULT: 17 NG/L — SIGNIFICANT CHANGE UP (ref 6–21)

## 2023-12-23 PROCEDURE — 99222 1ST HOSP IP/OBS MODERATE 55: CPT

## 2023-12-23 RX ORDER — METFORMIN HYDROCHLORIDE 850 MG/1
500 TABLET ORAL ONCE
Refills: 0 | Status: COMPLETED | OUTPATIENT
Start: 2023-12-23 | End: 2023-12-23

## 2023-12-23 RX ORDER — LINAGLIPTIN 5 MG/1
5 TABLET, FILM COATED ORAL DAILY
Refills: 0 | Status: DISCONTINUED | OUTPATIENT
Start: 2023-12-23 | End: 2024-01-05

## 2023-12-23 RX ORDER — HYDROXYZINE HCL 10 MG
50 TABLET ORAL ONCE
Refills: 0 | Status: COMPLETED | OUTPATIENT
Start: 2023-12-23 | End: 2023-12-23

## 2023-12-23 RX ORDER — HYDROXYZINE HCL 10 MG
50 TABLET ORAL EVERY 6 HOURS
Refills: 0 | Status: DISCONTINUED | OUTPATIENT
Start: 2023-12-23 | End: 2024-01-05

## 2023-12-23 RX ORDER — ALLOPURINOL 300 MG
100 TABLET ORAL DAILY
Refills: 0 | Status: DISCONTINUED | OUTPATIENT
Start: 2023-12-23 | End: 2024-01-05

## 2023-12-23 RX ORDER — BUDESONIDE AND FORMOTEROL FUMARATE DIHYDRATE 160; 4.5 UG/1; UG/1
2 AEROSOL RESPIRATORY (INHALATION)
Refills: 0 | Status: DISCONTINUED | OUTPATIENT
Start: 2023-12-23 | End: 2024-01-05

## 2023-12-23 RX ORDER — LINAGLIPTIN 5 MG/1
5 TABLET, FILM COATED ORAL ONCE
Refills: 0 | Status: COMPLETED | OUTPATIENT
Start: 2023-12-23 | End: 2023-12-23

## 2023-12-23 RX ORDER — ATROPINE SULFATE 1 %
1 DROPS OPHTHALMIC (EYE) DAILY
Refills: 0 | Status: DISCONTINUED | OUTPATIENT
Start: 2023-12-23 | End: 2024-01-05

## 2023-12-23 RX ORDER — BENZTROPINE MESYLATE 1 MG
2 TABLET ORAL DAILY
Refills: 0 | Status: DISCONTINUED | OUTPATIENT
Start: 2023-12-23 | End: 2023-12-26

## 2023-12-23 RX ORDER — HALOPERIDOL DECANOATE 100 MG/ML
5 INJECTION INTRAMUSCULAR ONCE
Refills: 0 | Status: DISCONTINUED | OUTPATIENT
Start: 2023-12-23 | End: 2023-12-23

## 2023-12-23 RX ORDER — METFORMIN HYDROCHLORIDE 850 MG/1
500 TABLET ORAL
Refills: 0 | Status: DISCONTINUED | OUTPATIENT
Start: 2023-12-23 | End: 2024-01-05

## 2023-12-23 RX ORDER — CLOZAPINE 150 MG/1
12.5 TABLET, ORALLY DISINTEGRATING ORAL AT BEDTIME
Refills: 0 | Status: DISCONTINUED | OUTPATIENT
Start: 2023-12-23 | End: 2023-12-25

## 2023-12-23 RX ORDER — ATROPINE SULFATE 1 %
1 DROPS OPHTHALMIC (EYE) DAILY
Refills: 0 | Status: DISCONTINUED | OUTPATIENT
Start: 2023-12-23 | End: 2023-12-23

## 2023-12-23 RX ORDER — DIPHENHYDRAMINE HCL 50 MG
50 CAPSULE ORAL ONCE
Refills: 0 | Status: DISCONTINUED | OUTPATIENT
Start: 2023-12-23 | End: 2023-12-23

## 2023-12-23 RX ORDER — ATORVASTATIN CALCIUM 80 MG/1
10 TABLET, FILM COATED ORAL AT BEDTIME
Refills: 0 | Status: DISCONTINUED | OUTPATIENT
Start: 2023-12-23 | End: 2024-01-05

## 2023-12-23 RX ORDER — LEVOTHYROXINE SODIUM 125 MCG
200 TABLET ORAL DAILY
Refills: 0 | Status: DISCONTINUED | OUTPATIENT
Start: 2023-12-23 | End: 2024-01-05

## 2023-12-23 RX ADMIN — ATORVASTATIN CALCIUM 10 MILLIGRAM(S): 80 TABLET, FILM COATED ORAL at 20:00

## 2023-12-23 RX ADMIN — Medication 50 MILLIGRAM(S): at 20:25

## 2023-12-23 RX ADMIN — METFORMIN HYDROCHLORIDE 500 MILLIGRAM(S): 850 TABLET ORAL at 14:57

## 2023-12-23 RX ADMIN — Medication 1 MILLIGRAM(S): at 12:05

## 2023-12-23 RX ADMIN — Medication 100 MILLIGRAM(S): at 14:55

## 2023-12-23 NOTE — BH INPATIENT PSYCHIATRY ASSESSMENT NOTE - NSBHCHARTREVIEWVS_PSY_A_CORE FT
Vital Signs Last 24 Hrs  T(C): 36.1 (12-22-23 @ 21:34), Max: 37.1 (12-22-23 @ 14:26)  T(F): 96.9 (12-22-23 @ 21:34), Max: 98.7 (12-22-23 @ 14:26)  HR: 87 (12-22-23 @ 21:34) (84 - 88)  BP: 162/81 (12-22-23 @ 21:34) (131/76 - 176/86)  BP(mean): --  RR: 17 (12-22-23 @ 21:34) (17 - 18)  SpO2: 99% (12-22-23 @ 20:23) (97% - 99%)     Vital Signs Last 24 Hrs  T(C): 36.1 (22 Dec 2023 21:34), Max: 37.1 (22 Dec 2023 14:26)  T(F): 96.9 (22 Dec 2023 21:34), Max: 98.7 (22 Dec 2023 14:26)  HR: 87 (22 Dec 2023 21:34) (84 - 88)  BP: 162/81 (22 Dec 2023 21:34) (131/76 - 176/86)  BP(mean): --  RR: 17 (22 Dec 2023 21:34) (17 - 18)  SpO2: 99% (22 Dec 2023 20:23) (97% - 99%)    Parameters below as of 22 Dec 2023 20:23  Patient On (Oxygen Delivery Method): room air
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
## 2023-12-23 NOTE — CONSULT NOTE ADULT - SUBJECTIVE AND OBJECTIVE BOX
67 yo male domiciled at [facility], with PMH diabetes, CAD, CLL, parathyroid cancer, PPH of chronic schizophrenia, multiple state hospitalizations, most recently between Feb 2019 and Oct 2023 at [place], who presented to the ED on 12/21/23 with an unclear complaint. Based on collateral and exam, pt was found to be disorganized and off meds.     Medicine consulted for general eval     Seen by bedside - refused physical exam and unable to give history     non compliant w/ meds unable to tell which meds he takes       REVIEW OF SYSTEMS:  All 12 ROS negative except ones mentioned in HPI     T(C): 36.1 (12-22-23 @ 21:34), Max: 37.1 (12-22-23 @ 14:26)  HR: 87 (12-22-23 @ 21:34) (84 - 88)  BP: 162/81 (12-22-23 @ 21:34) (131/76 - 176/86)  RR: 17 (12-22-23 @ 21:34) (17 - 18)  SpO2: 99% (12-22-23 @ 20:23) (97% - 99%)    PHYSICAL EXAM:  refused    Consultant(s) Notes Reviewed:  [x ] YES  [ ] NO  Care Discussed with Consultants/Other Providers [ x] YES  [ ] NO    Drug Dosing Weight  Height (cm): 170.2 (22 Dec 2023 21:34)  Weight (kg): 73.4 (22 Dec 2023 21:34)  BMI (kg/m2): 25.3 (22 Dec 2023 21:34)  BSA (m2): 1.85 (22 Dec 2023 21:34)  Height (cm): 170.2 (12-22-23 @ 21:34)  Weight (kg): 73.4 (12-22-23 @ 21:34)  BMI (kg/m2): 25.3 (12-22-23 @ 21:34)  BSA (m2): 1.85 (12-22-23 @ 21:34)  CAPILLARY BLOOD GLUCOSE      POCT Blood Glucose.: 131 mg/dL (22 Dec 2023 16:35)  POCT Blood Glucose.: 151 mg/dL (22 Dec 2023 14:19)    I&O's Summary    Per chart review previous meds:     Outpatient Medications	Allopurinol 100mg po 9am  Atorvastatin 10mg po 9pm  Atropine 1% eye drops 9am  Benztropine MES 2mg po 9am  Bisacodyl EC 5mg po 9pm  Clozapine 100mg 2 tablets po 9pm  Haloperidol Dec 100mg IM last given 12/14/23 next due 1/10/24  Januvia 100mg po 9am  Levothyroxine 200mg po 9am  Metformin HCL 500mg po 9am and 9pm  Symbicort Inhaler 5mcg 9am and 9pm      RADIOLOGY & ADDITIONAL TESTS:  Imaging Personally Reviewed:  [x] YES  [ ] NO    MEDS:  allopurinol 100 milliGRAM(s) Oral daily  atorvastatin 10 milliGRAM(s) Oral at bedtime  atropine 1% Solution 1 Drop(s) Both EYES daily  benztropine 2 milliGRAM(s) Oral daily  bisacodyl 5 milliGRAM(s) Oral at bedtime  budesonide  80 MICROgram(s)/formoterol 4.5 MICROgram(s) Inhaler 2 Puff(s) Inhalation two times a day  cloZAPine 12.5 milliGRAM(s) Oral at bedtime  dextrose 5%. 1000 milliLiter(s) IV Continuous <Continuous>  dextrose 5%. 1000 milliLiter(s) IV Continuous <Continuous>  dextrose 50% Injectable 12.5 Gram(s) IV Push once  dextrose 50% Injectable 25 Gram(s) IV Push once  dextrose 50% Injectable 25 Gram(s) IV Push once  dextrose Oral Gel 15 Gram(s) Oral once PRN  diphenhydrAMINE 50 milliGRAM(s) Oral every 6 hours PRN  glucagon  Injectable 1 milliGRAM(s) IntraMuscular once  haloperidol     Tablet 5 milliGRAM(s) Oral every 6 hours PRN  hydrOXYzine hydrochloride 50 milliGRAM(s) Oral every 6 hours PRN  insulin lispro (ADMELOG) corrective regimen sliding scale   SubCutaneous three times a day before meals  insulin lispro (ADMELOG) corrective regimen sliding scale   SubCutaneous at bedtime  levothyroxine 200 MICROGram(s) Oral daily  linagliptin 5 milliGRAM(s) Oral daily  LORazepam     Tablet 1 milliGRAM(s) Oral every 6 hours PRN  metFORMIN 500 milliGRAM(s) Oral two times a day           69 yo male domiciled at [facility], with PMH diabetes, CAD, CLL, parathyroid cancer, PPH of chronic schizophrenia, multiple state hospitalizations, most recently between Feb 2019 and Oct 2023 at [place], who presented to the ED on 12/21/23 with an unclear complaint. Based on collateral and exam, pt was found to be disorganized and off meds.     Medicine consulted for general eval     Seen by bedside - refused physical exam and unable to give history     non compliant w/ meds unable to tell which meds he takes       REVIEW OF SYSTEMS:  All 12 ROS negative except ones mentioned in HPI     T(C): 36.1 (12-22-23 @ 21:34), Max: 37.1 (12-22-23 @ 14:26)  HR: 87 (12-22-23 @ 21:34) (84 - 88)  BP: 162/81 (12-22-23 @ 21:34) (131/76 - 176/86)  RR: 17 (12-22-23 @ 21:34) (17 - 18)  SpO2: 99% (12-22-23 @ 20:23) (97% - 99%)    PHYSICAL EXAM:  refused    Consultant(s) Notes Reviewed:  [x ] YES  [ ] NO  Care Discussed with Consultants/Other Providers [ x] YES  [ ] NO    Drug Dosing Weight  Height (cm): 170.2 (22 Dec 2023 21:34)  Weight (kg): 73.4 (22 Dec 2023 21:34)  BMI (kg/m2): 25.3 (22 Dec 2023 21:34)  BSA (m2): 1.85 (22 Dec 2023 21:34)  Height (cm): 170.2 (12-22-23 @ 21:34)  Weight (kg): 73.4 (12-22-23 @ 21:34)  BMI (kg/m2): 25.3 (12-22-23 @ 21:34)  BSA (m2): 1.85 (12-22-23 @ 21:34)  CAPILLARY BLOOD GLUCOSE      POCT Blood Glucose.: 131 mg/dL (22 Dec 2023 16:35)  POCT Blood Glucose.: 151 mg/dL (22 Dec 2023 14:19)    I&O's Summary    Per chart review previous meds:     Outpatient Medications	Allopurinol 100mg po 9am  Atorvastatin 10mg po 9pm  Atropine 1% eye drops 9am  Benztropine MES 2mg po 9am  Bisacodyl EC 5mg po 9pm  Clozapine 100mg 2 tablets po 9pm  Haloperidol Dec 100mg IM last given 12/14/23 next due 1/10/24  Januvia 100mg po 9am  Levothyroxine 200mg po 9am  Metformin HCL 500mg po 9am and 9pm  Symbicort Inhaler 5mcg 9am and 9pm      RADIOLOGY & ADDITIONAL TESTS:  Imaging Personally Reviewed:  [x] YES  [ ] NO    MEDS:  allopurinol 100 milliGRAM(s) Oral daily  atorvastatin 10 milliGRAM(s) Oral at bedtime  atropine 1% Solution 1 Drop(s) Both EYES daily  benztropine 2 milliGRAM(s) Oral daily  bisacodyl 5 milliGRAM(s) Oral at bedtime  budesonide  80 MICROgram(s)/formoterol 4.5 MICROgram(s) Inhaler 2 Puff(s) Inhalation two times a day  cloZAPine 12.5 milliGRAM(s) Oral at bedtime  dextrose 5%. 1000 milliLiter(s) IV Continuous <Continuous>  dextrose 5%. 1000 milliLiter(s) IV Continuous <Continuous>  dextrose 50% Injectable 12.5 Gram(s) IV Push once  dextrose 50% Injectable 25 Gram(s) IV Push once  dextrose 50% Injectable 25 Gram(s) IV Push once  dextrose Oral Gel 15 Gram(s) Oral once PRN  diphenhydrAMINE 50 milliGRAM(s) Oral every 6 hours PRN  glucagon  Injectable 1 milliGRAM(s) IntraMuscular once  haloperidol     Tablet 5 milliGRAM(s) Oral every 6 hours PRN  hydrOXYzine hydrochloride 50 milliGRAM(s) Oral every 6 hours PRN  insulin lispro (ADMELOG) corrective regimen sliding scale   SubCutaneous three times a day before meals  insulin lispro (ADMELOG) corrective regimen sliding scale   SubCutaneous at bedtime  levothyroxine 200 MICROGram(s) Oral daily  linagliptin 5 milliGRAM(s) Oral daily  LORazepam     Tablet 1 milliGRAM(s) Oral every 6 hours PRN  metFORMIN 500 milliGRAM(s) Oral two times a day

## 2023-12-23 NOTE — BH INPATIENT PSYCHIATRY ASSESSMENT NOTE - PAST PSYCHOTROPIC MEDICATION
per chart, pt was previously on [facility] per chart, pt was previously on [facility]

## 2023-12-23 NOTE — BH INPATIENT PSYCHIATRY ASSESSMENT NOTE - NSBHCHARTREVIEWINVESTIGATE_PSY_A_CORE FT
< from: 12 Lead ECG (12.21.23 @ 13:17) >      Ventricular Rate 71 BPM    Atrial Rate 71 BPM    P-R Interval 146 ms    QRS Duration 74 ms    Q-T Interval 408 ms    QTC Calculation(Bazett) 443 ms    P Axis 58 degrees    R Axis 40 degrees    T Axis 4 degrees    Diagnosis Line Normal sinus rhythm  Inferior infarct , age undetermined  Anterolateral infarct , probably recent  Abnormal ECG    Confirmed by DARCIE MONTANO, SERG ([id number] on 12/21/2023 10:21:14 PM    < end of copied text >

## 2023-12-23 NOTE — BH INPATIENT PSYCHIATRY ASSESSMENT NOTE - DESCRIPTION
per hpi Domiciled at [facility], no known family or friends or relationship, disabled. Domiciled at [facility], no known family or friends or relationship, disabled.

## 2023-12-23 NOTE — BH INPATIENT PSYCHIATRY ASSESSMENT NOTE - NSBHMEDSOTHERFT_PSY_A_CORE
Allopurinol 100mg po 9am  Atorvastatin 10mg po 9pm  Atropine 1% eye drops 9am  Benztropine MES 2mg po 9am  Bisacodyl EC 5mg po 9pm  Clozapine 100mg 2 tablets po 9pm  Haloperidol Dec 100mg IM last given 12/14/23 next due 1/10/24  Januvia 100mg po 9am  Levothyroxine 200mg po 9am  Metformin HCL 500mg po 9am and 9pm  Symbicort Inhaler 5mcg 9am and 9pm

## 2023-12-23 NOTE — BH INPATIENT PSYCHIATRY ASSESSMENT NOTE - NSBHCHARTREVIEWLAB_PSY_A_CORE FT
CAPILLARY BLOOD GLUCOSE      POCT Blood Glucose.: 131 mg/dL (22 Dec 2023 16:35)

## 2023-12-23 NOTE — BH INPATIENT PSYCHIATRY ASSESSMENT NOTE - NSBHASSESSSUMMFT_PSY_ALL_CORE
69 yo male domiciled at [facility], with PMH diabetes, CAD, CLL, parathyroid cancer, PPH of chronic schizophrenia, multiple state hospitalizations, most recently between Feb 2019 and Oct 2023 at [place], who presented to the ED on 12/21/23 with an unclear complaint. Based on collateral and exam, pt was found to be disorganized and off meds, and subsequently admitted to [facility] under 9.39 legals for decompensated schizophrenia.    On exam today, patient continues to appear very limited, with impoverished and illogical thought and speech. Given pt's irritability, disorganized behavior, and psychosis, likely 2/2 medication noncompliance, pt is at this time unable to care for himself and would benefit from an inpatient admission until safety and stabilization are established. Pt has received his last Haldol decanoate 100 mg on 12/14/23 and is next due 1/10/24. He would benefit from restarting clozaril at the starting dose as pt has been off this medication for at least 2 days.     Plan:   #Schizophrenia  - restart clozaril at 12.5 mg tonight, then increase to 25 mg QHs tomorrow (12/24). Last ANC per clozaril rems was on 12/21/23: 3810 ANC (per µL). Continue to monitor V/S and weekly CBCs and gradually increase dose appropriately. (Pt was previously on Clozapine 100mg 2 tablets po 9pm.)   - restart Benztropine MES 2mg po 9am for EPS ppx  - Haloperidol Dec 100mg IM last given 12/14/23 ***next due 1/10/24    #Agitation  - For severe agitation not responding to behavioral intervention, may give haldol 5 mg po q6h prn, ativan 1 mg po q6h prn, hydroxyzine 50 mg po q6h prn, with escalation to IM if patient refusing PO and remains an imminent danger to self or others. If IM antipsychotic is administered, please perform follow-up ECG for QTc monitoring.  - Can give benadryl 50 mg po q6h for anxiety/insomnia    - restart home medical medications as follows:   Allopurinol 100mg po 9am  Atorvastatin 10mg po 9pm  Atropine 1% eye drops 9am  Bisacodyl EC 5mg po 9pm  Januvia 100mg po 9am  Levothyroxine 200mg po 9am  Metformin HCL 500mg po 9am and 9pm  Symbicort Inhaler 5mcg 9am and 9pm 69 yo male domiciled at [facility], with PMH diabetes, CAD, CLL, parathyroid cancer, PPH of chronic schizophrenia, multiple state hospitalizations, most recently between Feb 2019 and Oct 2023 at [place], who presented to the ED on 12/21/23 with an unclear complaint. Based on collateral and exam, pt was found to be disorganized and off meds, and subsequently admitted to [facility] under 9.39 legals for decompensated schizophrenia.    On exam today, patient continues to appear very limited, with impoverished and illogical thought and speech. Given pt's irritability, disorganized behavior, and psychosis, likely 2/2 medication noncompliance, pt is at this time unable to care for himself and would benefit from an inpatient admission until safety and stabilization are established. Pt has received his last Haldol decanoate 100 mg on 12/14/23 and is next due 1/10/24. He would benefit from restarting clozaril at the starting dose as pt has been off this medication for at least 2 days.     Plan:   #Schizophrenia  - restart clozaril at 12.5 mg tonight, then increase to 25 mg QHs tomorrow (12/24). Last ANC per clozaril rems was on 12/21/23: 3810 ANC (per µL). Continue to monitor V/S and weekly CBCs and gradually increase dose appropriately. (Pt was previously on Clozapine 100mg 2 tablets po 9pm.)   - restart Benztropine MES 2mg po 9am for EPS ppx  - Haloperidol Dec 100mg IM last given 12/14/23 ***next due 1/10/24    #Agitation  - For severe agitation not responding to behavioral intervention, may give haldol 5 mg po q6h prn, ativan 1 mg po q6h prn, hydroxyzine 50 mg po q6h prn, with escalation to IM if patient refusing PO and remains an imminent danger to self or others. If IM antipsychotic is administered, please perform follow-up ECG for QTc monitoring.  - Can give benadryl 50 mg po q6h for anxiety/insomnia    - restart home medical medications as follows:   Allopurinol 100mg po 9am  Atorvastatin 10mg po 9pm  Atropine 1% eye drops 9am  Bisacodyl EC 5mg po 9pm  Januvia 100mg po 9am  Levothyroxine 200mg po 9am  Metformin HCL 500mg po 9am and 9pm  Symbicort Inhaler 5mcg 9am and 9pm 67 yo male domiciled at [facility], with PMH diabetes, CAD, CLL, parathyroid cancer, PPH of chronic schizophrenia, multiple state hospitalizations, most recently between Feb 2019 and Oct 2023 at [place], who presented to the ED on 12/21/23 with an unclear complaint. Based on collateral and exam, pt was found to be disorganized and off meds, and subsequently admitted to [facility] under 9.39 legals for decompensated schizophrenia.    On exam today, patient continues to appear very limited, with impoverished and illogical thought and speech. Given pt's irritability, disorganized behavior, and psychosis, likely 2/2 medication noncompliance, pt is at this time unable to care for himself and would benefit from an inpatient admission until safety and stabilization are established. Pt has received his last Haldol decanoate 100 mg on 12/14/23 and is next due 1/10/24. He would benefit from restarting clozaril at the starting dose as pt has been off this medication for at least 2 days.     Plan:   #Schizophrenia  - restart clozaril at 12.5 mg tonight, then increase to 25 mg QHs tomorrow (12/24). Last ANC per clozaril rems was on 12/21/23: 3810 ANC (per µL). Continue to monitor V/S and weekly CBCs (ordered for 12/26) and gradually increase dose appropriately. (Pt was previously on Clozapine 100mg 2 tablets po 9pm.)   - restart Benztropine MES 2mg po 9am for EPS ppx  - Haloperidol Dec 100mg IM last given 12/14/23 ***next due 1/10/24  - f/u A1c and CBC with diff ordered for 12/26    #Agitation  - For severe agitation not responding to behavioral intervention, may give haldol 5 mg po q6h prn, ativan 1 mg po q6h prn, hydroxyzine 50 mg po q6h prn, with escalation to IM if patient refusing PO and remains an imminent danger to self or others. If IM antipsychotic is administered, please perform follow-up ECG for QTc monitoring.  - Can give benadryl 50 mg po q6h for anxiety/insomnia    - restart home medical medications as follows:   Allopurinol 100mg po 9am  Atorvastatin 10mg po 9pm  Atropine 1% eye drops 9am  Bisacodyl EC 5mg po 9pm  Januvia 100mg po 9am  Levothyroxine 200mg po 9am  Metformin HCL 500mg po 9am and 9pm  Symbicort Inhaler 5mcg 9am and 9pm 67 yo male domiciled at [facility], with PMH diabetes, CAD, CLL, parathyroid cancer, PPH of chronic schizophrenia, multiple state hospitalizations, most recently between Feb 2019 and Oct 2023 at [place], who presented to the ED on 12/21/23 with an unclear complaint. Based on collateral and exam, pt was found to be disorganized and off meds, and subsequently admitted to [facility] under 9.39 legals for decompensated schizophrenia.    On exam today, patient continues to appear very limited, with impoverished and illogical thought and speech. Given pt's irritability, disorganized behavior, and psychosis, likely 2/2 medication noncompliance, pt is at this time unable to care for himself and would benefit from an inpatient admission until safety and stabilization are established. Pt has received his last Haldol decanoate 100 mg on 12/14/23 and is next due 1/10/24. He would benefit from restarting clozaril at the starting dose as pt has been off this medication for at least 2 days.     Plan:   #Schizophrenia  - restart clozaril at 12.5 mg tonight, then increase to 25 mg QHs tomorrow (12/24). Last ANC per clozaril rems was on 12/21/23: 3810 ANC (per µL). Continue to monitor V/S and weekly CBCs (ordered for 12/26) and gradually increase dose appropriately. (Pt was previously on Clozapine 100mg 2 tablets po 9pm.)   - restart Benztropine MES 2mg po 9am for EPS ppx  - Haloperidol Dec 100mg IM last given 12/14/23 ***next due 1/10/24  - f/u A1c and CBC with diff ordered for 12/26    #Agitation  - For severe agitation not responding to behavioral intervention, may give haldol 5 mg po q6h prn, ativan 1 mg po q6h prn, hydroxyzine 50 mg po q6h prn, with escalation to IM if patient refusing PO and remains an imminent danger to self or others. If IM antipsychotic is administered, please perform follow-up ECG for QTc monitoring.  - Can give benadryl 50 mg po q6h for anxiety/insomnia    - restart home medical medications as follows:   Allopurinol 100mg po 9am  Atorvastatin 10mg po 9pm  Atropine 1% eye drops 9am  Bisacodyl EC 5mg po 9pm  Januvia 100mg po 9am  Levothyroxine 200mg po 9am  Metformin HCL 500mg po 9am and 9pm  Symbicort Inhaler 5mcg 9am and 9pm 69 yo male domiciled at [facility], with PMH diabetes, CAD, CLL, parathyroid cancer, PPH of chronic schizophrenia, multiple state hospitalizations, most recently between Feb 2019 and Oct 2023 at [place], who presented to the ED on 12/21/23 with an unclear complaint. Based on collateral and exam, pt was found to be disorganized and off meds, and subsequently admitted to [facility] under 9.39 legals for decompensated schizophrenia.    On exam today, patient continues to appear very limited, with impoverished and illogical thought and speech. Given pt's irritability, disorganized behavior, and psychosis, likely 2/2 medication noncompliance, pt is at this time unable to care for himself and would benefit from an inpatient admission until safety and stabilization are established. Pt has received his last Haldol decanoate 100 mg on 12/14/23 and is next due 1/10/24. He would benefit from restarting clozaril at the starting dose as pt has been off this medication for at least 2 days.     Plan:   #Schizophrenia  - restart clozaril at 12.5 mg tonight, then increase to 25 mg QHs tomorrow (12/24). Last ANC per clozaril rems was on 12/21/23: 3810 ANC (per µL). Continue to monitor V/S and weekly CBCs (ordered for 12/26) and gradually increase dose appropriately. (Pt was previously on Clozapine 100mg 2 tablets po 9pm.)   - restart Benztropine MES 2mg po 9am for EPS ppx  - Haloperidol Dec 100mg IM last given 12/14/23 ***next due 1/10/24  - f/u A1c and CBC with diff ordered for 12/26    #Agitation  - For severe agitation not responding to behavioral intervention, may give haldol 5 mg po q6h prn, ativan 1 mg po q6h prn, hydroxyzine 50 mg po q6h prn, with escalation to IM if patient refusing PO and remains an imminent danger to self or others. If IM antipsychotic is administered, please perform follow-up ECG for QTc monitoring.  - Can give benadryl 50 mg po q6h for anxiety/insomnia    #Anteroseptal infarct (+ECG in November and 12/21/23)  - F/u troponin  - Consult cardiology if elevated    #Hypothyroidism  - F/u TSH and reflex T4    - restart home medical medications as follows:   Allopurinol 100mg po 9am  Atorvastatin 10mg po 9pm  Atropine 1% eye drops 9am  Bisacodyl EC 5mg po 9pm  Januvia 100mg po 9am  Levothyroxine 200mg po 9am  Metformin HCL 500mg po 9am and 9pm  Symbicort Inhaler 5mcg 9am and 9pm 69 yo male domiciled at [facility], with PMH diabetes, CAD, CLL, parathyroid cancer, PPH of chronic schizophrenia, multiple state hospitalizations, most recently between Feb 2019 and Oct 2023 at [place], who presented to the ED on 12/21/23 with an unclear complaint. Based on collateral and exam, pt was found to be disorganized and off meds, and subsequently admitted to [facility] under 9.39 legals for decompensated schizophrenia.    On exam today, patient continues to appear very limited, with impoverished and illogical thought and speech. Given pt's irritability, disorganized behavior, and psychosis, likely 2/2 medication noncompliance, pt is at this time unable to care for himself and would benefit from an inpatient admission until safety and stabilization are established. Pt has received his last Haldol decanoate 100 mg on 12/14/23 and is next due 1/10/24. He would benefit from restarting clozaril at the starting dose as pt has been off this medication for at least 2 days.     Plan:   #Schizophrenia  - restart clozaril at 12.5 mg tonight, then increase to 25 mg QHs tomorrow (12/24). Last ANC per clozaril rems was on 12/21/23: 3810 ANC (per µL). Continue to monitor V/S and weekly CBCs (ordered for 12/26) and gradually increase dose appropriately. (Pt was previously on Clozapine 100mg 2 tablets po 9pm.)   - restart Benztropine MES 2mg po 9am for EPS ppx  - Haloperidol Dec 100mg IM last given 12/14/23 ***next due 1/10/24  - f/u A1c and CBC with diff ordered for 12/26    #Agitation  - For severe agitation not responding to behavioral intervention, may give haldol 5 mg po q6h prn, ativan 1 mg po q6h prn, hydroxyzine 50 mg po q6h prn, with escalation to IM if patient refusing PO and remains an imminent danger to self or others. If IM antipsychotic is administered, please perform follow-up ECG for QTc monitoring.  - Can give benadryl 50 mg po q6h for anxiety/insomnia    #Anteroseptal infarct (+ECG in November and 12/21/23)  - F/u troponin  - Consult cardiology if elevated    #Hypothyroidism  - F/u TSH and reflex T4    - restart home medical medications as follows:   Allopurinol 100mg po 9am  Atorvastatin 10mg po 9pm  Atropine 1% eye drops 9am  Bisacodyl EC 5mg po 9pm  Januvia 100mg po 9am  Levothyroxine 200mg po 9am  Metformin HCL 500mg po 9am and 9pm  Symbicort Inhaler 5mcg 9am and 9pm

## 2023-12-23 NOTE — BH INPATIENT PSYCHIATRY ASSESSMENT NOTE - CURRENT MEDICATION
MEDICATIONS  (STANDING):  dextrose 5%. 1000 milliLiter(s) (50 mL/Hr) IV Continuous <Continuous>  dextrose 5%. 1000 milliLiter(s) (100 mL/Hr) IV Continuous <Continuous>  dextrose 50% Injectable 12.5 Gram(s) IV Push once  dextrose 50% Injectable 25 Gram(s) IV Push once  dextrose 50% Injectable 25 Gram(s) IV Push once  glucagon  Injectable 1 milliGRAM(s) IntraMuscular once  insulin lispro (ADMELOG) corrective regimen sliding scale   SubCutaneous three times a day before meals  insulin lispro (ADMELOG) corrective regimen sliding scale   SubCutaneous at bedtime    MEDICATIONS  (PRN):  dextrose Oral Gel 15 Gram(s) Oral once PRN Blood Glucose LESS THAN 70 milliGRAM(s)/deciliter  diphenhydrAMINE 50 milliGRAM(s) Oral every 6 hours PRN Extrapyramidal prophylaxis  haloperidol     Tablet 5 milliGRAM(s) Oral every 6 hours PRN Agitation  hydrOXYzine hydrochloride 50 milliGRAM(s) Oral every 6 hours PRN anxiety  LORazepam     Tablet 1 milliGRAM(s) Oral every 6 hours PRN Agitation   MEDICATIONS  (STANDING):  allopurinol 100 milliGRAM(s) Oral daily  atorvastatin 10 milliGRAM(s) Oral at bedtime  atropine 1% Ointment 1 Application(s) Both EYES daily  benztropine 2 milliGRAM(s) Oral daily  bisacodyl 5 milliGRAM(s) Oral at bedtime  budesonide  80 MICROgram(s)/formoterol 4.5 MICROgram(s) Inhaler 2 Puff(s) Inhalation two times a day  cloZAPine 12.5 milliGRAM(s) Oral at bedtime  dextrose 5%. 1000 milliLiter(s) (100 mL/Hr) IV Continuous <Continuous>  dextrose 5%. 1000 milliLiter(s) (50 mL/Hr) IV Continuous <Continuous>  dextrose 50% Injectable 25 Gram(s) IV Push once  dextrose 50% Injectable 12.5 Gram(s) IV Push once  dextrose 50% Injectable 25 Gram(s) IV Push once  glucagon  Injectable 1 milliGRAM(s) IntraMuscular once  insulin lispro (ADMELOG) corrective regimen sliding scale   SubCutaneous three times a day before meals  insulin lispro (ADMELOG) corrective regimen sliding scale   SubCutaneous at bedtime  levothyroxine 200 MICROGram(s) Oral daily  linagliptin 5 milliGRAM(s) Oral daily  metFORMIN 500 milliGRAM(s) Oral two times a day    MEDICATIONS  (PRN):  dextrose Oral Gel 15 Gram(s) Oral once PRN Blood Glucose LESS THAN 70 milliGRAM(s)/deciliter  diphenhydrAMINE 50 milliGRAM(s) Oral every 6 hours PRN Extrapyramidal prophylaxis  haloperidol     Tablet 5 milliGRAM(s) Oral every 6 hours PRN Agitation  hydrOXYzine hydrochloride 50 milliGRAM(s) Oral every 6 hours PRN anxiety  LORazepam     Tablet 1 milliGRAM(s) Oral every 6 hours PRN Agitation

## 2023-12-23 NOTE — BH INPATIENT PSYCHIATRY ASSESSMENT NOTE - NSBHMETABOLIC_PSY_ALL_CORE_FT
BMI: BMI (kg/m2): 25.3 (12-22-23 @ 21:34)  HbA1c:   Glucose: POCT Blood Glucose.: 131 mg/dL (12-22-23 @ 16:35)    BP: 162/81 (12-22-23 @ 21:34) (121/56 - 176/86)Vital Signs Last 24 Hrs  T(C): 36.1 (12-22-23 @ 21:34), Max: 37.1 (12-22-23 @ 14:26)  T(F): 96.9 (12-22-23 @ 21:34), Max: 98.7 (12-22-23 @ 14:26)  HR: 87 (12-22-23 @ 21:34) (84 - 88)  BP: 162/81 (12-22-23 @ 21:34) (131/76 - 176/86)  BP(mean): --  RR: 17 (12-22-23 @ 21:34) (17 - 18)  SpO2: 99% (12-22-23 @ 20:23) (97% - 99%)      Lipid Panel: Date/Time: 12-23-23 @ 08:00  Cholesterol, Serum: 186  LDL Cholesterol Calculated: 100  HDL Cholesterol, Serum: 46  Total Cholesterol/HDL Ration Measurement: --  Triglycerides, Serum: 202

## 2023-12-23 NOTE — BH INPATIENT PSYCHIATRY ASSESSMENT NOTE - RISK ASSESSMENT
Risk factors of noncompliance with Tx, acute psychosis, Hx of multiple hospitalizations, multiple medical problems, poor insight and judgement, poor impulse control, and Hx of aggressive behavior are mitigated by protective factors of residential stability, help-seeking behavior, and good behavioral control. Pt's acute and chronic risk for suicide are low. Pt's acute risk for violent behavior towards others is low and chronic risk is moderately elevated based upon +Hx of past aggression but lack of current ideation.

## 2023-12-23 NOTE — BH INPATIENT PSYCHIATRY ASSESSMENT NOTE - NSBHMSETHTPROC_PSY_A_CORE
Pt called back office stating that she gets her atorvastatin from here pcp and no longer needs refills from [facility], will follow up with pcp and asking for phone calls to stop    Disorganized/Perseverative/Thought blocking/Illogical/Impaired reasoning

## 2023-12-23 NOTE — BH INPATIENT PSYCHIATRY ASSESSMENT NOTE - NSHPLANGLIMITEDENGLISH_GEN_A_CORE
Pediatric OT Re-Evaluation      Today's date: 2021   Patient name: Ivy Medina      : 2012       Age: 12 y o        School/Grade: [employer]  MRN: [id number]  Referring provider: Jo Dailey MD  Dx:   Encounter Diagnosis     ICD-10-CM    1  Lack of expected normal physiological development in childhood  R62 [id number]        Start Time: 1115  Stop Time: 1215  Total time in clinic (min): 60 minutes  Background   Medical History: No past medical history on file  Allergies: Not on File  Current Medications:   No current outpatient medications on file  No current facility-administered medications for this visit  Primary Concerns: Shira Bellamy arrived to the occupational therapy re-evaluation accompanied by her mother  COVID screening was completed prior to entering building answering "No" to all questions and temps falling WNL  Shira Bellamy was referred for skilled OP Occupational Therapy services by her pediatrician for concerns related to a diagnosis of Developmental Delay  Primary parent/caregiver concerns for occupational therapy evaluation include increasing independence with academic related skills and improved attention to task  Gestational History: (From initial evaluation and updated reporting) Shira Bellamy was born via vaginal delivery at 43 weeks gestation  Birthweight and height is reported as 6 lbs  4 oz  And 19 in  Respectively  Mom reports that she had high blood pressure during pregnancy  No other reported complications  Developmental Milestones:               Held Head Up: WNL              Rolled: WNL              Crawled: WNL              Walked Independently: WNL              Toilet Trained: WNL     Current/Previous Therapies: Shira Bellamy currently receives outpatient speech therapy 1x/week  Lifestyle: (From initial evaluation and updated reporting) Shira Bellamy currently resides at home with her mother, 4 brothers, and her grandmother as well as her father   Per parent report, Shira Bellamy enjoys swinging, sliding, playing with siblings, LOL dolls, dinosaurs, and cars  Shannen Vizcarra is currently involved in ballet and tap on weekends  Assessment Method: Parent/caregiver interview, standardized testing, and clinical observations  Behavior: During the evaluation, Shannen Vizcarra was able to follow 1-2 step directions with intermittent verbal prompts to redirect attention back to task due to "silly" behaviors  She was able to remain in her seated for table top tasks  When parent and brother entered the room for parent interview, Shannen Vizcarra required verbal prompts to reduce "silly" behaviors and engage with age-appropriate toys in a successful manner  Objective Measures:  Assessment of strength of gross grasp and pinch strength was completed using the Chava Dynamometer in the 2nd testing position and a baseline mechanical pinch gauge  Recorded and norm strength data are in pounds (lbs)  Grasp Right Hand (avg of 3) R Hand Norm Left Hand (avg of 3) L Hand Norm   Palmar 5 20-39 6 16-36   Lateral 1 6-12 1 5-11   Tip 0 4-10 0 3-10   3 Jaw 1 6-12  5 6-11      Structured Clinical Observations:    Postural control is observed to assess a child's postural reactions, compensatory postural adjustments and body awareness  During this assessment it is important that a child be able to adjust to changes/movement on a surface that they may be sitting or standing on  Shannen Vizcarra was observed to engage in a variety of positions seated at the tabletop, on the floor and on top of a therapy ball  When seated at the tabletop, Shannen Vizcarra was noted to remain upright with extended fine motor tasks  When seated on the therapy ball, an upright posture was noted for initial portion however as activity persisted, Paulina began to wrap her legs around the ball to form a wider DANA due to reduced postural mechanisms     Forearm alternating movements (diadokokinesis) is a test used to assess a childs ability to alternate rotations between supination and pronation with both arms simultaneously  Kenneth Edwards was able to perform movements with a visual model at a slowed rated   Sequential finger touching is a test used to assess a childs ability to isolate finger movements, moving them independently of each other, from the rest of his hand, and from his upper extremities  Jasper was able to perform with min challenges with x 2 visual models   Bilateral Motor Coordination [facility]) can be formally assessed with use of standardized testing such as the BOT-2 and [facility] test of the SIPT  It can also be observed during unstructured tasks such as pumping a swing, riding a bicycle, or performing jumping jacks  [facility] refers to the ability to coordinate both sides of the body, front and back of the body, and upper and lower extremities in order to successfully carry out a motor task  Kenneth Edwards demonstrates concerns regarding bilateral coordination as evidenced by challenges coordinating movements while attempting to perform jumping jacks as evidenced by reduced fluidity of UE/LE motor patterns  Paulina required consistent visual demonstration of movements as well as verbal prompting  Vision   Status: WNL  Corrective Lenses: No  Comments: Per parent report    Hearing   Status: WNL   Comments: Per parent report    Standardized Testing:  Fine Motor Based Assessments  Bruininks-Oseretsky Test of Motor Proficiency, Second Edition (BOT-2):   Kenneth Edwards was tested using the [place] of Motor Proficiency, Second Edition (BOT-2)  This is a standardized test for individuals ages 3 through 24 that uses engaging goal-directed activities to measure fine motor and gross motor skills, and identifies the presence of motor delay within specific components of each area  The following is a summary of Paulina's performance      Scale  Score Standard Score Percentile Rank Age Equivalent Descriptive Category   Fine Motor Precision 4    4:2-4:3 Well Below Average   Fine Motor Integration 13   6:0-6:2 Average   Fine Manual Control 17 36 8%  Below Average   Manual Dexterity 8   5:0-5:1 Below Average   Upper-Limb Coordination 22   8:0-8:2 Above Average   Manual Coordination 30 50 50%  Average   Fine Manual Control   This motor-area composite measures control and coordination of the distal musculature of the hands and fingers, especially for grasping, drawing, and cutting  The Fine Motor Precision subtest consists of activities that require precise control of finger and hand movement  The object is to draw, fold, or cut within a specified boundary  The Fine Motor Integration subtest requires the examinee to reproduce drawings of various geometric shapes that range in complexity from a Rampart to overlapping pencils  Based on the results indicated above, Cherie Forbes currently falls within the "Below Average" range for Fine Manual Control  Cherie Forbes presented with improved control while shading in shapes and drawing through a crooked path, however demo difficulties drawing through a curved line  Cherie Forbes demonstrated challenges connecting dots, was unable to fold paper following specified boundaries and displayed challenges with cutting on a curved line  ?   Manual Coordination   This motor-area composite measures control and coordination of the arms and hands, especially for object manipulation  The Manual Dexterity subtest uses goal-directed activities that involve reaching, grasping, and bimanual coordination with small objects  Emphasis is place on accuracy; however, the items are timed to more precisely differentiate levels of dexterity  The Upper-Limb Coordination subtest consists of activities designed to measure visual tracking with coordinated arm and hand movement  Based on the results indicated above, Cherie Forbes currently falls within the "Average" range for Manual Coordination however presents with challenges with manual dexterity   Cherie Forbes demonstrated difficulties with making marks within circles, transferring pennies and placing pegs within a peg board in an appropriate amount of time  Eleuterio Razo had challenges retrieving cards one at a time from pile, and would frequently  multiple at a time while attempting to sort them  Eleuterio Coffee was successful with catching a ball with B hands and dribbling, however presented with difficulties catching with one and and hitting a target while completing a unilateral throw  Sensory Based Assessments  The Sensory Processing Measure-Home Form  The Sensory Processing Measure is a norm-referenced questionnaire that provides standard scores regarding a child's functioning in regard to two higher level integration functions of praxis and social participation, as well as five sensory systems including visual, auditory, tactile, proprioceptive, and vestibular functioning  Scores are then classified into one of three interpretive ranges: Typical (similar to that of typical children, never having problems in most areas with some occasional problems); Some Problems (mild-to-moderate difficulties in behavioral or sensory functioning); or Definite Dysfunction (significant sensory processing problems that may have a noticeable effect on the child's daily functioning)  Area T-Score Interpretive Range   Social Participation 60 Some problems   Vision 50 Typical   Hearing 52 Typical   Touch 40 Typical   Body Awareness 52 Typical   Balance and Motion 47 Typical   Planning and Ideas 55 Typical   Total 47 Typical   Based on the results of the SPM, Eleuterio Razo is noted to demonstrate concerns within the following categories: social participation, which impact her overall ability to complete age-appropriate ADLs/IADLs  Examples of difficulties reported by Paulina's mother include: joining in play without disrupting ongoing activity and playing cooperatively with peers  Writing/Pre-writing skills:  Hand Dominance: Eleuterio Razo demonstrates a left hand preference for completion of fine motor/tabletop activities    Grasp Pattern(s) Achieved: Eleuterio Razo was observed to utilize a tripod grasp on the pencil with her L hand  Cherie Forbes was also noted to utilize 3-jaw salty and neat pincer grasps on objects  Scissors skills: Cherie Forbes was noted to assume an appropriate fxnl position on age-appropriate scissors to cut out a [place]  Cherie Forbes was observed to consistently keep her distal UE in supination and stabilize paper with her opposing UE  Cherie Forbes displayed difficulties coordinating simultaneous turning of her hand and cutting movements resulting in choppy cuts  ADL tasks: Paulina's mother reports Cherie Forbes is independent with most age-appropriate ADL's however is not able to tie her shoes on her own  Play skills: Based on clinical observation and parent interview, Cherie Forbes demonstrates age-appropriate play skills but benefits from prompts for safety and body awareness during gross motor and sensori-motor activities  Assessment:  Strengths- Cherie Forbes was pleasant and cooperative throughout the evaluation and willing to participate in tasks presented by therapist  Cherie Forbes has a supportive family network that is eager to learn strategies to implement at home  Limitations - Cherie Forbes was seen for an occupational therapy evaluation to assess concerns regarding sensory processing, fine motor, self-care, neuromuscular and adaptive functioning skills  Based on the results of this evaluation, Cherie Forbes demonstrates concerns with fine motor and visual motor skills, bilateral coordination, motor planning & sequencing and attention as well as UE/hand strength/endurance, which negatively impact her performance with everyday activities       Plan:  Long Term [place] will improve improve sensory processing abilities and social-emotional abilities to interact effectively with people and objects in her environment on 75% of given opportunities       2  Cherie Forbes will improve visual-perceptual-motor skills, strength, fine motor, and bilateral integration/coordination for increased participation in developmentally appropriate activities with 75% success       Short term goals:     STG #1 Monroe Grimm will copy pre-writing strokes and simple shapes (vertical/horizontal, [place], square) in 50% of given opportunities  - Met (Discharge Goal)     STG #2 Monroe Grimm will use scissors to cut along a 5 inch line within 1/2 inch o the boundary on 75% of given opportunities with no more than Min A  - Partially Met     STG #3 Monroe Grimm will visually attend to a non-preferred task for up to 2 minutes with no more than 2 cues for redirection in 75% of given opportunities following proprioceptive and/or vestibular input  - Met (Discharge Goal)     STG #4 Will transition to a non-preferred therapist-directed task with no more than 3 cues for redirection, 50% of given opportunities  -  Partially Met      STG #5 Will complete a multi-step (3-4) step activity with no more than 3 verbal prompting, 75% of given opportunities  - Not Met      STG #6 Will maintain a weight bearing position for up to to 1 minute with no compensations present, 50% of given opportunities  - Met (Discharge)     STG #7 Will consistently utilize one hand as the manipulator and one hand as the stabilizer for completion of bimanual activities with no more than 2 prompts per task, 75% of given opportunities  - Met (Discharge)    (New Goal): Monroe Grimm will demonstrate improvements in proximal strength/stability and bilateral coordination as seen with engaging in a catching activity with BUE positioned away from trunk w/o compensation in 75% of opportunities    (New Goal): Monroe Grimm will demonstrate improvements in fine motor control and coordination as evident by folding paper within a 1/4" of a specified boundary in 75% of attempts     (New Goal): Monroe Grimm will demonstrate improvements in in-hand manipulation, dexterity and intrinsic hand strength by placing pegs into a peg board with < 2 instances of droppage in 75% of opportunities       Summary & recommendations:  Monroe Grimm was referred for an Occupational Therapy evaluation to assess concerns related to sensory processing, fine motor, neuromuscular, self-care and adaptive functioning  Based on the results of standardizing testing along with structured clinical observations and parent concerns, Keren Ferguson would benefit from skilled Occupational Therapy in order to address performance skills and goals as listed above  It is recommended that Paulina receive outpatient OT 1x/week for 6 months to improve performance and independence in ADLs/IADLs across school, home and community environments        Plan  Patient would benefit from: skilled occupational therapy  Planned therapy interventions: self care, therapeutic exercise, therapeutic activities and neuromuscular re-education  Frequency: 1x week  Plan of Care beginning date: 4/2/2021  Plan of Care expiration date: 10/2/2021  Treatment plan discussed with: caregiver No

## 2023-12-23 NOTE — BH INPATIENT PSYCHIATRY ASSESSMENT NOTE - NSBHATTESTBILLING_PSY_A_CORE
91401-[id number] OBS or IP - low complexity OR 25-34 mins [id number]-Kspirqipmd OBS or IP - low complexity OR 25-34 mins

## 2023-12-23 NOTE — CONSULT NOTE ADULT - ASSESSMENT
67 yo male domiciled at [facility], with PMH diabetes, CAD, CLL, parathyroid cancer, PPH of chronic schizophrenia, multiple state hospitalizations, most recently between Feb 2019 and Oct 2023 at [place], who presented to the ED on 12/21/23 with an unclear complaint. Based on collateral and exam, pt was found to be disorganized and off meds.   Medicine consulted for general eval     # DMII   - send recent a1c   - ISS     # hypothyroid  - cw home dose levothyroxine  - fu TSH     # HLD    cw statin     # CAD   EKG similar to previous   fu trop     # psych issues management per primary psych team     # dvt ppx: lovenox       rest of management per primary team  67 yo male domiciled at [facility], with PMH diabetes, CAD, CLL, parathyroid cancer, PPH of chronic schizophrenia, multiple state hospitalizations, most recently between Feb 2019 and Oct 2023 at [place], who presented to the ED on 12/21/23 with an unclear complaint. Based on collateral and exam, pt was found to be disorganized and off meds.   Medicine consulted for general eval     # DMII   - send recent a1c   - ISS     # hypothyroid  - cw home dose levothyroxine  - fu TSH     # HLD    cw statin     # CAD   EKG similar to previous   fu trop     # psych issues management per primary psych team     # dvt ppx: lovenox       rest of management per primary team

## 2023-12-24 LAB
GLUCOSE BLDC GLUCOMTR-MCNC: 117 MG/DL — HIGH (ref 70–99)
GLUCOSE BLDC GLUCOMTR-MCNC: 117 MG/DL — HIGH (ref 70–99)
GLUCOSE BLDC GLUCOMTR-MCNC: 153 MG/DL — HIGH (ref 70–99)
GLUCOSE BLDC GLUCOMTR-MCNC: 153 MG/DL — HIGH (ref 70–99)

## 2023-12-24 PROCEDURE — 99232 SBSQ HOSP IP/OBS MODERATE 35: CPT

## 2023-12-24 RX ADMIN — Medication 100 MILLIGRAM(S): at 10:42

## 2023-12-24 RX ADMIN — Medication 2 MILLIGRAM(S): at 10:42

## 2023-12-24 RX ADMIN — CLOZAPINE 12.5 MILLIGRAM(S): 150 TABLET, ORALLY DISINTEGRATING ORAL at 20:13

## 2023-12-24 RX ADMIN — ATORVASTATIN CALCIUM 10 MILLIGRAM(S): 80 TABLET, FILM COATED ORAL at 20:12

## 2023-12-24 RX ADMIN — METFORMIN HYDROCHLORIDE 500 MILLIGRAM(S): 850 TABLET ORAL at 20:13

## 2023-12-24 NOTE — BH INPATIENT PSYCHIATRY PROGRESS NOTE - NSBHCHARTREVIEWVS_PSY_A_CORE FT
Vital Signs Last 24 Hrs  T(C): 36.6 (12-24-23 @ 07:57), Max: 36.6 (12-24-23 @ 07:57)  T(F): 97.8 (12-24-23 @ 07:57), Max: 97.8 (12-24-23 @ 07:57)  HR: 76 (12-24-23 @ 07:57) (76 - 76)  BP: 124/59 (12-24-23 @ 07:57) (124/59 - 124/59)  BP(mean): --  RR: 18 (12-24-23 @ 07:57) (18 - 18)  SpO2: --

## 2023-12-24 NOTE — BH INPATIENT PSYCHIATRY PROGRESS NOTE - CURRENT MEDICATION
MEDICATIONS  (STANDING):  allopurinol 100 milliGRAM(s) Oral daily  atorvastatin 10 milliGRAM(s) Oral at bedtime  atropine 1% Solution 1 Drop(s) Both EYES daily  benztropine 2 milliGRAM(s) Oral daily  bisacodyl 5 milliGRAM(s) Oral at bedtime  budesonide  80 MICROgram(s)/formoterol 4.5 MICROgram(s) Inhaler 2 Puff(s) Inhalation two times a day  cloZAPine 12.5 milliGRAM(s) Oral at bedtime  dextrose 5%. 1000 milliLiter(s) (100 mL/Hr) IV Continuous <Continuous>  dextrose 5%. 1000 milliLiter(s) (50 mL/Hr) IV Continuous <Continuous>  dextrose 50% Injectable 25 Gram(s) IV Push once  dextrose 50% Injectable 12.5 Gram(s) IV Push once  dextrose 50% Injectable 25 Gram(s) IV Push once  glucagon  Injectable 1 milliGRAM(s) IntraMuscular once  insulin lispro (ADMELOG) corrective regimen sliding scale   SubCutaneous three times a day before meals  insulin lispro (ADMELOG) corrective regimen sliding scale   SubCutaneous at bedtime  levothyroxine 200 MICROGram(s) Oral daily  linagliptin 5 milliGRAM(s) Oral daily  metFORMIN 500 milliGRAM(s) Oral two times a day    MEDICATIONS  (PRN):  dextrose Oral Gel 15 Gram(s) Oral once PRN Blood Glucose LESS THAN 70 milliGRAM(s)/deciliter  diphenhydrAMINE 50 milliGRAM(s) Oral every 6 hours PRN Extrapyramidal prophylaxis  haloperidol     Tablet 5 milliGRAM(s) Oral every 6 hours PRN Agitation  hydrOXYzine hydrochloride 50 milliGRAM(s) Oral every 6 hours PRN anxiety  LORazepam     Tablet 1 milliGRAM(s) Oral every 6 hours PRN Agitation

## 2023-12-24 NOTE — BH INPATIENT PSYCHIATRY PROGRESS NOTE - NSBHATTESTBILLING_PSY_A_CORE
[id number]-Okkugejqtq OBS or IP - moderate complexity OR 35-49 mins [id number] OBS or IP - moderate complexity OR 35-49 mins

## 2023-12-24 NOTE — BH INPATIENT PSYCHIATRY PROGRESS NOTE - NSBHFUPINTERVALHXFT_PSY_A_CORE
As per nurses report, patient has been refusing medications on and off, disorganised and irritable. Patient was seen in his room. He states "I am doing all right", and then "I think I could do better. I need my heart fixed." He was unable to explain what he meant by "fixing his heart". Then, out of context, he asks "Maximum security, isnt it?" When asked about his reluctance to take medications he turns away from the interviewers without any comments. He denies SI/HI, reports good appetite.

## 2023-12-24 NOTE — BH INPATIENT PSYCHIATRY PROGRESS NOTE - NSBHMSEREMMEM_PSY_A_CORE
Refilled ketoconazole shampoo per MD protocol. Patient due for follow-up visit by 9/30/22   Unable to assess

## 2023-12-24 NOTE — BH INPATIENT PSYCHIATRY PROGRESS NOTE - NSBHASSESSSUMMFT_PSY_ALL_CORE
67 yo male domiciled at [facility], with PMH diabetes, CAD, CLL, parathyroid cancer, PPH of chronic schizophrenia, multiple state hospitalizations, most recently between Feb 2019 and Oct 2023 at [place], who presented to the ED on 12/21/23 with an unclear complaint. Based on collateral and exam, pt was found to be disorganized and off meds, and subsequently admitted to [facility] under 9.39 legals for decompensated schizophrenia.    On exam today, patient continues to appear very limited, with impoverished and illogical thought and speech. Given pt's irritability, disorganized behavior, and psychosis, likely 2/2 medication noncompliance, pt is at this time unable to care for himself and would benefit from an inpatient admission until safety and stabilization are established. Pt has received his last Haldol decanoate 100 mg on 12/14/23 and is next due 1/10/24. He would benefit from restarting clozaril at the starting dose as pt has been off this medication for at least 2 days.     Plan:   #Schizophrenia  - restart clozaril at 12.5 mg tonight, then increase to 25 mg QHs tomorrow (12/24). Last ANC per clozaril rems was on 12/21/23: 3810 ANC (per µL). Continue to monitor V/S and weekly CBCs (ordered for 12/26) and gradually increase dose appropriately. (Pt was previously on Clozapine 100mg 2 tablets po 9pm.)   - restart Benztropine MES 2mg po 9am for EPS ppx  - Haloperidol Dec 100mg IM last given 12/14/23 ***next due 1/10/24  - f/u A1c and CBC with diff ordered for 12/26    #Agitation  - For severe agitation not responding to behavioral intervention, may give haldol 5 mg po q6h prn, ativan 1 mg po q6h prn, hydroxyzine 50 mg po q6h prn, with escalation to IM if patient refusing PO and remains an imminent danger to self or others. If IM antipsychotic is administered, please perform follow-up ECG for QTc monitoring.  - Can give benadryl 50 mg po q6h for anxiety/insomnia    #Anteroseptal infarct (+ECG in November and 12/21/23)  - F/u troponin  - Consult cardiology if elevated    #Hypothyroidism  - F/u TSH and reflex T4    - restart home medical medications as follows:   Allopurinol 100mg po 9am  Atorvastatin 10mg po 9pm  Atropine 1% eye drops 9am  Bisacodyl EC 5mg po 9pm  Januvia 100mg po 9am  Levothyroxine 200mg po 9am  Metformin HCL 500mg po 9am and 9pm  Symbicort Inhaler 5mcg 9am and 9pm 67 yo male domiciled at [facility], with PMH diabetes, CAD, CLL, parathyroid cancer, PPH of chronic schizophrenia, multiple state hospitalizations, most recently between Feb 2019 and Oct 2023 at [place], who presented to the ED on 12/21/23 with an unclear complaint. Based on collateral and exam, pt was found to be disorganized and off meds, and subsequently admitted to [facility] under 9.39 legals for decompensated schizophrenia.    On exam today, patient continues to appear very limited, with impoverished and illogical thought and speech. Given pt's irritability, disorganized behavior, and psychosis, likely 2/2 medication noncompliance, pt is at this time unable to care for himself and would benefit from an inpatient admission until safety and stabilization are established. Pt has received his last Haldol decanoate 100 mg on 12/14/23 and is next due 1/10/24. He would benefit from restarting clozaril at the starting dose as pt has been off this medication for at least 2 days.     Plan:   #Schizophrenia  - restart clozaril at 12.5 mg tonight, then increase to 25 mg QHs tomorrow (12/24). Last ANC per clozaril rems was on 12/21/23: 3810 ANC (per µL). Continue to monitor V/S and weekly CBCs (ordered for 12/26) and gradually increase dose appropriately. (Pt was previously on Clozapine 100mg 2 tablets po 9pm.)   - restart Benztropine MES 2mg po 9am for EPS ppx  - Haloperidol Dec 100mg IM last given 12/14/23 ***next due 1/10/24  - f/u A1c and CBC with diff ordered for 12/26    #Agitation  - For severe agitation not responding to behavioral intervention, may give haldol 5 mg po q6h prn, ativan 1 mg po q6h prn, hydroxyzine 50 mg po q6h prn, with escalation to IM if patient refusing PO and remains an imminent danger to self or others. If IM antipsychotic is administered, please perform follow-up ECG for QTc monitoring.  - Can give benadryl 50 mg po q6h for anxiety/insomnia    #Anteroseptal infarct (+ECG in November and 12/21/23)  - F/u troponin  - Consult cardiology if elevated    #Hypothyroidism  - F/u TSH and reflex T4    - restart home medical medications as follows:   Allopurinol 100mg po 9am  Atorvastatin 10mg po 9pm  Atropine 1% eye drops 9am  Bisacodyl EC 5mg po 9pm  Januvia 100mg po 9am  Levothyroxine 200mg po 9am  Metformin HCL 500mg po 9am and 9pm  Symbicort Inhaler 5mcg 9am and 9pm

## 2023-12-24 NOTE — BH INPATIENT PSYCHIATRY PROGRESS NOTE - NSBHMETABOLIC_PSY_ALL_CORE_FT
BMI: BMI (kg/m2): 25.3 (12-22-23 @ 21:34)  HbA1c: A1C with Estimated Average Glucose Result: 6.5 % (12-23-23 @ 08:00)    Glucose: POCT Blood Glucose.: 153 mg/dL (12-24-23 @ 06:42)    BP: 124/59 (12-24-23 @ 07:57) (121/56 - 176/86)Vital Signs Last 24 Hrs  T(C): 36.6 (12-24-23 @ 07:57), Max: 36.6 (12-24-23 @ 07:57)  T(F): 97.8 (12-24-23 @ 07:57), Max: 97.8 (12-24-23 @ 07:57)  HR: 76 (12-24-23 @ 07:57) (76 - 76)  BP: 124/59 (12-24-23 @ 07:57) (124/59 - 124/59)  BP(mean): --  RR: 18 (12-24-23 @ 07:57) (18 - 18)  SpO2: --      Lipid Panel: Date/Time: 12-23-23 @ 08:00  Cholesterol, Serum: 186  LDL Cholesterol Calculated: 100  HDL Cholesterol, Serum: 46  Total Cholesterol/HDL Ration Measurement: --  Triglycerides, Serum: 202

## 2023-12-25 LAB
GLUCOSE BLDC GLUCOMTR-MCNC: 145 MG/DL — HIGH (ref 70–99)
GLUCOSE BLDC GLUCOMTR-MCNC: 145 MG/DL — HIGH (ref 70–99)
GLUCOSE BLDC GLUCOMTR-MCNC: 178 MG/DL — HIGH (ref 70–99)
GLUCOSE BLDC GLUCOMTR-MCNC: 178 MG/DL — HIGH (ref 70–99)
GLUCOSE BLDC GLUCOMTR-MCNC: 189 MG/DL — HIGH (ref 70–99)
GLUCOSE BLDC GLUCOMTR-MCNC: 189 MG/DL — HIGH (ref 70–99)

## 2023-12-25 PROCEDURE — 99232 SBSQ HOSP IP/OBS MODERATE 35: CPT

## 2023-12-25 RX ORDER — CLOZAPINE 150 MG/1
25 TABLET, ORALLY DISINTEGRATING ORAL AT BEDTIME
Refills: 0 | Status: DISCONTINUED | OUTPATIENT
Start: 2023-12-25 | End: 2023-12-29

## 2023-12-25 RX ADMIN — Medication 2 MILLIGRAM(S): at 09:11

## 2023-12-25 RX ADMIN — BUDESONIDE AND FORMOTEROL FUMARATE DIHYDRATE 2 PUFF(S): 160; 4.5 AEROSOL RESPIRATORY (INHALATION) at 09:10

## 2023-12-25 RX ADMIN — METFORMIN HYDROCHLORIDE 500 MILLIGRAM(S): 850 TABLET ORAL at 09:10

## 2023-12-25 RX ADMIN — ATORVASTATIN CALCIUM 10 MILLIGRAM(S): 80 TABLET, FILM COATED ORAL at 20:25

## 2023-12-25 RX ADMIN — METFORMIN HYDROCHLORIDE 500 MILLIGRAM(S): 850 TABLET ORAL at 20:21

## 2023-12-25 NOTE — BH INPATIENT PSYCHIATRY PROGRESS NOTE - NSBHCHARTREVIEWVS_PSY_A_CORE FT
Vital Signs Last 24 Hrs  T(C): 35.8 (12-25-23 @ 08:07), Max: 35.8 (12-25-23 @ 08:07)  T(F): 96.5 (12-25-23 @ 08:07), Max: 96.5 (12-25-23 @ 08:07)  HR: 85 (12-25-23 @ 08:07) (85 - 88)  BP: 134/77 (12-25-23 @ 08:07) (134/77 - 138/75)  BP(mean): --  RR: 18 (12-25-23 @ 08:07) (18 - 18)  SpO2: --

## 2023-12-25 NOTE — BH INPATIENT PSYCHIATRY PROGRESS NOTE - NSBHASSESSSUMMFT_PSY_ALL_CORE
67 yo male domiciled at [facility], with PMH diabetes, CAD, CLL, parathyroid cancer, PPH of chronic schizophrenia, multiple state hospitalizations, most recently between Feb 2019 and Oct 2023 at [place], who presented to the ED on 12/21/23 with an unclear complaint. Based on collateral and exam, pt was found to be disorganized and off meds, and subsequently admitted to [facility] under 9.39 legals for decompensated schizophrenia.    Patient remains disorganized, illogical, irritable but not agitated. As patient did not take his clozaril for more than 2 days prior to admission, we had to restart at lower dose. Will continue to titrate clozaril and monitor for improvement and side effects. Requires further inpatient psychiatric hospitalization for further medication management and stabilization of symptoms.     Plan:   #Schizophrenia  **Home dose was clozaril 200mg qhs**  - increased to clozaril 25 mg QHs   - c/w Benztropine 2mg po qd for EPS ppx (will defer to primary team to taper if not necessary)  - Last ANC per clozaril rems was on 12/21/23: 3810 ANC (per µL)  - Continue to monitor V/S and weekly CBCs (ordered for 12/26) and gradually increase dose appropriately  - Haloperidol Dec 100mg IM last given 12/14/23 ***next due 1/10/24***    #T2DM   - A1C 6.5 from 12/23  - tradjenta 5mg qdaily (Januvia 100mg qdaily is home medication but non-formulary and switched to tradjenta while in hospital)  - metformin 500mg BID  - ISS     #Hypothyroidism  - c/w Levothyroxine 200mg qdaily  - TSH 1.07 WNL from 12/23    #HLD   - c/w Atorvastatin 10mg qhs    #Hx CLL  - c/w Allopurinol 100mg qdaily    #CAD   - EKG similar to previous   - trop T 17 (WNL) on 12/23    #Amblyopia  - c/w Atropine 1% eye drops qdaily    #Hx asthma/COPD?--- need to verify  - symbicort 2 puffs daily    #Agitation  - For severe agitation not responding to behavioral intervention, may give haldol 5 mg po q6h prn, ativan 1 mg po q6h prn, hydroxyzine 50 mg po q6h prn, with escalation to IM if patient refusing PO and remains an imminent danger to self or others. If IM antipsychotic is administered, please perform follow-up ECG for QTc monitoring.  - Can give benadryl 50 mg po q6h for anxiety/insomnia 69 yo male domiciled at [facility], with PMH diabetes, CAD, CLL, parathyroid cancer, PPH of chronic schizophrenia, multiple state hospitalizations, most recently between Feb 2019 and Oct 2023 at [place], who presented to the ED on 12/21/23 with an unclear complaint. Based on collateral and exam, pt was found to be disorganized and off meds, and subsequently admitted to [facility] under 9.39 legals for decompensated schizophrenia.    Patient remains disorganized, illogical, irritable but not agitated. As patient did not take his clozaril for more than 2 days prior to admission, we had to restart at lower dose. Will continue to titrate clozaril and monitor for improvement and side effects. Requires further inpatient psychiatric hospitalization for further medication management and stabilization of symptoms.     Plan:   #Schizophrenia  **Home dose was clozaril 200mg qhs**  - increased to clozaril 25 mg QHs   - c/w Benztropine 2mg po qd for EPS ppx (will defer to primary team to taper if not necessary)  - Last ANC per clozaril rems was on 12/21/23: 3810 ANC (per µL)  - Continue to monitor V/S and weekly CBCs (ordered for 12/26) and gradually increase dose appropriately  - Haloperidol Dec 100mg IM last given 12/14/23 ***next due 1/10/24***    #T2DM   - A1C 6.5 from 12/23  - tradjenta 5mg qdaily (Januvia 100mg qdaily is home medication but non-formulary and switched to tradjenta while in hospital)  - metformin 500mg BID  - ISS     #Hypothyroidism  - c/w Levothyroxine 200mg qdaily  - TSH 1.07 WNL from 12/23    #HLD   - c/w Atorvastatin 10mg qhs    #Hx CLL  - c/w Allopurinol 100mg qdaily    #CAD   - EKG similar to previous   - trop T 17 (WNL) on 12/23    #Amblyopia  - c/w Atropine 1% eye drops qdaily    #Hx asthma/COPD?--- need to verify  - symbicort 2 puffs daily    #Agitation  - For severe agitation not responding to behavioral intervention, may give haldol 5 mg po q6h prn, ativan 1 mg po q6h prn, hydroxyzine 50 mg po q6h prn, with escalation to IM if patient refusing PO and remains an imminent danger to self or others. If IM antipsychotic is administered, please perform follow-up ECG for QTc monitoring.  - Can give benadryl 50 mg po q6h for anxiety/insomnia

## 2023-12-25 NOTE — BH INPATIENT PSYCHIATRY PROGRESS NOTE - NSBHFUPINTERVALHXFT_PSY_A_CORE
Patient was seen and evaluated this morning. Chart was reviewed and no acute events were noted. No PRN medications were administered overnight. Upon approach, patient is lying in bed comfortably. Patient is alert and engages with interviewer. Patient is largely uncooperative with interview, appears irritable but not agitated. Patient reports "I don't want you as my psychiatrist," and later says "I'm okay. I don't need any psychiatrist."

## 2023-12-25 NOTE — BH INPATIENT PSYCHIATRY PROGRESS NOTE - NSBHMETABOLIC_PSY_ALL_CORE_FT
BMI: BMI (kg/m2): 25.3 (12-22-23 @ 21:34)  HbA1c: A1C with Estimated Average Glucose Result: 6.5 % (12-23-23 @ 08:00)    Glucose: POCT Blood Glucose.: 178 mg/dL (12-25-23 @ 06:38)    BP: 134/77 (12-25-23 @ 08:07) (124/59 - 176/86)Vital Signs Last 24 Hrs  T(C): 35.8 (12-25-23 @ 08:07), Max: 35.8 (12-25-23 @ 08:07)  T(F): 96.5 (12-25-23 @ 08:07), Max: 96.5 (12-25-23 @ 08:07)  HR: 85 (12-25-23 @ 08:07) (85 - 88)  BP: 134/77 (12-25-23 @ 08:07) (134/77 - 138/75)  BP(mean): --  RR: 18 (12-25-23 @ 08:07) (18 - 18)  SpO2: --      Lipid Panel: Date/Time: 12-23-23 @ 08:00  Cholesterol, Serum: 186  LDL Cholesterol Calculated: 100  HDL Cholesterol, Serum: 46  Total Cholesterol/HDL Ration Measurement: --  Triglycerides, Serum: 202

## 2023-12-25 NOTE — BH INPATIENT PSYCHIATRY PROGRESS NOTE - NSBHCHARTREVIEWINVESTIGATE_PSY_A_CORE FT
Ventricular Rate 71 BPM    Atrial Rate 71 BPM    P-R Interval 146 ms    QRS Duration 74 ms    Q-T Interval 408 ms    QTC Calculation(Bazett) 443 ms    P Axis 58 degrees    R Axis 40 degrees    T Axis 4 degrees    Diagnosis Line Normal sinus rhythm  Inferior infarct , age undetermined  Anterolateral infarct , probably recent  Abnormal ECG    Confirmed by DARCIE MONTANO, SERG ([id number]) on 12/21/2023 10:21:14 PM Ventricular Rate 71 BPM    Atrial Rate 71 BPM    P-R Interval 146 ms    QRS Duration 74 ms    Q-T Interval 408 ms    QTC Calculation(Bazett) 443 ms    P Axis 58 degrees    R Axis 40 degrees    T Axis 4 degrees    Diagnosis Line Normal sinus rhythm  Inferior infarct , age undetermined  Anterolateral infarct , probably recent  Abnormal ECG    Confirmed by DARCIE MONTANO, SERG ([id number]) on 12/21/2023 10:21:14 PM

## 2023-12-25 NOTE — BH INPATIENT PSYCHIATRY PROGRESS NOTE - NSBHATTESTCOMMENTATTENDFT_PSY_A_CORE
67 yo male domiciled at [facility], with PMH diabetes, CAD, CLL, parathyroid cancer, PPH of chronic schizophrenia, multiple state hospitalizations, most recently between Feb 2019 and Oct 2023 at [place], who presented to the ED on 12/21/23 with an unclear complaint. Based on collateral and exam, pt was found to be disorganized and off meds, and subsequently admitted to [facility] under 9.39 legals for decompensated schizophrenia. 69 yo male domiciled at [facility], with PMH diabetes, CAD, CLL, parathyroid cancer, PPH of chronic schizophrenia, multiple state hospitalizations, most recently between Feb 2019 and Oct 2023 at [place], who presented to the ED on 12/21/23 with an unclear complaint. Based on collateral and exam, pt was found to be disorganized and off meds, and subsequently admitted to [facility] under 9.39 legals for decompensated schizophrenia.

## 2023-12-25 NOTE — BH INPATIENT PSYCHIATRY PROGRESS NOTE - NSBHATTESTBILLING_PSY_A_CORE
81137-Bmbnemogri OBS or IP - low complexity OR 25-34 mins 16542-Lizoxnlxpn OBS or IP - low complexity OR 25-34 mins

## 2023-12-26 LAB
ANISOCYTOSIS BLD QL: SLIGHT — SIGNIFICANT CHANGE UP
ANISOCYTOSIS BLD QL: SLIGHT — SIGNIFICANT CHANGE UP
BASOPHILS # BLD AUTO: 0 K/UL — SIGNIFICANT CHANGE UP (ref 0–0.2)
BASOPHILS # BLD AUTO: 0 K/UL — SIGNIFICANT CHANGE UP (ref 0–0.2)
BASOPHILS NFR BLD AUTO: 0 % — SIGNIFICANT CHANGE UP (ref 0–1)
BASOPHILS NFR BLD AUTO: 0 % — SIGNIFICANT CHANGE UP (ref 0–1)
EOSINOPHIL NFR BLD AUTO: 0 % — SIGNIFICANT CHANGE UP (ref 0–8)
EOSINOPHIL NFR BLD AUTO: 0 % — SIGNIFICANT CHANGE UP (ref 0–8)
GLUCOSE BLDC GLUCOMTR-MCNC: 119 MG/DL — HIGH (ref 70–99)
GLUCOSE BLDC GLUCOMTR-MCNC: 119 MG/DL — HIGH (ref 70–99)
GLUCOSE BLDC GLUCOMTR-MCNC: 132 MG/DL — HIGH (ref 70–99)
GLUCOSE BLDC GLUCOMTR-MCNC: 132 MG/DL — HIGH (ref 70–99)
GLUCOSE BLDC GLUCOMTR-MCNC: 133 MG/DL — HIGH (ref 70–99)
GLUCOSE BLDC GLUCOMTR-MCNC: 133 MG/DL — HIGH (ref 70–99)
GLUCOSE BLDC GLUCOMTR-MCNC: 98 MG/DL — SIGNIFICANT CHANGE UP (ref 70–99)
GLUCOSE BLDC GLUCOMTR-MCNC: 98 MG/DL — SIGNIFICANT CHANGE UP (ref 70–99)
HCT VFR BLD CALC: 43.1 % — SIGNIFICANT CHANGE UP (ref 42–52)
HCT VFR BLD CALC: 43.1 % — SIGNIFICANT CHANGE UP (ref 42–52)
HGB BLD-MCNC: 14 G/DL — SIGNIFICANT CHANGE UP (ref 14–18)
HGB BLD-MCNC: 14 G/DL — SIGNIFICANT CHANGE UP (ref 14–18)
LYMPHOCYTES # BLD AUTO: 3.52 K/UL — HIGH (ref 1.2–3.4)
LYMPHOCYTES # BLD AUTO: 3.52 K/UL — HIGH (ref 1.2–3.4)
LYMPHOCYTES # BLD AUTO: 41 % — SIGNIFICANT CHANGE UP (ref 20.5–51.1)
LYMPHOCYTES # BLD AUTO: 41 % — SIGNIFICANT CHANGE UP (ref 20.5–51.1)
MANUAL SMEAR VERIFICATION: SIGNIFICANT CHANGE UP
MANUAL SMEAR VERIFICATION: SIGNIFICANT CHANGE UP
MCHC RBC-ENTMCNC: 29.1 PG — SIGNIFICANT CHANGE UP (ref 27–31)
MCHC RBC-ENTMCNC: 29.1 PG — SIGNIFICANT CHANGE UP (ref 27–31)
MCHC RBC-ENTMCNC: 32.5 G/DL — SIGNIFICANT CHANGE UP (ref 32–37)
MCHC RBC-ENTMCNC: 32.5 G/DL — SIGNIFICANT CHANGE UP (ref 32–37)
MCV RBC AUTO: 89.6 FL — SIGNIFICANT CHANGE UP (ref 80–94)
MCV RBC AUTO: 89.6 FL — SIGNIFICANT CHANGE UP (ref 80–94)
MONOCYTES # BLD AUTO: 0.77 K/UL — HIGH (ref 0.1–0.6)
MONOCYTES # BLD AUTO: 0.77 K/UL — HIGH (ref 0.1–0.6)
MONOCYTES NFR BLD AUTO: 9 % — SIGNIFICANT CHANGE UP (ref 1.7–9.3)
MONOCYTES NFR BLD AUTO: 9 % — SIGNIFICANT CHANGE UP (ref 1.7–9.3)
NEUTROPHILS # BLD AUTO: 4.12 K/UL — SIGNIFICANT CHANGE UP (ref 1.4–6.5)
NEUTROPHILS # BLD AUTO: 4.12 K/UL — SIGNIFICANT CHANGE UP (ref 1.4–6.5)
NEUTROPHILS NFR BLD AUTO: 48 % — SIGNIFICANT CHANGE UP (ref 42.2–75.2)
NEUTROPHILS NFR BLD AUTO: 48 % — SIGNIFICANT CHANGE UP (ref 42.2–75.2)
NRBC # BLD: 0 /100 WBCS — SIGNIFICANT CHANGE UP (ref 0–0)
NRBC # BLD: 0 /100 WBCS — SIGNIFICANT CHANGE UP (ref 0–0)
NRBC # BLD: SIGNIFICANT CHANGE UP /100 WBCS (ref 0–0)
NRBC # BLD: SIGNIFICANT CHANGE UP /100 WBCS (ref 0–0)
PLAT MORPH BLD: NORMAL — SIGNIFICANT CHANGE UP
PLAT MORPH BLD: NORMAL — SIGNIFICANT CHANGE UP
PLATELET # BLD AUTO: 104 K/UL — LOW (ref 130–400)
PLATELET # BLD AUTO: 104 K/UL — LOW (ref 130–400)
PLATELET CLUMP BLD QL SMEAR: SIGNIFICANT CHANGE UP
PLATELET CLUMP BLD QL SMEAR: SIGNIFICANT CHANGE UP
PMV BLD: 11.8 FL — HIGH (ref 7.4–10.4)
PMV BLD: 11.8 FL — HIGH (ref 7.4–10.4)
RBC # BLD: 4.81 M/UL — SIGNIFICANT CHANGE UP (ref 4.7–6.1)
RBC # BLD: 4.81 M/UL — SIGNIFICANT CHANGE UP (ref 4.7–6.1)
RBC # FLD: 16.6 % — HIGH (ref 11.5–14.5)
RBC # FLD: 16.6 % — HIGH (ref 11.5–14.5)
RBC BLD AUTO: NORMAL — SIGNIFICANT CHANGE UP
RBC BLD AUTO: NORMAL — SIGNIFICANT CHANGE UP
VARIANT LYMPHS # BLD: 2 % — SIGNIFICANT CHANGE UP (ref 0–5)
VARIANT LYMPHS # BLD: 2 % — SIGNIFICANT CHANGE UP (ref 0–5)
WBC # BLD: 8.58 K/UL — SIGNIFICANT CHANGE UP (ref 4.8–10.8)
WBC # BLD: 8.58 K/UL — SIGNIFICANT CHANGE UP (ref 4.8–10.8)
WBC # FLD AUTO: 8.58 K/UL — SIGNIFICANT CHANGE UP (ref 4.8–10.8)
WBC # FLD AUTO: 8.58 K/UL — SIGNIFICANT CHANGE UP (ref 4.8–10.8)

## 2023-12-26 PROCEDURE — 93010 ELECTROCARDIOGRAM REPORT: CPT

## 2023-12-26 PROCEDURE — 99232 SBSQ HOSP IP/OBS MODERATE 35: CPT

## 2023-12-26 RX ORDER — BENZTROPINE MESYLATE 1 MG
1 TABLET ORAL
Refills: 0 | Status: DISCONTINUED | OUTPATIENT
Start: 2023-12-26 | End: 2024-01-05

## 2023-12-26 RX ADMIN — METFORMIN HYDROCHLORIDE 500 MILLIGRAM(S): 850 TABLET ORAL at 20:37

## 2023-12-26 RX ADMIN — Medication 1 MILLIGRAM(S): at 20:38

## 2023-12-26 RX ADMIN — ATORVASTATIN CALCIUM 10 MILLIGRAM(S): 80 TABLET, FILM COATED ORAL at 20:37

## 2023-12-26 RX ADMIN — METFORMIN HYDROCHLORIDE 500 MILLIGRAM(S): 850 TABLET ORAL at 08:21

## 2023-12-26 RX ADMIN — Medication 2 MILLIGRAM(S): at 08:21

## 2023-12-26 NOTE — BH INPATIENT PSYCHIATRY PROGRESS NOTE - NSBHCHARTREVIEWVS_PSY_A_CORE FT
Vital Signs Last 24 Hrs  T(C): 37.6 (12-26-23 @ 08:06), Max: 37.6 (12-26-23 @ 08:06)  T(F): 99.7 (12-26-23 @ 08:06), Max: 99.7 (12-26-23 @ 08:06)  HR: 94 (12-26-23 @ 08:06) (94 - 96)  BP: 116/76 (12-26-23 @ 08:06) (116/76 - 130/85)  BP(mean): --  RR: 18 (12-26-23 @ 08:06) (16 - 18)  SpO2: --

## 2023-12-26 NOTE — BH TREATMENT PLAN - NSTXPSYCHOINTERSW_PSY_ALL_CORE
Sw will provide support contacts education and referrals for healthy coping skills. pt encouraged to attend at least 1 group per day.

## 2023-12-26 NOTE — BH INPATIENT PSYCHIATRY PROGRESS NOTE - NSBHFUPINTERVALHXFT_PSY_A_CORE
Patient was seen and evaluated this morning. Chart was reviewed and no acute events were noted overnight besides general disorganization from nursing staff. No PRN medications were administered overnight.     Upon approach, patient was seen standing in room with scrub top open exposing his bare chest. Patient walked up to the door to speak to treatment team but patient declined to move from the doorway, even to stand inside his own room and allowing him to sit down. Patient spoke with odd affect saying disorganized statements and could not report where he lives. Patient reports that he does not need any other medication besides Artane and Atarax. Patient superficially denied any suicidal ideation, auditory hallucinations, or ideas of reference. Otherwise, interview was limited.     Patient is alert and engages with interviewer. Patient is largely uncooperative with interview, appears irritable but not agitated. Patient reports "I don't want you as my psychiatrist," and later says "I'm okay. I don't need any psychiatrist."

## 2023-12-26 NOTE — UM REPORT PROGRESS NOTE - NSUMRMPROVIDER_GEN_A_CORE FT
Medicare and [employer]   no auth required     Medicare and [facility]   no auth required

## 2023-12-26 NOTE — BH INPATIENT PSYCHIATRY PROGRESS NOTE - NSBHMETABOLIC_PSY_ALL_CORE_FT
BMI: BMI (kg/m2): 25.3 (12-22-23 @ 21:34)  HbA1c: A1C with Estimated Average Glucose Result: 6.5 % (12-23-23 @ 08:00)    Glucose: POCT Blood Glucose.: 98 mg/dL (12-26-23 @ 11:25)    BP: 116/76 (12-26-23 @ 08:06) (116/76 - 138/75)Vital Signs Last 24 Hrs  T(C): 37.6 (12-26-23 @ 08:06), Max: 37.6 (12-26-23 @ 08:06)  T(F): 99.7 (12-26-23 @ 08:06), Max: 99.7 (12-26-23 @ 08:06)  HR: 94 (12-26-23 @ 08:06) (94 - 96)  BP: 116/76 (12-26-23 @ 08:06) (116/76 - 130/85)  BP(mean): --  RR: 18 (12-26-23 @ 08:06) (16 - 18)  SpO2: --      Lipid Panel: Date/Time: 12-23-23 @ 08:00  Cholesterol, Serum: 186  LDL Cholesterol Calculated: 100  HDL Cholesterol, Serum: 46  Total Cholesterol/HDL Ration Measurement: --  Triglycerides, Serum: 202

## 2023-12-26 NOTE — BH INPATIENT PSYCHIATRY PROGRESS NOTE - NSBHMSESPABN_PSY_A_CORE
----- Message from Rodrigo Toussaint sent at 12/22/2020  8:47 AM CST -----  Regarding: Sleep Aide  Patient would like a call to discuss getting a sleep aide for the night of his sleep study. He is stating because he can't use his machine for 2 nights, he will need something.    Thanks, Rodrigo     Soft volume/Decreased productivity

## 2023-12-26 NOTE — BH INPATIENT PSYCHIATRY PROGRESS NOTE - NSBHMSETHTPROC_PSY_A_CORE
Disorganized/Thought blocking/Illogical/Impaired reasoning Disorganized/Illogical/Impaired reasoning

## 2023-12-26 NOTE — BH INPATIENT PSYCHIATRY PROGRESS NOTE - NSBHCHARTREVIEWINVESTIGATE_PSY_A_CORE FT
Ventricular Rate 71 BPM    Atrial Rate 71 BPM    P-R Interval 146 ms    QRS Duration 74 ms    Q-T Interval 408 ms    QTC Calculation(Bazett) 443 ms    P Axis 58 degrees    R Axis 40 degrees    T Axis 4 degrees    Diagnosis Line Normal sinus rhythm  Inferior infarct , age undetermined  Anterolateral infarct , probably recent  Abnormal ECG    Confirmed by DARCIE MONTANO, SERG ([id number]) on 12/21/2023 10:21:14 PM Ventricular Rate 71 BPM    Atrial Rate 71 BPM    P-R Interval 146 ms    QRS Duration 74 ms    Q-T Interval 408 ms    QTC Calculation(Bazett) 443 ms    P Axis 58 degrees    R Axis 40 degrees    T Axis 4 degrees    Diagnosis Line Normal sinus rhythm  Inferior infarct , age undetermined  Anterolateral infarct , probably recent  Abnormal ECG    Confirmed by DARCIE MONTANO, SERG ([id number]) on 12/21/2023 10:21:14 PM

## 2023-12-26 NOTE — BH INPATIENT PSYCHIATRY PROGRESS NOTE - CURRENT MEDICATION
MEDICATIONS  (STANDING):  allopurinol 100 milliGRAM(s) Oral daily  atorvastatin 10 milliGRAM(s) Oral at bedtime  atropine 1% Solution 1 Drop(s) Both EYES daily  benztropine 2 milliGRAM(s) Oral daily  bisacodyl 5 milliGRAM(s) Oral at bedtime  budesonide  80 MICROgram(s)/formoterol 4.5 MICROgram(s) Inhaler 2 Puff(s) Inhalation two times a day  cloZAPine 25 milliGRAM(s) Oral at bedtime  dextrose 5%. 1000 milliLiter(s) (50 mL/Hr) IV Continuous <Continuous>  dextrose 5%. 1000 milliLiter(s) (100 mL/Hr) IV Continuous <Continuous>  dextrose 50% Injectable 12.5 Gram(s) IV Push once  dextrose 50% Injectable 25 Gram(s) IV Push once  dextrose 50% Injectable 25 Gram(s) IV Push once  glucagon  Injectable 1 milliGRAM(s) IntraMuscular once  insulin lispro (ADMELOG) corrective regimen sliding scale   SubCutaneous three times a day before meals  insulin lispro (ADMELOG) corrective regimen sliding scale   SubCutaneous at bedtime  levothyroxine 200 MICROGram(s) Oral daily  linagliptin 5 milliGRAM(s) Oral daily  metFORMIN 500 milliGRAM(s) Oral two times a day    MEDICATIONS  (PRN):  dextrose Oral Gel 15 Gram(s) Oral once PRN Blood Glucose LESS THAN 70 milliGRAM(s)/deciliter  diphenhydrAMINE 50 milliGRAM(s) Oral every 6 hours PRN Extrapyramidal prophylaxis  haloperidol     Tablet 5 milliGRAM(s) Oral every 6 hours PRN Agitation  hydrOXYzine hydrochloride 50 milliGRAM(s) Oral every 6 hours PRN anxiety  LORazepam     Tablet 1 milliGRAM(s) Oral every 6 hours PRN Agitation

## 2023-12-26 NOTE — BH INPATIENT PSYCHIATRY PROGRESS NOTE - NSBHASSESSSUMMFT_PSY_ALL_CORE
69 yo male domiciled at [facility], with PMH diabetes, CAD, CLL, parathyroid cancer, PPH of chronic schizophrenia, multiple state hospitalizations, most recently between Feb 2019 and Oct 2023 at [place], who presented to the ED on 12/21/23 with an unclear complaint. Based on collateral and exam, pt was found to be disorganized and off meds, and subsequently admitted to [facility] under 9.39 legals for decompensated schizophrenia.    Patient remains disorganized, illogical, irritable but not agitated. As patient did not take his clozaril for more than 2 days prior to admission, we had to restart at lower dose. Will continue to titrate clozaril and monitor for improvement and side effects. Requires further inpatient psychiatric hospitalization for further medication management and stabilization of symptoms.     Plan:   #Schizophrenia  **Home dose was clozaril 200mg qhs**  - C/w clozaril 25 mg QHs   - Reorder Benztropine to 1 mg po BID for EPS ppx  - Last ANC per clozaril rems was on 12/21/23: 3810 ANC (per µL)  - Continue to monitor V/S and weekly CBCs (ordered for 12/26) and gradually increase dose appropriately  - Haloperidol Dec 100mg IM last given 12/14/23 ***next due 1/10/24***    #T2DM   - A1C 6.5 from 12/23  - tradjenta 5mg qdaily (Januvia 100mg qdaily is home medication but non-formulary and switched to tradjenta while in hospital)  - metformin 500mg BID  - ISS    #Hypothyroidism  - c/w Levothyroxine 200mg qdaily  - TSH 1.07 WNL from 12/23    #HLD   - c/w Atorvastatin 10mg qhs    #Hx CLL  - c/w Allopurinol 100mg qdaily    #CAD   - EKG similar to previous   - trop T 17 (WNL) on 12/23    #Amblyopia  - c/w Atropine 1% eye drops qdaily    #Hx asthma/COPD?--- need to verify  - symbicort 2 puffs daily    #Agitation  - For severe agitation not responding to behavioral intervention, may give haldol 5 mg po q6h prn, ativan 1 mg po q6h prn, hydroxyzine 50 mg po q6h prn, with escalation to IM if patient refusing PO and remains an imminent danger to self or others. If IM antipsychotic is administered, please perform follow-up ECG for QTc monitoring.  - Can give benadryl 50 mg po q6h for anxiety/insomnia 69 yo male domiciled at [facility], with PMH diabetes, CAD, CLL, parathyroid cancer, PPH of chronic schizophrenia, multiple state hospitalizations, most recently between Feb 2019 and Oct 2023 at [place], who presented to the ED on 12/21/23 with an unclear complaint. Based on collateral and exam, pt was found to be disorganized and off meds, and subsequently admitted to [facility] under 9.39 legals for decompensated schizophrenia.    Patient remains disorganized, illogical, irritable but not agitated. As patient did not take his clozaril for more than 2 days prior to admission, we had to restart at lower dose. Will continue to titrate clozaril and monitor for improvement and side effects. Requires further inpatient psychiatric hospitalization for further medication management and stabilization of symptoms.     Plan:   #Schizophrenia  **Home dose was clozaril 200mg qhs**  - C/w clozaril 25 mg QHs   - Reorder Benztropine to 1 mg po BID for EPS ppx  - Last ANC per clozaril rems was on 12/21/23: 3810 ANC (per µL)  - Continue to monitor V/S and weekly CBCs (ordered for 12/26) and gradually increase dose appropriately  - Haloperidol Dec 100mg IM last given 12/14/23 ***next due 1/10/24***    #T2DM   - A1C 6.5 from 12/23  - tradjenta 5mg qdaily (Januvia 100mg qdaily is home medication but non-formulary and switched to tradjenta while in hospital)  - metformin 500mg BID  - ISS    #Hypothyroidism  - c/w Levothyroxine 200mg qdaily  - TSH 1.07 WNL from 12/23    #HLD   - c/w Atorvastatin 10mg qhs    #Hx CLL  - c/w Allopurinol 100mg qdaily    #CAD   - EKG similar to previous   - trop T 17 (WNL) on 12/23    #Amblyopia  - c/w Atropine 1% eye drops qdaily    #Hx asthma/COPD?--- need to verify  - symbicort 2 puffs daily    #Agitation  - For severe agitation not responding to behavioral intervention, may give haldol 5 mg po q6h prn, ativan 1 mg po q6h prn, hydroxyzine 50 mg po q6h prn, with escalation to IM if patient refusing PO and remains an imminent danger to self or others. If IM antipsychotic is administered, please perform follow-up ECG for QTc monitoring.  - Can give benadryl 50 mg po q6h for anxiety/insomnia

## 2023-12-27 LAB
GLUCOSE BLDC GLUCOMTR-MCNC: 135 MG/DL — HIGH (ref 70–99)
GLUCOSE BLDC GLUCOMTR-MCNC: 135 MG/DL — HIGH (ref 70–99)
GLUCOSE BLDC GLUCOMTR-MCNC: 137 MG/DL — HIGH (ref 70–99)
GLUCOSE BLDC GLUCOMTR-MCNC: 137 MG/DL — HIGH (ref 70–99)
GLUCOSE BLDC GLUCOMTR-MCNC: 139 MG/DL — HIGH (ref 70–99)
GLUCOSE BLDC GLUCOMTR-MCNC: 139 MG/DL — HIGH (ref 70–99)
GLUCOSE BLDC GLUCOMTR-MCNC: 150 MG/DL — HIGH (ref 70–99)
GLUCOSE BLDC GLUCOMTR-MCNC: 150 MG/DL — HIGH (ref 70–99)

## 2023-12-27 PROCEDURE — 99231 SBSQ HOSP IP/OBS SF/LOW 25: CPT

## 2023-12-27 RX ADMIN — CLOZAPINE 25 MILLIGRAM(S): 150 TABLET, ORALLY DISINTEGRATING ORAL at 20:23

## 2023-12-27 RX ADMIN — ATORVASTATIN CALCIUM 10 MILLIGRAM(S): 80 TABLET, FILM COATED ORAL at 20:23

## 2023-12-27 RX ADMIN — BUDESONIDE AND FORMOTEROL FUMARATE DIHYDRATE 2 PUFF(S): 160; 4.5 AEROSOL RESPIRATORY (INHALATION) at 20:24

## 2023-12-27 RX ADMIN — Medication 1 MILLIGRAM(S): at 20:23

## 2023-12-27 NOTE — BH INPATIENT PSYCHIATRY PROGRESS NOTE - NSBHFUPINTERVALHXFT_PSY_A_CORE
Patient was seen and evaluated this morning. Chart was reviewed and, as per Nursing, no acute events were noted overnight. No PRN medications were administered overnight.     Upon initial examination, patient was seen laying supine with legs crossed and both hands held over his forehead. Patient's speech was initially incoherent and asked if the team had medications for him. Shortly after he became agitated and began shouting for the team to leave the room. Overall, the interview was limited.    Patient is largely uncooperative with interview, appears irritable but not agitated. Patient denies suicidal or homicidal ideations. No obvious internal preoccupations were evident.  Patient was seen and evaluated this morning. Chart was reviewed and, as per Nursing, no acute events were noted overnight. No PRN medications were administered overnight. Patient did not take his standing antipsychotics last night.    Upon initial examination, patient was seen laying supine with legs crossed and both hands held over his forehead. Patient's speech was initially incoherent and asked if the team had medications for him. Shortly after he became agitated and began shouting for the team to leave the room. Overall, the interview was limited. Patient is largely uncooperative with interview, appears irritable but not agitated. No suicidal or homicidal ideation elicited. No obvious internal preoccupations were evident.  Patient was seen and evaluated this morning. Chart was reviewed and, as per Nursing, no acute events were noted overnight. No PRN medications were administered overnight. Patient did not take his standing antipsychotics last night.    Upon initial examination, patient was seen laying supine with legs crossed and both hands held over his forehead. Patient's speech was initially incoherent and asked if the team had medications for him. Shortly after he became agitated and began shouting for the team to leave the room. Overall, the interview was limited. Patient is largely uncooperative with interview, appears irritable but not agitated. No obvious internal preoccupations were evident.

## 2023-12-27 NOTE — BH INPATIENT PSYCHIATRY PROGRESS NOTE - NSBHCHARTREVIEWVS_PSY_A_CORE FT
Vital Signs Last 24 Hrs  T(C): 35.9 (12-27-23 @ 07:50), Max: 35.9 (12-26-23 @ 16:11)  T(F): 96.7 (12-27-23 @ 07:50), Max: 96.7 (12-27-23 @ 07:50)  HR: 68 (12-27-23 @ 07:50) (68 - 81)  BP: 127/70 (12-27-23 @ 07:50) (127/70 - 152/86)  BP(mean): --  RR: 17 (12-27-23 @ 07:50) (17 - 20)  SpO2: --

## 2023-12-27 NOTE — BH INPATIENT PSYCHIATRY PROGRESS NOTE - NSBHATTESTCOMMENTATTENDFT_PSY_A_CORE
Attempted to see pt again in the afternoon he was confused, irritable, and non cooperative. He initially asked male resident and students to leave so that he may speak to me. He then proceeded to ask who I was, I reminded him of my title, less than a minute later he asked "who are you a .. go join your friends.. get out", His demeanor changed rapidly to anger and escalating paranoia asking "why are you here"    Pt seen, chart reviewed, and case discussed. Agree with assessment and plan

## 2023-12-27 NOTE — BH INPATIENT PSYCHIATRY PROGRESS NOTE - NSBHCHARTREVIEWINVESTIGATE_PSY_A_CORE FT
Ventricular Rate 71 BPM    Atrial Rate 71 BPM    P-R Interval 146 ms    QRS Duration 74 ms    Q-T Interval 408 ms    QTC Calculation(Bazett) 443 ms    P Axis 58 degrees    R Axis 40 degrees    T Axis 4 degrees    Diagnosis Line Normal sinus rhythm  Inferior infarct , age undetermined  Anterolateral infarct , probably recent  Abnormal ECG    Confirmed by DARCIE MONTANO, SERG [id number]) on 12/21/2023 10:21:14 PM Ventricular Rate 71 BPM    Atrial Rate 71 BPM    P-R Interval 146 ms    QRS Duration 74 ms    Q-T Interval 408 ms    QTC Calculation(Bazett) 443 ms    P Axis 58 degrees    R Axis 40 degrees    T Axis 4 degrees    Diagnosis Line Normal sinus rhythm  Inferior infarct , age undetermined  Anterolateral infarct , probably recent  Abnormal ECG    Confirmed by DARCIE MONTANO, SERG ([id number]) on 12/21/2023 10:21:14 PM

## 2023-12-27 NOTE — BH INPATIENT PSYCHIATRY PROGRESS NOTE - NSBHMETABOLIC_PSY_ALL_CORE_FT
BMI: BMI (kg/m2): 25.3 (12-22-23 @ 21:34)  HbA1c: A1C with Estimated Average Glucose Result: 6.5 % (12-23-23 @ 08:00)    Glucose: POCT Blood Glucose.: 135 mg/dL (12-27-23 @ 06:35)    BP: 127/70 (12-27-23 @ 07:50) (116/76 - 152/86)Vital Signs Last 24 Hrs  T(C): 35.9 (12-27-23 @ 07:50), Max: 35.9 (12-26-23 @ 16:11)  T(F): 96.7 (12-27-23 @ 07:50), Max: 96.7 (12-27-23 @ 07:50)  HR: 68 (12-27-23 @ 07:50) (68 - 81)  BP: 127/70 (12-27-23 @ 07:50) (127/70 - 152/86)  BP(mean): --  RR: 17 (12-27-23 @ 07:50) (17 - 20)  SpO2: --      Lipid Panel: Date/Time: 12-23-23 @ 08:00  Cholesterol, Serum: 186  LDL Cholesterol Calculated: 100  HDL Cholesterol, Serum: 46  Total Cholesterol/HDL Ration Measurement: --  Triglycerides, Serum: 202   BMI: BMI (kg/m2): 25.3 (12-22-23 @ 21:34)  HbA1c: A1C with Estimated Average Glucose Result: 6.5 % (12-23-23 @ 08:00)    Glucose: POCT Blood Glucose.: 139 mg/dL (12-27-23 @ 10:33)    BP: 127/70 (12-27-23 @ 07:50) (116/76 - 152/86)Vital Signs Last 24 Hrs  T(C): 35.9 (12-27-23 @ 07:50), Max: 35.9 (12-26-23 @ 16:11)  T(F): 96.7 (12-27-23 @ 07:50), Max: 96.7 (12-27-23 @ 07:50)  HR: 68 (12-27-23 @ 07:50) (68 - 81)  BP: 127/70 (12-27-23 @ 07:50) (127/70 - 152/86)  BP(mean): --  RR: 17 (12-27-23 @ 07:50) (17 - 20)  SpO2: --      Lipid Panel: Date/Time: 12-23-23 @ 08:00  Cholesterol, Serum: 186  LDL Cholesterol Calculated: 100  HDL Cholesterol, Serum: 46  Total Cholesterol/HDL Ration Measurement: --  Triglycerides, Serum: 202

## 2023-12-27 NOTE — BH INPATIENT PSYCHIATRY PROGRESS NOTE - CURRENT MEDICATION
MEDICATIONS  (STANDING):  allopurinol 100 milliGRAM(s) Oral daily  atorvastatin 10 milliGRAM(s) Oral at bedtime  atropine 1% Solution 1 Drop(s) Both EYES daily  benztropine 1 milliGRAM(s) Oral two times a day  bisacodyl 5 milliGRAM(s) Oral at bedtime  budesonide  80 MICROgram(s)/formoterol 4.5 MICROgram(s) Inhaler 2 Puff(s) Inhalation two times a day  cloZAPine 25 milliGRAM(s) Oral at bedtime  dextrose 5%. 1000 milliLiter(s) (100 mL/Hr) IV Continuous <Continuous>  dextrose 5%. 1000 milliLiter(s) (50 mL/Hr) IV Continuous <Continuous>  dextrose 50% Injectable 25 Gram(s) IV Push once  dextrose 50% Injectable 12.5 Gram(s) IV Push once  dextrose 50% Injectable 25 Gram(s) IV Push once  glucagon  Injectable 1 milliGRAM(s) IntraMuscular once  insulin lispro (ADMELOG) corrective regimen sliding scale   SubCutaneous three times a day before meals  insulin lispro (ADMELOG) corrective regimen sliding scale   SubCutaneous at bedtime  levothyroxine 200 MICROGram(s) Oral daily  linagliptin 5 milliGRAM(s) Oral daily  metFORMIN 500 milliGRAM(s) Oral two times a day    MEDICATIONS  (PRN):  dextrose Oral Gel 15 Gram(s) Oral once PRN Blood Glucose LESS THAN 70 milliGRAM(s)/deciliter  diphenhydrAMINE 50 milliGRAM(s) Oral every 6 hours PRN Extrapyramidal prophylaxis  haloperidol     Tablet 5 milliGRAM(s) Oral every 6 hours PRN Agitation  hydrOXYzine hydrochloride 50 milliGRAM(s) Oral every 6 hours PRN anxiety  LORazepam     Tablet 1 milliGRAM(s) Oral every 6 hours PRN Agitation   MEDICATIONS  (STANDING):  allopurinol 100 milliGRAM(s) Oral daily  atorvastatin 10 milliGRAM(s) Oral at bedtime  atropine 1% Solution 1 Drop(s) Both EYES daily  benztropine 1 milliGRAM(s) Oral two times a day  bisacodyl 5 milliGRAM(s) Oral at bedtime  budesonide  80 MICROgram(s)/formoterol 4.5 MICROgram(s) Inhaler 2 Puff(s) Inhalation two times a day  cloZAPine 25 milliGRAM(s) Oral at bedtime  dextrose 5%. 1000 milliLiter(s) (50 mL/Hr) IV Continuous <Continuous>  dextrose 5%. 1000 milliLiter(s) (100 mL/Hr) IV Continuous <Continuous>  dextrose 50% Injectable 12.5 Gram(s) IV Push once  dextrose 50% Injectable 25 Gram(s) IV Push once  dextrose 50% Injectable 25 Gram(s) IV Push once  glucagon  Injectable 1 milliGRAM(s) IntraMuscular once  insulin lispro (ADMELOG) corrective regimen sliding scale   SubCutaneous three times a day before meals  insulin lispro (ADMELOG) corrective regimen sliding scale   SubCutaneous at bedtime  levothyroxine 200 MICROGram(s) Oral daily  linagliptin 5 milliGRAM(s) Oral daily  metFORMIN 500 milliGRAM(s) Oral two times a day    MEDICATIONS  (PRN):  dextrose Oral Gel 15 Gram(s) Oral once PRN Blood Glucose LESS THAN 70 milliGRAM(s)/deciliter  diphenhydrAMINE 50 milliGRAM(s) Oral every 6 hours PRN Extrapyramidal prophylaxis  haloperidol     Tablet 5 milliGRAM(s) Oral every 6 hours PRN Agitation  hydrOXYzine hydrochloride 50 milliGRAM(s) Oral every 6 hours PRN anxiety  LORazepam     Tablet 1 milliGRAM(s) Oral every 6 hours PRN Agitation

## 2023-12-27 NOTE — BH INPATIENT PSYCHIATRY PROGRESS NOTE - NSBHASSESSSUMMFT_PSY_ALL_CORE
67 yo male domiciled at [facility], with PMH diabetes, CAD, CLL, parathyroid cancer, PPH of chronic schizophrenia, multiple state hospitalizations, most recently between Feb 2019 and Oct 2023 at [place], who presented to the ED on 12/21/23 with an unclear complaint. Based on collateral and exam, pt was found to be disorganized and off meds, and subsequently admitted to [facility] under 9.39 legals for decompensated schizophrenia.    Patient remains disorganized, illogical, irritable but not agitated. As patient did not take his clozaril for more than 2 days prior to admission, we had to restart at lower dose. Will continue to titrate clozaril and monitor for improvement and side effects. Requires further inpatient psychiatric hospitalization for further medication management and stabilization of symptoms.     Plan:   #Schizophrenia  **Home dose was clozaril 200mg qhs**  - C/w clozaril 25 mg QHs   - Reorder Benztropine to 1 mg po BID for EPS ppx  - Last ANC per clozaril rems was on 12/21/23: 3810 ANC (per µL)  - Continue to monitor V/S and weekly CBCs (ordered for 12/26) and gradually increase dose appropriately  - Haloperidol Dec 100mg IM last given 12/14/23 ***next due 1/10/24***    #T2DM   - A1C 6.5 from 12/23  - tradjenta 5mg qdaily (Januvia 100mg qdaily is home medication but non-formulary and switched to tradjenta while in hospital)  - metformin 500mg BID  - ISS    #Hypothyroidism  - c/w Levothyroxine 200mg qdaily  - TSH 1.07 WNL from 12/23    #HLD   - c/w Atorvastatin 10mg qhs    #Hx CLL  - c/w Allopurinol 100mg qdaily    #CAD   - EKG similar to previous   - trop T 17 (WNL) on 12/23    #Amblyopia  - c/w Atropine 1% eye drops qdaily    #Hx asthma/COPD?--- need to verify  - symbicort 2 puffs daily    #Agitation  - For severe agitation not responding to behavioral intervention, may give haldol 5 mg po q6h prn, ativan 1 mg po q6h prn, hydroxyzine 50 mg po q6h prn, with escalation to IM if patient refusing PO and remains an imminent danger to self or others. If IM antipsychotic is administered, please perform follow-up ECG for QTc monitoring.  - Can give benadryl 50 mg po q6h for anxiety/insomnia 67 yo male domiciled at [facility], with PMH diabetes, CAD, CLL, parathyroid cancer, PPH of chronic schizophrenia, multiple state hospitalizations, most recently between Feb 2019 and Oct 2023 at [place], who presented to the ED on 12/21/23 with an unclear complaint. Based on collateral and exam, pt was found to be disorganized and off meds, and subsequently admitted to [facility] under 9.39 legals for decompensated schizophrenia.    Patient remains disorganized, illogical, irritable but not agitated. As patient did not take his clozaril for more than 2 days prior to admission, we had to restart at lower dose. Will continue to titrate clozaril and monitor for improvement and side effects. Requires further inpatient psychiatric hospitalization for further medication management and stabilization of symptoms.     Plan:   #Schizophrenia  **Home dose was clozaril 200mg qhs**  - C/w clozaril 25 mg QHs   - Reorder Benztropine to 1 mg po BID for EPS ppx  - Last ANC per clozaril rems was on 12/21/23: 3810 ANC (per µL)  - Continue to monitor V/S and weekly CBCs (ordered for 12/26) and gradually increase dose appropriately  - Haloperidol Dec 100mg IM last given 12/14/23 ***next due 1/10/24***    #T2DM   - A1C 6.5 from 12/23  - tradjenta 5mg qdaily (Januvia 100mg qdaily is home medication but non-formulary and switched to tradjenta while in hospital)  - metformin 500mg BID  - ISS    #Hypothyroidism  - c/w Levothyroxine 200mg qdaily  - TSH 1.07 WNL from 12/23    #HLD   - c/w Atorvastatin 10mg qhs    #Hx CLL  - c/w Allopurinol 100mg qdaily    #CAD   - EKG similar to previous   - trop T 17 (WNL) on 12/23    #Amblyopia  - c/w Atropine 1% eye drops qdaily    #Hx asthma/COPD?--- need to verify  - symbicort 2 puffs daily    #Agitation  - For severe agitation not responding to behavioral intervention, may give haldol 5 mg po q6h prn, ativan 1 mg po q6h prn, hydroxyzine 50 mg po q6h prn, with escalation to IM if patient refusing PO and remains an imminent danger to self or others. If IM antipsychotic is administered, please perform follow-up ECG for QTc monitoring.  - Can give benadryl 50 mg po q6h for anxiety/insomnia 67 yo male domiciled at [facility], with PMH diabetes, CAD, CLL, parathyroid cancer, PPH of chronic schizophrenia, multiple state hospitalizations, most recently between Feb 2019 and Oct 2023 at [place], who presented to the ED on 12/21/23 with an unclear complaint. Based on collateral and exam, pt was found to be disorganized and off meds, and subsequently admitted to [facility] under 9.39 legals for decompensated schizophrenia.    Patient remains disorganized, illogical, irritable but not agitated. As patient did not take his clozaril for more than 2 days prior to admission, we had to restart at lower dose. Will continue to titrate Clozaril and monitor for improvement and side effects. Requires further inpatient psychiatric hospitalization for further medication management and stabilization of symptoms. Patient continues to refuse standing medications and may need treatment over objection if patient continues to refuse medication given his poor insight and judgement.    Plan:   #Schizophrenia  **Home dose was clozaril 200mg qhs**  - C/w clozaril 25 mg QHs   - C/w Benztropine 1 mg PO BID for EPS ppx  - Last ANC per clozaril rems was on 12/21/23: 3810 ANC (per µL)  - Continue to monitor V/S and weekly CBCs (ordered for 12/26) and gradually increase dose appropriately  - Haloperidol Dec 100mg IM last given 12/14/23 ***next due 1/10/24***    #T2DM   - A1C 6.5 from 12/23  - tradjenta 5mg qdaily (Januvia 100mg qdaily is home medication but non-formulary and switched to tradjenta while in hospital)  - metformin 500mg BID  - ISS    #Hypothyroidism  - c/w Levothyroxine 200mg qdaily  - TSH 1.07 WNL from 12/23    #HLD   - c/w Atorvastatin 10mg qhs    #Hx CLL  - c/w Allopurinol 100mg qdaily    #CAD   - EKG similar to previous   - trop T 17 (WNL) on 12/23    #Amblyopia  - c/w Atropine 1% eye drops qdaily    #Hx asthma/COPD?--- need to verify  - symbicort 2 puffs daily    #Agitation  - For severe agitation not responding to behavioral intervention, may give haldol 5 mg po q6h prn, ativan 1 mg po q6h prn, hydroxyzine 50 mg po q6h prn, with escalation to IM if patient refusing PO and remains an imminent danger to self or others. If IM antipsychotic is administered, please perform follow-up ECG for QTc monitoring.  - Can give benadryl 50 mg po q6h for anxiety/insomnia 67 yo male domiciled at [facility], with PMH diabetes, CAD, CLL, parathyroid cancer, PPH of chronic schizophrenia, multiple state hospitalizations, most recently between Feb 2019 and Oct 2023 at [place], who presented to the ED on 12/21/23 with an unclear complaint. Based on collateral and exam, pt was found to be disorganized and off meds, and subsequently admitted to [facility] under 9.39 legals for decompensated schizophrenia.    Patient remains disorganized, illogical, irritable but not agitated. As patient did not take his clozaril for more than 2 days prior to admission, we had to restart at lower dose. Will continue to titrate Clozaril and monitor for improvement and side effects. Requires further inpatient psychiatric hospitalization for further medication management and stabilization of symptoms. Patient continues to refuse standing medications and may need treatment over objection if patient continues to refuse medication given his poor insight and judgement.    Plan:   #Schizophrenia  **Home dose was clozaril 200mg qhs**  - C/w clozaril 25 mg QHs   - C/w Benztropine 1 mg PO BID for EPS ppx  - Last ANC per clozaril rems was on 12/21/23: 3810 ANC (per µL)  - Continue to monitor V/S and weekly CBCs (ordered for 12/26) and gradually increase dose appropriately  - Haloperidol Dec 100mg IM last given 12/14/23 ***next due 1/10/24***    #T2DM   - A1C 6.5 from 12/23  - tradjenta 5mg qdaily (Januvia 100mg qdaily is home medication but non-formulary and switched to tradjenta while in hospital)  - metformin 500mg BID  - ISS    #Hypothyroidism  - c/w Levothyroxine 200mg qdaily  - TSH 1.07 WNL from 12/23    #HLD   - c/w Atorvastatin 10mg qhs    #Hx CLL  - c/w Allopurinol 100mg qdaily    #CAD   - EKG similar to previous   - trop T 17 (WNL) on 12/23    #Amblyopia  - c/w Atropine 1% eye drops qdaily    #Hx asthma/COPD?--- need to verify  - symbicort 2 puffs daily    #Agitation  - For severe agitation not responding to behavioral intervention, may give haldol 5 mg po q6h prn, ativan 1 mg po q6h prn, hydroxyzine 50 mg po q6h prn, with escalation to IM if patient refusing PO and remains an imminent danger to self or others. If IM antipsychotic is administered, please perform follow-up ECG for QTc monitoring.  - Can give benadryl 50 mg po q6h for anxiety/insomnia

## 2023-12-27 NOTE — BH INPATIENT PSYCHIATRY PROGRESS NOTE - NSBHMSESPABN_PSY_A_CORE
[facility] AMBULATORY ENCOUNTER  FAMILY PRACTICE OFFICE VISIT    CHIEF COMPLAINT:    Chief Complaint   Patient presents with   • New Patient   • Physical       SUBJECTIVE:  Lauren Vázquez is a 30 year old female who presented requesting evaluation for physical.  New to me  Works in Derm  Woke 2 days ago with lower abd pain  Intense woke from sleep   bilat low pelvis   Menses likely next weel  Notes being more Emotional before menses   Menses usually 5 d now down to 3 days  G1- P1 9 yo daughter  Prior partner had Vasectomy and has not been active since      Would like to get on BC due to being in new relationship and would like menses control  Hx Mirena, pill without problems  No drainage   Pain lasted 10 minutes, no prior   No  or GI          REVIEW OF SYSTEMS:    All other systems are reviewed and are negative except as documented in the history of present illness.     OBJECTIVE:  PROBLEM LIST:    Patient Active Problem List   Diagnosis   • Unspecified episodic mood disorder   • Attention deficit disorder without mention of hyperactivity   • Social phobia   • Chronic right-sided low back pain with right-sided sciatica   • Thyroid fullness   • Major depressive disorder, recurrent episode, moderate, with anxious distress   • Alcohol use disorder, moderate, in early remission       PAST HISTORIES:  Allergies, Medications, Medical History, Surgical History, Social History and Family History were reviewed and updated.  Current Outpatient Medications   Medication Sig Dispense Refill   • methylpheniDATE ER (Concerta) 36 MG CR tablet Take 2 tablets by mouth every morning. 60 tablet 0   • hydrOXYzine (ATARAX) 50 MG tablet TAKE 1 TABLET BY MOUTH EVERY 8 HOURS AS NEEDED FOR ANXIETY 30 tablet 2   • buPROPion XL (WELLBUTRIN XL) 150 MG 24 hr tablet Take 1 tablet by mouth daily. 90 tablet 0   • cloNIDine (CATAPRES) 0.1 MG tablet Take 1 tablet by mouth 2 times daily. 180 tablet 0   • nalTREXone (REVIA) 50 MG tablet Take 1 tablet by  mouth at bedtime. Indications: Abuse or Misuse of Alcohol 30 tablet 2   • Temazepam 30 MG capsule Take 1 capsule by mouth nightly as needed for Sleep. 30 capsule 2   • gabapentin (Neurontin) 600 MG tablet Take 1 tablet by mouth at bedtime. 90 tablet 0   • fluoxetine (PROzac) 40 MG capsule Take 1 capsule by mouth daily. 90 capsule 0   • norgestimate-ethinyl estradiol (Ortho-Cyclen, 28,) 0.25-35 MG-MCG per tablet Take 1 tablet by mouth daily. 84 tablet 3     No current facility-administered medications for this visit.     Immunization History   Administered Date(s) Administered   • COVID-19 Moderna Vaccine 03/14/2021   • HPV Quadrivalent 04/07/2014, 07/02/2014, 01/26/2015   • Hep B, adult 08/13/2020   • Influenza, Split 11/13/2011   • Influenza, injectable, quadrivalent, preservative-free 10/12/2020   • Influenza, seasonal, injectable, preservative free 11/13/2011   • Influenza, seasonal, injectable, trivalent 11/13/2011, 01/08/2020   • MMR 06/20/2012, 01/26/2015   • Tdap 03/04/2008, 09/04/2008, 03/02/2012     Past Medical History:   Diagnosis Date   • Attention deficit disorder    • RAD (reactive airway disease)     childhood     Past Surgical History:   Procedure Laterality Date   • Keloid excision  2012     Social History     Tobacco Use   • Smoking status: Never Smoker   • Smokeless tobacco: Never Used   Substance Use Topics   • Alcohol use: Yes     Alcohol/week: 0.0 standard drinks     Comment: naltrexone- year sober   • Drug use: No     Family History   Problem Relation Age of Onset   • Psychiatric Mother         anxiety, depression, alcohol   • Heart disease Father         MI age 52   • Psychiatric Father         bipolar, schizophrenia   • Cancer Sister         thyroid   • Diabetes Maternal Grandfather    • Cancer Maternal Grandfather         multiple myeloma    • Psychiatric Paternal Uncle    • NEGATIVE FAMILY HX OF Other         denies breast ov colon cancer       PHYSICAL EXAM:    Vital Signs:    Visit  Vitals  /74 (BP Location: RUE - Right upper extremity, Patient Position: Sitting, Cuff Size: Large Adult)   Pulse (!) 107   Ht 5' 7.75\" (1.721 m)   Wt 111.9 kg   LMP 03/11/2021 (Approximate)   SpO2 96%   BMI 37.77 kg/m²     General:  Alert, cooperative, conversive.  Skin:  Warm and dry without rash.    Head:  Normocephalic-atraumatic.   Neck:  Trachea is midline. No adenopathy.  Normal thyroid without mass or tenderness..   Eyes:  Normal conjunctivae and sclerae.  Pupils equal, round, reactive to light.    ENT:  Mucous membranes are moist.  Normal tympanic membranes and external auditory canals bilaterally.  No pharyngeal erythema or exudate.  No facial tenderness.  Normal nasal mucosa.  Cardiovascular:  Regular, rate and rhythm without murmur.  Respiratory:  Normal respiratory effort.  Clear to auscultation.  No wheezes, rales or rhonchi.  Gastrointestinal:  Soft and nontender.  No hepatomegaly or splenomegaly.   Musculoskeletal:  No deformity or edema.  No tenderness to palpation.  Neurologic:   Oriented times 4.   No focal deficits or lateralizing signs.  Psychiatric:   Cooperative.  Appropriate mood and affect.  Breast exam without masses, dimpling, skin texture changes, or lymphadenopathy bilaterally.   PELVIC EXAM:     External Genitalia: normal external genitalia   Vaginal Exam: introitus vaginal vault normal                       with fresh scant blood discharge                  Cervix: parous    Bimanual Exam: Uterus small, firm and without tenderness                  Adnexa without enlargement or tenderness       ASSESSMENT:   1. Preventative health care    2. Screening for malignant neoplasm of cervix    3. Family planning    PLAN:  Orders Placed This Encounter   • Thyroid Stimulating Hormone   • CBC No Differential   • Vitamin B12   • Vitamin D -25 Hydroxy   • Basic Metabolic Panel   • Lipid Panel With Reflex   • Pap Test   • norgestimate-ethinyl estradiol (Ortho-Cyclen, 28,) 0.25-35 MG-MCG per  tablet   consider cyst- if pain persists, possible need for pelvic US but has resolved  Continue behavioral health treatment   Healthy lifestyle behaviors     No follow-ups on file.    Instructions provided as documented in the After Visit Summary.    The patient indicated understanding of the diagnosis and agreed with the plan of care.    Soft volume Loud volume/Decreased productivity

## 2023-12-28 LAB
GLUCOSE BLDC GLUCOMTR-MCNC: 111 MG/DL — HIGH (ref 70–99)
GLUCOSE BLDC GLUCOMTR-MCNC: 111 MG/DL — HIGH (ref 70–99)
GLUCOSE BLDC GLUCOMTR-MCNC: 121 MG/DL — HIGH (ref 70–99)
GLUCOSE BLDC GLUCOMTR-MCNC: 121 MG/DL — HIGH (ref 70–99)
GLUCOSE BLDC GLUCOMTR-MCNC: 127 MG/DL — HIGH (ref 70–99)
GLUCOSE BLDC GLUCOMTR-MCNC: 127 MG/DL — HIGH (ref 70–99)
GLUCOSE BLDC GLUCOMTR-MCNC: 154 MG/DL — HIGH (ref 70–99)
GLUCOSE BLDC GLUCOMTR-MCNC: 154 MG/DL — HIGH (ref 70–99)

## 2023-12-28 PROCEDURE — 99231 SBSQ HOSP IP/OBS SF/LOW 25: CPT

## 2023-12-28 RX ADMIN — CLOZAPINE 25 MILLIGRAM(S): 150 TABLET, ORALLY DISINTEGRATING ORAL at 19:43

## 2023-12-28 RX ADMIN — Medication 1 MILLIGRAM(S): at 19:43

## 2023-12-28 RX ADMIN — LINAGLIPTIN 5 MILLIGRAM(S): 5 TABLET, FILM COATED ORAL at 08:02

## 2023-12-28 RX ADMIN — ATORVASTATIN CALCIUM 10 MILLIGRAM(S): 80 TABLET, FILM COATED ORAL at 19:43

## 2023-12-28 RX ADMIN — Medication 1 MILLIGRAM(S): at 08:03

## 2023-12-28 RX ADMIN — METFORMIN HYDROCHLORIDE 500 MILLIGRAM(S): 850 TABLET ORAL at 08:02

## 2023-12-28 RX ADMIN — Medication 100 MILLIGRAM(S): at 08:03

## 2023-12-28 RX ADMIN — METFORMIN HYDROCHLORIDE 500 MILLIGRAM(S): 850 TABLET ORAL at 19:43

## 2023-12-28 NOTE — BH INPATIENT PSYCHIATRY PROGRESS NOTE - NSBHCHARTREVIEWINVESTIGATE_PSY_A_CORE FT
Ventricular Rate 71 BPM    Atrial Rate 71 BPM    P-R Interval 146 ms    QRS Duration 74 ms    Q-T Interval 408 ms    QTC Calculation(Bazett) 443 ms    P Axis 58 degrees    R Axis 40 degrees    T Axis 4 degrees    Diagnosis Line Normal sinus rhythm  Inferior infarct , age undetermined  Anterolateral infarct , probably recent  Abnormal ECG    Confirmed by DARCIE MONTANO, SERG ([id number]) on 12/21/2023 10:21:14 PM Ventricular Rate 71 BPM    Atrial Rate 71 BPM    P-R Interval 146 ms    QRS Duration 74 ms    Q-T Interval 408 ms    QTC Calculation(Bazett) 443 ms    P Axis 58 degrees    R Axis 40 degrees    T Axis 4 degrees    Diagnosis Line Normal sinus rhythm  Inferior infarct , age undetermined  Anterolateral infarct , probably recent  Abnormal ECG    Confirmed by DARCIE MONTANO, SERG ([id number]) on 12/21/2023 10:21:14 PM

## 2023-12-28 NOTE — BH INPATIENT PSYCHIATRY PROGRESS NOTE - OTHER
no obvious internally pre-occupations Improved but still odd Patient did not recall he was recently living at [place], reporting he was at [facility] recently Patient appeared calm and at ease but still confused about his current place and situation Patient did not recall he was recently living at [place], reporting he was at [facility] recently

## 2023-12-28 NOTE — BH SAFETY PLAN - INTERNAL COPING STRATEGY 1
I like to take showers, walk and listen to music to calm down. I like to take showers to help me calm down.

## 2023-12-28 NOTE — BH SAFETY PLAN - SUICIDE PREVENTION LIFELINE PHONES
Suicide Prevention Lifeline Phone: [phone] TALK ([phone]) Suicide Prevention Lifeline Phone: [phone] TALK ([phone])

## 2023-12-28 NOTE — BH INPATIENT PSYCHIATRY PROGRESS NOTE - NSBHASSESSSUMMFT_PSY_ALL_CORE
67 yo male domiciled at [facility], with PMH diabetes, CAD, CLL, parathyroid cancer, PPH of chronic schizophrenia, multiple state hospitalizations, most recently between Feb 2019 and Oct 2023 at [place], who presented to the ED on 12/21/23 with an unclear complaint. Based on collateral and exam, pt was found to be disorganized and off meds, and subsequently admitted to [facility] under 9.39 legals for decompensated schizophrenia.    Patient remains disorganized, illogical, irritable but not agitated. As patient did not take his clozaril for more than 2 days prior to admission, we had to restart at lower dose. Will continue to titrate Clozaril and monitor for improvement and side effects. Requires further inpatient psychiatric hospitalization for further medication management and stabilization of symptoms. Patient was agreeable to standing medications overnight and was much improved in our encounter today. However, may need treatment over objection if patient refuses medication given history of poor insight and judgement.    Plan:   #Schizophrenia  **Home dose was clozaril 200mg qhs**  - C/w clozaril 25 mg QHs   - C/w Benztropine 1 mg PO BID for EPS ppx  - Last ANC per clozaril rems was on 12/21/23: 3810 ANC (per µL)  - Continue to monitor V/S and weekly CBCs (ordered for 12/26) and gradually increase dose appropriately  - Haloperidol Dec 100mg IM last given 12/14/23 ***next due 1/10/24***    #T2DM   - A1C 6.5 from 12/23  - tradjenta 5mg qdaily (Januvia 100mg qdaily is home medication but non-formulary and switched to tradjenta while in hospital)  - metformin 500mg BID  - ISS    #Hypothyroidism  - c/w Levothyroxine 200mg qdaily  - TSH 1.07 WNL from 12/23    #HLD   - c/w Atorvastatin 10mg qhs    #Hx CLL  - c/w Allopurinol 100mg qdaily    #CAD   - EKG similar to previous   - trop T 17 (WNL) on 12/23    #Amblyopia  - c/w Atropine 1% eye drops qdaily    #Hx asthma/COPD?--- need to verify  - symbicort 2 puffs daily    #Agitation  - For severe agitation not responding to behavioral intervention, may give haldol 5 mg po q6h prn, ativan 1 mg po q6h prn, hydroxyzine 50 mg po q6h prn, with escalation to IM if patient refusing PO and remains an imminent danger to self or others. If IM antipsychotic is administered, please perform follow-up ECG for QTc monitoring.  - Can give benadryl 50 mg po q6h for anxiety/insomnia 69 yo male domiciled at [facility], with PMH diabetes, CAD, CLL, parathyroid cancer, PPH of chronic schizophrenia, multiple state hospitalizations, most recently between Feb 2019 and Oct 2023 at [place], who presented to the ED on 12/21/23 with an unclear complaint. Based on collateral and exam, pt was found to be disorganized and off meds, and subsequently admitted to [facility] under 9.39 legals for decompensated schizophrenia.    Patient remains disorganized, illogical, irritable but not agitated. As patient did not take his clozaril for more than 2 days prior to admission, we had to restart at lower dose. Will continue to titrate Clozaril and monitor for improvement and side effects. Requires further inpatient psychiatric hospitalization for further medication management and stabilization of symptoms. Patient was agreeable to standing medications overnight and was much improved in our encounter today. However, may need treatment over objection if patient refuses medication given history of poor insight and judgement.    Plan:   #Schizophrenia  **Home dose was clozaril 200mg qhs**  - C/w clozaril 25 mg QHs   - C/w Benztropine 1 mg PO BID for EPS ppx  - Last ANC per clozaril rems was on 12/21/23: 3810 ANC (per µL)  - Continue to monitor V/S and weekly CBCs (ordered for 12/26) and gradually increase dose appropriately  - Haloperidol Dec 100mg IM last given 12/14/23 ***next due 1/10/24***    #T2DM   - A1C 6.5 from 12/23  - tradjenta 5mg qdaily (Januvia 100mg qdaily is home medication but non-formulary and switched to tradjenta while in hospital)  - metformin 500mg BID  - ISS    #Hypothyroidism  - c/w Levothyroxine 200mg qdaily  - TSH 1.07 WNL from 12/23    #HLD   - c/w Atorvastatin 10mg qhs    #Hx CLL  - c/w Allopurinol 100mg qdaily    #CAD   - EKG similar to previous   - trop T 17 (WNL) on 12/23    #Amblyopia  - c/w Atropine 1% eye drops qdaily    #Hx asthma/COPD?--- need to verify  - symbicort 2 puffs daily    #Agitation  - For severe agitation not responding to behavioral intervention, may give haldol 5 mg po q6h prn, ativan 1 mg po q6h prn, hydroxyzine 50 mg po q6h prn, with escalation to IM if patient refusing PO and remains an imminent danger to self or others. If IM antipsychotic is administered, please perform follow-up ECG for QTc monitoring.  - Can give benadryl 50 mg po q6h for anxiety/insomnia 67 yo male domiciled at [facility], with PMH diabetes, CAD, CLL, parathyroid cancer, PPH of chronic schizophrenia, multiple state hospitalizations, most recently between Feb 2019 and Oct 2023 at [place], who presented to the ED on 12/21/23 with an unclear complaint. Based on collateral and exam, pt was found to be disorganized and off meds, and subsequently admitted to [facility] under 9.39 legals for decompensated schizophrenia.    Patient remains disorganized, illogical, irritable but not agitated. As patient did not take his clozaril for more than 2 days prior to admission, we had to restart at lower dose. Will continue to titrate Clozaril and monitor for improvement and side effects. Requires further inpatient psychiatric hospitalization for further medication management and stabilization of symptoms. After multiple nights of refusing, patient agreed to standing Clozaril overnight and was improved in our encounter today. However, may need treatment over objection if patient  refuses medication given history of poor insight and judgement.    Plan:   #Schizophrenia  **Home dose was clozaril 200mg qhs**  - C/w clozaril 25 mg QHs, monitor for compliance  - C/w Benztropine 1 mg PO BID for EPS ppx  - Last ANC per clozaril rems was on 12/21/23: 3810 ANC (per µL)  - Continue to monitor V/S and weekly CBCs (ordered for 12/26) and gradually increase dose appropriately  - Haloperidol Dec 100mg IM last given 12/14/23 ***next due 1/10/24***    #T2DM   - A1C 6.5 from 12/23  - tradjenta 5mg qdaily (Januvia 100mg qdaily is home medication but non-formulary and switched to tradjenta while in hospital)  - metformin 500mg BID  - ISS    #Hypothyroidism  - c/w Levothyroxine 200mg qdaily  - TSH 1.07 WNL from 12/23    #HLD   - c/w Atorvastatin 10mg qhs    #Hx CLL  - c/w Allopurinol 100mg qdaily    #CAD   - EKG similar to previous   - trop T 17 (WNL) on 12/23    #Amblyopia  - c/w Atropine 1% eye drops qdaily    #Hx asthma/COPD?--- need to verify  - symbicort 2 puffs daily    #Agitation  - For severe agitation not responding to behavioral intervention, may give haldol 5 mg po q6h prn, ativan 1 mg po q6h prn, hydroxyzine 50 mg po q6h prn, with escalation to IM if patient refusing PO and remains an imminent danger to self or others. If IM antipsychotic is administered, please perform follow-up ECG for QTc monitoring.  - Can give benadryl 50 mg po q6h for anxiety/insomnia 69 yo male domiciled at [facility], with PMH diabetes, CAD, CLL, parathyroid cancer, PPH of chronic schizophrenia, multiple state hospitalizations, most recently between Feb 2019 and Oct 2023 at [place], who presented to the ED on 12/21/23 with an unclear complaint. Based on collateral and exam, pt was found to be disorganized and off meds, and subsequently admitted to [facility] under 9.39 legals for decompensated schizophrenia.    Patient remains disorganized, illogical, irritable but not agitated. As patient did not take his clozaril for more than 2 days prior to admission, we had to restart at lower dose. Will continue to titrate Clozaril and monitor for improvement and side effects. Requires further inpatient psychiatric hospitalization for further medication management and stabilization of symptoms. After multiple nights of refusing, patient agreed to standing Clozaril overnight and was improved in our encounter today. However, may need treatment over objection if patient  refuses medication given history of poor insight and judgement.    Plan:   #Schizophrenia  **Home dose was clozaril 200mg qhs**  - C/w clozaril 25 mg QHs, monitor for compliance  - C/w Benztropine 1 mg PO BID for EPS ppx  - Last ANC per clozaril rems was on 12/21/23: 3810 ANC (per µL)  - Continue to monitor V/S and weekly CBCs (ordered for 12/26) and gradually increase dose appropriately  - Haloperidol Dec 100mg IM last given 12/14/23 ***next due 1/10/24***    #T2DM   - A1C 6.5 from 12/23  - tradjenta 5mg qdaily (Januvia 100mg qdaily is home medication but non-formulary and switched to tradjenta while in hospital)  - metformin 500mg BID  - ISS    #Hypothyroidism  - c/w Levothyroxine 200mg qdaily  - TSH 1.07 WNL from 12/23    #HLD   - c/w Atorvastatin 10mg qhs    #Hx CLL  - c/w Allopurinol 100mg qdaily    #CAD   - EKG similar to previous   - trop T 17 (WNL) on 12/23    #Amblyopia  - c/w Atropine 1% eye drops qdaily    #Hx asthma/COPD?--- need to verify  - symbicort 2 puffs daily    #Agitation  - For severe agitation not responding to behavioral intervention, may give haldol 5 mg po q6h prn, ativan 1 mg po q6h prn, hydroxyzine 50 mg po q6h prn, with escalation to IM if patient refusing PO and remains an imminent danger to self or others. If IM antipsychotic is administered, please perform follow-up ECG for QTc monitoring.  - Can give benadryl 50 mg po q6h for anxiety/insomnia

## 2023-12-28 NOTE — BH INPATIENT PSYCHIATRY PROGRESS NOTE - NSBHATTESTBILLING_PSY_A_CORE
[id number] OBS or IP - low complexity OR 25-34 mins [id number] OBS or IP - low complexity OR 25-34 mins

## 2023-12-28 NOTE — BH SAFETY PLAN - ENVIRONMENT SAFETY 1:
I feel my environment is safer when I take meds and take trips to Psych ER when I feel its not working. I feel my environment is safer when I take medications.

## 2023-12-28 NOTE — BH SAFETY PLAN - ENVIRONMENT SAFETY 3:
Also, staying away from certain people, places and things that would trigger me would make my environment safe.

## 2023-12-28 NOTE — BH SAFETY PLAN - WARNING SIGN 1
I came in because I believed that I had 271 disorders, I don't believe that anymore. A major warning sign of my stresses would be that I become very agitated.

## 2023-12-28 NOTE — BH INPATIENT PSYCHIATRY PROGRESS NOTE - CURRENT MEDICATION
MEDICATIONS  (STANDING):  allopurinol 100 milliGRAM(s) Oral daily  atorvastatin 10 milliGRAM(s) Oral at bedtime  atropine 1% Solution 1 Drop(s) Both EYES daily  benztropine 1 milliGRAM(s) Oral two times a day  bisacodyl 5 milliGRAM(s) Oral at bedtime  budesonide  80 MICROgram(s)/formoterol 4.5 MICROgram(s) Inhaler 2 Puff(s) Inhalation two times a day  cloZAPine 25 milliGRAM(s) Oral at bedtime  dextrose 5%. 1000 milliLiter(s) (50 mL/Hr) IV Continuous <Continuous>  dextrose 5%. 1000 milliLiter(s) (100 mL/Hr) IV Continuous <Continuous>  dextrose 50% Injectable 12.5 Gram(s) IV Push once  dextrose 50% Injectable 25 Gram(s) IV Push once  dextrose 50% Injectable 25 Gram(s) IV Push once  glucagon  Injectable 1 milliGRAM(s) IntraMuscular once  insulin lispro (ADMELOG) corrective regimen sliding scale   SubCutaneous three times a day before meals  insulin lispro (ADMELOG) corrective regimen sliding scale   SubCutaneous at bedtime  levothyroxine 200 MICROGram(s) Oral daily  linagliptin 5 milliGRAM(s) Oral daily  metFORMIN 500 milliGRAM(s) Oral two times a day    MEDICATIONS  (PRN):  dextrose Oral Gel 15 Gram(s) Oral once PRN Blood Glucose LESS THAN 70 milliGRAM(s)/deciliter  diphenhydrAMINE 50 milliGRAM(s) Oral every 6 hours PRN Extrapyramidal prophylaxis  haloperidol     Tablet 5 milliGRAM(s) Oral every 6 hours PRN Agitation  hydrOXYzine hydrochloride 50 milliGRAM(s) Oral every 6 hours PRN anxiety  LORazepam     Tablet 1 milliGRAM(s) Oral every 6 hours PRN Agitation   MEDICATIONS  (STANDING):  allopurinol 100 milliGRAM(s) Oral daily  atorvastatin 10 milliGRAM(s) Oral at bedtime  atropine 1% Solution 1 Drop(s) Both EYES daily  benztropine 1 milliGRAM(s) Oral two times a day  bisacodyl 5 milliGRAM(s) Oral at bedtime  budesonide  80 MICROgram(s)/formoterol 4.5 MICROgram(s) Inhaler 2 Puff(s) Inhalation two times a day  cloZAPine 25 milliGRAM(s) Oral at bedtime  dextrose 5%. 1000 milliLiter(s) (100 mL/Hr) IV Continuous <Continuous>  dextrose 5%. 1000 milliLiter(s) (50 mL/Hr) IV Continuous <Continuous>  dextrose 50% Injectable 25 Gram(s) IV Push once  dextrose 50% Injectable 12.5 Gram(s) IV Push once  dextrose 50% Injectable 25 Gram(s) IV Push once  glucagon  Injectable 1 milliGRAM(s) IntraMuscular once  insulin lispro (ADMELOG) corrective regimen sliding scale   SubCutaneous three times a day before meals  insulin lispro (ADMELOG) corrective regimen sliding scale   SubCutaneous at bedtime  levothyroxine 200 MICROGram(s) Oral daily  linagliptin 5 milliGRAM(s) Oral daily  metFORMIN 500 milliGRAM(s) Oral two times a day    MEDICATIONS  (PRN):  dextrose Oral Gel 15 Gram(s) Oral once PRN Blood Glucose LESS THAN 70 milliGRAM(s)/deciliter  diphenhydrAMINE 50 milliGRAM(s) Oral every 6 hours PRN Extrapyramidal prophylaxis  haloperidol     Tablet 5 milliGRAM(s) Oral every 6 hours PRN Agitation  hydrOXYzine hydrochloride 50 milliGRAM(s) Oral every 6 hours PRN anxiety  LORazepam     Tablet 1 milliGRAM(s) Oral every 6 hours PRN Agitation

## 2023-12-28 NOTE — BH INPATIENT PSYCHIATRY PROGRESS NOTE - NSBHMETABOLIC_PSY_ALL_CORE_FT
BMI: BMI (kg/m2): 25.3 (12-22-23 @ 21:34)  HbA1c: A1C with Estimated Average Glucose Result: 6.5 % (12-23-23 @ 08:00)    Glucose: POCT Blood Glucose.: 150 mg/dL (12-27-23 @ 19:32)    BP: 137/83 (12-27-23 @ 15:41) (116/76 - 152/86)Vital Signs Last 24 Hrs  T(C): 35.9 (12-27-23 @ 15:41), Max: 35.9 (12-27-23 @ 15:41)  T(F): 96.6 (12-27-23 @ 15:41), Max: 96.6 (12-27-23 @ 15:41)  HR: 80 (12-27-23 @ 15:41) (80 - 80)  BP: 137/83 (12-27-23 @ 15:41) (137/83 - 137/83)  BP(mean): --  RR: 16 (12-27-23 @ 15:41) (16 - 16)  SpO2: --      Lipid Panel: Date/Time: 12-23-23 @ 08:00  Cholesterol, Serum: 186  LDL Cholesterol Calculated: 100  HDL Cholesterol, Serum: 46  Total Cholesterol/HDL Ration Measurement: --  Triglycerides, Serum: 202   BMI: BMI (kg/m2): 25.3 (12-22-23 @ 21:34)  HbA1c: A1C with Estimated Average Glucose Result: 6.5 % (12-23-23 @ 08:00)    Glucose: POCT Blood Glucose.: 150 mg/dL (12-27-23 @ 19:32)    BP: 132/66 (12-28-23 @ 09:52) (116/76 - 152/86)Vital Signs Last 24 Hrs  T(C): 36.7 (12-28-23 @ 09:52), Max: 36.7 (12-28-23 @ 09:52)  T(F): 98.1 (12-28-23 @ 09:52), Max: 98.1 (12-28-23 @ 09:52)  HR: 69 (12-28-23 @ 09:52) (69 - 80)  BP: 132/66 (12-28-23 @ 09:52) (132/66 - 137/83)  BP(mean): --  RR: 18 (12-28-23 @ 09:52) (16 - 18)  SpO2: --      Lipid Panel: Date/Time: 12-23-23 @ 08:00  Cholesterol, Serum: 186  LDL Cholesterol Calculated: 100  HDL Cholesterol, Serum: 46  Total Cholesterol/HDL Ration Measurement: --  Triglycerides, Serum: 202

## 2023-12-28 NOTE — BH INPATIENT PSYCHIATRY PROGRESS NOTE - NSBHFUPINTERVALHXFT_PSY_A_CORE
Patient was seen and evaluated this morning. Chart was reviewed and, as per Nursing, no acute events were noted overnight. No PRN medications were administered overnight. Patient was agreeable and received his standing antipsychotics last night.    Upon initial examination, patient was seen laying supine with his arms outstretched in no acute distress. Patient's speech was coherent. He recalled yesterday's encounter with the team and apologized stating "I'm sorry for yesterday... I was just upset that there was something wrong with my medications". Upon further questioning the patient endorsed that, in the past, he was domiciled in a psychiatric facility in [facility]. However, he was unaware that he in currently in [facility]. Patient was agreeable to continue inpatient management. Overall, the interview was much less constrained as the day prior. Patient is largely cooperative with interview, appears in good mood and euthymic. No obvious internal preoccupations were evident.  Patient was seen and evaluated this morning. Chart was reviewed and, as per Nursing, no acute events were noted overnight. No PRN medications were administered overnight. Patient was agreeable and received his standing antipsychotics last night.    Upon initial examination, patient was seen laying supine with his arms outstretched in no acute distress. Patient's speech was coherent. He recalled yesterday's encounter with the team and apologized stating "I'm sorry for yesterday... I was just upset that there was something wrong with my medications". Upon further questioning the patient endorsed that, in the past, he was domiciled in a psychiatric facility in [facility]. However, he was unaware that he in currently in [facility]. Patient was agreeable to continue inpatient management. Overall, the interview was much less constrained as the day prior. Patient is largely cooperative with interview, appears in good mood and euthymic. No obvious internal preoccupations were evident.  Patient was seen and evaluated this morning. Chart was reviewed and, as per Nursing, no acute events were noted overnight. No PRN medications were administered overnight. Patient was agreeable and received his standing antipsychotics last night but refused his Synthroid.    Upon initial examination, patient was seen laying supine with his arms outstretched in no acute distress. Patient's speech was coherent. He recalled yesterday's encounter with the team and apologized stating "I'm sorry for yesterday... I was just upset that there was something wrong with my medications". Upon further questioning the patient endorsed that, in the past, he was domiciled in a psychiatric facility in [facility]. However, he was unaware that he in currently in [facility]. Patient was agreeable to continue inpatient management. Overall, the interview was much less constrained as the day prior. Patient is largely cooperative with interview, appears in good mood and euthymic. No obvious internal preoccupations were evident.  Patient was seen and evaluated this morning. Chart was reviewed and, as per Nursing, no acute events were noted overnight. No PRN medications were administered overnight. Patient was agreeable and received his standing antipsychotics last night but refused his Synthroid.    Upon initial examination, patient was seen laying supine with his arms outstretched in no acute distress. Patient's speech was coherent. He recalled yesterday's encounter with the team and apologized stating "I'm sorry for yesterday... I was just upset that there was something wrong with my medications". Upon further questioning the patient endorsed that, in the past, he was domiciled in a psychiatric facility in [facility]. However, he was unaware that he in currently in [facility]. Patient was agreeable to continue inpatient management. Overall, the interview was much less constrained as the day prior. Patient is largely cooperative with interview, appears in good mood and euthymic. No obvious internal preoccupations were evident.

## 2023-12-28 NOTE — BH INPATIENT PSYCHIATRY PROGRESS NOTE - NSBHCHARTREVIEWVS_PSY_A_CORE FT
Vital Signs Last 24 Hrs  T(C): 35.9 (12-27-23 @ 15:41), Max: 35.9 (12-27-23 @ 15:41)  T(F): 96.6 (12-27-23 @ 15:41), Max: 96.6 (12-27-23 @ 15:41)  HR: 80 (12-27-23 @ 15:41) (80 - 80)  BP: 137/83 (12-27-23 @ 15:41) (137/83 - 137/83)  BP(mean): --  RR: 16 (12-27-23 @ 15:41) (16 - 16)  SpO2: --     Vital Signs Last 24 Hrs  T(C): 36.7 (12-28-23 @ 09:52), Max: 36.7 (12-28-23 @ 09:52)  T(F): 98.1 (12-28-23 @ 09:52), Max: 98.1 (12-28-23 @ 09:52)  HR: 69 (12-28-23 @ 09:52) (69 - 80)  BP: 132/66 (12-28-23 @ 09:52) (132/66 - 137/83)  BP(mean): --  RR: 18 (12-28-23 @ 09:52) (16 - 18)  SpO2: --

## 2023-12-28 NOTE — BH SAFETY PLAN - THE ONE THING THAT IS MOST IMPORTANT TO ME AND WORTH LIVING FOR IS:
Living a healthy life, my health and my happiness is what's most important to me. Living a healthy life, my health and my happiness is what's most important to me worth living for.

## 2023-12-29 LAB
GLUCOSE BLDC GLUCOMTR-MCNC: 104 MG/DL — HIGH (ref 70–99)
GLUCOSE BLDC GLUCOMTR-MCNC: 104 MG/DL — HIGH (ref 70–99)
GLUCOSE BLDC GLUCOMTR-MCNC: 108 MG/DL — HIGH (ref 70–99)
GLUCOSE BLDC GLUCOMTR-MCNC: 108 MG/DL — HIGH (ref 70–99)
GLUCOSE BLDC GLUCOMTR-MCNC: 165 MG/DL — HIGH (ref 70–99)
GLUCOSE BLDC GLUCOMTR-MCNC: 165 MG/DL — HIGH (ref 70–99)
GLUCOSE BLDC GLUCOMTR-MCNC: 76 MG/DL — SIGNIFICANT CHANGE UP (ref 70–99)
GLUCOSE BLDC GLUCOMTR-MCNC: 76 MG/DL — SIGNIFICANT CHANGE UP (ref 70–99)

## 2023-12-29 PROCEDURE — 99231 SBSQ HOSP IP/OBS SF/LOW 25: CPT

## 2023-12-29 RX ORDER — CLOZAPINE 150 MG/1
50 TABLET, ORALLY DISINTEGRATING ORAL AT BEDTIME
Refills: 0 | Status: DISCONTINUED | OUTPATIENT
Start: 2023-12-29 | End: 2024-01-02

## 2023-12-29 RX ADMIN — Medication 5 MILLIGRAM(S): at 20:01

## 2023-12-29 RX ADMIN — METFORMIN HYDROCHLORIDE 500 MILLIGRAM(S): 850 TABLET ORAL at 08:07

## 2023-12-29 RX ADMIN — LINAGLIPTIN 5 MILLIGRAM(S): 5 TABLET, FILM COATED ORAL at 08:07

## 2023-12-29 RX ADMIN — METFORMIN HYDROCHLORIDE 500 MILLIGRAM(S): 850 TABLET ORAL at 20:01

## 2023-12-29 RX ADMIN — ATORVASTATIN CALCIUM 10 MILLIGRAM(S): 80 TABLET, FILM COATED ORAL at 20:02

## 2023-12-29 RX ADMIN — Medication 100 MILLIGRAM(S): at 08:07

## 2023-12-29 RX ADMIN — CLOZAPINE 50 MILLIGRAM(S): 150 TABLET, ORALLY DISINTEGRATING ORAL at 20:01

## 2023-12-29 RX ADMIN — Medication 1 MILLIGRAM(S): at 08:07

## 2023-12-29 RX ADMIN — Medication 200 MICROGRAM(S): at 08:07

## 2023-12-29 RX ADMIN — BUDESONIDE AND FORMOTEROL FUMARATE DIHYDRATE 2 PUFF(S): 160; 4.5 AEROSOL RESPIRATORY (INHALATION) at 20:00

## 2023-12-29 RX ADMIN — Medication 1 MILLIGRAM(S): at 20:01

## 2023-12-29 NOTE — BH INPATIENT PSYCHIATRY PROGRESS NOTE - NSBHCHARTREVIEWINVESTIGATE_PSY_A_CORE FT
Ventricular Rate 71 BPM    Atrial Rate 71 BPM    P-R Interval 146 ms    QRS Duration 74 ms    Q-T Interval 408 ms    QTC Calculation(Bazett) 443 ms    P Axis 58 degrees    R Axis 40 degrees    T Axis 4 degrees    Diagnosis Line Normal sinus rhythm  Inferior infarct , age undetermined  Anterolateral infarct , probably recent  Abnormal ECG    Confirmed by DARCIE MONTANO, SERG ([id number]) on 12/21/2023 10:21:14 PM Ventricular Rate 71 BPM    Atrial Rate 71 BPM    P-R Interval 146 ms    QRS Duration 74 ms    Q-T Interval 408 ms    QTC Calculation(Bazett) 443 ms    P Axis 58 degrees    R Axis 40 degrees    T Axis 4 degrees    Diagnosis Line Normal sinus rhythm  Inferior infarct , age undetermined  Anterolateral infarct , probably recent  Abnormal ECG    Confirmed by DARCIE MONTANO, SERG ([id number]) on 12/21/2023 10:21:14 PM

## 2023-12-29 NOTE — BH INPATIENT PSYCHIATRY PROGRESS NOTE - OTHER
no obvious internally pre-occupations Patient appeared calm and at ease but still confused about his current place.

## 2023-12-29 NOTE — BH INPATIENT PSYCHIATRY PROGRESS NOTE - NSBHMETABOLIC_PSY_ALL_CORE_FT
BMI: BMI (kg/m2): 25.3 (12-22-23 @ 21:34)  HbA1c: A1C with Estimated Average Glucose Result: 6.5 % (12-23-23 @ 08:00)    Glucose: POCT Blood Glucose.: 104 mg/dL (12-29-23 @ 06:53)    BP: 123/68 (12-29-23 @ 07:43) (123/68 - 152/86)Vital Signs Last 24 Hrs  T(C): 35.8 (12-29-23 @ 07:43), Max: 36.7 (12-28-23 @ 09:52)  T(F): 96.4 (12-29-23 @ 07:43), Max: 98.1 (12-28-23 @ 09:52)  HR: 101 (12-29-23 @ 07:43) (69 - 101)  BP: 123/68 (12-29-23 @ 07:43) (123/68 - 132/66)  BP(mean): --  RR: 17 (12-29-23 @ 07:43) (16 - 18)  SpO2: --      Lipid Panel: Date/Time: 12-23-23 @ 08:00  Cholesterol, Serum: 186  LDL Cholesterol Calculated: 100  HDL Cholesterol, Serum: 46  Total Cholesterol/HDL Ration Measurement: --  Triglycerides, Serum: 202

## 2023-12-29 NOTE — BH INPATIENT PSYCHIATRY PROGRESS NOTE - NSBHMSETHTASSOC_PSY_A_CORE
Normal Propranolol Pregnancy And Lactation Text: This medication is Pregnancy Category C and it isn't known if it is safe during pregnancy. It is excreted in breast milk.

## 2023-12-29 NOTE — BH INPATIENT PSYCHIATRY PROGRESS NOTE - NSBHCHARTREVIEWVS_PSY_A_CORE FT
Vital Signs Last 24 Hrs  T(C): 35.8 (12-29-23 @ 07:43), Max: 36.7 (12-28-23 @ 09:52)  T(F): 96.4 (12-29-23 @ 07:43), Max: 98.1 (12-28-23 @ 09:52)  HR: 101 (12-29-23 @ 07:43) (69 - 101)  BP: 123/68 (12-29-23 @ 07:43) (123/68 - 132/66)  BP(mean): --  RR: 17 (12-29-23 @ 07:43) (16 - 18)  SpO2: --

## 2023-12-29 NOTE — BH INPATIENT PSYCHIATRY PROGRESS NOTE - NSBHATTESTBILLING_PSY_A_CORE
43292-[id number] OBS or IP - low complexity OR 25-34 mins 43456-Jmafmiltjo OBS or IP - low complexity OR 25-34 mins

## 2023-12-29 NOTE — BH INPATIENT PSYCHIATRY PROGRESS NOTE - NSBHASSESSSUMMFT_PSY_ALL_CORE
69 yo male domiciled at [facility], with PMH diabetes, CAD, CLL, parathyroid cancer, PPH of chronic schizophrenia, multiple state hospitalizations, most recently between Feb 2019 and Oct 2023 at [place], who presented to the ED on 12/21/23 with an unclear complaint. Based on collateral and exam, pt was found to be disorganized and off meds, and subsequently admitted to [facility] under 9.39 legals for decompensated schizophrenia.    Patient remains disorganized, illogical, irritable but not agitated. As patient did not take his clozaril for more than 2 days prior to admission, we had to restart at lower dose. Will continue to titrate Clozaril and monitor for improvement and side effects. Requires further inpatient psychiatric hospitalization for further medication management and stabilization of symptoms. After multiple nights of refusing, patient agreed to standing Clozaril overnight. This is Day 2 of Clozaril. Patient's mental status was improved in our encounter today. However, may need treatment over objection if patient  refuses medication given history of poor insight and judgement.    Plan:   #Schizophrenia  **Home dose was clozaril 200mg qhs**  - C/w clozaril 25 mg QHs, monitor for compliance  - C/w Benztropine 1 mg PO BID for EPS ppx  - Last ANC per clozaril rems was on 12/21/23: 3810 ANC (per µL)  - Continue to monitor V/S and weekly CBCs (ordered for 12/26) and gradually increase dose appropriately  - Haloperidol Dec 100mg IM last given 12/14/23 ***next due 1/10/24***    #T2DM   - A1C 6.5 from 12/23  - tradjenta 5mg qdaily (Januvia 100mg qdaily is home medication but non-formulary and switched to tradjenta while in hospital)  - metformin 500mg BID  - ISS    #Hypothyroidism  - c/w Levothyroxine 200mg qdaily  - TSH 1.07 WNL from 12/23    #HLD   - c/w Atorvastatin 10mg qhs    #Hx CLL  - c/w Allopurinol 100mg qdaily    #CAD   - EKG similar to previous   - trop T 17 (WNL) on 12/23    #Amblyopia  - c/w Atropine 1% eye drops qdaily    #Hx asthma/COPD?--- need to verify  - symbicort 2 puffs daily    #Agitation  - For severe agitation not responding to behavioral intervention, may give haldol 5 mg po q6h prn, ativan 1 mg po q6h prn, hydroxyzine 50 mg po q6h prn, with escalation to IM if patient refusing PO and remains an imminent danger to self or others. If IM antipsychotic is administered, please perform follow-up ECG for QTc monitoring.  - Can give benadryl 50 mg po q6h for anxiety/insomnia 67 yo male domiciled at [facility], with PMH diabetes, CAD, CLL, parathyroid cancer, PPH of chronic schizophrenia, multiple state hospitalizations, most recently between Feb 2019 and Oct 2023 at [place], who presented to the ED on 12/21/23 with an unclear complaint. Based on collateral and exam, pt was found to be disorganized and off meds, and subsequently admitted to [facility] under 9.39 legals for decompensated schizophrenia.    Patient remains disorganized, illogical, irritable but not agitated. As patient did not take his clozaril for more than 2 days prior to admission, we had to restart at lower dose. Will continue to titrate Clozaril and monitor for improvement and side effects. Requires further inpatient psychiatric hospitalization for further medication management and stabilization of symptoms. After multiple nights of refusing, patient agreed to standing Clozaril overnight. This is Day 2 of Clozaril. Patient's mental status was improved in our encounter today. However, may need treatment over objection if patient  refuses medication given history of poor insight and judgement.    Plan:   #Schizophrenia  **Home dose was clozaril 200mg qhs**  - C/w clozaril 25 mg QHs, monitor for compliance  - C/w Benztropine 1 mg PO BID for EPS ppx  - Last ANC per clozaril rems was on 12/21/23: 3810 ANC (per µL)  - Continue to monitor V/S and weekly CBCs (ordered for 12/26) and gradually increase dose appropriately  - Haloperidol Dec 100mg IM last given 12/14/23 ***next due 1/10/24***    #T2DM   - A1C 6.5 from 12/23  - tradjenta 5mg qdaily (Januvia 100mg qdaily is home medication but non-formulary and switched to tradjenta while in hospital)  - metformin 500mg BID  - ISS    #Hypothyroidism  - c/w Levothyroxine 200mg qdaily  - TSH 1.07 WNL from 12/23    #HLD   - c/w Atorvastatin 10mg qhs    #Hx CLL  - c/w Allopurinol 100mg qdaily    #CAD   - EKG similar to previous   - trop T 17 (WNL) on 12/23    #Amblyopia  - c/w Atropine 1% eye drops qdaily    #Hx asthma/COPD?--- need to verify  - symbicort 2 puffs daily    #Agitation  - For severe agitation not responding to behavioral intervention, may give haldol 5 mg po q6h prn, ativan 1 mg po q6h prn, hydroxyzine 50 mg po q6h prn, with escalation to IM if patient refusing PO and remains an imminent danger to self or others. If IM antipsychotic is administered, please perform follow-up ECG for QTc monitoring.  - Can give benadryl 50 mg po q6h for anxiety/insomnia 67 yo male domiciled at [facility], with PMH diabetes, CAD, CLL, parathyroid cancer, PPH of chronic schizophrenia, multiple state hospitalizations, most recently between Feb 2019 and Oct 2023 at [place], who presented to the ED on 12/21/23 with an unclear complaint. Based on collateral and exam, pt was found to be disorganized and off meds, and subsequently admitted to [facility] under 9.39 legals for decompensated schizophrenia.    Patient remains disorganized, illogical, irritable but not agitated. Patient remains unable to care for himself and would benefit from inpatient psychiatric admission to titrate Clozaril and monitor for improvement and side effects. As patient did not take his Clozaril for more than 2 days prior to admission, we had to restart at lower dose. After multiple nights of refusing, patient agreed to standing Clozaril over the last two days and patient's mental status is improving since the medication was taken. However, may need treatment over objection if patient refuses medication as he did earlier in his admission, given history of poor insight and judgement given his prior history of non-compliance.    Plan:   #Schizophrenia  **Home dose was clozaril 200mg qhs**  - Increase Clozaril PO to 50 mg QHs, started 12/29, monitor for compliance  - C/w Benztropine 1 mg PO BID for EPS ppx  - Last ANC per clozaril rems was on 12/21/23: 3810 ANC (per µL)  - Continue to monitor V/S and weekly CBCs (ordered for 12/26) and gradually increase dose appropriately  - Haloperidol Dec 100mg IM last given 12/14/23 ***next due 1/10/24***    #T2DM   - A1C 6.5 from 12/23  - tradjenta 5mg qdaily (Januvia 100mg qdaily is home medication but non-formulary and switched to tradjenta while in hospital)  - metformin 500mg BID  - ISS    #Hypothyroidism  - c/w Levothyroxine 200mg qdaily  - TSH 1.07 WNL from 12/23    #HLD   - c/w Atorvastatin 10mg qhs    #Hx CLL  - c/w Allopurinol 100mg qdaily    #CAD   - EKG similar to previous   - trop T 17 (WNL) on 12/23    #Amblyopia  - c/w Atropine 1% eye drops qdaily    #Hx asthma/COPD  - symbicort 2 puffs daily    #Agitation  - For severe agitation not responding to behavioral intervention, may give haldol 5 mg po q6h prn, ativan 1 mg po q6h prn, hydroxyzine 50 mg po q6h prn, with escalation to IM if patient refusing PO and remains an imminent danger to self or others. If IM antipsychotic is administered, please perform follow-up ECG for QTc monitoring.  - Can give benadryl 50 mg po q6h for anxiety/insomnia 69 yo male domiciled at [facility], with PMH diabetes, CAD, CLL, parathyroid cancer, PPH of chronic schizophrenia, multiple state hospitalizations, most recently between Feb 2019 and Oct 2023 at [place], who presented to the ED on 12/21/23 with an unclear complaint. Based on collateral and exam, pt was found to be disorganized and off meds, and subsequently admitted to [facility] under 9.39 legals for decompensated schizophrenia.    Patient remains disorganized, illogical, irritable but not agitated. Patient remains unable to care for himself and would benefit from inpatient psychiatric admission to titrate Clozaril and monitor for improvement and side effects. As patient did not take his Clozaril for more than 2 days prior to admission, we had to restart at lower dose. After multiple nights of refusing, patient agreed to standing Clozaril over the last two days and patient's mental status is improving since the medication was taken. However, may need treatment over objection if patient refuses medication as he did earlier in his admission, given history of poor insight and judgement given his prior history of non-compliance.    Plan:   #Schizophrenia  **Home dose was clozaril 200mg qhs**  - Increase Clozaril PO to 50 mg QHs, started 12/29, monitor for compliance  - C/w Benztropine 1 mg PO BID for EPS ppx  - Last ANC per clozaril rems was on 12/21/23: 3810 ANC (per µL)  - Continue to monitor V/S and weekly CBCs (ordered for 12/26) and gradually increase dose appropriately  - Haloperidol Dec 100mg IM last given 12/14/23 ***next due 1/10/24***    #T2DM   - A1C 6.5 from 12/23  - tradjenta 5mg qdaily (Januvia 100mg qdaily is home medication but non-formulary and switched to tradjenta while in hospital)  - metformin 500mg BID  - ISS    #Hypothyroidism  - c/w Levothyroxine 200mg qdaily  - TSH 1.07 WNL from 12/23    #HLD   - c/w Atorvastatin 10mg qhs    #Hx CLL  - c/w Allopurinol 100mg qdaily    #CAD   - EKG similar to previous   - trop T 17 (WNL) on 12/23    #Amblyopia  - c/w Atropine 1% eye drops qdaily    #Hx asthma/COPD  - symbicort 2 puffs daily    #Agitation  - For severe agitation not responding to behavioral intervention, may give haldol 5 mg po q6h prn, ativan 1 mg po q6h prn, hydroxyzine 50 mg po q6h prn, with escalation to IM if patient refusing PO and remains an imminent danger to self or others. If IM antipsychotic is administered, please perform follow-up ECG for QTc monitoring.  - Can give benadryl 50 mg po q6h for anxiety/insomnia

## 2023-12-29 NOTE — BH INPATIENT PSYCHIATRY PROGRESS NOTE - NSBHFUPINTERVALHXFT_PSY_A_CORE
Patient was seen and evaluated this morning. Chart was reviewed and, as per Nursing, no acute events were noted overnight and patient received all standing medications. No PRN medications were administered overnight. Patient agreed to receive his standing antipsychotics last night. This is his second consecutive day receiving his standing antipsychotic.    Upon initial examination, patient was seen laying supine and repositioned himself to sit upright for the interview. Patient's speech was coherent. The patient expressed that "everything was going fine" and when questioned whether he had been hearing he responded that "no scary business like that" was occurring. The patient expressed that he would like to leave and was agreeable to continue inpatient management  until after the holiday weekend. Overall, the interview was not constrained as days prior. Patient was largely cooperative with the interview and was in good mood and euthymic. No obvious internal preoccupations were evident. Patient was seen and evaluated this morning. Chart was reviewed and, as per Nursing, no acute events were noted overnight and patient received all standing medications. No PRN medications were administered overnight. Patient agreed to receive his standing antipsychotics last night. This is his second consecutive day receiving his standing antipsychotic.    Upon initial examination, patient was seen laying supine and repositioned himself to sit upright for the interview. Patient's speech was coherent. The patient expressed that "everything was going fine" and when questioned whether he had been hearing he responded that "no scary business like that" was occurring. Patient asked the team if they had a good [place]. At end of interview, patient did call back one team member and asked him about his finances before speaking in a calm manner about how he had "trillion [phone]" of dollars. The patient expressed that he would like to leave soon and was agreeable to continue inpatient management until after the holiday weekend. Overall, the interview was not constrained as days prior. Patient was largely cooperative with the interview and was in good mood and euthymic. No obvious internal preoccupations were evident. Patient was seen and evaluated this morning. Chart was reviewed and, as per Nursing, no acute events were noted overnight and patient received all standing medications. No PRN medications were administered overnight. Patient agreed to receive his standing antipsychotics last night. This is his second consecutive day receiving his standing antipsychotic.    Upon initial examination, patient was seen laying supine and repositioned himself to sit upright for the interview. Patient's speech was coherent. The patient expressed that "everything was going fine" and when questioned whether he had been hearing he responded that "no scary business like that" was occurring. Patient asked the team if they had a good [place]. At end of interview, patient did call back one team member and asked him about his finances before speaking in a calm manner about how he had "trillion [phone]" of dollars. The patient expressed that he would like to leave soon and was agreeable to continue inpatient management until after the holiday weekend. Overall, the interview was not constrained as days prior. Patient was largely cooperative with the interview and was in good mood and euthymic. No obvious internal preoccupations were evident.

## 2023-12-30 LAB
GLUCOSE BLDC GLUCOMTR-MCNC: 117 MG/DL — HIGH (ref 70–99)
GLUCOSE BLDC GLUCOMTR-MCNC: 117 MG/DL — HIGH (ref 70–99)
GLUCOSE BLDC GLUCOMTR-MCNC: 136 MG/DL — HIGH (ref 70–99)
GLUCOSE BLDC GLUCOMTR-MCNC: 136 MG/DL — HIGH (ref 70–99)
GLUCOSE BLDC GLUCOMTR-MCNC: 87 MG/DL — SIGNIFICANT CHANGE UP (ref 70–99)
GLUCOSE BLDC GLUCOMTR-MCNC: 87 MG/DL — SIGNIFICANT CHANGE UP (ref 70–99)

## 2023-12-30 RX ADMIN — LINAGLIPTIN 5 MILLIGRAM(S): 5 TABLET, FILM COATED ORAL at 08:21

## 2023-12-30 RX ADMIN — ATORVASTATIN CALCIUM 10 MILLIGRAM(S): 80 TABLET, FILM COATED ORAL at 20:29

## 2023-12-30 RX ADMIN — Medication 200 MICROGRAM(S): at 08:18

## 2023-12-30 RX ADMIN — Medication 100 MILLIGRAM(S): at 08:19

## 2023-12-30 RX ADMIN — Medication 1 MILLIGRAM(S): at 20:29

## 2023-12-30 RX ADMIN — Medication 1 MILLIGRAM(S): at 08:19

## 2023-12-30 RX ADMIN — METFORMIN HYDROCHLORIDE 500 MILLIGRAM(S): 850 TABLET ORAL at 20:29

## 2023-12-30 RX ADMIN — METFORMIN HYDROCHLORIDE 500 MILLIGRAM(S): 850 TABLET ORAL at 08:18

## 2023-12-30 RX ADMIN — CLOZAPINE 50 MILLIGRAM(S): 150 TABLET, ORALLY DISINTEGRATING ORAL at 20:29

## 2023-12-31 LAB
GLUCOSE BLDC GLUCOMTR-MCNC: 115 MG/DL — HIGH (ref 70–99)
GLUCOSE BLDC GLUCOMTR-MCNC: 115 MG/DL — HIGH (ref 70–99)
GLUCOSE BLDC GLUCOMTR-MCNC: 125 MG/DL — HIGH (ref 70–99)
GLUCOSE BLDC GLUCOMTR-MCNC: 125 MG/DL — HIGH (ref 70–99)

## 2023-12-31 RX ADMIN — Medication 100 MILLIGRAM(S): at 09:17

## 2023-12-31 RX ADMIN — Medication 1 MILLIGRAM(S): at 20:08

## 2023-12-31 RX ADMIN — CLOZAPINE 50 MILLIGRAM(S): 150 TABLET, ORALLY DISINTEGRATING ORAL at 20:08

## 2023-12-31 RX ADMIN — Medication 1 MILLIGRAM(S): at 09:17

## 2023-12-31 RX ADMIN — METFORMIN HYDROCHLORIDE 500 MILLIGRAM(S): 850 TABLET ORAL at 20:08

## 2023-12-31 RX ADMIN — Medication 200 MICROGRAM(S): at 07:16

## 2023-12-31 RX ADMIN — LINAGLIPTIN 5 MILLIGRAM(S): 5 TABLET, FILM COATED ORAL at 09:18

## 2023-12-31 RX ADMIN — METFORMIN HYDROCHLORIDE 500 MILLIGRAM(S): 850 TABLET ORAL at 09:17

## 2023-12-31 RX ADMIN — ATORVASTATIN CALCIUM 10 MILLIGRAM(S): 80 TABLET, FILM COATED ORAL at 20:07

## 2024-01-01 LAB
GLUCOSE BLDC GLUCOMTR-MCNC: 119 MG/DL — HIGH (ref 70–99)
GLUCOSE BLDC GLUCOMTR-MCNC: 119 MG/DL — HIGH (ref 70–99)

## 2024-01-01 RX ADMIN — Medication 1 DROP(S): at 08:47

## 2024-01-01 RX ADMIN — LINAGLIPTIN 5 MILLIGRAM(S): 5 TABLET, FILM COATED ORAL at 08:47

## 2024-01-01 RX ADMIN — Medication 1 MILLIGRAM(S): at 20:28

## 2024-01-01 RX ADMIN — Medication 1 MILLIGRAM(S): at 08:46

## 2024-01-01 RX ADMIN — METFORMIN HYDROCHLORIDE 500 MILLIGRAM(S): 850 TABLET ORAL at 08:46

## 2024-01-01 RX ADMIN — BUDESONIDE AND FORMOTEROL FUMARATE DIHYDRATE 2 PUFF(S): 160; 4.5 AEROSOL RESPIRATORY (INHALATION) at 08:46

## 2024-01-01 RX ADMIN — CLOZAPINE 50 MILLIGRAM(S): 150 TABLET, ORALLY DISINTEGRATING ORAL at 20:28

## 2024-01-01 RX ADMIN — Medication 100 MILLIGRAM(S): at 08:46

## 2024-01-01 RX ADMIN — METFORMIN HYDROCHLORIDE 500 MILLIGRAM(S): 850 TABLET ORAL at 20:29

## 2024-01-01 RX ADMIN — ATORVASTATIN CALCIUM 10 MILLIGRAM(S): 80 TABLET, FILM COATED ORAL at 20:28

## 2024-01-01 NOTE — PROGRESS NOTE BEHAVIORAL HEALTH - IMPULSE CONTROL
Normal
Impaired
Normal
No

## 2024-01-02 LAB
GLUCOSE BLDC GLUCOMTR-MCNC: 107 MG/DL — HIGH (ref 70–99)
GLUCOSE BLDC GLUCOMTR-MCNC: 107 MG/DL — HIGH (ref 70–99)
GLUCOSE BLDC GLUCOMTR-MCNC: 114 MG/DL — HIGH (ref 70–99)
GLUCOSE BLDC GLUCOMTR-MCNC: 114 MG/DL — HIGH (ref 70–99)
GLUCOSE BLDC GLUCOMTR-MCNC: 118 MG/DL — HIGH (ref 70–99)
GLUCOSE BLDC GLUCOMTR-MCNC: 118 MG/DL — HIGH (ref 70–99)
GLUCOSE BLDC GLUCOMTR-MCNC: 95 MG/DL — SIGNIFICANT CHANGE UP (ref 70–99)
GLUCOSE BLDC GLUCOMTR-MCNC: 95 MG/DL — SIGNIFICANT CHANGE UP (ref 70–99)

## 2024-01-02 PROCEDURE — 99231 SBSQ HOSP IP/OBS SF/LOW 25: CPT

## 2024-01-02 RX ORDER — CLOZAPINE 150 MG/1
100 TABLET, ORALLY DISINTEGRATING ORAL AT BEDTIME
Refills: 0 | Status: DISCONTINUED | OUTPATIENT
Start: 2024-01-02 | End: 2024-01-05

## 2024-01-02 RX ADMIN — Medication 1 MILLIGRAM(S): at 20:04

## 2024-01-02 RX ADMIN — Medication 100 MILLIGRAM(S): at 08:12

## 2024-01-02 RX ADMIN — METFORMIN HYDROCHLORIDE 500 MILLIGRAM(S): 850 TABLET ORAL at 20:04

## 2024-01-02 RX ADMIN — Medication 1 MILLIGRAM(S): at 08:12

## 2024-01-02 RX ADMIN — Medication 5 MILLIGRAM(S): at 20:04

## 2024-01-02 RX ADMIN — METFORMIN HYDROCHLORIDE 500 MILLIGRAM(S): 850 TABLET ORAL at 08:12

## 2024-01-02 RX ADMIN — ATORVASTATIN CALCIUM 10 MILLIGRAM(S): 80 TABLET, FILM COATED ORAL at 20:04

## 2024-01-02 RX ADMIN — CLOZAPINE 100 MILLIGRAM(S): 150 TABLET, ORALLY DISINTEGRATING ORAL at 20:04

## 2024-01-02 NOTE — BH INPATIENT PSYCHIATRY PROGRESS NOTE - OTHER
Patient appeared agitated and stated that he did not fully trust one of the team members. no obvious internally pre-occupations superficially cooperative Patient is notably paranoid, also guarded on exam

## 2024-01-02 NOTE — BH INPATIENT PSYCHIATRY PROGRESS NOTE - NSBHCONSBHPROVDETAILS_PSY_A_CORE  FT
Could not reach anyone at pt's TLR; last contact was by Telepsychiatry team yesterday.  Could not reach anyone at pt's TLR; last contact was by Telepsychiatry team on admission

## 2024-01-02 NOTE — BH INPATIENT PSYCHIATRY PROGRESS NOTE - CURRENT MEDICATION
MEDICATIONS  (STANDING):  allopurinol 100 milliGRAM(s) Oral daily  atorvastatin 10 milliGRAM(s) Oral at bedtime  atropine 1% Solution 1 Drop(s) Both EYES daily  benztropine 1 milliGRAM(s) Oral two times a day  bisacodyl 5 milliGRAM(s) Oral at bedtime  budesonide  80 MICROgram(s)/formoterol 4.5 MICROgram(s) Inhaler 2 Puff(s) Inhalation two times a day  cloZAPine 50 milliGRAM(s) Oral at bedtime  dextrose 5%. 1000 milliLiter(s) (50 mL/Hr) IV Continuous <Continuous>  dextrose 5%. 1000 milliLiter(s) (100 mL/Hr) IV Continuous <Continuous>  dextrose 50% Injectable 12.5 Gram(s) IV Push once  dextrose 50% Injectable 25 Gram(s) IV Push once  dextrose 50% Injectable 25 Gram(s) IV Push once  glucagon  Injectable 1 milliGRAM(s) IntraMuscular once  insulin lispro (ADMELOG) corrective regimen sliding scale   SubCutaneous three times a day before meals  insulin lispro (ADMELOG) corrective regimen sliding scale   SubCutaneous at bedtime  levothyroxine 200 MICROGram(s) Oral daily  linagliptin 5 milliGRAM(s) Oral daily  metFORMIN 500 milliGRAM(s) Oral two times a day    MEDICATIONS  (PRN):  dextrose Oral Gel 15 Gram(s) Oral once PRN Blood Glucose LESS THAN 70 milliGRAM(s)/deciliter  diphenhydrAMINE 50 milliGRAM(s) Oral every 6 hours PRN Extrapyramidal prophylaxis  haloperidol     Tablet 5 milliGRAM(s) Oral every 6 hours PRN Agitation  hydrOXYzine hydrochloride 50 milliGRAM(s) Oral every 6 hours PRN anxiety  LORazepam     Tablet 1 milliGRAM(s) Oral every 6 hours PRN Agitation   MEDICATIONS  (STANDING):  allopurinol 100 milliGRAM(s) Oral daily  atorvastatin 10 milliGRAM(s) Oral at bedtime  atropine 1% Solution 1 Drop(s) Both EYES daily  benztropine 1 milliGRAM(s) Oral two times a day  bisacodyl 5 milliGRAM(s) Oral at bedtime  budesonide  80 MICROgram(s)/formoterol 4.5 MICROgram(s) Inhaler 2 Puff(s) Inhalation two times a day  cloZAPine 100 milliGRAM(s) Oral at bedtime  dextrose 5%. 1000 milliLiter(s) (50 mL/Hr) IV Continuous <Continuous>  dextrose 5%. 1000 milliLiter(s) (100 mL/Hr) IV Continuous <Continuous>  dextrose 50% Injectable 25 Gram(s) IV Push once  dextrose 50% Injectable 12.5 Gram(s) IV Push once  dextrose 50% Injectable 25 Gram(s) IV Push once  glucagon  Injectable 1 milliGRAM(s) IntraMuscular once  insulin lispro (ADMELOG) corrective regimen sliding scale   SubCutaneous three times a day before meals  insulin lispro (ADMELOG) corrective regimen sliding scale   SubCutaneous at bedtime  levothyroxine 200 MICROGram(s) Oral daily  linagliptin 5 milliGRAM(s) Oral daily  metFORMIN 500 milliGRAM(s) Oral two times a day    MEDICATIONS  (PRN):  dextrose Oral Gel 15 Gram(s) Oral once PRN Blood Glucose LESS THAN 70 milliGRAM(s)/deciliter  diphenhydrAMINE 50 milliGRAM(s) Oral every 6 hours PRN Extrapyramidal prophylaxis  haloperidol     Tablet 5 milliGRAM(s) Oral every 6 hours PRN Agitation  hydrOXYzine hydrochloride 50 milliGRAM(s) Oral every 6 hours PRN anxiety  LORazepam     Tablet 1 milliGRAM(s) Oral every 6 hours PRN Agitation

## 2024-01-02 NOTE — BH INPATIENT PSYCHIATRY PROGRESS NOTE - NSBHASSESSSUMMFT_PSY_ALL_CORE
67 yo male domiciled at [facility], with PMH diabetes, CAD, CLL, parathyroid cancer, PPH of chronic schizophrenia, multiple state hospitalizations, most recently between Feb 2019 and Oct 2023 at [place], who presented to the ED on 12/21/23 with an unclear complaint. Based on collateral and exam, pt was found to be disorganized and off meds, and subsequently admitted to [facility] under 9.39 legals for decompensated schizophrenia.    Patient remains disorganized, illogical, irritable and was mildly agitated. Patient remains unable to care for himself and would benefit from inpatient psychiatric admission to titrate Clozaril and monitor for improvement and side effects. As patient did not take his Clozaril for more than 2 days prior to admission, patient was restart on Clozaril at a lower dose. After multiple nights of refusing, patient has agreed to standing Clozaril since 12/27/23 and mental status examination has been improved since the medication was taken. However, may need treatment over objection if patient refuses medication as he did earlier in his admission, given history of poor insight and judgement given his prior history of non-compliance. Will increase dose today and continue to monitor mental status and plan for discharge if condition remains stable.    Plan:   #Schizophrenia  **Home dose was clozaril 200mg qhs**  - Increase Clozaril PO to 100 mg QHs, started 1/2, monitor for compliance  - C/w Benztropine 1 mg PO BID for EPS ppx  - Last ANC per clozaril rems was on 12/21/23: 3810 ANC (per µL)  - Continue to monitor V/S and weekly CBCs (ordered for 12/26) and gradually increase dose appropriately  - Haloperidol Dec 100mg IM last given 12/14/23 ***next due 1/10/24***    #T2DM   - A1C 6.5 from 12/23  - tradjenta 5mg qdaily (Januvia 100mg qdaily is home medication but non-formulary and switched to tradjenta while in hospital)  - metformin 500mg BID  - ISS    #Hypothyroidism  - c/w Levothyroxine 200mg qdaily  - TSH 1.07 WNL from 12/23    #HLD   - c/w Atorvastatin 10mg qhs    #Hx CLL  - c/w Allopurinol 100mg qdaily    #CAD   - EKG similar to previous   - trop T 17 (WNL) on 12/23    #Amblyopia  - c/w Atropine 1% eye drops qdaily    #Hx asthma/COPD  - symbicort 2 puffs daily    #Agitation  - For severe agitation not responding to behavioral intervention, may give haldol 5 mg po q6h prn, ativan 1 mg po q6h prn, hydroxyzine 50 mg po q6h prn, with escalation to IM if patient refusing PO and remains an imminent danger to self or others. If IM antipsychotic is administered, please perform follow-up ECG for QTc monitoring.  - Can give benadryl 50 mg po q6h for anxiety/insomnia 67 yo male domiciled at [facility], with PMH diabetes, CAD, CLL, parathyroid cancer, PPH of chronic schizophrenia, multiple state hospitalizations, most recently between Feb 2019 and Oct 2023 at [place], who presented to the ED on 12/21/23 with an unclear complaint. Based on collateral and exam, pt was found to be disorganized and off meds, and subsequently admitted to [facility] under 9.39 legals for decompensated schizophrenia.    Patient remains disorganized, illogical, irritable and was mildly agitated. Patient remains unable to care for himself and would benefit from inpatient psychiatric admission to titrate Clozaril and monitor for improvement and side effects. As patient did not take his Clozaril for more than 2 days prior to admission, patient was restart on Clozaril at a lower dose. After multiple nights of refusing, patient has agreed to standing Clozaril since 12/27/23 and mental status examination has been improved since the medication was taken. However, may need treatment over objection if patient refuses medication as he did earlier in his admission, given history of poor insight and judgement given his prior history of non-compliance. Will increase dose today and continue to monitor mental status and plan for discharge if condition remains stable.    Plan:   #Schizophrenia  **Home dose was clozaril 200mg qhs**  - Increase Clozaril PO to 100 mg QHs, started 1/2, monitor for compliance  - C/w Benztropine 1 mg PO BID for EPS ppx  - Last ANC per clozaril rems was on 12/21/23: 3810 ANC (per µL)  - Continue to monitor V/S and weekly CBCs (ordered for 12/26) and gradually increase dose appropriately  - Haloperidol Dec 100mg IM last given 12/14/23 ***next due 1/10/24***    #T2DM   - A1C 6.5 from 12/23  - tradjenta 5mg qdaily (Januvia 100mg qdaily is home medication but non-formulary and switched to tradjenta while in hospital)  - metformin 500mg BID  - ISS    #Hypothyroidism  - c/w Levothyroxine 200mg qdaily  - TSH 1.07 WNL from 12/23    #HLD   - c/w Atorvastatin 10mg qhs    #Hx CLL  - c/w Allopurinol 100mg qdaily    #CAD   - EKG similar to previous   - trop T 17 (WNL) on 12/23    #Amblyopia  - c/w Atropine 1% eye drops qdaily    #Hx asthma/COPD  - symbicort 2 puffs daily    #Agitation  - For severe agitation not responding to behavioral intervention, may give haldol 5 mg po q6h prn, ativan 1 mg po q6h prn, hydroxyzine 50 mg po q6h prn, with escalation to IM if patient refusing PO and remains an imminent danger to self or others. If IM antipsychotic is administered, please perform follow-up ECG for QTc monitoring.  - Can give benadryl 50 mg po q6h for anxiety/insomnia

## 2024-01-02 NOTE — BH INPATIENT PSYCHIATRY PROGRESS NOTE - NSBHPSYCHOLCOGORIENT_PSY_A_CORE
Oriented to person. Disoriented to place or time./Not fully oriented... Oriented to person. Disoriented to place or time./Unable to assess

## 2024-01-02 NOTE — BH INPATIENT PSYCHIATRY PROGRESS NOTE - NSCGIIMPROVESX_PSY_ALL_CORE
Addended by: SHABBIR BUTTERFIELD on: 3/23/2023 08:15 AM     Modules accepted: Orders     2 = Much improved - notably better with signficant reduction of symptoms; increase in the level of functioning but some symptoms remain

## 2024-01-02 NOTE — BH TREATMENT PLAN - NSTXPLANTHERAPYSESSIONSFT_PSY_ALL_CORE
12-28-23  Type of therapy: Coping skills, Inspiration and motiviation, Leisure development, Self esteem, Stress management, Symptom management  Type of session: Individual  Level of patient participation: Resistance to participation  Duration of participation: Less than 15 minutes  Therapy conducted by: Psych rehab  Therapy Summary: Pt will need ongoing encouragement .

## 2024-01-02 NOTE — BH INPATIENT PSYCHIATRY PROGRESS NOTE - NSBHCHARTREVIEWINVESTIGATE_PSY_A_CORE FT
Ventricular Rate 71 BPM    Atrial Rate 71 BPM    P-R Interval 146 ms    QRS Duration 74 ms    Q-T Interval 408 ms    QTC Calculation(Bazett) 443 ms    P Axis 58 degrees    R Axis 40 degrees    T Axis 4 degrees    Diagnosis Line Normal sinus rhythm  Inferior infarct , age undetermined  Anterolateral infarct , probably recent  Abnormal ECG    Confirmed by DARCIE MONTANO, SERG ([id number]) on 12/21/2023 10:21:14 PM Ventricular Rate 71 BPM    Atrial Rate 71 BPM    P-R Interval 146 ms    QRS Duration 74 ms    Q-T Interval 408 ms    QTC Calculation(Bazett) 443 ms    P Axis 58 degrees    R Axis 40 degrees    T Axis 4 degrees    Diagnosis Line Normal sinus rhythm  Inferior infarct , age undetermined  Anterolateral infarct , probably recent  Abnormal ECG    Confirmed by DARCIE MONTANO, SERG ([id number]) on 12/21/2023 10:21:14 PM

## 2024-01-02 NOTE — BH TREATMENT PLAN - NSDCCRITERIA_PSY_ALL_CORE
improvement of medication compliance and psychotic Sx
improvement of medication compliance and psychotic Sx

## 2024-01-02 NOTE — BH INPATIENT PSYCHIATRY PROGRESS NOTE - NSBHFUPINTERVALHXFT_PSY_A_CORE
Patient was seen and evaluated this morning. Chart was reviewed and, as per nursing, no acute events were noted overnight however patient refused standing insulin. Fingersticks noted.  No PRN medications were administered overnight. Patient agreed to receive his standing antipsychotics last night.     Upon examination, patient was seen laying supine and repositioned himself to sit upright for the interview with arms crossed. Patient's speech articulation was mildly impaired but coherent. When asked how he was doing, the patient expressed that "everything was going good" and stated "I'm ready to go... when can I be discharged. When asked what his plans for after discharge the patient stated "that's my business... but nothing illegal". When a member of the team insisted on understanding his discharge plans the patient became agitated stating "that doesn't matter right now". Upon further questioning regarding his medication, the patient stated "I only take my vitamins and artane, not atarax. No suicidal or homicidal ideations were elicited from the interview. Patient denied audio or visual hallucinations. Overall, the interview was not constrained. Patient was mildly largely cooperative with the interview, in a good mood but mildly agitated. No obvious internal preoccupations were evident. Patient was seen and evaluated this morning. Chart was reviewed and, as per nursing, no acute events were noted overnight however patient refused standing insulin. Fingersticks noted. No PRN medications were administered overnight. Patient agreed to receive his standing antipsychotics last night but refused some og his nonpsychiatric medications including Dulcolax.    Upon examination, patient was seen laying supine and abruptly repositioned himself to sit upright for the interview with arms crossed. Patient's speech articulation was mildly impaired but coherent. When asked how he was doing, the patient expressed that "everything was going good" and stated "I'm ready to go... when can I be discharged. When asked what his plans for after discharge the patient stated "that's my business... but nothing illegal". When a member of the team insisted on understanding his discharge plans the patient became irritable stating "that doesn't matter right now". Upon further questioning regarding his medication, the patient stated "I only take my vitamins and artane, not atarax." No suicidal or homicidal ideations were elicited from the interview. Patient denied audio or visual hallucinations. Overall, the interview was not constrained. Patient was mildly largely cooperative with the interview, in a good mood but mildly irritable. No obvious internal preoccupations were evident.

## 2024-01-02 NOTE — BH TREATMENT PLAN - NSTXMEDICINTERRN_PSY_ALL_CORE
RN will offer PRN medications  RN will keep patient informed with treatment plan  RN will provide reality orientation
RN will offer PRN medications  RN will keep patient informed with treatment plan  RN will provide reality orientation

## 2024-01-02 NOTE — BH TREATMENT PLAN - NSTXPSYCHOINTERSW_PSY_ALL_CORE
Sw will provide support contacts education and referrals for healthy coping skills. pt encouraged to attend at least 1 group per day. SW will provide support contacts education and referrals for healthy coping skills. Pt encouraged to attend at least 1 group per day.

## 2024-01-02 NOTE — BH INPATIENT PSYCHIATRY PROGRESS NOTE - NSBHCHARTREVIEWVS_PSY_A_CORE FT
Vital Signs Last 24 Hrs  T(C): 36.6 (01-02-24 @ 08:06), Max: 36.6 (01-02-24 @ 08:06)  T(F): 97.8 (01-02-24 @ 08:06), Max: 97.8 (01-02-24 @ 08:06)  HR: 111 (01-02-24 @ 08:06) (95 - 111)  BP: 138/63 (01-02-24 @ 08:06) (138/63 - 158/81)  BP(mean): --  RR: 18 (01-02-24 @ 08:06) (18 - 18)  SpO2: --

## 2024-01-02 NOTE — BH INPATIENT PSYCHIATRY PROGRESS NOTE - NSBHMETABOLIC_PSY_ALL_CORE_FT
BMI: BMI (kg/m2): 25.3 (12-22-23 @ 21:34)  HbA1c: A1C with Estimated Average Glucose Result: 6.5 % (12-23-23 @ 08:00)    Glucose: POCT Blood Glucose.: 114 mg/dL (01-02-24 @ 06:47)    BP: 138/63 (01-02-24 @ 08:06) (120/66 - 158/81)Vital Signs Last 24 Hrs  T(C): 36.6 (01-02-24 @ 08:06), Max: 36.6 (01-02-24 @ 08:06)  T(F): 97.8 (01-02-24 @ 08:06), Max: 97.8 (01-02-24 @ 08:06)  HR: 111 (01-02-24 @ 08:06) (95 - 111)  BP: 138/63 (01-02-24 @ 08:06) (138/63 - 158/81)  BP(mean): --  RR: 18 (01-02-24 @ 08:06) (18 - 18)  SpO2: --      Lipid Panel: Date/Time: 12-23-23 @ 08:00  Cholesterol, Serum: 186  LDL Cholesterol Calculated: 100  HDL Cholesterol, Serum: 46  Total Cholesterol/HDL Ration Measurement: --  Triglycerides, Serum: 202

## 2024-01-03 LAB
GLUCOSE BLDC GLUCOMTR-MCNC: 134 MG/DL — HIGH (ref 70–99)
GLUCOSE BLDC GLUCOMTR-MCNC: 134 MG/DL — HIGH (ref 70–99)
GLUCOSE BLDC GLUCOMTR-MCNC: 154 MG/DL — HIGH (ref 70–99)
GLUCOSE BLDC GLUCOMTR-MCNC: 154 MG/DL — HIGH (ref 70–99)
GLUCOSE BLDC GLUCOMTR-MCNC: 84 MG/DL — SIGNIFICANT CHANGE UP (ref 70–99)
GLUCOSE BLDC GLUCOMTR-MCNC: 84 MG/DL — SIGNIFICANT CHANGE UP (ref 70–99)

## 2024-01-03 PROCEDURE — 99231 SBSQ HOSP IP/OBS SF/LOW 25: CPT

## 2024-01-03 RX ADMIN — Medication 1 DROP(S): at 08:01

## 2024-01-03 RX ADMIN — CLOZAPINE 100 MILLIGRAM(S): 150 TABLET, ORALLY DISINTEGRATING ORAL at 20:18

## 2024-01-03 RX ADMIN — Medication 1 MILLIGRAM(S): at 08:00

## 2024-01-03 RX ADMIN — METFORMIN HYDROCHLORIDE 500 MILLIGRAM(S): 850 TABLET ORAL at 08:01

## 2024-01-03 RX ADMIN — Medication 100 MILLIGRAM(S): at 08:00

## 2024-01-03 RX ADMIN — BUDESONIDE AND FORMOTEROL FUMARATE DIHYDRATE 2 PUFF(S): 160; 4.5 AEROSOL RESPIRATORY (INHALATION) at 07:36

## 2024-01-03 RX ADMIN — Medication 200 MICROGRAM(S): at 06:12

## 2024-01-03 RX ADMIN — LINAGLIPTIN 5 MILLIGRAM(S): 5 TABLET, FILM COATED ORAL at 08:00

## 2024-01-03 RX ADMIN — ATORVASTATIN CALCIUM 10 MILLIGRAM(S): 80 TABLET, FILM COATED ORAL at 20:18

## 2024-01-03 RX ADMIN — Medication 1 MILLIGRAM(S): at 20:18

## 2024-01-03 RX ADMIN — METFORMIN HYDROCHLORIDE 500 MILLIGRAM(S): 850 TABLET ORAL at 20:18

## 2024-01-03 NOTE — BH INPATIENT PSYCHIATRY PROGRESS NOTE - CURRENT MEDICATION
MEDICATIONS  (STANDING):  allopurinol 100 milliGRAM(s) Oral daily  atorvastatin 10 milliGRAM(s) Oral at bedtime  atropine 1% Solution 1 Drop(s) Both EYES daily  benztropine 1 milliGRAM(s) Oral two times a day  bisacodyl 5 milliGRAM(s) Oral at bedtime  budesonide  80 MICROgram(s)/formoterol 4.5 MICROgram(s) Inhaler 2 Puff(s) Inhalation two times a day  cloZAPine 100 milliGRAM(s) Oral at bedtime  dextrose 5%. 1000 milliLiter(s) (50 mL/Hr) IV Continuous <Continuous>  dextrose 5%. 1000 milliLiter(s) (100 mL/Hr) IV Continuous <Continuous>  dextrose 50% Injectable 25 Gram(s) IV Push once  dextrose 50% Injectable 12.5 Gram(s) IV Push once  dextrose 50% Injectable 25 Gram(s) IV Push once  glucagon  Injectable 1 milliGRAM(s) IntraMuscular once  insulin lispro (ADMELOG) corrective regimen sliding scale   SubCutaneous three times a day before meals  insulin lispro (ADMELOG) corrective regimen sliding scale   SubCutaneous at bedtime  levothyroxine 200 MICROGram(s) Oral daily  linagliptin 5 milliGRAM(s) Oral daily  metFORMIN 500 milliGRAM(s) Oral two times a day    MEDICATIONS  (PRN):  dextrose Oral Gel 15 Gram(s) Oral once PRN Blood Glucose LESS THAN 70 milliGRAM(s)/deciliter  diphenhydrAMINE 50 milliGRAM(s) Oral every 6 hours PRN Extrapyramidal prophylaxis  haloperidol     Tablet 5 milliGRAM(s) Oral every 6 hours PRN Agitation  hydrOXYzine hydrochloride 50 milliGRAM(s) Oral every 6 hours PRN anxiety  LORazepam     Tablet 1 milliGRAM(s) Oral every 6 hours PRN Agitation   MEDICATIONS  (STANDING):  allopurinol 100 milliGRAM(s) Oral daily  atorvastatin 10 milliGRAM(s) Oral at bedtime  atropine 1% Solution 1 Drop(s) Both EYES daily  benztropine 1 milliGRAM(s) Oral two times a day  bisacodyl 5 milliGRAM(s) Oral at bedtime  budesonide  80 MICROgram(s)/formoterol 4.5 MICROgram(s) Inhaler 2 Puff(s) Inhalation two times a day  cloZAPine 100 milliGRAM(s) Oral at bedtime  dextrose 5%. 1000 milliLiter(s) (100 mL/Hr) IV Continuous <Continuous>  dextrose 5%. 1000 milliLiter(s) (50 mL/Hr) IV Continuous <Continuous>  dextrose 50% Injectable 25 Gram(s) IV Push once  dextrose 50% Injectable 12.5 Gram(s) IV Push once  dextrose 50% Injectable 25 Gram(s) IV Push once  glucagon  Injectable 1 milliGRAM(s) IntraMuscular once  insulin lispro (ADMELOG) corrective regimen sliding scale   SubCutaneous three times a day before meals  insulin lispro (ADMELOG) corrective regimen sliding scale   SubCutaneous at bedtime  levothyroxine 200 MICROGram(s) Oral daily  linagliptin 5 milliGRAM(s) Oral daily  metFORMIN 500 milliGRAM(s) Oral two times a day    MEDICATIONS  (PRN):  dextrose Oral Gel 15 Gram(s) Oral once PRN Blood Glucose LESS THAN 70 milliGRAM(s)/deciliter  diphenhydrAMINE 50 milliGRAM(s) Oral every 6 hours PRN Extrapyramidal prophylaxis  haloperidol     Tablet 5 milliGRAM(s) Oral every 6 hours PRN Agitation  hydrOXYzine hydrochloride 50 milliGRAM(s) Oral every 6 hours PRN anxiety  LORazepam     Tablet 1 milliGRAM(s) Oral every 6 hours PRN Agitation

## 2024-01-03 NOTE — BH INPATIENT PSYCHIATRY DISCHARGE NOTE - HOSPITAL COURSE
Patient was admitted for disorganized behavior in the context of medication non-adherence. Patient was on home dose of Clozaril 200 mg once daily but given non-compliance, patient needed to be restarted. Patient originally presented with poor insight and was verbally irritable to treatment team and staff as well as endorsing paranoid delusions. Patient originally refused his psychotropic medications, but did start taking a starting dose of Clozaril, though it did not seem related to any improvement to insight. Patient continued to take his Clozaril which was increased to 100 mg once daily at bedtime. As he took higher doses, patient became less irritable and more cooperative to treatment team and patient referred less to paranoid delusions. Patient is still inconsistent with his medical medications and still insight into his chronic condition, as well as where he currently resides. Patient still presents with disorganized thoughts but he is in significantly improved behavorial control and is not currently at acute risk of harm of self or others. Patient would no longer benefit from inpatient level of care.

## 2024-01-03 NOTE — BH DISCHARGE NOTE NURSING/SOCIAL WORK/PSYCH REHAB - NSDCVIVACCINE_GEN_ALL_CORE_FT
Tdap; 30-Nov-2023 09:41; Milly Hilton (KRISSY); [employer]; [id number] (Exp. Date: 23-Nov-2025); IntraMuscular; Deltoid Right.; 0.5 milliLiter(s); VIS (VIS Published: 09-May-2013, VIS Presented: 30-Nov-2023);    Tdap; 30-Nov-2023 09:41; Milly Hilton (KRISSY); [employer]; [id number] (Exp. Date: 23-Nov-2025); IntraMuscular; Deltoid Right.; 0.5 milliLiter(s); VIS (VIS Published: 09-May-2013, VIS Presented: 30-Nov-2023);

## 2024-01-03 NOTE — BH INPATIENT PSYCHIATRY DISCHARGE NOTE - NSBHDCBILLING_PSY_ALL_CORE
[id number] (Hospital discharge day management; 30 min or less) [id number] (Hospital discharge day management; 30 min or less)

## 2024-01-03 NOTE — BH INPATIENT PSYCHIATRY PROGRESS NOTE - NSBHASSESSSUMMFT_PSY_ALL_CORE
67 yo male domiciled at [facility], with PMH diabetes, CAD, CLL, parathyroid cancer, PPH of chronic schizophrenia, multiple state hospitalizations, most recently between Feb 2019 and Oct 2023 at [place], who presented to the ED on 12/21/23 with an unclear complaint. Based on collateral and exam, pt was found to be disorganized and off meds, and subsequently admitted to [facility] under 9.39 legals for decompensated schizophrenia.    Patient remains disorganized, illogical, irritable and was mildly agitated. Patient remains unable to care for himself and would benefit from inpatient psychiatric admission to titrate Clozaril and monitor for improvement and side effects. As patient did not take his Clozaril for more than 2 days prior to admission, patient was restart on Clozaril at a lower dose. After multiple nights of refusing, patient has agreed to standing Clozaril since 12/27/23 and mental status examination has been improved since the medication was taken. Medication dosage has been steadily increased to a current dose of 100 mg qHs. However, may need treatment over objection if patient refuses medication as he did earlier in his admission, given history of poor insight and judgement given his prior history of non-compliance. Will continue at the same dose today, continue to monitor mental status and plan for discharge if condition remains stable.    Plan:   #Schizophrenia  **Home dose was clozaril 200mg qhs**  - Increase Clozaril PO to 100 mg QHs, started 1/2, monitor for compliance  - C/w Benztropine 1 mg PO BID for EPS ppx  - Last ANC per clozaril rems was on 12/21/23: 3810 ANC (per µL)  - Continue to monitor V/S and weekly CBCs (ordered for 12/26) and gradually increase dose appropriately  - Haloperidol Dec 100mg IM last given 12/14/23 ***next due 1/10/24***    #T2DM   - A1C 6.5 from 12/23  - tradjenta 5mg qdaily (Januvia 100mg qdaily is home medication but non-formulary and switched to tradjenta while in hospital)  - metformin 500mg BID  - ISS    #Hypothyroidism  - c/w Levothyroxine 200mg qdaily  - TSH 1.07 WNL from 12/23    #HLD   - c/w Atorvastatin 10mg qhs    #Hx CLL  - c/w Allopurinol 100mg qdaily    #CAD   - EKG similar to previous   - trop T 17 (WNL) on 12/23    #Amblyopia  - c/w Atropine 1% eye drops qdaily    #Hx asthma/COPD  - symbicort 2 puffs daily    #Agitation  - For severe agitation not responding to behavioral intervention, may give haldol 5 mg po q6h prn, ativan 1 mg po q6h prn, hydroxyzine 50 mg po q6h prn, with escalation to IM if patient refusing PO and remains an imminent danger to self or others. If IM antipsychotic is administered, please perform follow-up ECG for QTc monitoring.  - Can give benadryl 50 mg po q6h for anxiety/insomnia 67 yo male domiciled at [facility], with PMH diabetes, CAD, CLL, parathyroid cancer, PPH of chronic schizophrenia, multiple state hospitalizations, most recently between Feb 2019 and Oct 2023 at [place], who presented to the ED on 12/21/23 with an unclear complaint. Based on collateral and exam, pt was found to be disorganized and off meds, and subsequently admitted to [facility] under 9.39 legals for decompensated schizophrenia.    Patient remains disorganized, illogical, irritable and was mildly agitated. Patient remains unable to care for himself and would benefit from inpatient psychiatric admission to titrate Clozaril and monitor for improvement and side effects. As patient did not take his Clozaril for more than 2 days prior to admission, patient was restart on Clozaril at a lower dose. After multiple nights of refusing, patient has agreed to standing Clozaril since 12/27/23 and mental status examination has been improved since the medication was taken. Medication dosage has been steadily increased to a current dose of 100 mg qHs. However, may need treatment over objection if patient refuses medication as he did earlier in his admission, given history of poor insight and judgement given his prior history of non-compliance. Will continue at the same dose today, continue to monitor mental status and plan for discharge if condition remains stable.    Plan:   #Schizophrenia  **Home dose was clozaril 200mg qhs**  - Increase Clozaril PO to 100 mg QHs, started 1/2, monitor for compliance  - C/w Benztropine 1 mg PO BID for EPS ppx  - Last ANC per clozaril rems was on 12/21/23: 3810 ANC (per µL)  - Continue to monitor V/S and weekly CBCs (ordered for 12/26) and gradually increase dose appropriately  - Haloperidol Dec 100mg IM last given 12/14/23 ***next due 1/10/24***    #T2DM   - A1C 6.5 from 12/23  - tradjenta 5mg qdaily (Januvia 100mg qdaily is home medication but non-formulary and switched to tradjenta while in hospital)  - metformin 500mg BID  - ISS    #Hypothyroidism  - c/w Levothyroxine 200mg qdaily  - TSH 1.07 WNL from 12/23    #HLD   - c/w Atorvastatin 10mg qhs    #Hx CLL  - c/w Allopurinol 100mg qdaily    #CAD   - EKG similar to previous   - trop T 17 (WNL) on 12/23    #Amblyopia  - c/w Atropine 1% eye drops qdaily    #Hx asthma/COPD  - symbicort 2 puffs daily    #Agitation  - For severe agitation not responding to behavioral intervention, may give haldol 5 mg po q6h prn, ativan 1 mg po q6h prn, hydroxyzine 50 mg po q6h prn, with escalation to IM if patient refusing PO and remains an imminent danger to self or others. If IM antipsychotic is administered, please perform follow-up ECG for QTc monitoring.  - Can give benadryl 50 mg po q6h for anxiety/insomnia 67 yo male domiciled at [facility] [place], with PMH diabetes, CAD, CLL, parathyroid cancer, PPH of chronic schizophrenia, multiple state hospitalizations, most recently between Feb 2019 and Oct 2023 at [place], who presented to the ED on 12/21/23 with an unclear complaint. Based on collateral and exam, pt was found to be disorganized and off meds, and subsequently admitted to [facility] under 9.39 legals for decompensated schizophrenia.    Patient remains disorganized, illogical, irritable and was mildly agitated. Patient remains unable to care for himself and would benefit from inpatient psychiatric admission to titrate Clozaril and monitor for improvement and side effects. As patient did not take his Clozaril for more than 2 days prior to admission, patient was restart on Clozaril at a lower dose. After multiple nights of refusing, patient has agreed to standing Clozaril since 12/27/23 and mental status examination has been improved since the medication was taken. Medication dosage has been steadily increased to a current dose of 100 mg qHs. However, may need treatment over objection if patient refuses medication as he did earlier in his admission, given history of poor insight and judgement given his prior history of non-compliance. Will continue at the same dose today, continue to monitor mental status and plan for discharge if condition remains stable.    Plan:   #Schizophrenia  **Home dose was clozaril 200mg qhs**  - C/w Clozaril PO to 100 mg QHs, monitor for compliance  - C/w Benztropine 1 mg PO BID for EPS ppx  - Clozaril level and CBC ordered for 1/4  - Last ANC per clozaril rems was on 12/21/23: 3810 ANC (per µL)  - Continue to monitor V/S and weekly CBCs (ordered for 12/26) and gradually increase dose appropriately  - Haloperidol Dec 100mg IM last given 12/14/23 ***next due 1/10/24***    #T2DM   - A1C 6.5 from 12/23  - tradjenta 5mg qdaily (Januvia 100mg qdaily is home medication but non-formulary and switched to tradjenta while in hospital)  - metformin 500mg BID  - ISS    #Hypothyroidism  - c/w Levothyroxine 200mg qdaily  - TSH 1.07 WNL from 12/23    #HLD   - c/w Atorvastatin 10mg qhs    #Hx CLL  - c/w Allopurinol 100mg qdaily    #CAD   - EKG similar to previous   - trop T 17 (WNL) on 12/23    #Amblyopia  - c/w Atropine 1% eye drops qdaily    #Hx asthma/COPD  - symbicort 2 puffs daily    #Agitation  - For severe agitation not responding to behavioral intervention, may give haldol 5 mg po q6h prn, ativan 1 mg po q6h prn, hydroxyzine 50 mg po q6h prn, with escalation to IM if patient refusing PO and remains an imminent danger to self or others. If IM antipsychotic is administered, please perform follow-up ECG for QTc monitoring.  - Can give benadryl 50 mg po q6h for anxiety/insomnia 67 yo male domiciled at [facility], with PMH diabetes, CAD, CLL, parathyroid cancer, PPH of chronic schizophrenia, multiple state hospitalizations, most recently between Feb 2019 and Oct 2023 at [place], who presented to the ED on 12/21/23 with an unclear complaint. Based on collateral and exam, pt was found to be disorganized and off meds, and subsequently admitted to [facility] under 9.39 legals for decompensated schizophrenia.    Patient remains disorganized, illogical, irritable and was mildly agitated. Patient remains unable to care for himself and would benefit from inpatient psychiatric admission to titrate Clozaril and monitor for improvement and side effects. As patient did not take his Clozaril for more than 2 days prior to admission, patient was restart on Clozaril at a lower dose. After multiple nights of refusing, patient has agreed to standing Clozaril since 12/27/23 and mental status examination has been improved since the medication was taken. Medication dosage has been steadily increased to a current dose of 100 mg qHs. However, may need treatment over objection if patient refuses medication as he did earlier in his admission, given history of poor insight and judgement given his prior history of non-compliance. Will continue at the same dose today, continue to monitor mental status and plan for discharge if condition remains stable.    Plan:   #Schizophrenia  **Home dose was clozaril 200mg qhs**  - C/w Clozaril PO to 100 mg QHs, monitor for compliance  - C/w Benztropine 1 mg PO BID for EPS ppx  - Clozaril level and CBC ordered for 1/4  - Last ANC per clozaril rems was on 12/21/23: 3810 ANC (per µL)  - Continue to monitor V/S and weekly CBCs (ordered for 12/26) and gradually increase dose appropriately  - Haloperidol Dec 100mg IM last given 12/14/23 ***next due 1/10/24***    #T2DM   - A1C 6.5 from 12/23  - tradjenta 5mg qdaily (Januvia 100mg qdaily is home medication but non-formulary and switched to tradjenta while in hospital)  - metformin 500mg BID  - ISS    #Hypothyroidism  - c/w Levothyroxine 200mg qdaily  - TSH 1.07 WNL from 12/23    #HLD   - c/w Atorvastatin 10mg qhs    #Hx CLL  - c/w Allopurinol 100mg qdaily    #CAD   - EKG similar to previous   - trop T 17 (WNL) on 12/23    #Amblyopia  - c/w Atropine 1% eye drops qdaily    #Hx asthma/COPD  - symbicort 2 puffs daily    #Agitation  - For severe agitation not responding to behavioral intervention, may give haldol 5 mg po q6h prn, ativan 1 mg po q6h prn, hydroxyzine 50 mg po q6h prn, with escalation to IM if patient refusing PO and remains an imminent danger to self or others. If IM antipsychotic is administered, please perform follow-up ECG for QTc monitoring.  - Can give benadryl 50 mg po q6h for anxiety/insomnia

## 2024-01-03 NOTE — BH INPATIENT PSYCHIATRY PROGRESS NOTE - NSBHATTESTCOMMENTATTENDFT_PSY_A_CORE
Pt seen, chart reviewed and case discussed. Agree with assessment and plan. Pt has notable improvement as he is more organized and less irritable, motivated for discharge

## 2024-01-03 NOTE — BH INPATIENT PSYCHIATRY PROGRESS NOTE - NSBHMETABOLIC_PSY_ALL_CORE_FT
BMI: BMI (kg/m2): 25.3 (12-22-23 @ 21:34)  HbA1c: A1C with Estimated Average Glucose Result: 6.5 % (12-23-23 @ 08:00)    Glucose: POCT Blood Glucose.: 118 mg/dL (01-02-24 @ 19:48)    BP: 149/69 (01-03-24 @ 07:57) (120/66 - 158/81)Vital Signs Last 24 Hrs  T(C): 35.2 (01-03-24 @ 07:57), Max: 35.4 (01-02-24 @ 16:08)  T(F): 95.4 (01-03-24 @ 07:57), Max: 95.8 (01-02-24 @ 16:08)  HR: 93 (01-03-24 @ 07:57) (93 - 99)  BP: 149/69 (01-03-24 @ 07:57) (149/69 - 154/83)  BP(mean): --  RR: 16 (01-03-24 @ 07:57) (16 - 16)  SpO2: --      Lipid Panel: Date/Time: 12-23-23 @ 08:00  Cholesterol, Serum: 186  LDL Cholesterol Calculated: 100  HDL Cholesterol, Serum: 46  Total Cholesterol/HDL Ration Measurement: --  Triglycerides, Serum: 202

## 2024-01-03 NOTE — BH INPATIENT PSYCHIATRY DISCHARGE NOTE - NSDCCPCAREPLAN_GEN_ALL_CORE_FT
PRINCIPAL DISCHARGE DIAGNOSIS  Diagnosis: Schizophrenia  Assessment and Plan of Treatment: Please continue medications and follow up with outpatient psychiatric team

## 2024-01-03 NOTE — BH DISCHARGE NOTE NURSING/SOCIAL WORK/PSYCH REHAB - NSBHDCAGENCY1FT_PSY_A_CORE
[facility] ACT Team  [facility] ACT Team  [facility]  [facility]

## 2024-01-03 NOTE — BH INPATIENT PSYCHIATRY PROGRESS NOTE - NSBHCHARTREVIEWVS_PSY_A_CORE FT
Vital Signs Last 24 Hrs  T(C): 35.2 (01-03-24 @ 07:57), Max: 35.4 (01-02-24 @ 16:08)  T(F): 95.4 (01-03-24 @ 07:57), Max: 95.8 (01-02-24 @ 16:08)  HR: 93 (01-03-24 @ 07:57) (93 - 99)  BP: 149/69 (01-03-24 @ 07:57) (149/69 - 154/83)  BP(mean): --  RR: 16 (01-03-24 @ 07:57) (16 - 16)  SpO2: --

## 2024-01-03 NOTE — BH INPATIENT PSYCHIATRY PROGRESS NOTE - NSBHFUPINTERVALHXFT_PSY_A_CORE
Patient was seen and evaluated this morning. Chart was reviewed and, as per nursing, no acute events were noted overnight however patient refused standing insulin. Fingersticks noted. No PRN medications were administered overnight. Patient agreed to receive his standing antipsychotics last night.    Upon examination, patient was seen laying supine with arms crossed and remained in the same position throughout the entirety of the interview. Patient's speech articulation was mildly impaired but coherent. When asked how he was doing and how he had slept overnight, the patient expressed that "everything was good"  and asked the team, "When can I go home" to which he was told he would be prepared for discharge before the weekend. The patient was agreeable to the plan. When asked whether he would feel safe going home the patient and would feel like hurting himself or others he replied, "Yes I feel safe, I'm going to keep to myself at home". No suicidal or homicidal ideations were elicited from the interview. No audio or visual hallucinations were elicited. Overall the interview was not constrained and patient was largely cooperative with the interview in a good mood. Less irritable as compared to yesterday. No internal preoccupations were evident. Patient was seen and evaluated this morning. Chart was reviewed and, as per nursing, no acute events were noted overnight however patient refused standing insulin. Fingersticks noted. No PRN medications were administered overnight. Patient agreed to receive his standing antipsychotics last night.    Upon examination, patient was seen laying supine with arms crossed and remained in the same position throughout the entirety of the interview. Patient's speech articulation was mildly impaired but coherent. When asked how he was doing and how he had slept overnight, the patient expressed that "everything was good"  and asked the team, "When can I go home" to which he was told he would be prepared for discharge before the weekend. The patient was agreeable to the plan. When asked whether he would feel safe going home the patient and would feel like hurting himself or others he replied, "Yes I feel safe, I'm going to keep to myself at home".  Patient denied any suicidal or homicidal ideations. Overall the interview was not constrained and patient was largely cooperative with the interview in a good mood. Less irritable as compared to yesterday. No internal preoccupations were evident. Patient did not directly refer to any delusions on interview as well.

## 2024-01-03 NOTE — BH INPATIENT PSYCHIATRY PROGRESS NOTE - NSBHMSEAFFQUAL_PSY_A_CORE
Patient becomes easily irritable when questioned further regarding his concerns or plans for after discharge/Irritable Patient becomes easily irritable when questioned further regarding his concerns or plans for after discharge/Euthymic

## 2024-01-03 NOTE — BH INPATIENT PSYCHIATRY PROGRESS NOTE - NSBHCHARTREVIEWINVESTIGATE_PSY_A_CORE FT
Ventricular Rate 71 BPM    Atrial Rate 71 BPM    P-R Interval 146 ms    QRS Duration 74 ms    Q-T Interval 408 ms    QTC Calculation(Bazett) 443 ms    P Axis 58 degrees    R Axis 40 degrees    T Axis 4 degrees    Diagnosis Line Normal sinus rhythm  Inferior infarct , age undetermined  Anterolateral infarct , probably recent  Abnormal ECG    Confirmed by DARCIE MONTANO, SERG ([id number]) on 12/21/2023 10:21:14 PM Ventricular Rate 71 BPM    Atrial Rate 71 BPM    P-R Interval 146 ms    QRS Duration 74 ms    Q-T Interval 408 ms    QTC Calculation(Bazett) 443 ms    P Axis 58 degrees    R Axis 40 degrees    T Axis 4 degrees    Diagnosis Line Normal sinus rhythm  Inferior infarct , age undetermined  Anterolateral infarct , probably recent  Abnormal ECG    Confirmed by DARCIE MONTANO, SERG ([id number]) on 12/21/2023 10:21:14 PM

## 2024-01-03 NOTE — BH DISCHARGE NOTE NURSING/SOCIAL WORK/PSYCH REHAB - NSDCNEXTLEVELWHO_PSY_ALL_CORE_FT
to [phone] to [phone] to [phone] and [email or web address] to [phone] and [email or web address]

## 2024-01-03 NOTE — BH INPATIENT PSYCHIATRY DISCHARGE NOTE - NSBHDCMEDICALFT_PSY_A_CORE
#T2DM   - A1C 6.5 from 12/23  - tradjenta 5mg qdaily (Januvia 100mg qdaily is home medication but non-formulary and switched to tradjenta while in hospital)  - metformin 500mg BID    #Hypothyroidism  - c/w Levothyroxine 200mg qdaily  - TSH 1.07 WNL from 12/23    #HLD   - c/w Atorvastatin 10mg qhs    #Hx CLL  - c/w Allopurinol 100mg qdaily    #CAD   - EKG similar to previous   - trop T 17 (WNL) on 12/23    #Amblyopia  - c/w Atropine 1% eye drops qdaily    #Hx asthma/COPD  - symbicort 2 puffs daily

## 2024-01-03 NOTE — BH DISCHARGE NOTE NURSING/SOCIAL WORK/PSYCH REHAB - FACILITY ADDRESS
[address] [facility] [address]  The patient is a 66y Male complaining of weakness. [address] [facility] [address]

## 2024-01-03 NOTE — BH DISCHARGE NOTE NURSING/SOCIAL WORK/PSYCH REHAB - NSCDUDCCRISIS_PSY_A_CORE
[facility] Well  1 (112) [facility]-WELL ([phone])  Text "WELL" to 00987  Website: www.[employer]/.[employer]iz[employer] 1 (430) 621-HOPE (3033) Website: www.safehorizon.org/.National Suicide Prevention Lifeline 5 (417) 057-3936/.  [employer]  1 (320) LIFENET (830-1477)/988 Suicide and Crisis Lifeline [facility] Well  1 (140) [facility]-WELL ([phone])  Text "WELL" to 65799  Website: www.[employer]/.Safe Horizons 1 (271) 621-HOPE (8796) Website: www.safehorizon.org/.National Suicide Prevention Lifeline 1 (359) 038-6788/.  [employer]  1 (816) LIFENET (232-1564)/988 Suicide and Crisis Lifeline

## 2024-01-03 NOTE — BH INPATIENT PSYCHIATRY DISCHARGE NOTE - NSBHFUPINTERVALHXFT_PSY_A_CORE
Patient was seen and evaluated this morning. Chart was reviewed and, as per nursing, no acute events were noted overnight however patient refused standing insulin. Fingersticks noted. No PRN medications were administered overnight. Patient agreed to receive his standing antipsychotics last night.    Upon examination, patient was cooperative though notably still irritable. Patient reported feeling ready for discharge. Patient denied any suicidal or homicidal ideations. Overall the interview was not constrained and patient was largely cooperative with the interview in a good mood. Patient still presents with poor insight, but this was reported to be his baseline. Patient did not appear internally preoccupied and does not appear to be at risk of harming himself or others. Patient was seen and evaluated this morning. Chart was reviewed and, as per nursing, no acute events were noted overnight however patient refused standing insulin. Fingersticks noted. No PRN medications were administered overnight. Patient agreed to receive his standing antipsychotics last night.    Upon examination, patient was cooperative though notably still irritable. Patient reported feeling ready for discharge. Patient denied any suicidal or homicidal ideations. Overall the interview was not constrained and patient was largely cooperative with the interview in a good mood. Patient still presents with poor insight, but this was reported to be his baseline. Patient did not appear internally preoccupied and does not appear to be at risk of harming himself or others.    No signs of abnormal movements different from baseline noted on exam.

## 2024-01-03 NOTE — BH INPATIENT PSYCHIATRY DISCHARGE NOTE - NSBHASSESSSUMMFT_PSY_ALL_CORE
67 yo male domiciled at [facility], with PMH diabetes, CAD, CLL, parathyroid cancer, PPH of chronic schizophrenia, multiple state hospitalizations, most recently between Feb 2019 and Oct 2023 at [place], with AOT, who presented to the ED on 12/21/23 with an unclear complaint. Based on collateral and exam, pt was found to be disorganized and off meds, and subsequently admitted to [facility] under 9.39 legals for decompensated schizophrenia.    Patient remains disorganized, illogical, irritable and was mildly agitated. Patient remains unable to care for himself and would benefit from inpatient psychiatric admission to titrate Clozaril and monitor for improvement and side effects. As patient did not take his Clozaril for more than 2 days prior to admission, patient was restart on Clozaril at a lower dose. After multiple nights of refusing, patient has agreed to standing Clozaril since 12/27/23 and mental status examination has been improved since the medication was taken. Medication dosage has been steadily increased to a current dose of 100 mg qHs. However, may need treatment over objection if patient refuses medication as he did earlier in his admission, given history of poor insight and judgement given his prior history of non-compliance. Will continue at the same dose today, continue to monitor mental status and plan for discharge if condition remains stable.     Patient remains irritable but was reported by his team at [facility] and his outpatient psychiatrist Dr. Solis that the patient's poor insight is at baseline. Patient currently does not appear to be at increased risk of harm to self or others.    Plan:   #Schizophrenia  **Home dose was clozaril 200mg qhs**  - C/w Clozaril PO to 100 mg QHs, monitor for compliance  - C/w Benztropine 1 mg PO BID for EPS ppx  - Clozaril level and CBC ordered for 1/4  - Last ANC per clozaril rems was on 12/21/23: 3810 ANC (per µL)  - Continue to monitor V/S and weekly CBCs (ordered for 12/26) and gradually increase dose appropriately  - Haloperidol Dec 100mg IM last given 12/14/23 ***next due 1/10/24***    #T2DM   - A1C 6.5 from 12/23  - tradjenta 5mg qdaily (Januvia 100mg qdaily is home medication but non-formulary and switched to tradjenta while in hospital)  - metformin 500mg BID  - ISS    #Hypothyroidism  - c/w Levothyroxine 200mg qdaily  - TSH 1.07 WNL from 12/23    #HLD   - c/w Atorvastatin 10mg qhs    #Hx CLL  - c/w Allopurinol 100mg qdaily    #CAD   - EKG similar to previous   - trop T 17 (WNL) on 12/23    #Amblyopia  - c/w Atropine 1% eye drops qdaily    #Hx asthma/COPD  - symbicort 2 puffs daily    #Agitation  - For severe agitation not responding to behavioral intervention, may give haldol 5 mg po q6h prn, ativan 1 mg po q6h prn, hydroxyzine 50 mg po q6h prn, with escalation to IM if patient refusing PO and remains an imminent danger to self or others. If IM antipsychotic is administered, please perform follow-up ECG for QTc monitoring.  - Can give benadryl 50 mg po q6h for anxiety/insomnia 69 yo male domiciled at [facility], with PMH diabetes, CAD, CLL, parathyroid cancer, PPH of chronic schizophrenia, multiple state hospitalizations, most recently between Feb 2019 and Oct 2023 at [place], with AOT, who presented to the ED on 12/21/23 with an unclear complaint. Based on collateral and exam, pt was found to be disorganized and off meds, and subsequently admitted to [facility] under 9.39 legals for decompensated schizophrenia.    Patient remains disorganized, illogical, irritable and was mildly agitated. Patient remains unable to care for himself and would benefit from inpatient psychiatric admission to titrate Clozaril and monitor for improvement and side effects. As patient did not take his Clozaril for more than 2 days prior to admission, patient was restart on Clozaril at a lower dose. After multiple nights of refusing, patient has agreed to standing Clozaril since 12/27/23 and mental status examination has been improved since the medication was taken. Medication dosage has been steadily increased to a current dose of 100 mg qHs. However, may need treatment over objection if patient refuses medication as he did earlier in his admission, given history of poor insight and judgement given his prior history of non-compliance. Will continue at the same dose today, continue to monitor mental status and plan for discharge if condition remains stable.     Patient remains irritable but was reported by his team at [facility] and his outpatient psychiatrist Dr. Solis that the patient's poor insight is at baseline. Patient currently does not appear to be at increased risk of harm to self or others.    Plan:   #Schizophrenia  **Home dose was clozaril 200mg qhs**  - C/w Clozaril PO to 100 mg QHs, monitor for compliance  - C/w Benztropine 1 mg PO BID for EPS ppx  - Clozaril level and CBC ordered for 1/4  - Last ANC per clozaril rems was on 12/21/23: 3810 ANC (per µL)  - Continue to monitor V/S and weekly CBCs (ordered for 12/26) and gradually increase dose appropriately  - Haloperidol Dec 100mg IM last given 12/14/23 ***next due 1/10/24***    #T2DM   - A1C 6.5 from 12/23  - tradjenta 5mg qdaily (Januvia 100mg qdaily is home medication but non-formulary and switched to tradjenta while in hospital)  - metformin 500mg BID  - ISS    #Hypothyroidism  - c/w Levothyroxine 200mg qdaily  - TSH 1.07 WNL from 12/23    #HLD   - c/w Atorvastatin 10mg qhs    #Hx CLL  - c/w Allopurinol 100mg qdaily    #CAD   - EKG similar to previous   - trop T 17 (WNL) on 12/23    #Amblyopia  - c/w Atropine 1% eye drops qdaily    #Hx asthma/COPD  - symbicort 2 puffs daily    #Agitation  - For severe agitation not responding to behavioral intervention, may give haldol 5 mg po q6h prn, ativan 1 mg po q6h prn, hydroxyzine 50 mg po q6h prn, with escalation to IM if patient refusing PO and remains an imminent danger to self or others. If IM antipsychotic is administered, please perform follow-up ECG for QTc monitoring.  - Can give benadryl 50 mg po q6h for anxiety/insomnia

## 2024-01-03 NOTE — BH INPATIENT PSYCHIATRY DISCHARGE NOTE - NSDCMRMEDTOKEN_GEN_ALL_CORE_FT
aspirin 81 mg oral capsule: 1 cap(s) orally once a day  aspirin 81 mg oral tablet, chewable: 1 tab(s) orally once a day starting on 4/15. It is important that it is NOT re-started before then as it is being held to prevent worsening of the head bleed.  atorvastatin 40 mg oral tablet: 1 tab(s) orally once a day  budesonide-formoterol 160 mcg-4.5 mcg/inh inhalation aerosol: 2 puff(s) inhaled 2 times a day  docusate sodium 100 mg oral capsule: 3 cap(s) orally once a day  hydroxychloroquine 200 mg oral tablet: 2 tab(s) orally once a day for 2 days for COVID viral infection  Invega Sustenna 117 mg/0.75 mL intramuscular suspension, extended release: 1 application intramuscular every 30 days, next dose due 5/16  Januvia 100 mg oral tablet: 1 tab(s) orally once a day  levETIRAcetam 500 mg oral tablet: 1 tab(s) orally 2 times a day for 5 days, for seizure prophylaxis after subarachnoid hemorrhage  levothyroxine 137 mcg (0.137 mg) oral tablet: 1 tab(s) orally once a day  Lipitor 10 mg oral tablet: 1 tab(s) orally once a day  metFORMIN 500 mg oral tablet: 1 tab(s) orally 2 times a day  metoprolol succinate 25 mg oral tablet, extended release: 1 tab(s) orally once a day  Plavix 75 mg oral tablet: 1 tab(s) orally once a day starting on 4/15, It is important that it is NOT re-started before then as it is being held to prevent worsening head bleed.      allopurinol 100 mg oral tablet: 1 tab(s) orally once a day  atorvastatin 10 mg oral tablet: 1 tab(s) orally once a day (at bedtime)  atropine 1% ophthalmic solution: 1 drop(s) to each affected eye once a day  benztropine 1 mg oral tablet: 1 tab(s) orally 2 times a day  budesonide-formoterol 160 mcg-4.5 mcg/inh inhalation aerosol: 2 puff(s) inhaled 2 times a day  cloZAPine 100 mg oral tablet: 1 tab(s) orally once a day (at bedtime)  docusate sodium 100 mg oral capsule: 3 cap(s) orally once a day  hydrOXYzine hydrochloride 50 mg oral tablet: 1 tab(s) orally every 6 hours as needed for anxiety  Januvia 100 mg oral tablet: 1 tab(s) orally once a day  levothyroxine 200 mcg (0.2 mg) oral tablet: 1 tab(s) orally once a day  metFORMIN 500 mg oral tablet: 1 tab(s) orally 2 times a day

## 2024-01-03 NOTE — BH INPATIENT PSYCHIATRY DISCHARGE NOTE - DESCRIPTION
Domiciled at [facility], no known family or friends or relationship, disabled. Domiciled at [facility], no known family or friends or relationship, disabled.

## 2024-01-03 NOTE — BH INPATIENT PSYCHIATRY PROGRESS NOTE - OTHER
superficially cooperative no obvious internally pre-occupations Patient is superficially calm but notably paranoid.

## 2024-01-03 NOTE — BH INPATIENT PSYCHIATRY DISCHARGE NOTE - NSBHMSEAFFQUAL_PSY_A_CORE
Patient becomes easily irritable when questioned further regarding his concerns or plans for after discharge/Irritable
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
## 2024-01-03 NOTE — BH DISCHARGE NOTE NURSING/SOCIAL WORK/PSYCH REHAB - PATIENT PORTAL LINK FT
You can access the FollowMyHealth Patient Portal offered by [facility] by registering at the following website: [email or web address]. By joining [employer]’s FollowMyHealth portal, you will also be able to view your health information using other applications (apps) compatible with our system. You can access the FollowMyHealth Patient Portal offered by [facility] by registering at the following website: [email or web address]. By joining [employer]’s FollowMyHealth portal, you will also be able to view your health information using other applications (apps) compatible with our system.

## 2024-01-03 NOTE — BH INPATIENT PSYCHIATRY PROGRESS NOTE - NSBHATTESTBILLING_PSY_A_CORE
46383-Hlaqoxrkrw OBS or IP - low complexity OR 25-34 mins 63059-Bjycvikoiw OBS or IP - low complexity OR 25-34 mins

## 2024-01-04 LAB
ANISOCYTOSIS BLD QL: SLIGHT — SIGNIFICANT CHANGE UP
ANISOCYTOSIS BLD QL: SLIGHT — SIGNIFICANT CHANGE UP
BASOPHILS # BLD AUTO: 0 K/UL — SIGNIFICANT CHANGE UP (ref 0–0.2)
BASOPHILS # BLD AUTO: 0 K/UL — SIGNIFICANT CHANGE UP (ref 0–0.2)
BASOPHILS NFR BLD AUTO: 0 % — SIGNIFICANT CHANGE UP (ref 0–1)
BASOPHILS NFR BLD AUTO: 0 % — SIGNIFICANT CHANGE UP (ref 0–1)
EOSINOPHIL NFR BLD AUTO: 0 % — SIGNIFICANT CHANGE UP (ref 0–8)
EOSINOPHIL NFR BLD AUTO: 0 % — SIGNIFICANT CHANGE UP (ref 0–8)
GLUCOSE BLDC GLUCOMTR-MCNC: 157 MG/DL — HIGH (ref 70–99)
GLUCOSE BLDC GLUCOMTR-MCNC: 157 MG/DL — HIGH (ref 70–99)
GLUCOSE BLDC GLUCOMTR-MCNC: 81 MG/DL — SIGNIFICANT CHANGE UP (ref 70–99)
GLUCOSE BLDC GLUCOMTR-MCNC: 81 MG/DL — SIGNIFICANT CHANGE UP (ref 70–99)
HCT VFR BLD CALC: 41.2 % — LOW (ref 42–52)
HCT VFR BLD CALC: 41.2 % — LOW (ref 42–52)
HGB BLD-MCNC: 13 G/DL — LOW (ref 14–18)
HGB BLD-MCNC: 13 G/DL — LOW (ref 14–18)
HYPOCHROMIA BLD QL: SLIGHT — SIGNIFICANT CHANGE UP
HYPOCHROMIA BLD QL: SLIGHT — SIGNIFICANT CHANGE UP
LYMPHOCYTES # BLD AUTO: 1.88 K/UL — SIGNIFICANT CHANGE UP (ref 1.2–3.4)
LYMPHOCYTES # BLD AUTO: 1.88 K/UL — SIGNIFICANT CHANGE UP (ref 1.2–3.4)
LYMPHOCYTES # BLD AUTO: 28 % — SIGNIFICANT CHANGE UP (ref 20.5–51.1)
LYMPHOCYTES # BLD AUTO: 28 % — SIGNIFICANT CHANGE UP (ref 20.5–51.1)
MANUAL SMEAR VERIFICATION: SIGNIFICANT CHANGE UP
MANUAL SMEAR VERIFICATION: SIGNIFICANT CHANGE UP
MCHC RBC-ENTMCNC: 28.4 PG — SIGNIFICANT CHANGE UP (ref 27–31)
MCHC RBC-ENTMCNC: 28.4 PG — SIGNIFICANT CHANGE UP (ref 27–31)
MCHC RBC-ENTMCNC: 31.6 G/DL — LOW (ref 32–37)
MCHC RBC-ENTMCNC: 31.6 G/DL — LOW (ref 32–37)
MCV RBC AUTO: 90.2 FL — SIGNIFICANT CHANGE UP (ref 80–94)
MCV RBC AUTO: 90.2 FL — SIGNIFICANT CHANGE UP (ref 80–94)
MONOCYTES # BLD AUTO: 0.4 K/UL — SIGNIFICANT CHANGE UP (ref 0.1–0.6)
MONOCYTES # BLD AUTO: 0.4 K/UL — SIGNIFICANT CHANGE UP (ref 0.1–0.6)
MONOCYTES NFR BLD AUTO: 6 % — SIGNIFICANT CHANGE UP (ref 1.7–9.3)
MONOCYTES NFR BLD AUTO: 6 % — SIGNIFICANT CHANGE UP (ref 1.7–9.3)
NEUTROPHILS # BLD AUTO: 3.15 K/UL — SIGNIFICANT CHANGE UP (ref 1.4–6.5)
NEUTROPHILS # BLD AUTO: 3.15 K/UL — SIGNIFICANT CHANGE UP (ref 1.4–6.5)
NEUTROPHILS NFR BLD AUTO: 47 % — SIGNIFICANT CHANGE UP (ref 42.2–75.2)
NEUTROPHILS NFR BLD AUTO: 47 % — SIGNIFICANT CHANGE UP (ref 42.2–75.2)
NRBC # BLD: 0 /100 WBCS — SIGNIFICANT CHANGE UP (ref 0–0)
NRBC # BLD: 0 /100 WBCS — SIGNIFICANT CHANGE UP (ref 0–0)
NRBC # BLD: SIGNIFICANT CHANGE UP /100 WBCS (ref 0–0)
NRBC # BLD: SIGNIFICANT CHANGE UP /100 WBCS (ref 0–0)
OVALOCYTES BLD QL SMEAR: SLIGHT — SIGNIFICANT CHANGE UP
OVALOCYTES BLD QL SMEAR: SLIGHT — SIGNIFICANT CHANGE UP
PLAT MORPH BLD: NORMAL — SIGNIFICANT CHANGE UP
PLAT MORPH BLD: NORMAL — SIGNIFICANT CHANGE UP
PLATELET # BLD AUTO: 93 K/UL — LOW (ref 130–400)
PLATELET # BLD AUTO: 93 K/UL — LOW (ref 130–400)
PMV BLD: 10.4 FL — SIGNIFICANT CHANGE UP (ref 7.4–10.4)
PMV BLD: 10.4 FL — SIGNIFICANT CHANGE UP (ref 7.4–10.4)
RBC # BLD: 4.57 M/UL — LOW (ref 4.7–6.1)
RBC # BLD: 4.57 M/UL — LOW (ref 4.7–6.1)
RBC # FLD: 15.9 % — HIGH (ref 11.5–14.5)
RBC # FLD: 15.9 % — HIGH (ref 11.5–14.5)
RBC BLD AUTO: ABNORMAL
RBC BLD AUTO: ABNORMAL
VARIANT LYMPHS # BLD: 19 % — HIGH (ref 0–5)
VARIANT LYMPHS # BLD: 19 % — HIGH (ref 0–5)
WBC # BLD: 6.7 K/UL — SIGNIFICANT CHANGE UP (ref 4.8–10.8)
WBC # BLD: 6.7 K/UL — SIGNIFICANT CHANGE UP (ref 4.8–10.8)
WBC # FLD AUTO: 6.7 K/UL — SIGNIFICANT CHANGE UP (ref 4.8–10.8)
WBC # FLD AUTO: 6.7 K/UL — SIGNIFICANT CHANGE UP (ref 4.8–10.8)

## 2024-01-04 RX ORDER — PALIPERIDONE 1.5 MG/1
1 TABLET, EXTENDED RELEASE ORAL
Qty: 1 | Refills: 0
Start: 2024-01-04

## 2024-01-04 RX ORDER — ATORVASTATIN CALCIUM 80 MG/1
1 TABLET, FILM COATED ORAL
Qty: 14 | Refills: 0
Start: 2024-01-04 | End: 2024-01-17

## 2024-01-04 RX ORDER — SITAGLIPTIN 50 MG/1
1 TABLET, FILM COATED ORAL
Qty: 14 | Refills: 0
Start: 2024-01-04 | End: 2024-01-17

## 2024-01-04 RX ORDER — LEVOTHYROXINE SODIUM 125 MCG
1 TABLET ORAL
Qty: 14 | Refills: 0
Start: 2024-01-04 | End: 2024-01-17

## 2024-01-04 RX ORDER — METFORMIN HYDROCHLORIDE 850 MG/1
1 TABLET ORAL
Qty: 28 | Refills: 0
Start: 2024-01-04 | End: 2024-01-17

## 2024-01-04 RX ORDER — ATROPINE SULFATE 1 %
1 DROPS OPHTHALMIC (EYE)
Qty: 1 | Refills: 0
Start: 2024-01-04 | End: 2024-01-17

## 2024-01-04 RX ORDER — BENZTROPINE MESYLATE 1 MG
1 TABLET ORAL
Qty: 28 | Refills: 0
Start: 2024-01-04 | End: 2024-01-17

## 2024-01-04 RX ORDER — HYDROXYZINE HCL 10 MG
1 TABLET ORAL
Qty: 56 | Refills: 0
Start: 2024-01-04 | End: 2024-01-17

## 2024-01-04 RX ORDER — ALLOPURINOL 300 MG
1 TABLET ORAL
Qty: 14 | Refills: 0
Start: 2024-01-04 | End: 2024-01-17

## 2024-01-04 RX ORDER — CLOZAPINE 150 MG/1
1 TABLET, ORALLY DISINTEGRATING ORAL
Qty: 14 | Refills: 0
Start: 2024-01-04 | End: 2024-01-17

## 2024-01-04 RX ADMIN — METFORMIN HYDROCHLORIDE 500 MILLIGRAM(S): 850 TABLET ORAL at 08:26

## 2024-01-04 RX ADMIN — ATORVASTATIN CALCIUM 10 MILLIGRAM(S): 80 TABLET, FILM COATED ORAL at 20:13

## 2024-01-04 RX ADMIN — LINAGLIPTIN 5 MILLIGRAM(S): 5 TABLET, FILM COATED ORAL at 08:26

## 2024-01-04 RX ADMIN — Medication 1 MILLIGRAM(S): at 20:13

## 2024-01-04 RX ADMIN — Medication 100 MILLIGRAM(S): at 08:26

## 2024-01-04 RX ADMIN — CLOZAPINE 100 MILLIGRAM(S): 150 TABLET, ORALLY DISINTEGRATING ORAL at 20:13

## 2024-01-04 RX ADMIN — Medication 1 MILLIGRAM(S): at 08:26

## 2024-01-04 NOTE — BH INPATIENT PSYCHIATRY PROGRESS NOTE - NSBHCHARTREVIEWINVESTIGATE_PSY_A_CORE FT
Ventricular Rate 71 BPM    Atrial Rate 71 BPM    P-R Interval 146 ms    QRS Duration 74 ms    Q-T Interval 408 ms    QTC Calculation(Bazett) 443 ms    P Axis 58 degrees    R Axis 40 degrees    T Axis 4 degrees    Diagnosis Line Normal sinus rhythm  Inferior infarct , age undetermined  Anterolateral infarct , probably recent  Abnormal ECG    Confirmed by DARCIE MONTANO, SERG ([id number]) on 12/21/2023 10:21:14 PM Ventricular Rate 71 BPM    Atrial Rate 71 BPM    P-R Interval 146 ms    QRS Duration 74 ms    Q-T Interval 408 ms    QTC Calculation(Bazett) 443 ms    P Axis 58 degrees    R Axis 40 degrees    T Axis 4 degrees    Diagnosis Line Normal sinus rhythm  Inferior infarct , age undetermined  Anterolateral infarct , probably recent  Abnormal ECG    Confirmed by DARCIE MONTANO, SERG ([id number]) on 12/21/2023 10:21:14 PM

## 2024-01-04 NOTE — BH INPATIENT PSYCHIATRY PROGRESS NOTE - NSTXMEDICINTERMD_PSY_ALL_CORE
meds, psychoeducation

## 2024-01-04 NOTE — BH INPATIENT PSYCHIATRY PROGRESS NOTE - NSBHMSEBODY_PSY_A_CORE
Average build
Average build
Overweight
Average build
Overweight
Obese
Average build

## 2024-01-04 NOTE — BH INPATIENT PSYCHIATRY PROGRESS NOTE - NSBHMETABOLICLABS_PSY_ALL_CORE
Labs within last 12 months
All labs not within last 12 months, ordered
Labs within last 12 months
Labs within last 12 months

## 2024-01-04 NOTE — BH INPATIENT PSYCHIATRY PROGRESS NOTE - NSTXMEDICDATETRGT_PSY_ALL_CORE
05-Jan-2024

## 2024-01-04 NOTE — BH INPATIENT PSYCHIATRY PROGRESS NOTE - NSBHFUPINTERVALHXFT_PSY_A_CORE
Patient was seen and evaluated this morning. Chart was reviewed and, as per nursing, no acute events were noted overnight however patient refused standing insulin. Fingersticks noted. No PRN medications were administered overnight. Patient agreed to receive his standing antipsychotics last night. Patient did refuse his lab work this morning.    Upon examination, patient was standing at doorway. Patient was cooperative and not agitated or paranoid but did appear consistently irritable, making multiple sexist and racist remarks during the interview. Patient still endorses the desire to be discharged but remains guarded about where he wishes to go after the hospital. Patient did not appear internally preoccupied and does not appear to be at risk of harming himself or others.    Patient's mental health aide at his current residence [facility] were contacted who reported patient's poor insight but irritable affect and sometimes verbally abusive patterns are at this baseline. Patient's ICM Care coordinator Daniel Boston contacted at [phone] who reported that the patient is currently under an AOT but does not currently have an ACT Team. Patient's  is James Lombardo, contact number [phone]. Patient's outpatient psychiatrist is Dr. Solis, who was contacted at [phone], who also reported patient's poor insight and irritable behavior is at baseline.   Patient was seen and evaluated this morning. Chart was reviewed and, as per nursing, no acute events were noted overnight however patient refused standing insulin. Fingersticks noted. No PRN medications were administered overnight. Patient agreed to receive his standing antipsychotics last night. Patient did refuse his lab work this morning.    Upon examination, patient was standing at doorway. Patient was cooperative and not agitated or paranoid but did appear consistently irritable, making multiple sexist and racist remarks during the interview. Patient still endorses the desire to be discharged but remains guarded about where he wishes to go after the hospital. Patient did not appear internally preoccupied and does not appear to be at risk of harming himself or others.    Patient's mental health aide at his current residence [facility] were contacted who reported patient's poor insight but irritable affect and sometimes verbally abusive patterns are at this baseline. Patient's ICM Care coordinator Daniel Boston contacted at [phone] who reported that the patient is currently under an AOT but does not currently have an ACT Team. Patient's  is James Lombardo, contact number [phone]. Patient's outpatient psychiatrist is Dr. Solis, who was contacted at [phone], who also reported patient's poor insight and irritable behavior is at baseline.

## 2024-01-04 NOTE — BH INPATIENT PSYCHIATRY PROGRESS NOTE - NSBHMSEKNOW_PSY_A_CORE
Unable to assess
Impaired
Unable to assess

## 2024-01-04 NOTE — BH INPATIENT PSYCHIATRY PROGRESS NOTE - PRN MEDS
MEDICATIONS  (PRN):  dextrose Oral Gel 15 Gram(s) Oral once PRN Blood Glucose LESS THAN 70 milliGRAM(s)/deciliter  diphenhydrAMINE 50 milliGRAM(s) Oral every 6 hours PRN Extrapyramidal prophylaxis  haloperidol     Tablet 5 milliGRAM(s) Oral every 6 hours PRN Agitation  hydrOXYzine hydrochloride 50 milliGRAM(s) Oral every 6 hours PRN anxiety  LORazepam     Tablet 1 milliGRAM(s) Oral every 6 hours PRN Agitation  

## 2024-01-04 NOTE — BH INPATIENT PSYCHIATRY PROGRESS NOTE - NSTXMEDICDATEEST_PSY_ALL_CORE
22-Dec-2023

## 2024-01-04 NOTE — BH INPATIENT PSYCHIATRY PROGRESS NOTE - NSBHATTESTBILLING_PSY_A_CORE
[id number] OBS or IP - low complexity OR 25-34 mins [id number]-Kjrekxdqgc OBS or IP - low complexity OR 25-34 mins
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
## 2024-01-04 NOTE — BH INPATIENT PSYCHIATRY PROGRESS NOTE - NSTXPSYCHOINTERMD_PSY_ALL_CORE
meds, psychoeducation, milieu therapy 

## 2024-01-04 NOTE — BH INPATIENT PSYCHIATRY PROGRESS NOTE - NSCGISEVERILLNESS_PSY_ALL_CORE
5 = Markedly ill - intrusive symptoms that distinctly impair social/occupational function or cause intrusive levels of distress
6 = Severely ill - disruptive pathology, behavior and function are frequently influenced by symptoms, may require assistance from others
5 = Markedly ill - intrusive symptoms that distinctly impair social/occupational function or cause intrusive levels of distress

## 2024-01-04 NOTE — BH INPATIENT PSYCHIATRY PROGRESS NOTE - NSBHMETABOLIC_PSY_ALL_CORE_FT
BMI: BMI (kg/m2): 25.3 (12-22-23 @ 21:34)  HbA1c: A1C with Estimated Average Glucose Result: 6.5 % (12-23-23 @ 08:00)    Glucose: POCT Blood Glucose.: 81 mg/dL (01-04-24 @ 10:32)    BP: 132/71 (01-04-24 @ 07:49) (132/71 - 158/81)Vital Signs Last 24 Hrs  T(C): 36.7 (01-04-24 @ 07:49), Max: 36.7 (01-04-24 @ 07:49)  T(F): 98.1 (01-04-24 @ 07:49), Max: 98.1 (01-04-24 @ 07:49)  HR: 95 (01-04-24 @ 07:49) (91 - 101)  BP: 132/71 (01-04-24 @ 07:49) (132/71 - 144/75)  BP(mean): --  RR: 17 (01-04-24 @ 07:49) (16 - 17)  SpO2: --      Lipid Panel: Date/Time: 12-23-23 @ 08:00  Cholesterol, Serum: 186  LDL Cholesterol Calculated: 100  HDL Cholesterol, Serum: 46  Total Cholesterol/HDL Ration Measurement: --  Triglycerides, Serum: 202

## 2024-01-04 NOTE — BH INPATIENT PSYCHIATRY PROGRESS NOTE - NSDCCRITERIA_PSY_ALL_CORE
improvement of medication compliance and psychotic Sx

## 2024-01-04 NOTE — BH INPATIENT PSYCHIATRY PROGRESS NOTE - NSBHCONTPROVIDER_PSY_ALL_CORE
No, attempted...
Yes...
No, attempted...
No, attempted...

## 2024-01-04 NOTE — BH INPATIENT PSYCHIATRY PROGRESS NOTE - NSTXPSYCHODATEEST_PSY_ALL_CORE
28-Dec-2023
23-Dec-2023
23-Dec-2023
26-Dec-2023
28-Dec-2023
26-Dec-2023
26-Dec-2023
28-Dec-2023
22-Dec-2023

## 2024-01-04 NOTE — BH INPATIENT PSYCHIATRY PROGRESS NOTE - NSBHCHARTREVIEWVS_PSY_A_CORE FT
Vital Signs Last 24 Hrs  T(C): 36.7 (01-04-24 @ 07:49), Max: 36.7 (01-04-24 @ 07:49)  T(F): 98.1 (01-04-24 @ 07:49), Max: 98.1 (01-04-24 @ 07:49)  HR: 95 (01-04-24 @ 07:49) (91 - 101)  BP: 132/71 (01-04-24 @ 07:49) (132/71 - 144/75)  BP(mean): --  RR: 17 (01-04-24 @ 07:49) (16 - 17)  SpO2: --

## 2024-01-04 NOTE — BH INPATIENT PSYCHIATRY PROGRESS NOTE - CURRENT MEDICATION
MEDICATIONS  (STANDING):  allopurinol 100 milliGRAM(s) Oral daily  atorvastatin 10 milliGRAM(s) Oral at bedtime  atropine 1% Solution 1 Drop(s) Both EYES daily  benztropine 1 milliGRAM(s) Oral two times a day  bisacodyl 5 milliGRAM(s) Oral at bedtime  budesonide  80 MICROgram(s)/formoterol 4.5 MICROgram(s) Inhaler 2 Puff(s) Inhalation two times a day  cloZAPine 100 milliGRAM(s) Oral at bedtime  dextrose 5%. 1000 milliLiter(s) (50 mL/Hr) IV Continuous <Continuous>  dextrose 5%. 1000 milliLiter(s) (100 mL/Hr) IV Continuous <Continuous>  dextrose 50% Injectable 12.5 Gram(s) IV Push once  dextrose 50% Injectable 25 Gram(s) IV Push once  dextrose 50% Injectable 25 Gram(s) IV Push once  glucagon  Injectable 1 milliGRAM(s) IntraMuscular once  insulin lispro (ADMELOG) corrective regimen sliding scale   SubCutaneous three times a day before meals  insulin lispro (ADMELOG) corrective regimen sliding scale   SubCutaneous at bedtime  levothyroxine 200 MICROGram(s) Oral daily  linagliptin 5 milliGRAM(s) Oral daily  metFORMIN 500 milliGRAM(s) Oral two times a day    MEDICATIONS  (PRN):  dextrose Oral Gel 15 Gram(s) Oral once PRN Blood Glucose LESS THAN 70 milliGRAM(s)/deciliter  diphenhydrAMINE 50 milliGRAM(s) Oral every 6 hours PRN Extrapyramidal prophylaxis  haloperidol     Tablet 5 milliGRAM(s) Oral every 6 hours PRN Agitation  hydrOXYzine hydrochloride 50 milliGRAM(s) Oral every 6 hours PRN anxiety  LORazepam     Tablet 1 milliGRAM(s) Oral every 6 hours PRN Agitation

## 2024-01-04 NOTE — BH INPATIENT PSYCHIATRY PROGRESS NOTE - NSBHMSESPEECH_PSY_A_CORE
Abnormal as indicated, otherwise normal...
Normal volume, rate, productivity, spontaneity and articulation
Abnormal as indicated, otherwise normal...
Normal volume, rate, productivity, spontaneity and articulation

## 2024-01-04 NOTE — BH INPATIENT PSYCHIATRY PROGRESS NOTE - NSBHMSEPERCEPT_PSY_A_CORE
No abnormalities/Other
No abnormalities
No abnormalities/Other
Other
No abnormalities/Other

## 2024-01-04 NOTE — BH INPATIENT PSYCHIATRY PROGRESS NOTE - NSBHCHARTREVIEWLAB_PSY_A_CORE FT
Thyroid Stimulating Hormone w/FT4 Reflex (12.23.23 @ 16:10)   Thyroid Stimulating Hormone w/FT4 Reflex: 1.07 uIU/mL  
                                  CAPILLARY BLOOD GLUCOSE      POCT Blood Glucose.: 131 mg/dL (22 Dec 2023 16:35)

## 2024-01-04 NOTE — BH INPATIENT PSYCHIATRY PROGRESS NOTE - NSTXPSYCHOGOAL_PSY_ALL_CORE
Will identify 2 coping skills that help mitigate hallucinations
Will verbalize 1 strategy to successfully cope with psychotic symptoms
Will identify 2 coping skills that help mitigate hallucinations
Will verbalize 1 strategy to successfully cope with psychotic symptoms
Will identify 2 coping skills that help mitigate hallucinations

## 2024-01-04 NOTE — BH INPATIENT PSYCHIATRY PROGRESS NOTE - NSBHATTESTTYPESTAFF_PSY_A_CORE
Resident
Resident/Student

## 2024-01-05 VITALS
HEART RATE: 107 BPM | RESPIRATION RATE: 18 BRPM | TEMPERATURE: 96 F | DIASTOLIC BLOOD PRESSURE: 82 MMHG | SYSTOLIC BLOOD PRESSURE: 159 MMHG

## 2024-01-05 LAB
CLOZAPINE SERPL-MCNC: 97 NG/ML — LOW (ref 350–600)
CLOZAPINE SERPL-MCNC: 97 NG/ML — LOW (ref 350–600)

## 2024-01-05 RX ADMIN — METFORMIN HYDROCHLORIDE 500 MILLIGRAM(S): 850 TABLET ORAL at 08:11

## 2024-01-05 RX ADMIN — Medication 1 MILLIGRAM(S): at 08:10

## 2024-01-05 RX ADMIN — Medication 100 MILLIGRAM(S): at 08:10

## 2024-01-05 RX ADMIN — LINAGLIPTIN 5 MILLIGRAM(S): 5 TABLET, FILM COATED ORAL at 08:10

## 2024-01-06 ENCOUNTER — EMERGENCY (EMERGENCY)
Facility: HOSPITAL | Age: 69
LOS: 0 days | Discharge: HOME | End: 2024-01-06
Attending: EMERGENCY MEDICINE
Payer: MEDICARE

## 2024-01-06 VITALS
WEIGHT: 149.91 LBS | SYSTOLIC BLOOD PRESSURE: 142 MMHG | HEART RATE: 120 BPM | OXYGEN SATURATION: 95 % | HEIGHT: 67 IN | DIASTOLIC BLOOD PRESSURE: 78 MMHG | RESPIRATION RATE: 17 BRPM | TEMPERATURE: 98 F
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
## 2024-01-06 DIAGNOSIS — E11.9 TYPE 2 DIABETES MELLITUS WITHOUT COMPLICATIONS: ICD-10-CM

## 2024-01-06 DIAGNOSIS — E78.5 HYPERLIPIDEMIA, UNSPECIFIED: ICD-10-CM

## 2024-01-06 DIAGNOSIS — F20.9 SCHIZOPHRENIA, UNSPECIFIED: ICD-10-CM

## 2024-01-06 DIAGNOSIS — I25.10 ATHEROSCLEROTIC HEART DISEASE OF NATIVE CORONARY ARTERY WITHOUT ANGINA PECTORIS: ICD-10-CM

## 2024-01-06 DIAGNOSIS — I25.2 OLD MYOCARDIAL INFARCTION: ICD-10-CM

## 2024-01-06 DIAGNOSIS — Z85.6 PERSONAL HISTORY OF LEUKEMIA: ICD-10-CM

## 2024-01-06 DIAGNOSIS — I10 ESSENTIAL (PRIMARY) HYPERTENSION: ICD-10-CM

## 2024-01-06 DIAGNOSIS — Z85.46 PERSONAL HISTORY OF MALIGNANT NEOPLASM OF PROSTATE: ICD-10-CM

## 2024-01-06 DIAGNOSIS — S01.112A LACERATION WITHOUT FOREIGN BODY OF LEFT EYELID AND PERIOCULAR AREA, INITIAL ENCOUNTER: ICD-10-CM
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
## 2024-01-06 PROCEDURE — 70486 CT MAXILLOFACIAL W/O DYE: CPT | Mod: 26,MA

## 2024-01-06 PROCEDURE — 12011 RPR F/E/E/N/L/M 2.5 CM/<: CPT

## 2024-01-06 PROCEDURE — 96372 THER/PROPH/DIAG INJ SC/IM: CPT | Mod: XU

## 2024-01-06 PROCEDURE — 70450 CT HEAD/BRAIN W/O DYE: CPT | Mod: MA

## 2024-01-06 PROCEDURE — 70486 CT MAXILLOFACIAL W/O DYE: CPT | Mod: MA

## 2024-01-06 PROCEDURE — 99284 EMERGENCY DEPT VISIT MOD MDM: CPT | Mod: 25

## 2024-01-06 PROCEDURE — 99285 EMERGENCY DEPT VISIT HI MDM: CPT | Mod: FS,25

## 2024-01-06 PROCEDURE — G0168: CPT

## 2024-01-06 PROCEDURE — 70450 CT HEAD/BRAIN W/O DYE: CPT | Mod: 26,MA

## 2024-01-06 RX ORDER — HALOPERIDOL DECANOATE 100 MG/ML
5 INJECTION INTRAMUSCULAR ONCE
Refills: 0 | Status: COMPLETED | OUTPATIENT
Start: 2024-01-06 | End: 2024-01-06

## 2024-01-06 RX ADMIN — HALOPERIDOL DECANOATE 5 MILLIGRAM(S): 100 INJECTION INTRAMUSCULAR at 20:19

## 2024-01-06 RX ADMIN — Medication 2 MILLIGRAM(S): at 20:19

## 2024-01-06 NOTE — ED PROVIDER NOTE - PATIENT PORTAL LINK FT
You can access the FollowMyHealth Patient Portal offered by [facility] by registering at the following website: [email or web address]. By joining [employer]’s FollowMyHealth portal, you will also be able to view your health information using other applications (apps) compatible with our system. You can access the FollowMyHealth Patient Portal offered by [facility] by registering at the following website: [email or web address]. By joining [employer]’s FollowMyHealth portal, you will also be able to view your health information using other applications (apps) compatible with our system.

## 2024-01-06 NOTE — ED PROCEDURE NOTE - CPROC ED TIME OUT STATEMENT1
“Patient's name, , procedure and correct site were confirmed during the [place] Timeout.” “Patient's name, , procedure and correct site were confirmed during the [place] Timeout.”
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
## 2024-01-06 NOTE — ED ADULT NURSE NOTE - NSFALLHARMRISKINTERV_ED_ALL_ED
Assistance OOB with selected safe patient handling equipment if applicable/Assistance with ambulation/Communicate risk of Fall with Harm to all staff, patient, and family/Provide visual cue: red socks, yellow wristband, yellow gown, etc/Reinforce activity limits and safety measures with patient and family/Bed in lowest position, wheels locked, appropriate side rails in place/Call bell, personal items and telephone in reach/Instruct patient to call for assistance before getting out of bed/chair/stretcher/Non-slip footwear applied when patient is off stretcher/[place] to call system/Physically safe environment - no spills, clutter or unnecessary equipment/Purposeful Proactive Rounding/Room/bathroom lighting operational, light cord in reach Assistance OOB with selected safe patient handling equipment if applicable/Assistance with ambulation/Communicate risk of Fall with Harm to all staff, patient, and family/Provide visual cue: red socks, yellow wristband, yellow gown, etc/Reinforce activity limits and safety measures with patient and family/Bed in lowest position, wheels locked, appropriate side rails in place/Call bell, personal items and telephone in reach/Instruct patient to call for assistance before getting out of bed/chair/stretcher/Non-slip footwear applied when patient is off stretcher/[place] to call system/Physically safe environment - no spills, clutter or unnecessary equipment/Purposeful Proactive Rounding/Room/bathroom lighting operational, light cord in reach

## 2024-01-06 NOTE — ED PROVIDER NOTE - NSFOLLOWUPCLINICS_GEN_ALL_ED_FT
[facility]  Medicine  [address], [place]   Phone: [phone]  Fax:   Follow Up Time: 7-10 Days     [facility]  Medicine  [address], [place]   Phone: [phone]  Fax:   Follow Up Time: 7-10 Days

## 2024-01-07 VITALS
DIASTOLIC BLOOD PRESSURE: 60 MMHG | OXYGEN SATURATION: 95 % | SYSTOLIC BLOOD PRESSURE: 116 MMHG | RESPIRATION RATE: 18 BRPM | HEART RATE: 91 BPM

## 2024-01-07 LAB
MANUAL DIF COMMENT BLD-IMP: SIGNIFICANT CHANGE UP
MANUAL DIF COMMENT BLD-IMP: SIGNIFICANT CHANGE UP

## 2024-01-08 DIAGNOSIS — E78.5 HYPERLIPIDEMIA, UNSPECIFIED: ICD-10-CM

## 2024-01-08 DIAGNOSIS — I25.10 ATHEROSCLEROTIC HEART DISEASE OF NATIVE CORONARY ARTERY WITHOUT ANGINA PECTORIS: ICD-10-CM

## 2024-01-08 DIAGNOSIS — C91.10 CHRONIC LYMPHOCYTIC LEUKEMIA OF B-CELL TYPE NOT HAVING ACHIEVED REMISSION: ICD-10-CM

## 2024-01-08 DIAGNOSIS — F20.9 SCHIZOPHRENIA, UNSPECIFIED: ICD-10-CM

## 2024-01-08 DIAGNOSIS — E11.9 TYPE 2 DIABETES MELLITUS WITHOUT COMPLICATIONS: ICD-10-CM

## 2024-01-08 DIAGNOSIS — E03.9 HYPOTHYROIDISM, UNSPECIFIED: ICD-10-CM

## 2024-01-09 ENCOUNTER — EMERGENCY (EMERGENCY)
Facility: HOSPITAL | Age: 69
LOS: 0 days | Discharge: HOME | End: 2024-01-09
Attending: EMERGENCY MEDICINE
Payer: MEDICARE

## 2024-01-09 VITALS
HEIGHT: 67 IN | HEART RATE: 70 BPM | WEIGHT: 171.08 LBS | TEMPERATURE: 98 F | SYSTOLIC BLOOD PRESSURE: 139 MMHG | DIASTOLIC BLOOD PRESSURE: 78 MMHG | OXYGEN SATURATION: 100 % | RESPIRATION RATE: 18 BRPM

## 2024-01-09 DIAGNOSIS — I10 ESSENTIAL (PRIMARY) HYPERTENSION: ICD-10-CM

## 2024-01-09 DIAGNOSIS — Z85.6 PERSONAL HISTORY OF LEUKEMIA: ICD-10-CM

## 2024-01-09 DIAGNOSIS — I25.10 ATHEROSCLEROTIC HEART DISEASE OF NATIVE CORONARY ARTERY WITHOUT ANGINA PECTORIS: ICD-10-CM

## 2024-01-09 DIAGNOSIS — F20.9 SCHIZOPHRENIA, UNSPECIFIED: ICD-10-CM

## 2024-01-09 DIAGNOSIS — S00.83XA CONTUSION OF OTHER PART OF HEAD, INITIAL ENCOUNTER: ICD-10-CM

## 2024-01-09 DIAGNOSIS — E78.5 HYPERLIPIDEMIA, UNSPECIFIED: ICD-10-CM

## 2024-01-09 DIAGNOSIS — Z01.89 ENCOUNTER FOR OTHER SPECIFIED SPECIAL EXAMINATIONS: ICD-10-CM
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
## 2024-01-09 PROCEDURE — 99282 EMERGENCY DEPT VISIT SF MDM: CPT

## 2024-01-09 PROCEDURE — 99284 EMERGENCY DEPT VISIT MOD MDM: CPT

## 2024-01-09 NOTE — ED ADULT TRIAGE NOTE - CHIEF COMPLAINT QUOTE
pt sts he is here for his morning meds which is a shot and he comes here every morning at the hospital to get it done. denies any complaints

## 2024-01-09 NOTE — ED PROVIDER NOTE - CLINICAL SUMMARY MEDICAL DECISION MAKING FREE TEXT BOX
68-year-old man, history of hypertension, CLL, hyperlipidemia, CAD, schizophrenia, lives at [address] [place] [address], recent psychiatric hospitalization presents requesting his daily medications.  He is a poor historian, denies other complaints however.  Vital signs, exam as noted, he is well-appearing.  There is ecchymosis around the right eye, and chart review reveals a fall a few days ago, with normal head, neck, facial bone CT.  Eyebrow laceration was repaired at that time and is healing well.  Unclear why patient came to the ED, as his medications are given by staff at his residential facility.  Spoke with staff, reported that all medications are available, however he has been refusing.  Spoke with patient, advised that he should get his medications at the facility since they know what and when he should be taking.  He was discharged home. 68-year-old man, history of hypertension, CLL, hyperlipidemia, CAD, schizophrenia, lives at [address] [place] [address], recent psychiatric hospitalization presents requesting his daily medications.  He is a poor historian, denies other complaints however.  Vital signs, exam as noted, he is well-appearing.  There is ecchymosis around the right eye, and chart review reveals a fall a few days ago, with normal head, neck, facial bone CT.  Eyebrow laceration was repaired at that time and is healing well.  Unclear why patient came to the ED, as his medications are given by staff at his residential facility.  Spoke with staff, reported that all medications are available, however he has been refusing.  Spoke with patient, advised that he should get his medications at the facility since they know what and when he should be taking.  He was discharged home.
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
## 2024-01-09 NOTE — ED PROVIDER NOTE - PHYSICAL EXAMINATION
VITAL SIGNS: I have reviewed nursing notes and confirm.  CONSTITUTIONAL: well-appearing, non-toxic, NAD  SKIN: Warm dry, normal skin turgor  HEAD: NCAT  EYES: EOMI, PERRLA, no scleral icterus. (+) ecchymosis around the right eye  NECK: Supple; non tender. Full ROM.   CARD: RRR, no murmurs, rubs or gallops  RESP: clear to ausculation b/l.  No rales, rhonchi, or wheezing.  ABD: soft, + BS, non-tender.  EXT: Full ROM  NEURO: Normal gait.  PSYCH: Cooperative, appropriate.

## 2024-01-09 NOTE — ED PROVIDER NOTE - PATIENT PORTAL LINK FT
You can access the FollowMyHealth Patient Portal offered by [facility] by registering at the following website: [email or web address]. By joining [employer]’s FollowMyHealth portal, you will also be able to view your health information using other applications (apps) compatible with our system. You can access the FollowMyHealth Patient Portal offered by [facility] by registering at the following website: [email or web address]. By joining [employer]’s FollowMyHealth portal, you will also be able to view your health information using other applications (apps) compatible with our system.

## 2024-01-09 NOTE — ED PROVIDER NOTE - PROGRESS NOTE DETAILS
MS–spoke with patient's care facility at [address] [place] [address].  Staff informed me that patient has been refusing his morning and nightly meds.  Patient has all medications available at the facility.  Staff member states that all of his medications are readily available at the facility but patient refuses to take. MS–spoke with patient's care facility at [address] [place] [address].  Staff informed me that patient has been refusing his morning and nightly meds.  Patient has all medications available at the facility.  Staff member states that all of his medications are readily available at the facility but patient refuses to take.

## 2024-02-07 NOTE — BH SOCIAL WORK CONFIRMATION FOLLOW UP NOTE - NSCOMMENTS_PSY_ALL_CORE
Chicho was discharged from [facility] IPP unit on 1/5. He returned to his group home, [facility], [phone]. He broke his outpatient mental health appointment with Dr. Solis at [facility] on 1/8, [phone].  spoke with [facility] staff (Fabricio) and left a message for patient's  after patient broke his appointment. Patient was seen at [facility] ED two times since his IPP discharge.

## 2024-02-26 ENCOUNTER — EMERGENCY (EMERGENCY)
Facility: HOSPITAL | Age: 69
LOS: 0 days | Discharge: HOME | End: 2024-02-26
Attending: STUDENT IN AN ORGANIZED HEALTH CARE EDUCATION/TRAINING PROGRAM
Payer: MEDICARE

## 2024-02-26 VITALS
HEIGHT: 67 IN | TEMPERATURE: 98 F | OXYGEN SATURATION: 99 % | RESPIRATION RATE: 16 BRPM | WEIGHT: 171.52 LBS | HEART RATE: 99 BPM | SYSTOLIC BLOOD PRESSURE: 163 MMHG | DIASTOLIC BLOOD PRESSURE: 75 MMHG

## 2024-02-26 DIAGNOSIS — Z01.89 ENCOUNTER FOR OTHER SPECIFIED SPECIAL EXAMINATIONS: ICD-10-CM

## 2024-02-26 PROCEDURE — 99283 EMERGENCY DEPT VISIT LOW MDM: CPT | Mod: FS

## 2024-02-26 PROCEDURE — 99282 EMERGENCY DEPT VISIT SF MDM: CPT

## 2024-02-26 NOTE — ED PROVIDER NOTE - NSFOLLOWUPCLINICS_GEN_ALL_ED_FT
[facility]  Medicine  [address] [place], [place]   Phone: [phone]  Fax:   Follow Up Time: 1-3 Days

## 2024-02-26 NOTE — ED PROVIDER NOTE - CLINICAL SUMMARY MEDICAL DECISION MAKING FREE TEXT BOX
67 yo male, no PMHx, presenting for medical evaluation. He has no complaints. He states he would like to know his blood type. Recommended follow up outpatient. Patient comfortable with plan. Well appearing on exam. No external signs of trauma. Stable for discharge.

## 2024-02-26 NOTE — ED PROVIDER NOTE - IV ALTEPLASE ADMIN OUTSIDE HIDDEN
Today July 20, 2017 you received the    Typhoid - injectable. This vaccine is valid for two years.   .    These appointments can be made as a NURSE ONLY visit.    **It is very important for the vaccinations to be given on the scheduled day(s), this helps ensure you receive the full effectiveness of the vaccine.**    Please call [facility] with any questions [phone]    Thank you for visiting [facility]       show

## 2024-02-26 NOTE — ED PROVIDER NOTE - OBJECTIVE STATEMENT
Patient is a 68 year old male presents for routine blood work. Patient stated he felt like coming to the emergency room instead of the medicine clinic. He denies any fever, cough, sore throat, N/V, chest pain, shortness of breath, abdominal pain, urinary complaints.

## 2024-02-26 NOTE — ED PROVIDER NOTE - NSFOLLOWUPINSTRUCTIONS_ED_ALL_ED_FT
Normal Exam    WHAT YOU NEED TO KNOW:    Your healthcare provider did not find a reason for your symptoms today. You may need to follow up with him or her, or a specialist. Providers will work with you to try to find the cause of your symptoms. They may also run tests to find out more about your overall health.    DISCHARGE INSTRUCTIONS:    Follow up with your healthcare provider or a specialist as directed: Tell your healthcare provider about your symptoms. You may be given a complete physical exam and health checkup. Write down your questions so you remember to ask them during your visits.    Maintain a healthy lifestyle: Healthy foods and regular physical activity can improve your health. They also decrease your risk of heart disease, high blood pressure, and diabetes.    Get 30 minutes of activity every day most days of the week. Ask your provider which activities are best for you. You can do 30 minutes at once or spread your activity throughout the day to get the recommended amount.    Eat a variety of healthy foods. Healthy foods include whole-grain breads, low-fat dairy products, beans, lean meats, and fish. Eat fruits and vegetables every day, especially those that are green, orange, and red.    Maintain a healthy weight. Ask your provider how much you should weigh. Ask him or her to help you create a weight loss plan if you are overweight.    Limit alcohol. Women should limit alcohol to 1 drink a day. Men should limit alcohol to 2 drinks a day. A drink of alcohol is 12 ounces of beer, 5 ounces of wine, or 1½ ounces of liquor.  Do not smoke: If you smoke, it is never too late to quit. You lower your risk for many health problems if you quit. Ask your provider for information if you need help quitting.    Contact your healthcare provider if:    Your symptoms get worse, or you have new symptoms that bother you.    You have questions or concerns about your condition or care.    Your illness makes it difficult to follow a healthy diet.  Return to the emergency department if:    You have trouble breathing.    You have chest pain.    You feel lightheaded or faint.

## 2024-03-05 ENCOUNTER — EMERGENCY (EMERGENCY)
Facility: HOSPITAL | Age: 69
LOS: 0 days | Discharge: HOME | End: 2024-03-05
Attending: STUDENT IN AN ORGANIZED HEALTH CARE EDUCATION/TRAINING PROGRAM
Payer: MEDICARE

## 2024-03-05 VITALS
OXYGEN SATURATION: 97 % | WEIGHT: 170.42 LBS | RESPIRATION RATE: 16 BRPM | DIASTOLIC BLOOD PRESSURE: 69 MMHG | SYSTOLIC BLOOD PRESSURE: 157 MMHG | TEMPERATURE: 98 F | HEIGHT: 67 IN | HEART RATE: 99 BPM

## 2024-03-05 DIAGNOSIS — E78.5 HYPERLIPIDEMIA, UNSPECIFIED: ICD-10-CM

## 2024-03-05 DIAGNOSIS — Z01.89 ENCOUNTER FOR OTHER SPECIFIED SPECIAL EXAMINATIONS: ICD-10-CM

## 2024-03-05 DIAGNOSIS — F20.9 SCHIZOPHRENIA, UNSPECIFIED: ICD-10-CM

## 2024-03-05 DIAGNOSIS — I10 ESSENTIAL (PRIMARY) HYPERTENSION: ICD-10-CM

## 2024-03-05 DIAGNOSIS — E11.9 TYPE 2 DIABETES MELLITUS WITHOUT COMPLICATIONS: ICD-10-CM

## 2024-03-05 PROCEDURE — 99282 EMERGENCY DEPT VISIT SF MDM: CPT

## 2024-03-05 PROCEDURE — 99283 EMERGENCY DEPT VISIT LOW MDM: CPT

## 2024-03-05 NOTE — ED PROVIDER NOTE - CLINICAL SUMMARY MEDICAL DECISION MAKING FREE TEXT BOX
Throughout ED observation period, pt remained clinically and hemodynamically stable.  no medical complaints  physical exam unremarkable  will dc and encourage pcp f/u  Referral coordinator utilized to assist in making and in expediting outpatient follow up.

## 2024-03-05 NOTE — ED PROVIDER NOTE - ATTENDING CONTRIBUTION TO CARE
68-year-old male past medical history of mhx Htn, CLL, HLD, Dm schizophrenia, prostate ca,  pt presents for general eval. pt states he feels fine but does not like to see primary care and likes to come to the ED for physical exam. no cp, sob, f,c,cough, congestion, uri sx, trauma, dizziness.  pt states he is eating well, drinking well, having normal BMs and has no urinary complaints.  pt states he wants to know if he "needs a shot."    vss  gen- NAD, aaox3  card-rrr  lungs-ctab, no wheezing or rhonchi  abd-sntnd, no guarding or rebound  neuro- full str/sensation, cn ii-xii grossly intact, normal coordination and gait

## 2024-03-05 NOTE — ED PROVIDER NOTE - OBJECTIVE STATEMENT
68-year-old male past medical history as noted in chart prior to coming with complaints of medical eval.  Patient denies any acute medical complaints at this time, notes he does not have a PCP and was just coming in today in case he needed some shots. Denies any fever, chills, nausea, vomiting, CP, SOB, changes in urination, or changes in bowel movements.

## 2024-03-05 NOTE — ED PROVIDER NOTE - PATIENT PORTAL LINK FT
You can access the FollowMyHealth Patient Portal offered by [facility] by registering at the following website: [email or web address]. By joining [employer]’s FollowMyHealth portal, you will also be able to view your health information using other applications (apps) compatible with our system.

## 2024-03-05 NOTE — ED PROVIDER NOTE - NSFOLLOWUPINSTRUCTIONS_ED_ALL_ED_FT
Follow up at medicine clinic for primary care needs.  You can establish care with a primary care doctor who can get blood work and appropriate shots.    Medical Screening Exam  A medical screening exam (MSE) helps to determine whether you need immediate medical treatment relating to any number of symptoms you are having. This type of exam may be done in an emergency department, an urgent care setting, or your health care provider's office.    Depending on your symptoms and severity, you may need additional tests or medical therapy.    It is important to note that an MSE does not necessarily mean that you will need or receive further medical testing or interventions if your symptoms are not deemed to be medically urgent (emergent).    Tell a health care provider about:  Any allergies you have.  All medicines you are taking, including vitamins, herbs, eye drops, creams, and over-the-counter medicines.  Any problems you or family members have had with anesthetic medicines.  Any bleeding problems you have.  Any surgeries you have had.  Any medical conditions you have.  Whether you are pregnant or may be pregnant.  What happens during the test?  A health care provider touching a person's throat during a medical exam.  During the exam, a health care provider does a short, often focused, physical exam and asks about your medical history to assess:  Your current symptoms.  Your overall health.  Your need for possible further medical intervention.  What can I expect after the test?  If you have a regular health care provider, make an appointment for a follow-up visit with him or her. If you do not have a regular health care provider, ask about resources in your community.    Your medical screening exam may determine that:  You do not need emergency treatment at this time.  You need treatment right away.  You need to be transferred to another medical center. This may happen if you need an emergent specialist or consultant that is not available at the medical center you are at.  You need to have more tests.  A medical specialist may be consulted if needed.    Get help right away if:  Your condition gets worse.  You develop new or troubling symptoms before you see your health care provider.  These symptoms may represent a serious problem that is an emergency. Do not wait to see if the symptoms will go away. Get medical help right away. Call your local emergency services (911 in the [place] Do not drive yourself to the hospital.    Summary  A medical screening exam helps to determine whether you need medical treatment right away. This type of exam may be done in an emergency department, an urgent care setting, or your health care provider's office.  During the exam, a health care provider does a short physical exam and asks about your current symptoms and overall health.  Depending on the exam, more tests or therapies may be ordered. However, an MSE does not necessarily mean that you will have further medical testing if your symptoms are not deemed to be urgent.  If you need further care that is not offered at your current medical center, you may need to be transferred to another facility.  This information is not intended to replace advice given to you by your health care provider. Make sure you discuss any questions you have with your health care provider.

## 2024-03-05 NOTE — ED PROVIDER NOTE - IV ALTEPLASE EXCL REL HIDDEN
Discharge education provided, patient states ride is en route from [place] and will be here around 3 or 4.    show

## 2024-03-07 NOTE — CHART NOTE - NSCHARTNOTEFT_GEN_A_CORE
[facility] MRN [id number] / No answer 3/6 - JL / Pt already has established care w PCP 3/7 - JEANNETTE    Specialty: PCP

## 2024-04-18 ENCOUNTER — EMERGENCY (EMERGENCY)
Facility: HOSPITAL | Age: 69
LOS: 0 days | Discharge: HOME | End: 2024-04-18
Attending: EMERGENCY MEDICINE
Payer: MEDICARE

## 2024-04-18 VITALS
DIASTOLIC BLOOD PRESSURE: 86 MMHG | TEMPERATURE: 98 F | OXYGEN SATURATION: 100 % | RESPIRATION RATE: 18 BRPM | HEART RATE: 103 BPM | SYSTOLIC BLOOD PRESSURE: 175 MMHG | HEIGHT: 67 IN

## 2024-04-18 DIAGNOSIS — Z01.89 ENCOUNTER FOR OTHER SPECIFIED SPECIAL EXAMINATIONS: ICD-10-CM

## 2024-04-18 PROCEDURE — 99283 EMERGENCY DEPT VISIT LOW MDM: CPT | Mod: GC

## 2024-04-18 PROCEDURE — 99282 EMERGENCY DEPT VISIT SF MDM: CPT

## 2024-04-18 NOTE — ED PROVIDER NOTE - OBJECTIVE STATEMENT
68-year-old male past medical history as noted in chart prior to coming with complaints of medical eval.  Patient denies any acute medical complaints at this time. Here for routine check up.

## 2024-04-18 NOTE — ED ADULT NURSE NOTE - NSFALLUNIVINTERV_ED_ALL_ED
Bed/Stretcher in lowest position, wheels locked, appropriate side rails in place/Call bell, personal items and telephone in reach/Instruct patient to call for assistance before getting out of bed/chair/stretcher/Non-slip footwear applied when patient is off stretcher/[place] to call system/Physically safe environment - no spills, clutter or unnecessary equipment/Purposeful proactive rounding/Room/bathroom lighting operational, light cord in reach

## 2024-04-18 NOTE — ED PROVIDER NOTE - ATTENDING CONTRIBUTION TO CARE
Patient here for checkup has no complaints just wants to be evaluated.  Here on exam patient no distress S1-S2 CTAB soft nontender neurologically grossly intact.  Impression   Patient here for general medical exam no further workup needed ED outpatient follow-up.

## 2024-04-18 NOTE — ED PROVIDER NOTE - PATIENT PORTAL LINK FT
You can access the FollowMyHealth Patient Portal offered by [facility] by registering at the following website: [email or web address]. By joining [employer]’s FollowMyHealth portal, you will also be able to view your health information using other applications (apps) compatible with our system.

## 2024-04-18 NOTE — ED PROVIDER NOTE - NSFOLLOWUPINSTRUCTIONS_ED_ALL_ED_FT
Our Emergency Department Referral Coordinators will be reaching out to you in the next 24-48 hours from 9:00am to 5:00pm to schedule a follow up appointment. Please expect a phone call from the hospital in that time frame. If you do not receive a call or if you have any questions or concerns, you can reach them at   [phone].      Medical Screening Exam  A medical screening exam helps determine whether or not you need emergency medical treatment.    During the medical screening exam, a health care provider does a short physical exam and medical history to assess:    Your current symptoms.  Your overall health.    Depending on your symptoms, you may need additional tests.    What are the possible outcomes of a medical screening exam?  Your medical screening exam may determine that:    You do not need emergency treatment at this time.  You need treatment right away.  You need to be transferred to another medical center.    When should I seek medical care?  If you have a regular health care provider, make an appointment for a follow-up visit with him or her. If you do not have a regular health care provider, ask about resources in your community.    Get help right away if:  Your condition may change over time. If your condition gets worse or you develop new or troubling symptoms before you see your health care provider, go to an emergency department right away.    In an emergency:     Call 911 or have someone drive you to the nearest hospital.  Do not drive yourself.  This information is not intended to replace advice given to you by your health care provider. Make sure you discuss any questions you have with your health care provider.

## 2024-04-19 NOTE — CHART NOTE - NSCHARTNOTEFT_GEN_A_CORE
[facility] MRN [id number] / Pt already has established care per nursing staff 4/19 - JL    Specialty: PCP

## 2024-04-26 ENCOUNTER — INPATIENT (INPATIENT)
Facility: HOSPITAL | Age: 69
LOS: 11 days | Discharge: HOME | DRG: 885 | End: 2024-05-08
Attending: PSYCHIATRY & NEUROLOGY | Admitting: PSYCHIATRY & NEUROLOGY
Payer: MEDICARE

## 2024-04-26 VITALS
SYSTOLIC BLOOD PRESSURE: 137 MMHG | HEIGHT: 67 IN | OXYGEN SATURATION: 99 % | DIASTOLIC BLOOD PRESSURE: 65 MMHG | RESPIRATION RATE: 18 BRPM | TEMPERATURE: 98 F | HEART RATE: 102 BPM

## 2024-04-26 DIAGNOSIS — F20.9 SCHIZOPHRENIA, UNSPECIFIED: ICD-10-CM

## 2024-04-26 LAB
ALBUMIN SERPL ELPH-MCNC: 4.4 G/DL — SIGNIFICANT CHANGE UP (ref 3.5–5.2)
ALP SERPL-CCNC: 66 U/L — SIGNIFICANT CHANGE UP (ref 30–115)
ALT FLD-CCNC: 24 U/L — SIGNIFICANT CHANGE UP (ref 0–41)
ANION GAP SERPL CALC-SCNC: 11 MMOL/L — SIGNIFICANT CHANGE UP (ref 7–14)
APAP SERPL-MCNC: <5 UG/ML — LOW (ref 10–30)
AST SERPL-CCNC: 38 U/L — SIGNIFICANT CHANGE UP (ref 0–41)
BASOPHILS # BLD AUTO: 0 K/UL — SIGNIFICANT CHANGE UP (ref 0–0.2)
BASOPHILS NFR BLD AUTO: 0 % — SIGNIFICANT CHANGE UP (ref 0–1)
BILIRUB SERPL-MCNC: 0.3 MG/DL — SIGNIFICANT CHANGE UP (ref 0.2–1.2)
BUN SERPL-MCNC: 12 MG/DL — SIGNIFICANT CHANGE UP (ref 10–20)
CALCIUM SERPL-MCNC: 8.8 MG/DL — SIGNIFICANT CHANGE UP (ref 8.4–10.5)
CHLORIDE SERPL-SCNC: 100 MMOL/L — SIGNIFICANT CHANGE UP (ref 98–110)
CO2 SERPL-SCNC: 26 MMOL/L — SIGNIFICANT CHANGE UP (ref 17–32)
CREAT SERPL-MCNC: 0.7 MG/DL — SIGNIFICANT CHANGE UP (ref 0.7–1.5)
EGFR: 102 ML/MIN/1.73M2 — SIGNIFICANT CHANGE UP
EOSINOPHIL # BLD AUTO: 0.35 K/UL — SIGNIFICANT CHANGE UP (ref 0–0.7)
EOSINOPHIL NFR BLD AUTO: 2.6 % — SIGNIFICANT CHANGE UP (ref 0–8)
ETHANOL SERPL-MCNC: <10 MG/DL — SIGNIFICANT CHANGE UP
FLUAV AG NPH QL: SIGNIFICANT CHANGE UP
FLUBV AG NPH QL: SIGNIFICANT CHANGE UP
GLUCOSE SERPL-MCNC: 95 MG/DL — SIGNIFICANT CHANGE UP (ref 70–99)
HCT VFR BLD CALC: 39.8 % — LOW (ref 42–52)
HGB BLD-MCNC: 13.1 G/DL — LOW (ref 14–18)
LYMPHOCYTES # BLD AUTO: 60.9 % — HIGH (ref 20.5–51.1)
LYMPHOCYTES # BLD AUTO: 8.22 K/UL — HIGH (ref 1.2–3.4)
MCHC RBC-ENTMCNC: 31.1 PG — HIGH (ref 27–31)
MCHC RBC-ENTMCNC: 32.9 G/DL — SIGNIFICANT CHANGE UP (ref 32–37)
MCV RBC AUTO: 94.5 FL — HIGH (ref 80–94)
MONOCYTES # BLD AUTO: 0.58 K/UL — SIGNIFICANT CHANGE UP (ref 0.1–0.6)
MONOCYTES NFR BLD AUTO: 4.3 % — SIGNIFICANT CHANGE UP (ref 1.7–9.3)
NEUTROPHILS # BLD AUTO: 4.23 K/UL — SIGNIFICANT CHANGE UP (ref 1.4–6.5)
NEUTROPHILS NFR BLD AUTO: 31.3 % — LOW (ref 42.2–75.2)
PLATELET # BLD AUTO: 96 K/UL — LOW (ref 130–400)
PMV BLD: 12.8 FL — HIGH (ref 7.4–10.4)
POTASSIUM SERPL-MCNC: 4.7 MMOL/L — SIGNIFICANT CHANGE UP (ref 3.5–5)
POTASSIUM SERPL-SCNC: 4.7 MMOL/L — SIGNIFICANT CHANGE UP (ref 3.5–5)
PROT SERPL-MCNC: 5.9 G/DL — LOW (ref 6–8)
RBC # BLD: 4.21 M/UL — LOW (ref 4.7–6.1)
RBC # FLD: 15.9 % — HIGH (ref 11.5–14.5)
RSV RNA NPH QL NAA+NON-PROBE: SIGNIFICANT CHANGE UP
SALICYLATES SERPL-MCNC: <0.3 MG/DL — LOW (ref 4–30)
SARS-COV-2 RNA SPEC QL NAA+PROBE: SIGNIFICANT CHANGE UP
SODIUM SERPL-SCNC: 137 MMOL/L — SIGNIFICANT CHANGE UP (ref 135–146)
WBC # BLD: 13.5 K/UL — HIGH (ref 4.8–10.8)
WBC # FLD AUTO: 13.5 K/UL — HIGH (ref 4.8–10.8)

## 2024-04-26 PROCEDURE — 90792 PSYCH DIAG EVAL W/MED SRVCS: CPT | Mod: 2W

## 2024-04-26 PROCEDURE — 93010 ELECTROCARDIOGRAM REPORT: CPT

## 2024-04-26 PROCEDURE — 84484 ASSAY OF TROPONIN QUANT: CPT

## 2024-04-26 PROCEDURE — 86140 C-REACTIVE PROTEIN: CPT

## 2024-04-26 PROCEDURE — 99285 EMERGENCY DEPT VISIT HI MDM: CPT | Mod: FS

## 2024-04-26 PROCEDURE — 85025 COMPLETE CBC W/AUTO DIFF WBC: CPT

## 2024-04-26 PROCEDURE — 82962 GLUCOSE BLOOD TEST: CPT

## 2024-04-26 RX ORDER — DEXTROSE 50 % IN WATER 50 %
12.5 SYRINGE (ML) INTRAVENOUS ONCE
Refills: 0 | Status: DISCONTINUED | OUTPATIENT
Start: 2024-04-27 | End: 2024-05-08

## 2024-04-26 RX ORDER — DEXTROSE 10 % IN WATER 10 %
125 INTRAVENOUS SOLUTION INTRAVENOUS ONCE
Refills: 0 | Status: DISCONTINUED | OUTPATIENT
Start: 2024-04-27 | End: 2024-05-08

## 2024-04-26 RX ORDER — SODIUM CHLORIDE 9 MG/ML
1000 INJECTION, SOLUTION INTRAVENOUS
Refills: 0 | Status: DISCONTINUED | OUTPATIENT
Start: 2024-04-27 | End: 2024-05-08

## 2024-04-26 RX ORDER — DEXTROSE 50 % IN WATER 50 %
15 SYRINGE (ML) INTRAVENOUS ONCE
Refills: 0 | Status: DISCONTINUED | OUTPATIENT
Start: 2024-04-27 | End: 2024-05-08

## 2024-04-26 RX ORDER — HALOPERIDOL DECANOATE 100 MG/ML
5 INJECTION INTRAMUSCULAR ONCE
Refills: 0 | Status: COMPLETED | OUTPATIENT
Start: 2024-04-26 | End: 2024-04-26

## 2024-04-26 RX ORDER — DEXTROSE 50 % IN WATER 50 %
25 SYRINGE (ML) INTRAVENOUS ONCE
Refills: 0 | Status: DISCONTINUED | OUTPATIENT
Start: 2024-04-27 | End: 2024-05-08

## 2024-04-26 RX ORDER — GLUCAGON INJECTION, SOLUTION 0.5 MG/.1ML
1 INJECTION, SOLUTION SUBCUTANEOUS ONCE
Refills: 0 | Status: DISCONTINUED | OUTPATIENT
Start: 2024-04-27 | End: 2024-05-08

## 2024-04-26 NOTE — BH PATIENT PROFILE - CONTRAINDICATIONS & PRECAUTIONS (SELECT ALL THAT APPLY)
Subjective   Priyanka Gil is a 24 y.o. female.   Chief Complaint   Patient presents with    Annual Exam     Labs ordered in chart        History of Present Illness   Ms. Gil presents to the office today for annual physical exam.  Reports that the treatment for her migraine in the office the other day was therapeutic, she states that she has had some slight headaches but nothing as severe, she does feel that the amitriptyline is helping with migraine management and is also helping her sleep better at night.  She reports that the other formulation of Zofran was still unaffordable for her and she does not currently have anything on hand in case she develops another migraine for the nausea and vomiting that she typically experiences.  She reports that she continues to have issues with chest tightness and aching, states that she gets short of breath very easily, she states that walking even a short distance at a regular pace will make her feel short of breath, she states that if she goes up and down a flight of stairs she gets extremely short of breath.  She states that she realizes it may be related to her weight, states that she gained weight with her pregnancy and has not been able to lose it.  She states that she has previously been told that her heart appears enlarged.  She reports history of cardiac issues, unsure of exactly what kind of issues and her mother after having had a severe case of COVID.  She reports that she did have COVID twice while she was pregnant.  She also reports that her mom has developed asthma.  She admits that she does not go to ophthalmology, she has not been to the dentist recently, states that she does have dental coverage.  She reports that she is fasting and prefer to do lab work today.  The following portions of the patient's history were reviewed and updated as appropriate: allergies, current medications, past family history, past medical history, past social history, past  "surgical history and problem list.    Review of Systems    Objective   History reviewed. No pertinent past medical history.   History reviewed. No pertinent surgical history.     Current Outpatient Medications:     amitriptyline (ELAVIL) 10 MG tablet, Take 1 tablet by mouth Every Night., Disp: 30 tablet, Rfl: 0    ASHWAGANDHA PO, Take 1,300 mg by mouth Daily. Takes 2 tablets daily., Disp: , Rfl:     busPIRone (BUSPAR) 5 MG tablet, Take 1 tablet by mouth 3 (Three) Times a Day., Disp: 90 tablet, Rfl: 0    ondansetron (Zofran) 4 MG tablet, Take 1 tablet by mouth Every 8 (Eight) Hours As Needed for Nausea or Vomiting., Disp: 45 tablet, Rfl: 1    albuterol sulfate  (90 Base) MCG/ACT inhaler, Inhale 2 puffs Every 4 (Four) Hours As Needed for Wheezing or Shortness of Air., Disp: 1 g, Rfl: 2    fluticasone (FLONASE) 50 MCG/ACT nasal spray, 2 sprays into the nostril(s) as directed by provider Daily., Disp: 16 g, Rfl: 2    promethazine (PHENERGAN) 12.5 MG tablet, Take 1 tablet by mouth Every 6 (Six) Hours As Needed for Nausea or Vomiting., Disp: 30 tablet, Rfl: 0      /76 (BP Location: Right arm, Patient Position: Sitting, Cuff Size: Adult)   Pulse 69   Temp 97.1 °F (36.2 °C) (Temporal)   Ht 162.6 cm (64\")   Wt 89.4 kg (197 lb)   SpO2 98%   BMI 33.81 kg/m²      Body mass index is 33.81 kg/m².  BMI is >= 30 and <35. (Class 1 Obesity). The following options were offered after discussion;: exercise counseling/recommendations and nutrition counseling/recommendations       Physical Exam  Vitals and nursing note reviewed.   Constitutional:       General: She is not in acute distress.     Appearance: Normal appearance. She is obese. She is not ill-appearing, toxic-appearing or diaphoretic.   HENT:      Head: Normocephalic and atraumatic.      Right Ear: Tympanic membrane, ear canal and external ear normal. There is no impacted cerumen.      Left Ear: Tympanic membrane, ear canal and external ear normal. There is " no impacted cerumen.      Nose: Congestion and rhinorrhea present.      Mouth/Throat:      Mouth: Mucous membranes are moist.      Pharynx: Oropharynx is clear. No oropharyngeal exudate or posterior oropharyngeal erythema.   Eyes:      Extraocular Movements: Extraocular movements intact.      Conjunctiva/sclera: Conjunctivae normal.      Pupils: Pupils are equal, round, and reactive to light.   Neck:      Thyroid: No thyroid mass, thyromegaly or thyroid tenderness.      Vascular: No carotid bruit.   Cardiovascular:      Rate and Rhythm: Normal rate and regular rhythm.      Pulses: Normal pulses.      Heart sounds: Normal heart sounds.   Pulmonary:      Effort: Pulmonary effort is normal.      Breath sounds: Normal breath sounds.   Abdominal:      General: Bowel sounds are normal.      Palpations: Abdomen is soft.   Musculoskeletal:         General: No tenderness. Normal range of motion.      Cervical back: Normal range of motion and neck supple.      Right lower leg: No edema.      Left lower leg: No edema.   Skin:     General: Skin is warm and dry.      Capillary Refill: Capillary refill takes less than 2 seconds.   Neurological:      General: No focal deficit present.      Mental Status: She is alert and oriented to person, place, and time. Mental status is at baseline.      Motor: No weakness.      Coordination: Coordination normal.      Gait: Gait normal.   Psychiatric:         Mood and Affect: Mood normal.         Behavior: Behavior normal.         Thought Content: Thought content normal.         Judgment: Judgment normal.             Assessment & Plan   Diagnoses and all orders for this visit:    1. Encounter for annual physical exam (Primary)    2. Migraine with aura and with status migrainosus, not intractable    3. Nausea and vomiting, unspecified vomiting type  -     promethazine (PHENERGAN) 12.5 MG tablet; Take 1 tablet by mouth Every 6 (Six) Hours As Needed for Nausea or Vomiting.  Dispense: 30 tablet;  Refill: 0    4. Dyspnea on exertion  -     albuterol sulfate  (90 Base) MCG/ACT inhaler; Inhale 2 puffs Every 4 (Four) Hours As Needed for Wheezing or Shortness of Air.  Dispense: 1 g; Refill: 2  -     Adult Transthoracic Echo Complete W/ Cont if Necessary Per Protocol; Future    5. Sinus congestion  -     fluticasone (FLONASE) 50 MCG/ACT nasal spray; 2 sprays into the nostril(s) as directed by provider Daily.  Dispense: 16 g; Refill: 2    6. Chest tightness  -     Adult Transthoracic Echo Complete W/ Cont if Necessary Per Protocol; Future    7. Class 1 obesity due to excess calories without serious comorbidity with body mass index (BMI) of 33.0 to 33.9 in adult    8. Palpitations  -     Holter Monitor - 72 Hour Up To 15 Days; Future               Plan of care reviewed with Ms. Gil  She is fasting today so she will get her lab work obtained and we will communicate results as able, she has been informed that I will be out of the office for a week and it may be next week before we get the results back to her, she reports understanding of this.  She continues to experience issues with shortness of breath with minimal activity as well as chest tightness and discomfort, typically with activity however at times when she is experiencing increased anxiety.  She reports that while she was pregnant she was told that her heart was enlarged, reviewed chest x-ray from in October 2022 and the silhouette of the heart is reported to be normal.  She reports that her mom has had multiple cardiac issues ever since having COVID-19.  She reports having had COVID-19 2 times during her pregnancy.  She states that she had never had any issues with shortness of breath or chest pain prior to her pregnancy.  She reports she has never had an echocardiogram but did have an Holter monitor after an ER visit last May and 2022, this Holter monitor report was reviewed, it does mention that she did have normal sinus with first-degree block  and intermittent second-degree Mobitz 1 block with an average heart rate of 77 bpm.  As this test was performed a year ago we will repeat this along with an echocardiogram for further assessment.  She does mention that her mom developed asthma later in life, some of the symptoms that she reports may correlate with this diagnosis, we will trial treatment via albuterol inhaler for shortness of breath, she is to report the effect of the treatment.  She reports a clean home living environment currently, is in a smoke-free home without any evidence of mold.  I did advise her to consider purchasing an air purifier.  She reports that the Zofran even in its plain form was still unaffordable for her.  She does suffer from migraines although reports that her migraines have improved with the initiation of amitriptyline nightly, just in case she develops another severe migraine with nausea and vomiting we will send her in some Phenergan to see if this is more affordable for her to have on hand.  She was advised that this medication can make her extremely drowsy.  We did discuss her obesity, given her symptoms of shortness of breath and chest discomfort I advised against high intensity exercise but rather encouraged her to incorporate forms of exercise that may include yoga.  Encouraged healthy diet adequate water intake.  Also encouraged her to incorporate routine ophthalmology and orthodontic exams.  Stressed the importance of the ophthalmology exams that she has not ever had one done and she does suffer from headaches.        Please note that portions of this note were completed with a voice recognition program.     Electronically signed by KATHLEEN Ashford, 05/09/23, 10:17 CDT.    Patient/surrogate refused vaccine...
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
## 2024-04-26 NOTE — ED BEHAVIORAL HEALTH ASSESSMENT NOTE - DESCRIPTION
per hpi Patient agitated in the ED, Haldol 5 IM x one given with improvement. Patient medically cleared.

## 2024-04-26 NOTE — BH PATIENT PROFILE - ABILITY TO HEAR (WITH HEARING AID OR HEARING APPLIANCE IF NORMALLY USED):
Call to patient, updated with recommendations as below, patient verbalized understanding.  Pharmacy verified.  Will follow up in the office on Monday as scheduled.   Adequate: hears normal conversation without difficulty
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
## 2024-04-26 NOTE — ED ADULT NURSE NOTE - CHIEF COMPLAINT QUOTE
Pt sent in from [address] [place] because he has been refusing to take any of his meds. Pt calm and cooperative in triage

## 2024-04-26 NOTE — ED ADULT NURSE REASSESSMENT NOTE - NS ED NURSE REASSESS COMMENT FT1
Code Flight activated, pt attempting to leave w/ belonings. Security at bedside, charge RN and ANM at bedside.

## 2024-04-26 NOTE — ED ADULT TRIAGE NOTE - CHIEF COMPLAINT QUOTE
Pt sent in from [address] [place] because he has been refusing to take any of his meds. Pt calm and cooperative in triage

## 2024-04-26 NOTE — ED PROVIDER NOTE - PHYSICAL EXAMINATION
Physical Exam    Constitutional: No acute distress.   Eyes: Conjunctiva pink, Sclera clear, PERRLA, EOMI.  ENT: No sinus tenderness. No nasal discharge. No oropharyngeal erythema, edema, or exudates. Uvula midline.   Cardiovascular: Regular rate, regular rhythm. No noted murmurs rubs or gallops.  Respiratory: unlabored respiratory effort, clear to auscultation bilaterally no wheezing, rales or rhonchi  Gastrointestinal: Normal bowel sounds. soft, non distended, non-tender abdomen.   Musculoskeletal: supple neck, no midline tenderness. No joint or bony deformity.   Integumentary: warm, dry, no rash  Neurologic: cranial nerves II-XII grossly intact, extremities’ motor and sensory functions grossly intact  Psychiatric: unwilling to speak to me at this time. Appears disorganized

## 2024-04-26 NOTE — BH PATIENT PROFILE - HOME MEDICATIONS
levothyroxine 200 mcg (0.2 mg) oral tablet , 1 tab(s) orally once a day  atropine 1% ophthalmic solution , 1 drop(s) to each affected eye once a day  hydrOXYzine hydrochloride 50 mg oral tablet , 1 tab(s) orally every 6 hours as needed for anxiety  cloZAPine 100 mg oral tablet , 1 tab(s) orally once a day (at bedtime)  benztropine 1 mg oral tablet , 1 tab(s) orally 2 times a day  allopurinol 100 mg oral tablet , 1 tab(s) orally once a day  atorvastatin 10 mg oral tablet , 1 tab(s) orally once a day (at bedtime)  Januvia 100 mg oral tablet , 1 tab(s) orally once a day  metFORMIN 500 mg oral tablet , 1 tab(s) orally 2 times a day  docusate sodium 100 mg oral capsule , 3 cap(s) orally once a day  budesonide-formoterol 160 mcg-4.5 mcg/inh inhalation aerosol , 2 puff(s) inhaled 2 times a day

## 2024-04-26 NOTE — ED BEHAVIORAL HEALTH ASSESSMENT NOTE - CURRENT MEDICATION
Not taking any prescribed medications since Jan 2024.     Supposed to be taking the following medications:   allopurinol 100 mg oral tablet: 1 tab(s) orally once a day  budesonide-formoterol 160 mcg-4.5 mcg/inh inhalation aerosol: 2 puff(s) inhaled 2 times a day  atorvastatin 10 mg oral tablet: 1 tab(s) orally once a day (at bedtime)  levothyroxine 200 mcg (0.2 mg) oral tablet: 1 tab(s) orally once a day  metFORMIN 500 mg oral tablet: 1 tab(s) orally 2 times a day  docusate sodium 100 mg oral capsule: 3 cap(s) orally once a day  atropine 1% ophthalmic solution: 1 drop(s) to each affected eye once a day    benztropine 1 mg oral tablet: 1 tab(s) orally 2 times a day  cloZAPine 100 mg oral tablet: 1 tab(s) orally once a day (at bedtime)  hydrOXYzine hydrochloride 50 mg oral tablet: 1 tab(s) orally every 6 hours as needed for anxiety  Invega 78mg URIAS monthly (last given 2 months ago)

## 2024-04-26 NOTE — ED BEHAVIORAL HEALTH ASSESSMENT NOTE - VIOLENCE RISK FACTORS:
Violent ideation/threat/speech/Impulsivity/Lack of insight into violence risk/need for treatment/Noncompliance with treatment

## 2024-04-26 NOTE — ED ADULT NURSE NOTE - OBJECTIVE STATEMENT
68 yr old male, presenting to ED c/o med eval. As per EMS, pt sent from outpatient psych center due to refusing meds. Pt poor historian, denies any symptoms. Denies n/v/d/fevers/chills.
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
## 2024-04-26 NOTE — ED ADULT NURSE REASSESSMENT NOTE - NS ED NURSE REASSESS COMMENT FT1
Report given to Geetha REY  Pt going to 2N bed [facility]  transport set up for pt  Pt on constant observation until transport arrives.

## 2024-04-26 NOTE — ED ADULT NURSE NOTE - NSFALLUNIVINTERV_ED_ALL_ED
Bed/Stretcher in lowest position, wheels locked, appropriate side rails in place/Call bell, personal items and telephone in reach/Instruct patient to call for assistance before getting out of bed/chair/stretcher/Non-slip footwear applied when patient is off stretcher/[place] to call system/Physically safe environment - no spills, clutter or unnecessary equipment/Purposeful proactive rounding/Room/bathroom lighting operational, light cord in reach

## 2024-04-27 PROCEDURE — 99221 1ST HOSP IP/OBS SF/LOW 40: CPT

## 2024-04-27 RX ORDER — ATROPINE SULFATE 1 %
1 DROPS OPHTHALMIC (EYE) DAILY
Refills: 0 | Status: DISCONTINUED | OUTPATIENT
Start: 2024-04-27 | End: 2024-05-08

## 2024-04-27 RX ORDER — BUDESONIDE AND FORMOTEROL FUMARATE DIHYDRATE 160; 4.5 UG/1; UG/1
2 AEROSOL RESPIRATORY (INHALATION)
Refills: 0 | Status: DISCONTINUED | OUTPATIENT
Start: 2024-04-27 | End: 2024-05-08

## 2024-04-27 RX ORDER — CLOZAPINE 150 MG/1
12.5 TABLET, ORALLY DISINTEGRATING ORAL
Refills: 0 | Status: DISCONTINUED | OUTPATIENT
Start: 2024-04-27 | End: 2024-05-06

## 2024-04-27 RX ORDER — ALLOPURINOL 300 MG
100 TABLET ORAL DAILY
Refills: 0 | Status: DISCONTINUED | OUTPATIENT
Start: 2024-04-27 | End: 2024-05-08

## 2024-04-27 RX ORDER — HALOPERIDOL DECANOATE 100 MG/ML
5 INJECTION INTRAMUSCULAR ONCE
Refills: 0 | Status: COMPLETED | OUTPATIENT
Start: 2024-04-27 | End: 2024-04-27

## 2024-04-27 RX ORDER — METFORMIN HYDROCHLORIDE 850 MG/1
500 TABLET ORAL
Refills: 0 | Status: DISCONTINUED | OUTPATIENT
Start: 2024-04-27 | End: 2024-05-08

## 2024-04-27 RX ORDER — PALIPERIDONE 1.5 MG/1
3 TABLET, EXTENDED RELEASE ORAL AT BEDTIME
Refills: 0 | Status: DISCONTINUED | OUTPATIENT
Start: 2024-04-27 | End: 2024-05-08

## 2024-04-27 RX ORDER — ATORVASTATIN CALCIUM 80 MG/1
10 TABLET, FILM COATED ORAL AT BEDTIME
Refills: 0 | Status: DISCONTINUED | OUTPATIENT
Start: 2024-04-27 | End: 2024-05-08

## 2024-04-27 RX ORDER — LEVOTHYROXINE SODIUM 125 MCG
200 TABLET ORAL DAILY
Refills: 0 | Status: DISCONTINUED | OUTPATIENT
Start: 2024-04-27 | End: 2024-05-08

## 2024-04-27 RX ORDER — HALOPERIDOL DECANOATE 100 MG/ML
5 INJECTION INTRAMUSCULAR EVERY 6 HOURS
Refills: 0 | Status: DISCONTINUED | OUTPATIENT
Start: 2024-04-27 | End: 2024-05-08

## 2024-04-27 RX ORDER — LINAGLIPTIN 5 MG/1
5 TABLET, FILM COATED ORAL DAILY
Refills: 0 | Status: DISCONTINUED | OUTPATIENT
Start: 2024-04-27 | End: 2024-05-08

## 2024-04-27 RX ORDER — ACETAMINOPHEN 500 MG
650 TABLET ORAL EVERY 6 HOURS
Refills: 0 | Status: DISCONTINUED | OUTPATIENT
Start: 2024-04-27 | End: 2024-05-08

## 2024-04-27 RX ORDER — HYDROXYZINE HCL 10 MG
50 TABLET ORAL EVERY 6 HOURS
Refills: 0 | Status: DISCONTINUED | OUTPATIENT
Start: 2024-04-27 | End: 2024-05-08
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
## 2024-04-27 NOTE — BH INPATIENT PSYCHIATRY ASSESSMENT NOTE - NSBHCHARTREVIEWVS_PSY_A_CORE FT
Vital Signs Last 24 Hrs  T(C): 36.1 (04-27-24 @ 15:12), Max: 36.8 (04-26-24 @ 15:54)  T(F): 96.9 (04-27-24 @ 15:12), Max: 98.2 (04-26-24 @ 15:54)  HR: 87 (04-27-24 @ 15:12) (74 - 99)  BP: 169/82 (04-27-24 @ 15:12) (121/68 - 169/82)  BP(mean): --  RR: 18 (04-27-24 @ 15:12) (18 - 18)  SpO2: 98% (04-26-24 @ 15:54) (98% - 98%)     Vital Signs Last 24 Hrs  T(C): 36.1 (04-27-24 @ 15:12), Max: 36.2 (04-27-24 @ 00:19)  T(F): 96.9 (04-27-24 @ 15:12), Max: 97.1 (04-27-24 @ 00:19)  HR: 87 (04-27-24 @ 15:12) (74 - 87)  BP: 169/82 (04-27-24 @ 15:12) (133/68 - 169/82)  BP(mean): --  RR: 18 (04-27-24 @ 15:12) (18 - 18)  SpO2: --
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
## 2024-04-27 NOTE — BH INPATIENT PSYCHIATRY ASSESSMENT NOTE - NSBHMETABOLIC_PSY_ALL_CORE_FT
BMI: BMI (kg/m2): 25.4 (04-27-24 @ 00:19)  HbA1c: A1C with Estimated Average Glucose Result: 6.5 % (12-23-23 @ 08:00)    Glucose: POCT Blood Glucose.: 157 mg/dL (01-04-24 @ 19:59)    BP: 169/82 (04-27-24 @ 15:12) (121/68 - 169/82)Vital Signs Last 24 Hrs  T(C): 36.1 (04-27-24 @ 15:12), Max: 36.8 (04-26-24 @ 15:54)  T(F): 96.9 (04-27-24 @ 15:12), Max: 98.2 (04-26-24 @ 15:54)  HR: 87 (04-27-24 @ 15:12) (74 - 99)  BP: 169/82 (04-27-24 @ 15:12) (121/68 - 169/82)  BP(mean): --  RR: 18 (04-27-24 @ 15:12) (18 - 18)  SpO2: 98% (04-26-24 @ 15:54) (98% - 98%)      Lipid Panel: Date/Time: 12-23-23 @ 08:00  Cholesterol, Serum: 186  LDL Cholesterol Calculated: 100  HDL Cholesterol, Serum: 46  Total Cholesterol/HDL Ration Measurement: --  Triglycerides, Serum: 202   BMI: BMI (kg/m2): 25.4 (04-27-24 @ 00:19)  HbA1c: A1C with Estimated Average Glucose Result: 6.5 % (12-23-23 @ 08:00)    Glucose: POCT Blood Glucose.: 157 mg/dL (01-04-24 @ 19:59)    BP: 169/82 (04-27-24 @ 15:12) (121/68 - 169/82)Vital Signs Last 24 Hrs  T(C): 36.1 (04-27-24 @ 15:12), Max: 36.2 (04-27-24 @ 00:19)  T(F): 96.9 (04-27-24 @ 15:12), Max: 97.1 (04-27-24 @ 00:19)  HR: 87 (04-27-24 @ 15:12) (74 - 87)  BP: 169/82 (04-27-24 @ 15:12) (133/68 - 169/82)  BP(mean): --  RR: 18 (04-27-24 @ 15:12) (18 - 18)  SpO2: --      Lipid Panel: Date/Time: 12-23-23 @ 08:00  Cholesterol, Serum: 186  LDL Cholesterol Calculated: 100  HDL Cholesterol, Serum: 46  Total Cholesterol/HDL Ration Measurement: --  Triglycerides, Serum: 202

## 2024-04-27 NOTE — BH INPATIENT PSYCHIATRY ASSESSMENT NOTE - ADDITIONAL DETAILS / COMMENTS
considerable historical information is available on the unit.  The patient is also known to the 2 physicians admitting him.   He may be limited intellectually but his premorbid functioning is unknown.  He tends to be very limited in his contacts and  fund of knowledge.
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
## 2024-04-27 NOTE — BH INPATIENT PSYCHIATRY ASSESSMENT NOTE - NSBHCONSIPREASONDETAILS_PSY_A_CORE FT
patient cannot function outside of the structured environment and currently is not  able to tolerate   His structured living arrangement.

## 2024-04-27 NOTE — BH INPATIENT PSYCHIATRY ASSESSMENT NOTE - NSBHASSESSSUMMFT_PSY_ALL_CORE
67 yo male domiciled at [facility], with PMH diabetes, CAD, CLL, parathyroid cancer, PPH of chronic schizophrenia, multiple state hospitalizations, most recently between Feb 2019 and Oct 2023 at [place], who presented to the ED on 4/26/24 with an unclear complaint. Based on collateral and exam, pt was found to be disorganized and off meds, and subsequently admitted to [facility] under 9.39 legals for decompensated schizophrenia.    Based on history and ED evaluation  he appeared very limited, with impoverished and illogical thought and speech. Given pt's irritability, disorganized behavior, and psychosis, likely 2/2 medication noncompliance, pt  was assessed to be unable to care for himself and was admitted to the inpatient unit.     Plan:   #Schizophrenia  - restart clozaril at 12.5 mg tonight, then increase to 25 mg QHs tomorrow (4/27). Last ANC per clozaril rems was on . Continue to monitor V/S and weekly CBCs (ordered for 12/26) and gradually increase dose appropriately. (Pt was previously on Clozapine 100mg 2 tablets po 9pm.)   - restart Benztropine MES 2mg po 9am for EPS ppx  - Haloperidol Dec 100mg IM last given 12/14/23 ***next due 1/10/24  - f/u A1c and CBC with diff ordered for 12/26    #Agitation  - For severe agitation not responding to behavioral intervention, may give haldol 5 mg po q6h prn, ativan 1 mg po q6h prn, hydroxyzine 50 mg po q6h prn, with escalation to IM if patient refusing PO and remains an imminent danger to self or others. If IM antipsychotic is administered, please perform follow-up ECG for QTc monitoring.  - Can give benadryl 50 mg po q6h for anxiety/insomnia    #Anteroseptal infarct (+ECG in November and 12/21/23)  - get troponin  - Consult cardiology if elevated    #Hypothyroidism  - F/u TSH and reflex T4    - restart home medical medications as follows:   Allopurinol 100mg po 9am  Atorvastatin 10mg po 9pm  Atropine 1% eye drops 9am  Bisacodyl EC 5mg po 9pm  Januvia 100mg po 9am  Levothyroxine 200mg po 9am  Metformin HCL 500mg po 9am and 9pm  Symbicort Inhaler 5mcg 9am and 9pm   69 yo male domiciled at [facility], with PMH diabetes, CAD, CLL, parathyroid cancer, PPH of chronic schizophrenia, multiple state hospitalizations, most recently between Feb 2019 and Oct 2023 at [place],  hospitalized here  in late 2023,who presented to the ED on 4/26/24 with an unclear complaint. Based on collateral and exam, pt was found to be disorganized and off meds, and subsequently admitted to [facility] under 9.39 legals for decompensated schizophrenia.    Based on history and ED evaluation  he appeared very limited, with impoverished and illogical thought and speech. Given pt's irritability, disorganized behavior, and psychosis, likely 2/2 medication noncompliance, pt  was assessed to be unable to care for himself and was admitted to the inpatient unit.     Plan:   #Schizophrenia  - restart clozaril at 12.5 mg tonight, then increase to 25 mg QHs tomorrow (4/27). Last ANC per clozaril rems was on . Continue to monitor V/S and weekly CBCs  and gradually increase dose appropriately. (Pt was previously on Clozapine 100mg 2 tablets po 9pm.)     - f/u A1c and CBC     #Agitation  - For severe agitation not responding to behavioral intervention, may give haldol 5 mg po q6h prn, ativan 1 mg po q6h prn, hydroxyzine 50 mg po q6h prn, with escalation to IM if patient refusing PO and remains an imminent danger to self or others. If IM antipsychotic is administered, please perform follow-up ECG for QTc monitoring.  - Can give benadryl 50 mg po q6h for anxiety/insomnia    #Anteroseptal infarct (+ECG in November and 12/21/23)  - get troponin  - Consult cardiology if elevated    #Hypothyroidism  - F/u TSH and reflex T4

## 2024-04-27 NOTE — BH INPATIENT PSYCHIATRY ASSESSMENT NOTE - VIOLENCE RISK FACTORS:
Noncompliance with treatment/Community stressors that increase the risk of destabilization/Irritability

## 2024-04-27 NOTE — BH INPATIENT PSYCHIATRY ASSESSMENT NOTE - NSCOMMENTSUICRISKFACT_PSY_ALL_CORE
patient's risk factors include major mental illness, noncompliance with medication his age gender and isolation

## 2024-04-27 NOTE — BH INPATIENT PSYCHIATRY ASSESSMENT NOTE - CURRENT MEDICATION
MEDICATIONS  (STANDING):  dextrose 10% Bolus 125 milliLiter(s) IV Bolus once  dextrose 5%. 1000 milliLiter(s) (50 mL/Hr) IV Continuous <Continuous>  dextrose 5%. 1000 milliLiter(s) (100 mL/Hr) IV Continuous <Continuous>  dextrose 50% Injectable 25 Gram(s) IV Push once  dextrose 50% Injectable 12.5 Gram(s) IV Push once  glucagon  Injectable 1 milliGRAM(s) IntraMuscular once    MEDICATIONS  (PRN):  acetaminophen     Tablet .. 650 milliGRAM(s) Oral every 6 hours PRN Temp greater or equal to 38C (100.4F), Mild Pain (1 - 3)  dextrose Oral Gel 15 Gram(s) Oral once PRN Blood Glucose LESS THAN 70 milliGRAM(s)/deciliter   MEDICATIONS  (STANDING):  allopurinol 100 milliGRAM(s) Oral daily  atorvastatin 10 milliGRAM(s) Oral at bedtime  atropine 1% Solution 1 Drop(s) Both EYES daily  budesonide 160 MICROgram(s)/formoterol 4.5 MICROgram(s) Inhaler 2 Puff(s) Inhalation two times a day  dextrose 10% Bolus 125 milliLiter(s) IV Bolus once  dextrose 5%. 1000 milliLiter(s) (100 mL/Hr) IV Continuous <Continuous>  dextrose 5%. 1000 milliLiter(s) (50 mL/Hr) IV Continuous <Continuous>  dextrose 50% Injectable 25 Gram(s) IV Push once  dextrose 50% Injectable 12.5 Gram(s) IV Push once  glucagon  Injectable 1 milliGRAM(s) IntraMuscular once  levothyroxine 200 MICROGram(s) Oral daily  linagliptin 5 milliGRAM(s) Oral daily  metFORMIN 500 milliGRAM(s) Oral two times a day    MEDICATIONS  (PRN):  acetaminophen     Tablet .. 650 milliGRAM(s) Oral every 6 hours PRN Temp greater or equal to 38C (100.4F), Mild Pain (1 - 3)  dextrose Oral Gel 15 Gram(s) Oral once PRN Blood Glucose LESS THAN 70 milliGRAM(s)/deciliter  hydrOXYzine hydrochloride 50 milliGRAM(s) Oral every 6 hours PRN anxiety

## 2024-04-27 NOTE — BH INPATIENT PSYCHIATRY ASSESSMENT NOTE - OTHER
does not appear internally preoccupied oleksandra   Impaired by history demonstrates no impulsivity on unit  tongue thrusting paranoia  by history

## 2024-04-28 LAB
GLUCOSE BLDC GLUCOMTR-MCNC: 119 MG/DL — HIGH (ref 70–99)
GLUCOSE BLDC GLUCOMTR-MCNC: 198 MG/DL — HIGH (ref 70–99)

## 2024-04-28 PROCEDURE — 99232 SBSQ HOSP IP/OBS MODERATE 35: CPT

## 2024-04-28 RX ADMIN — ATORVASTATIN CALCIUM 10 MILLIGRAM(S): 80 TABLET, FILM COATED ORAL at 20:31

## 2024-04-28 RX ADMIN — BUDESONIDE AND FORMOTEROL FUMARATE DIHYDRATE 2 PUFF(S): 160; 4.5 AEROSOL RESPIRATORY (INHALATION) at 08:50

## 2024-04-28 RX ADMIN — Medication 1 DROP(S): at 10:12

## 2024-04-28 RX ADMIN — PALIPERIDONE 3 MILLIGRAM(S): 1.5 TABLET, EXTENDED RELEASE ORAL at 20:33

## 2024-04-28 RX ADMIN — CLOZAPINE 12.5 MILLIGRAM(S): 150 TABLET, ORALLY DISINTEGRATING ORAL at 08:55

## 2024-04-28 RX ADMIN — METFORMIN HYDROCHLORIDE 500 MILLIGRAM(S): 850 TABLET ORAL at 20:33

## 2024-04-28 RX ADMIN — Medication 100 MILLIGRAM(S): at 09:54

## 2024-04-28 RX ADMIN — CLOZAPINE 12.5 MILLIGRAM(S): 150 TABLET, ORALLY DISINTEGRATING ORAL at 20:32

## 2024-04-28 RX ADMIN — METFORMIN HYDROCHLORIDE 500 MILLIGRAM(S): 850 TABLET ORAL at 08:56

## 2024-04-28 RX ADMIN — LINAGLIPTIN 5 MILLIGRAM(S): 5 TABLET, FILM COATED ORAL at 10:12

## 2024-04-28 RX ADMIN — BUDESONIDE AND FORMOTEROL FUMARATE DIHYDRATE 2 PUFF(S): 160; 4.5 AEROSOL RESPIRATORY (INHALATION) at 20:30

## 2024-04-28 NOTE — BH INPATIENT PSYCHIATRY PROGRESS NOTE - CURRENT MEDICATION
MEDICATIONS  (STANDING):  allopurinol 100 milliGRAM(s) Oral daily  atorvastatin 10 milliGRAM(s) Oral at bedtime  atropine 1% Solution 1 Drop(s) Both EYES daily  budesonide 160 MICROgram(s)/formoterol 4.5 MICROgram(s) Inhaler 2 Puff(s) Inhalation two times a day  cloZAPine 12.5 milliGRAM(s) Oral two times a day  dextrose 10% Bolus 125 milliLiter(s) IV Bolus once  dextrose 5%. 1000 milliLiter(s) (100 mL/Hr) IV Continuous <Continuous>  dextrose 5%. 1000 milliLiter(s) (50 mL/Hr) IV Continuous <Continuous>  dextrose 50% Injectable 25 Gram(s) IV Push once  dextrose 50% Injectable 12.5 Gram(s) IV Push once  glucagon  Injectable 1 milliGRAM(s) IntraMuscular once  levothyroxine 200 MICROGram(s) Oral daily  linagliptin 5 milliGRAM(s) Oral daily  metFORMIN 500 milliGRAM(s) Oral two times a day  paliperidone ER 3 milliGRAM(s) Oral at bedtime    MEDICATIONS  (PRN):  acetaminophen     Tablet .. 650 milliGRAM(s) Oral every 6 hours PRN Temp greater or equal to 38C (100.4F), Mild Pain (1 - 3)  dextrose Oral Gel 15 Gram(s) Oral once PRN Blood Glucose LESS THAN 70 milliGRAM(s)/deciliter  haloperidol     Tablet 5 milliGRAM(s) Oral every 6 hours PRN Agitation not responding to reassurance  hydrOXYzine hydrochloride 50 milliGRAM(s) Oral every 6 hours PRN anxiety  LORazepam     Tablet 2 milliGRAM(s) Oral every 6 hours PRN Agitation not r responding to reassurance

## 2024-04-28 NOTE — BH INPATIENT PSYCHIATRY PROGRESS NOTE - PRN MEDS
MEDICATIONS  (PRN):  acetaminophen     Tablet .. 650 milliGRAM(s) Oral every 6 hours PRN Temp greater or equal to 38C (100.4F), Mild Pain (1 - 3)  dextrose Oral Gel 15 Gram(s) Oral once PRN Blood Glucose LESS THAN 70 milliGRAM(s)/deciliter  haloperidol     Tablet 5 milliGRAM(s) Oral every 6 hours PRN Agitation not responding to reassurance  hydrOXYzine hydrochloride 50 milliGRAM(s) Oral every 6 hours PRN anxiety  LORazepam     Tablet 2 milliGRAM(s) Oral every 6 hours PRN Agitation not r responding to reassurance

## 2024-04-28 NOTE — BH INPATIENT PSYCHIATRY PROGRESS NOTE - NSBHFUPINTERVALHXFT_PSY_A_CORE
Patient refused medications yesterday, but has taken all scheduled medications today.    Chart reviewed, patient seen and evaluated in AM. On approach, patient is sitting on bed. Patient reports feeling "So-so." He requests his clothing, bible, and other items. Otherwise, denies medication SE, AH, VH, SI, HI, Patient endorsed OK sleep and appetite.

## 2024-04-28 NOTE — BH INPATIENT PSYCHIATRY PROGRESS NOTE - NSBHASSESSSUMMFT_PSY_ALL_CORE
69 yo male domiciled at [facility], with PMH diabetes, CAD, CLL, parathyroid cancer, PPH of chronic schizophrenia, multiple state hospitalizations, most recently between Feb 2019 and Oct 2023 at [facility],  hospitalized here  in late 2023,who presented to the ED on 4/26/24 with an unclear complaint. Based on collateral and exam, pt was found to be disorganized and off meds, and subsequently admitted to [facility] under 9.39 legals for decompensated schizophrenia.    Based on history and ED evaluation  he appeared very limited, with impoverished and illogical thought and speech. Given pt's irritability, disorganized behavior, and psychosis, likely 2/2 medication noncompliance, pt  was assessed to be unable to care for himself and was admitted to the inpatient unit.     4/28: Patient appears euthymic, willing to cooperate with interview. Patient is now taking prescribed medications. Will continue to monitor     Plan:   #Schizophrenia  - Continue Clozaril 12.5mg BID (did not take dose on 4/28)  - Continue to monitor V/S and weekly CBCs  and gradually increase dose appropriately. (Pt was previously on Clozapine 100mg 2 tablets po 9pm.)   - f/u A1c and CBC     #Agitation  - For severe agitation not responding to behavioral intervention, may give haldol 5 mg po q6h prn, ativan 1 mg po q6h prn, hydroxyzine 50 mg po q6h prn, with escalation to IM if patient refusing PO and remains an imminent danger to self or others. If IM antipsychotic is administered, please perform follow-up ECG for QTc monitoring.  - Can give benadryl 50 mg po q6h for anxiety/insomnia    #Anteroseptal infarct (+ECG in November and 12/21/23)  - F/u troponin  - Consult cardiology if elevated    #Hypothyroidism  - F/u TSH and reflex T4

## 2024-04-28 NOTE — BH INPATIENT PSYCHIATRY PROGRESS NOTE - NSBHCHARTREVIEWVS_PSY_A_CORE FT
Vital Signs Last 24 Hrs  T(C): 35.9 (04-28-24 @ 09:18), Max: 36.1 (04-27-24 @ 15:12)  T(F): 96.6 (04-28-24 @ 09:18), Max: 96.9 (04-27-24 @ 15:12)  HR: 92 (04-28-24 @ 09:18) (87 - 92)  BP: 158/86 (04-28-24 @ 09:18) (158/86 - 169/82)  BP(mean): --  RR: 16 (04-28-24 @ 09:18) (16 - 18)  SpO2: --

## 2024-04-28 NOTE — BH INPATIENT PSYCHIATRY PROGRESS NOTE - NSBHMETABOLIC_PSY_ALL_CORE_FT
BMI: BMI (kg/m2): 25.4 (04-27-24 @ 00:19)  HbA1c: A1C with Estimated Average Glucose Result: 6.5 % (12-23-23 @ 08:00)    Glucose: POCT Blood Glucose.: 157 mg/dL (01-04-24 @ 19:59)    BP: 158/86 (04-28-24 @ 09:18) (121/68 - 169/82)Vital Signs Last 24 Hrs  T(C): 35.9 (04-28-24 @ 09:18), Max: 36.1 (04-27-24 @ 15:12)  T(F): 96.6 (04-28-24 @ 09:18), Max: 96.9 (04-27-24 @ 15:12)  HR: 92 (04-28-24 @ 09:18) (87 - 92)  BP: 158/86 (04-28-24 @ 09:18) (158/86 - 169/82)  BP(mean): --  RR: 16 (04-28-24 @ 09:18) (16 - 18)  SpO2: --      Lipid Panel: Date/Time: 12-23-23 @ 08:00  Cholesterol, Serum: 186  LDL Cholesterol Calculated: 100  HDL Cholesterol, Serum: 46  Total Cholesterol/HDL Ration Measurement: --  Triglycerides, Serum: 202

## 2024-04-28 NOTE — BH INPATIENT PSYCHIATRY PROGRESS NOTE - OTHER
Impaired by history demonstrates no impulsivity on unit paranoia  by history  tongue thrusting does not appear internally preoccupied

## 2024-04-29 LAB
GLUCOSE BLDC GLUCOMTR-MCNC: 107 MG/DL — HIGH (ref 70–99)
GLUCOSE BLDC GLUCOMTR-MCNC: 114 MG/DL — HIGH (ref 70–99)
GLUCOSE BLDC GLUCOMTR-MCNC: 116 MG/DL — HIGH (ref 70–99)
GLUCOSE BLDC GLUCOMTR-MCNC: 119 MG/DL — HIGH (ref 70–99)
GLUCOSE BLDC GLUCOMTR-MCNC: 124 MG/DL — HIGH (ref 70–99)
GLUCOSE BLDC GLUCOMTR-MCNC: 155 MG/DL — HIGH (ref 70–99)
GLUCOSE BLDC GLUCOMTR-MCNC: 157 MG/DL — HIGH (ref 70–99)

## 2024-04-29 RX ADMIN — Medication 200 MICROGRAM(S): at 10:18

## 2024-04-29 RX ADMIN — Medication 1 DROP(S): at 10:18

## 2024-04-29 RX ADMIN — CLOZAPINE 12.5 MILLIGRAM(S): 150 TABLET, ORALLY DISINTEGRATING ORAL at 20:01

## 2024-04-29 RX ADMIN — METFORMIN HYDROCHLORIDE 500 MILLIGRAM(S): 850 TABLET ORAL at 10:18

## 2024-04-29 RX ADMIN — BUDESONIDE AND FORMOTEROL FUMARATE DIHYDRATE 2 PUFF(S): 160; 4.5 AEROSOL RESPIRATORY (INHALATION) at 09:30

## 2024-04-29 RX ADMIN — PALIPERIDONE 3 MILLIGRAM(S): 1.5 TABLET, EXTENDED RELEASE ORAL at 20:02

## 2024-04-29 RX ADMIN — LINAGLIPTIN 5 MILLIGRAM(S): 5 TABLET, FILM COATED ORAL at 10:19

## 2024-04-29 RX ADMIN — BUDESONIDE AND FORMOTEROL FUMARATE DIHYDRATE 2 PUFF(S): 160; 4.5 AEROSOL RESPIRATORY (INHALATION) at 20:01

## 2024-04-29 RX ADMIN — ATORVASTATIN CALCIUM 10 MILLIGRAM(S): 80 TABLET, FILM COATED ORAL at 20:01

## 2024-04-29 RX ADMIN — Medication 100 MILLIGRAM(S): at 10:17

## 2024-04-29 RX ADMIN — METFORMIN HYDROCHLORIDE 500 MILLIGRAM(S): 850 TABLET ORAL at 20:01

## 2024-04-29 RX ADMIN — CLOZAPINE 12.5 MILLIGRAM(S): 150 TABLET, ORALLY DISINTEGRATING ORAL at 10:18

## 2024-04-29 NOTE — BH INPATIENT PSYCHIATRY PROGRESS NOTE - NSBHFUPINTERVALHXFT_PSY_A_CORE
Patient was seen and evaluated this morning. Chart was reviewed and no acute events were noted. No PRN medications were administered overnight. Upon approach, patient was standing in the hallway. Patient notes he feels fine at the moment and has had prior admissions to this hospital.  Patient is alert and engages with interviewer. Patient is cooperative and answers all questions appropriately. Patient is having delusions that he cannot shower because it is contaminated with hepatitis.   Patient was seen and evaluated this morning. Chart was reviewed and no acute events were noted. No PRN medications were administered overnight. Upon approach, patient was standing in the hallway. Patient notes he feels fine at the moment and recalls previous admissions to this hospital. Patient is alert and engages with interviewer. Patient is cooperative and answers all questions appropriately. Patient is having delusions that he cannot shower because it is contaminated with hepatitis.

## 2024-04-29 NOTE — BH INPATIENT PSYCHIATRY PROGRESS NOTE - NSBHMETABOLIC_PSY_ALL_CORE_FT
BMI: BMI (kg/m2): 25.4 (04-27-24 @ 00:19)  HbA1c: A1C with Estimated Average Glucose Result: 6.5 % (12-23-23 @ 08:00)    Glucose: POCT Blood Glucose.: 124 mg/dL (04-29-24 @ 06:41)    BP: 136/73 (04-29-24 @ 08:48) (120/70 - 169/82)Vital Signs Last 24 Hrs  T(C): 36.2 (04-28-24 @ 20:44), Max: 36.2 (04-28-24 @ 20:44)  T(F): 97.2 (04-28-24 @ 20:44), Max: 97.2 (04-28-24 @ 20:44)  HR: 92 (04-29-24 @ 08:48) (87 - 104)  BP: 136/73 (04-29-24 @ 08:48) (120/70 - 157/74)  BP(mean): --  RR: 18 (04-29-24 @ 08:48) (16 - 18)  SpO2: --      Lipid Panel: Date/Time: 12-23-23 @ 08:00  Cholesterol, Serum: 186  LDL Cholesterol Calculated: 100  HDL Cholesterol, Serum: 46  Total Cholesterol/HDL Ration Measurement: --  Triglycerides, Serum: 202

## 2024-04-29 NOTE — BH INPATIENT PSYCHIATRY PROGRESS NOTE - NSBHFUPINTERVALCCFT_PSY_A_CORE
For now Evaluation of agitated behavior. Patient states "I don't know".  "The shower is contaminated with hepatitis"

## 2024-04-29 NOTE — BH INPATIENT PSYCHIATRY PROGRESS NOTE - OTHER
paranoia  by history does not appear internally preoccupied   Impaired by history demonstrates no impulsivity on unit  tongue thrusting delusions regarding showers  lip smacking

## 2024-04-29 NOTE — BH INPATIENT PSYCHIATRY PROGRESS NOTE - NSBHMSEPERCEPT_PSY_A_CORE
Attempted to reach the patient to arrange an appointment with Dr. Mancilla in the Spring. No answer and mailbox was full.  [facility] message previously sent to the patient and it was not read.    Letter mailed to the patient.    No abnormalities/Other

## 2024-04-29 NOTE — BH INPATIENT PSYCHIATRY PROGRESS NOTE - NSBHCHARTREVIEWVS_PSY_A_CORE FT
Vital Signs Last 24 Hrs  T(C): 36.2 (04-28-24 @ 20:44), Max: 36.2 (04-28-24 @ 20:44)  T(F): 97.2 (04-28-24 @ 20:44), Max: 97.2 (04-28-24 @ 20:44)  HR: 92 (04-29-24 @ 08:48) (87 - 104)  BP: 136/73 (04-29-24 @ 08:48) (120/70 - 157/74)  BP(mean): --  RR: 18 (04-29-24 @ 08:48) (16 - 18)  SpO2: --

## 2024-04-29 NOTE — BH INPATIENT PSYCHIATRY PROGRESS NOTE - NSBHASSESSSUMMFT_PSY_ALL_CORE
69 yo male domiciled at [facility], with PMH diabetes, CAD, CLL, parathyroid cancer, PPH of chronic schizophrenia, multiple state hospitalizations, most recently between Feb 2019 and Oct 2023 at [place],  hospitalized here  in late 2023,who presented to the ED on 4/26/24 with an unclear complaint. Based on collateral and exam, pt was found to be disorganized and off meds, and subsequently admitted to [facility] under 9.39 legals for decompensated schizophrenia.    Based on history and ED evaluation  he appeared very limited, with impoverished and illogical thought and speech. Given pt's irritability, disorganized behavior, and psychosis, likely 2/2 medication noncompliance, pt  was assessed to be unable to care for himself and was admitted to the inpatient unit.     4/28: Patient appears euthymic, willing to cooperate with interview. Patient is now taking prescribed medications. Will continue to monitor     Plan:   #Schizophrenia  - Continue Clozaril 12.5mg BID (did not take dose on 4/28)  - Continue to monitor V/S and weekly CBCs  and gradually increase dose appropriately. (Pt was previously on Clozapine 100mg 2 tablets po 9pm.)   - f/u A1c and CBC     #Agitation  - For severe agitation not responding to behavioral intervention, may give haldol 5 mg po q6h prn, ativan 1 mg po q6h prn, hydroxyzine 50 mg po q6h prn, with escalation to IM if patient refusing PO and remains an imminent danger to self or others. If IM antipsychotic is administered, please perform follow-up ECG for QTc monitoring.  - Can give benadryl 50 mg po q6h for anxiety/insomnia    #Anteroseptal infarct (+ECG in November and 12/21/23)  - F/u troponin  - Consult cardiology if elevated    #Hypothyroidism  - F/u TSH and reflex T4   69 yo male domiciled at [facility], with PMH diabetes, CAD, CLL, parathyroid cancer, PPH of chronic schizophrenia, multiple state hospitalizations, most recently between Feb 2019 and Oct 2023 at [place],  hospitalized here  in late 2023,who presented to the ED on 4/26/24 with an unclear complaint. Based on collateral and exam, pt was found to be disorganized and off meds, and subsequently admitted to [facility] under 9.39 legals for decompensated schizophrenia.    Based on history and ED evaluation  he appeared very limited, with impoverished and illogical thought and speech. Given pt's irritability, disorganized behavior, and psychosis, likely 2/2 medication noncompliance, pt  was assessed to be unable to care for himself and was admitted to the inpatient unit.     4/28: Patient appears euthymic, willing to cooperate with interview. Patient is now taking prescribed medications. Will continue to monitor   4/29- calm, cooperative, taking medications; endorsing vague delusions, but does not appear overtly paranoid; pt has AOT    Plan:   #Schizophrenia  - Continue Clozaril 12.5mg BID (did not take dose on 4/28)  - Continue to monitor V/S and weekly CBCs and gradually increase dose appropriately. (Pt was previously on Clozapine 100mg 2 tablets po 9pm.)   - ANC 4230 4/26  - invega ER 3mg qhs    #Anteroseptal infarct (+ECG in November and 12/21/23)  - F/u troponin  - Consult cardiology if elevated    #Hypothyroidism  - F/u TSH and reflex T4  - c/w synthroid 200 mcg qdaily     #T2DM  #HLD  - tradjenta 5mg qdaily  - metformin 500mg bid  - lipitor 10mg qhs  - allopurinol 100mg qdaily         #Agitation  - For severe agitation not responding to behavioral intervention, may give haldol 5 mg po q6h prn, ativan 1 mg po q6h prn, hydroxyzine 50 mg po q6h prn, with escalation to IM if patient refusing PO and remains an imminent danger to self or others. If IM antipsychotic is administered, please perform follow-up ECG for QTc monitoring.  - Can give benadryl 50 mg po q6h for anxiety/insomnia

## 2024-04-29 NOTE — BH INPATIENT PSYCHIATRY PROGRESS NOTE - CURRENT MEDICATION
MEDICATIONS  (STANDING):  allopurinol 100 milliGRAM(s) Oral daily  atorvastatin 10 milliGRAM(s) Oral at bedtime  atropine 1% Solution 1 Drop(s) Both EYES daily  budesonide 160 MICROgram(s)/formoterol 4.5 MICROgram(s) Inhaler 2 Puff(s) Inhalation two times a day  cloZAPine 12.5 milliGRAM(s) Oral two times a day  dextrose 10% Bolus 125 milliLiter(s) IV Bolus once  dextrose 5%. 1000 milliLiter(s) (100 mL/Hr) IV Continuous <Continuous>  dextrose 5%. 1000 milliLiter(s) (50 mL/Hr) IV Continuous <Continuous>  dextrose 50% Injectable 12.5 Gram(s) IV Push once  dextrose 50% Injectable 25 Gram(s) IV Push once  glucagon  Injectable 1 milliGRAM(s) IntraMuscular once  levothyroxine 200 MICROGram(s) Oral daily  linagliptin 5 milliGRAM(s) Oral daily  metFORMIN 500 milliGRAM(s) Oral two times a day  paliperidone ER 3 milliGRAM(s) Oral at bedtime    MEDICATIONS  (PRN):  acetaminophen     Tablet .. 650 milliGRAM(s) Oral every 6 hours PRN Temp greater or equal to 38C (100.4F), Mild Pain (1 - 3)  dextrose Oral Gel 15 Gram(s) Oral once PRN Blood Glucose LESS THAN 70 milliGRAM(s)/deciliter  haloperidol     Tablet 5 milliGRAM(s) Oral every 6 hours PRN Agitation not responding to reassurance  hydrOXYzine hydrochloride 50 milliGRAM(s) Oral every 6 hours PRN anxiety  LORazepam     Tablet 2 milliGRAM(s) Oral every 6 hours PRN Agitation not r responding to reassurance   MEDICATIONS  (STANDING):  allopurinol 100 milliGRAM(s) Oral daily  atorvastatin 10 milliGRAM(s) Oral at bedtime  atropine 1% Solution 1 Drop(s) Both EYES daily  budesonide 160 MICROgram(s)/formoterol 4.5 MICROgram(s) Inhaler 2 Puff(s) Inhalation two times a day  cloZAPine 12.5 milliGRAM(s) Oral two times a day  dextrose 10% Bolus 125 milliLiter(s) IV Bolus once  dextrose 5%. 1000 milliLiter(s) (100 mL/Hr) IV Continuous <Continuous>  dextrose 5%. 1000 milliLiter(s) (50 mL/Hr) IV Continuous <Continuous>  dextrose 50% Injectable 25 Gram(s) IV Push once  dextrose 50% Injectable 12.5 Gram(s) IV Push once  glucagon  Injectable 1 milliGRAM(s) IntraMuscular once  levothyroxine 200 MICROGram(s) Oral daily  linagliptin 5 milliGRAM(s) Oral daily  metFORMIN 500 milliGRAM(s) Oral two times a day  paliperidone ER 3 milliGRAM(s) Oral at bedtime    MEDICATIONS  (PRN):  acetaminophen     Tablet .. 650 milliGRAM(s) Oral every 6 hours PRN Temp greater or equal to 38C (100.4F), Mild Pain (1 - 3)  dextrose Oral Gel 15 Gram(s) Oral once PRN Blood Glucose LESS THAN 70 milliGRAM(s)/deciliter  haloperidol     Tablet 5 milliGRAM(s) Oral every 6 hours PRN Agitation not responding to reassurance  hydrOXYzine hydrochloride 50 milliGRAM(s) Oral every 6 hours PRN anxiety  LORazepam     Tablet 2 milliGRAM(s) Oral every 6 hours PRN Agitation not r responding to reassurance

## 2024-04-30 LAB
GLUCOSE BLDC GLUCOMTR-MCNC: 121 MG/DL — HIGH (ref 70–99)
GLUCOSE BLDC GLUCOMTR-MCNC: 169 MG/DL — HIGH (ref 70–99)
GLUCOSE BLDC GLUCOMTR-MCNC: 98 MG/DL — SIGNIFICANT CHANGE UP (ref 70–99)

## 2024-04-30 RX ADMIN — CLOZAPINE 12.5 MILLIGRAM(S): 150 TABLET, ORALLY DISINTEGRATING ORAL at 09:39

## 2024-04-30 RX ADMIN — BUDESONIDE AND FORMOTEROL FUMARATE DIHYDRATE 2 PUFF(S): 160; 4.5 AEROSOL RESPIRATORY (INHALATION) at 19:52

## 2024-04-30 RX ADMIN — Medication 100 MILLIGRAM(S): at 09:39

## 2024-04-30 RX ADMIN — METFORMIN HYDROCHLORIDE 500 MILLIGRAM(S): 850 TABLET ORAL at 19:53

## 2024-04-30 RX ADMIN — METFORMIN HYDROCHLORIDE 500 MILLIGRAM(S): 850 TABLET ORAL at 09:38

## 2024-04-30 RX ADMIN — ATORVASTATIN CALCIUM 10 MILLIGRAM(S): 80 TABLET, FILM COATED ORAL at 19:53

## 2024-04-30 RX ADMIN — PALIPERIDONE 3 MILLIGRAM(S): 1.5 TABLET, EXTENDED RELEASE ORAL at 19:51

## 2024-04-30 NOTE — BH INPATIENT PSYCHIATRY PROGRESS NOTE - CURRENT MEDICATION
MEDICATIONS  (STANDING):  allopurinol 100 milliGRAM(s) Oral daily  atorvastatin 10 milliGRAM(s) Oral at bedtime  atropine 1% Solution 1 Drop(s) Both EYES daily  budesonide 160 MICROgram(s)/formoterol 4.5 MICROgram(s) Inhaler 2 Puff(s) Inhalation two times a day  cloZAPine 12.5 milliGRAM(s) Oral two times a day  dextrose 10% Bolus 125 milliLiter(s) IV Bolus once  dextrose 5%. 1000 milliLiter(s) (50 mL/Hr) IV Continuous <Continuous>  dextrose 5%. 1000 milliLiter(s) (100 mL/Hr) IV Continuous <Continuous>  dextrose 50% Injectable 12.5 Gram(s) IV Push once  dextrose 50% Injectable 25 Gram(s) IV Push once  glucagon  Injectable 1 milliGRAM(s) IntraMuscular once  levothyroxine 200 MICROGram(s) Oral daily  linagliptin 5 milliGRAM(s) Oral daily  metFORMIN 500 milliGRAM(s) Oral two times a day  paliperidone ER 3 milliGRAM(s) Oral at bedtime    MEDICATIONS  (PRN):  acetaminophen     Tablet .. 650 milliGRAM(s) Oral every 6 hours PRN Temp greater or equal to 38C (100.4F), Mild Pain (1 - 3)  dextrose Oral Gel 15 Gram(s) Oral once PRN Blood Glucose LESS THAN 70 milliGRAM(s)/deciliter  haloperidol     Tablet 5 milliGRAM(s) Oral every 6 hours PRN Agitation not responding to reassurance  hydrOXYzine hydrochloride 50 milliGRAM(s) Oral every 6 hours PRN anxiety  LORazepam     Tablet 2 milliGRAM(s) Oral every 6 hours PRN Agitation not r responding to reassurance   MEDICATIONS  (STANDING):  allopurinol 100 milliGRAM(s) Oral daily  atorvastatin 10 milliGRAM(s) Oral at bedtime  atropine 1% Solution 1 Drop(s) Both EYES daily  budesonide 160 MICROgram(s)/formoterol 4.5 MICROgram(s) Inhaler 2 Puff(s) Inhalation two times a day  cloZAPine 12.5 milliGRAM(s) Oral two times a day  dextrose 10% Bolus 125 milliLiter(s) IV Bolus once  dextrose 5%. 1000 milliLiter(s) (50 mL/Hr) IV Continuous <Continuous>  dextrose 5%. 1000 milliLiter(s) (100 mL/Hr) IV Continuous <Continuous>  dextrose 50% Injectable 25 Gram(s) IV Push once  dextrose 50% Injectable 12.5 Gram(s) IV Push once  glucagon  Injectable 1 milliGRAM(s) IntraMuscular once  levothyroxine 200 MICROGram(s) Oral daily  linagliptin 5 milliGRAM(s) Oral daily  metFORMIN 500 milliGRAM(s) Oral two times a day  paliperidone ER 3 milliGRAM(s) Oral at bedtime    MEDICATIONS  (PRN):  acetaminophen     Tablet .. 650 milliGRAM(s) Oral every 6 hours PRN Temp greater or equal to 38C (100.4F), Mild Pain (1 - 3)  dextrose Oral Gel 15 Gram(s) Oral once PRN Blood Glucose LESS THAN 70 milliGRAM(s)/deciliter  haloperidol     Tablet 5 milliGRAM(s) Oral every 6 hours PRN Agitation not responding to reassurance  hydrOXYzine hydrochloride 50 milliGRAM(s) Oral every 6 hours PRN anxiety  LORazepam     Tablet 2 milliGRAM(s) Oral every 6 hours PRN Agitation not r responding to reassurance

## 2024-04-30 NOTE — BH INPATIENT PSYCHIATRY PROGRESS NOTE - NSBHASSESSSUMMFT_PSY_ALL_CORE
69 yo male domiciled at [facility], with PMH diabetes, CAD, CLL, parathyroid cancer, PPH of chronic schizophrenia, multiple state hospitalizations, most recently between Feb 2019 and Oct 2023 at [place],  hospitalized here  in late 2023,who presented to the ED on 4/26/24 with an unclear complaint. Based on collateral and exam, pt was found to be disorganized and off meds, and subsequently admitted to [facility] under 9.39 legals for decompensated schizophrenia.    Based on history and ED evaluation  he appeared very limited, with impoverished and illogical thought and speech. Given pt's irritability, disorganized behavior, and psychosis, likely 2/2 medication noncompliance, pt  was assessed to be unable to care for himself and was admitted to the inpatient unit.     4/28: Patient appears euthymic, willing to cooperate with interview. Patient is now taking prescribed medications. Will continue to monitor   4/29- calm, cooperative, taking medications; endorsing vague delusions, but does not appear overtly paranoid; pt has AOT    Plan:   #Schizophrenia  - Continue Clozaril 12.5mg BID (did not take dose on 4/28)  - Continue to monitor V/S and weekly CBCs and gradually increase dose appropriately. (Pt was previously on Clozapine 100mg 2 tablets po 9pm.)   - ANC 4230 4/26  - invega ER 3mg qhs    #Anteroseptal infarct (+ECG in November and 12/21/23)  - F/u troponin  - Consult cardiology if elevated    #Hypothyroidism  - F/u TSH and reflex T4  - c/w synthroid 200 mcg qdaily     #T2DM  #HLD  - tradjenta 5mg qdaily  - metformin 500mg bid  - lipitor 10mg qhs  - allopurinol 100mg qdaily         #Agitation  - For severe agitation not responding to behavioral intervention, may give haldol 5 mg po q6h prn, ativan 1 mg po q6h prn, hydroxyzine 50 mg po q6h prn, with escalation to IM if patient refusing PO and remains an imminent danger to self or others. If IM antipsychotic is administered, please perform follow-up ECG for QTc monitoring.  - Can give benadryl 50 mg po q6h for anxiety/insomnia   69 yo male domiciled at [facility], with PMH diabetes, CAD, CLL, parathyroid cancer, PPH of chronic schizophrenia, multiple state hospitalizations, most recently between Feb 2019 and Oct 2023 at [place],  hospitalized here  in late 2023,who presented to the ED on 4/26/24 with an unclear complaint. Based on collateral and exam, pt was found to be disorganized and off meds, and subsequently admitted to [facility] under 9.39 legals for decompensated schizophrenia.    Based on history and ED evaluation  he appeared very limited, with impoverished and illogical thought and speech. Given pt's irritability, disorganized behavior, and psychosis, likely 2/2 medication noncompliance, pt  was assessed to be unable to care for himself and was admitted to the inpatient unit.     4/28: Patient appears euthymic, willing to cooperate with interview. Patient is now taking prescribed medications. Will continue to monitor   4/29- calm, cooperative, taking medications; endorsing vague delusions, but does not appear overtly paranoid; pt has AOT  4/30- refused labs in AM, refused to speak with team; will increase clozaril to 25mg BID once bloodwork available to check for ANC and cardiac markers    Plan:   #Schizophrenia  - Continue Clozaril 12.5mg BID (did not take dose on 4/28)  - Continue to monitor V/S and weekly CBCs and gradually increase dose appropriately. (Pt was previously on Clozapine 100mg 2 tablets po 9pm.)   - ANC 4230 4/26  - invega ER 3mg qhs    #Anteroseptal infarct (+ECG in November and 12/21/23)  - F/u troponin  - Consult cardiology if elevated    #Hypothyroidism  - F/u TSH and reflex T4  - c/w synthroid 200 mcg qdaily     #T2DM  #HLD  - tradjenta 5mg qdaily  - metformin 500mg bid  - lipitor 10mg qhs  - allopurinol 100mg qdaily         #Agitation  - For severe agitation not responding to behavioral intervention, may give haldol 5 mg po q6h prn, ativan 1 mg po q6h prn, hydroxyzine 50 mg po q6h prn, with escalation to IM if patient refusing PO and remains an imminent danger to self or others. If IM antipsychotic is administered, please perform follow-up ECG for QTc monitoring.  - Can give benadryl 50 mg po q6h for anxiety/insomnia

## 2024-04-30 NOTE — BH INPATIENT PSYCHIATRY PROGRESS NOTE - NSBHFUPINTERVALHXFT_PSY_A_CORE
Patient was seen and evaluated this morning and this afternoon. Chart was reviewed and no acute events were noted. No PRN medications were administered overnight. Upon approach, patient is lying in bed comfortably. Patient kept his arm over his eyes and face throughout the interaction. Patient is alert. Patient does not engage with interviewer and requests to be left alone during both visits. Patient is not cooperative and does not answer questions appropriately. Patient tolerated lunch with juice.    Patient was seen and evaluated this morning and this afternoon. Chart was reviewed and no acute events were noted. No PRN medications were administered overnight. Upon approach, patient is lying in bed comfortably. Patient kept his arm over his eyes and face throughout the interaction. Patient is alert. Patient does not engage with interviewer and requests to be left alone during both visits. Patient is not cooperative.

## 2024-04-30 NOTE — BH TREATMENT PLAN - NSTXPATIENTPARTICIPATE_PSY_ALL_CORE
Patient participated in identification of needs/problems/goals for treatment Patient participated in identification of needs/problems/goals for treatment/Patient participated in defining interventions

## 2024-04-30 NOTE — BH TREATMENT PLAN - NSTXMEDICINTERRN_PSY_ALL_CORE
Nursing will administer medications and encourage patient to take medication.  Nursing will continue to provide medication eduction and explain benefits as needed.

## 2024-04-30 NOTE — BH INPATIENT PSYCHIATRY PROGRESS NOTE - NSBHCHARTREVIEWVS_PSY_A_CORE FT
Vital Signs Last 24 Hrs  T(C): 36.7 (04-30-24 @ 08:47), Max: 36.7 (04-30-24 @ 08:47)  T(F): 98.1 (04-30-24 @ 08:47), Max: 98.1 (04-30-24 @ 08:47)  HR: 116 (04-30-24 @ 08:47) (98 - 116)  BP: 145/73 (04-30-24 @ 08:47) (140/72 - 166/79)  BP(mean): --  RR: 16 (04-30-24 @ 08:47) (16 - 18)  SpO2: --

## 2024-04-30 NOTE — BH INPATIENT PSYCHIATRY PROGRESS NOTE - ADDITIONAL DETAILS / COMMENTS
Patient overall refused to interact with team. Patient was found in the same position in bed during both visits today. It is unknown if he has risen from bed at all today. Patient is irritable, which is consistent with his previous baseline.

## 2024-05-01 PROCEDURE — 99221 1ST HOSP IP/OBS SF/LOW 40: CPT

## 2024-05-01 RX ADMIN — METFORMIN HYDROCHLORIDE 500 MILLIGRAM(S): 850 TABLET ORAL at 08:24

## 2024-05-01 RX ADMIN — METFORMIN HYDROCHLORIDE 500 MILLIGRAM(S): 850 TABLET ORAL at 20:24

## 2024-05-01 RX ADMIN — Medication 1 DROP(S): at 08:22

## 2024-05-01 RX ADMIN — BUDESONIDE AND FORMOTEROL FUMARATE DIHYDRATE 2 PUFF(S): 160; 4.5 AEROSOL RESPIRATORY (INHALATION) at 20:25

## 2024-05-01 RX ADMIN — BUDESONIDE AND FORMOTEROL FUMARATE DIHYDRATE 2 PUFF(S): 160; 4.5 AEROSOL RESPIRATORY (INHALATION) at 08:24

## 2024-05-01 RX ADMIN — PALIPERIDONE 3 MILLIGRAM(S): 1.5 TABLET, EXTENDED RELEASE ORAL at 20:24

## 2024-05-01 RX ADMIN — ATORVASTATIN CALCIUM 10 MILLIGRAM(S): 80 TABLET, FILM COATED ORAL at 20:24

## 2024-05-01 RX ADMIN — Medication 100 MILLIGRAM(S): at 08:22

## 2024-05-01 NOTE — BH INPATIENT PSYCHIATRY PROGRESS NOTE - OTHER
lip smacking Uncooperative but improved from yesterday. Patient sat upright instead of laying in the supine position with his arms covering his face.

## 2024-05-01 NOTE — BH INPATIENT PSYCHIATRY PROGRESS NOTE - NSBHASSESSSUMMFT_PSY_ALL_CORE
69 yo male domiciled at [facility], with PMH diabetes, CAD, CLL, parathyroid cancer, PPH of chronic schizophrenia, multiple state hospitalizations, most recently between Feb 2019 and Oct 2023 at [place],  hospitalized here  in late 2023,who presented to the ED on 4/26/24 with an unclear complaint. Based on collateral and exam, pt was found to be disorganized and off meds, and subsequently admitted to [facility] under 9.39 legals for decompensated schizophrenia.    Based on history and ED evaluation  he appeared very limited, with impoverished and illogical thought and speech. Given pt's irritability, disorganized behavior, and psychosis, likely 2/2 medication noncompliance, pt  was assessed to be unable to care for himself and was admitted to the inpatient unit.     4/28: Patient appears euthymic, willing to cooperate with interview. Patient is now taking prescribed medications. Will continue to monitor   4/29- calm, cooperative, taking medications; endorsing vague delusions, but does not appear overtly paranoid; pt has AOT  4/30- refused labs in AM, refused to speak with team; will increase clozaril to 25mg BID once bloodwork available to check for ANC and cardiac markers    Plan:   #Schizophrenia  - Continue Clozaril 12.5mg BID (did not take dose on 4/28)  - Continue to monitor V/S and weekly CBCs and gradually increase dose appropriately. (Pt was previously on Clozapine 100mg 2 tablets po 9pm.)   - ANC 4230 4/26  - invega ER 3mg qhs    #Anteroseptal infarct (+ECG in November and 12/21/23)  - F/u troponin  - Consult cardiology if elevated    #Hypothyroidism  - F/u TSH and reflex T4  - c/w synthroid 200 mcg qdaily     #T2DM  #HLD  - tradjenta 5mg qdaily  - metformin 500mg bid  - lipitor 10mg qhs  - allopurinol 100mg qdaily         #Agitation  - For severe agitation not responding to behavioral intervention, may give haldol 5 mg po q6h prn, ativan 1 mg po q6h prn, hydroxyzine 50 mg po q6h prn, with escalation to IM if patient refusing PO and remains an imminent danger to self or others. If IM antipsychotic is administered, please perform follow-up ECG for QTc monitoring.  - Can give benadryl 50 mg po q6h for anxiety/insomnia   69 yo male domiciled at [facility], with PMH diabetes, CAD, CLL, parathyroid cancer, PPH of chronic schizophrenia, multiple state hospitalizations, most recently between Feb 2019 and Oct 2023 at [place],  hospitalized here  in late 2023,who presented to the ED on 4/26/24 with an unclear complaint. Based on collateral and exam, pt was found to be disorganized and off meds, and subsequently admitted to [facility] under 9.39 legals for decompensated schizophrenia.    Based on history and ED evaluation  he appeared very limited, with impoverished and illogical thought and speech. Given pt's irritability, disorganized behavior, and psychosis, likely 2/2 medication noncompliance, pt  was assessed to be unable to care for himself and was admitted to the inpatient unit.     4/28: Patient appears euthymic, willing to cooperate with interview. Patient is now taking prescribed medications. Will continue to monitor   4/29- calm, cooperative, taking medications; endorsing vague delusions, but does not appear overtly paranoid; pt has AOT  4/30- refused labs in AM, refused to speak with team; will increase clozaril to 25mg BID once bloodwork available to check for ANC and cardiac markers  5/1- refusing clozaril and labwork; encouraging patient to adhere to medication regimen    Plan:   #Schizophrenia  - Continue Clozaril 12.5mg BID (did not take dose on 4/28)  - Continue to monitor V/S and weekly CBCs and gradually increase dose appropriately. (Pt was previously on Clozapine 100mg 2 tablets po 9pm.)   - ANC 4230 4/26  - invega ER 3mg qhs    #Anteroseptal infarct (+ECG in November and 12/21/23)  - F/u troponin  - Consult cardiology if elevated    #Hypothyroidism  - F/u TSH and reflex T4  - c/w synthroid 200 mcg qdaily     #T2DM  #HLD  - tradjenta 5mg qdaily  - metformin 500mg bid  - lipitor 10mg qhs  - allopurinol 100mg qdaily         #Agitation  - For severe agitation not responding to behavioral intervention, may give haldol 5 mg po q6h prn, ativan 1 mg po q6h prn, hydroxyzine 50 mg po q6h prn, with escalation to IM if patient refusing PO and remains an imminent danger to self or others. If IM antipsychotic is administered, please perform follow-up ECG for QTc monitoring.  - Can give benadryl 50 mg po q6h for anxiety/insomnia

## 2024-05-01 NOTE — BH INPATIENT PSYCHIATRY PROGRESS NOTE - CURRENT MEDICATION
MEDICATIONS  (STANDING):  allopurinol 100 milliGRAM(s) Oral daily  atorvastatin 10 milliGRAM(s) Oral at bedtime  atropine 1% Solution 1 Drop(s) Both EYES daily  budesonide 160 MICROgram(s)/formoterol 4.5 MICROgram(s) Inhaler 2 Puff(s) Inhalation two times a day  cloZAPine 12.5 milliGRAM(s) Oral two times a day  dextrose 10% Bolus 125 milliLiter(s) IV Bolus once  dextrose 5%. 1000 milliLiter(s) (100 mL/Hr) IV Continuous <Continuous>  dextrose 5%. 1000 milliLiter(s) (50 mL/Hr) IV Continuous <Continuous>  dextrose 50% Injectable 25 Gram(s) IV Push once  dextrose 50% Injectable 12.5 Gram(s) IV Push once  glucagon  Injectable 1 milliGRAM(s) IntraMuscular once  levothyroxine 200 MICROGram(s) Oral daily  linagliptin 5 milliGRAM(s) Oral daily  metFORMIN 500 milliGRAM(s) Oral two times a day  paliperidone ER 3 milliGRAM(s) Oral at bedtime    MEDICATIONS  (PRN):  acetaminophen     Tablet .. 650 milliGRAM(s) Oral every 6 hours PRN Temp greater or equal to 38C (100.4F), Mild Pain (1 - 3)  dextrose Oral Gel 15 Gram(s) Oral once PRN Blood Glucose LESS THAN 70 milliGRAM(s)/deciliter  haloperidol     Tablet 5 milliGRAM(s) Oral every 6 hours PRN Agitation not responding to reassurance  hydrOXYzine hydrochloride 50 milliGRAM(s) Oral every 6 hours PRN anxiety  LORazepam     Tablet 2 milliGRAM(s) Oral every 6 hours PRN Agitation not r responding to reassurance   MEDICATIONS  (STANDING):  allopurinol 100 milliGRAM(s) Oral daily  atorvastatin 10 milliGRAM(s) Oral at bedtime  atropine 1% Solution 1 Drop(s) Both EYES daily  budesonide 160 MICROgram(s)/formoterol 4.5 MICROgram(s) Inhaler 2 Puff(s) Inhalation two times a day  cloZAPine 12.5 milliGRAM(s) Oral two times a day  dextrose 10% Bolus 125 milliLiter(s) IV Bolus once  dextrose 5%. 1000 milliLiter(s) (50 mL/Hr) IV Continuous <Continuous>  dextrose 5%. 1000 milliLiter(s) (100 mL/Hr) IV Continuous <Continuous>  dextrose 50% Injectable 25 Gram(s) IV Push once  dextrose 50% Injectable 12.5 Gram(s) IV Push once  glucagon  Injectable 1 milliGRAM(s) IntraMuscular once  levothyroxine 200 MICROGram(s) Oral daily  linagliptin 5 milliGRAM(s) Oral daily  metFORMIN 500 milliGRAM(s) Oral two times a day  paliperidone ER 3 milliGRAM(s) Oral at bedtime    MEDICATIONS  (PRN):  acetaminophen     Tablet .. 650 milliGRAM(s) Oral every 6 hours PRN Temp greater or equal to 38C (100.4F), Mild Pain (1 - 3)  dextrose Oral Gel 15 Gram(s) Oral once PRN Blood Glucose LESS THAN 70 milliGRAM(s)/deciliter  haloperidol     Tablet 5 milliGRAM(s) Oral every 6 hours PRN Agitation not responding to reassurance  hydrOXYzine hydrochloride 50 milliGRAM(s) Oral every 6 hours PRN anxiety  LORazepam     Tablet 2 milliGRAM(s) Oral every 6 hours PRN Agitation not r responding to reassurance
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
## 2024-05-01 NOTE — BH INPATIENT PSYCHIATRY PROGRESS NOTE - NSBHFUPINTERVALHXFT_PSY_A_CORE
Patient was seen and evaluated this morning. Chart was reviewed and no acute events were noted. No PRN medications were administered overnight. Upon approach, patient arose from supine position to sitting upright on the bed. Patient is alert. Patient does not engage with interviewer. Patient nods instead of answering our questions. Patient spontaneously stood up during our interview and walked the team to the exit. Patient avoids eye contact when speaking to him. Patient is uncooperative and selectively answers questions. Patient is more cooperative than yesterday, however. Patient denies blood draws at this time without giving a reason. Patient does not deny medications.   Patient was seen and evaluated this morning. Chart was reviewed and no acute events were noted. No PRN medications were administered overnight. Upon approach, patient arose from supine position to sitting upright on the bed. Patient is alert. Patient does not engage with interviewer. Patient nods instead of answering our questions. Patient spontaneously stood up during our interview and walked the team to the exit. Patient avoids eye contact when speaking to him. Patient is uncooperative and selectively answers questions. Patient is more cooperative than yesterday, however. Patient denies blood draws at this time without giving a reason. Patient is selectively taking some of medications, but not all.    Patient was seen and evaluated this morning. Chart was reviewed and no acute events were noted. No PRN medications were administered overnight. Upon approach, patient arose from supine position to sitting upright on the bed. Patient is alert. Patient does not engage with interviewer. Patient nods instead of answering our questions. Patient spontaneously stood up during our interview and walked the team to the exit. Patient avoids eye contact when speaking to him. Patient is uncooperative and selectively answers questions by nodding. Patient is more cooperative than yesterday. Patient denies blood draws at this time without giving a reason. Patient is selectively taking some of medications, but not all.

## 2024-05-01 NOTE — BH INPATIENT PSYCHIATRY PROGRESS NOTE - NSBHMETABOLIC_PSY_ALL_CORE_FT
BMI: BMI (kg/m2): 25.4 (04-27-24 @ 00:19)  HbA1c: A1C with Estimated Average Glucose Result: 6.5 % (12-23-23 @ 08:00)    Glucose: POCT Blood Glucose.: 169 mg/dL (04-30-24 @ 06:33)    BP: 143/86 (05-01-24 @ 08:00) (120/70 - 166/79)Vital Signs Last 24 Hrs  T(C): 35.4 (05-01-24 @ 08:00), Max: 36.3 (04-30-24 @ 19:17)  T(F): 95.8 (05-01-24 @ 08:00), Max: 97.4 (04-30-24 @ 19:17)  HR: 119 (05-01-24 @ 08:00) (102 - 119)  BP: 143/86 (05-01-24 @ 08:00) (135/71 - 143/86)  BP(mean): --  RR: 18 (05-01-24 @ 08:00) (16 - 18)  SpO2: --      Lipid Panel: Date/Time: 12-23-23 @ 08:00  Cholesterol, Serum: 186  LDL Cholesterol Calculated: 100  HDL Cholesterol, Serum: 46  Total Cholesterol/HDL Ration Measurement: --  Triglycerides, Serum: 202

## 2024-05-01 NOTE — BH INPATIENT PSYCHIATRY PROGRESS NOTE - NSBHCHARTREVIEWVS_PSY_A_CORE FT
Vital Signs Last 24 Hrs  T(C): 35.4 (05-01-24 @ 08:00), Max: 36.3 (04-30-24 @ 19:17)  T(F): 95.8 (05-01-24 @ 08:00), Max: 97.4 (04-30-24 @ 19:17)  HR: 119 (05-01-24 @ 08:00) (102 - 119)  BP: 143/86 (05-01-24 @ 08:00) (135/71 - 143/86)  BP(mean): --  RR: 18 (05-01-24 @ 08:00) (16 - 18)  SpO2: --

## 2024-05-01 NOTE — CONSULT NOTE ADULT - ASSESSMENT
# schizoprenia - mgnmt and treatment per psych   < from: 12 Lead ECG (12.26.23 @ 08:24) >  QTC Calculation(Bazett) 444 ms    P Axis 58 degrees    R Axis -19 degrees    T Axis 94 degrees    Diagnosis Line Normal sinus rhythm  Inferior infarct , age undetermined  Anterolateral infarct , age undetermined  Abnormal ECG    < end of copied text >    #DMII - on metformin, linagliptin  CAPILLARY BLOOD GLUCOSE - not recorded     #hx of CAD, CLL , parathyroid ca - no complaints - outpt follow up     recall prn

## 2024-05-01 NOTE — BH INPATIENT PSYCHIATRY PROGRESS NOTE - ADDITIONAL DETAILS / COMMENTS
Patient overall refused to interact with team. Patient is irritable, which is consistent with his previous baseline. Patient is able to ambulate without difficulty or assistance.

## 2024-05-02 RX ADMIN — METFORMIN HYDROCHLORIDE 500 MILLIGRAM(S): 850 TABLET ORAL at 09:17

## 2024-05-02 RX ADMIN — Medication 200 MICROGRAM(S): at 09:14

## 2024-05-02 RX ADMIN — PALIPERIDONE 3 MILLIGRAM(S): 1.5 TABLET, EXTENDED RELEASE ORAL at 19:53

## 2024-05-02 RX ADMIN — METFORMIN HYDROCHLORIDE 500 MILLIGRAM(S): 850 TABLET ORAL at 19:55

## 2024-05-02 RX ADMIN — BUDESONIDE AND FORMOTEROL FUMARATE DIHYDRATE 2 PUFF(S): 160; 4.5 AEROSOL RESPIRATORY (INHALATION) at 19:52

## 2024-05-02 RX ADMIN — Medication 100 MILLIGRAM(S): at 09:14

## 2024-05-02 RX ADMIN — ATORVASTATIN CALCIUM 10 MILLIGRAM(S): 80 TABLET, FILM COATED ORAL at 19:57

## 2024-05-02 RX ADMIN — BUDESONIDE AND FORMOTEROL FUMARATE DIHYDRATE 2 PUFF(S): 160; 4.5 AEROSOL RESPIRATORY (INHALATION) at 09:11

## 2024-05-02 NOTE — BH INPATIENT PSYCHIATRY PROGRESS NOTE - OTHER
Uncooperative but improved from yesterday. Patient sat upright instead of laying in the supine position with his arms covering his face. lip smacking tongue thrusting

## 2024-05-02 NOTE — BH INPATIENT PSYCHIATRY PROGRESS NOTE - NSBHCHARTREVIEWVS_PSY_A_CORE FT
Vital Signs Last 24 Hrs  T(C): --  T(F): --  HR: 94 (05-01-24 @ 20:15) (94 - 94)  BP: 136/68 (05-01-24 @ 20:15) (136/68 - 136/68)  BP(mean): --  RR: --  SpO2: --     Vital Signs Last 24 Hrs  T(C): --  T(F): --  HR: 89 (05-02-24 @ 16:00) (89 - 94)  BP: 149/70 (05-02-24 @ 16:00) (136/68 - 149/70)  BP(mean): --  RR: 17 (05-02-24 @ 16:00) (17 - 17)  SpO2: --

## 2024-05-02 NOTE — BH INPATIENT PSYCHIATRY PROGRESS NOTE - NSBHMETABOLIC_PSY_ALL_CORE_FT
BMI: BMI (kg/m2): 25.4 (04-27-24 @ 00:19)  HbA1c: A1C with Estimated Average Glucose Result: 6.5 % (12-23-23 @ 08:00)    Glucose: POCT Blood Glucose.: 169 mg/dL (04-30-24 @ 06:33)    BP: 136/68 (05-01-24 @ 20:15) (135/71 - 166/79)Vital Signs Last 24 Hrs  T(C): --  T(F): --  HR: 94 (05-01-24 @ 20:15) (94 - 94)  BP: 136/68 (05-01-24 @ 20:15) (136/68 - 136/68)  BP(mean): --  RR: --  SpO2: --      Lipid Panel: Date/Time: 12-23-23 @ 08:00  Cholesterol, Serum: 186  LDL Cholesterol Calculated: 100  HDL Cholesterol, Serum: 46  Total Cholesterol/HDL Ration Measurement: --  Triglycerides, Serum: 202   BMI: BMI (kg/m2): 25.4 (04-27-24 @ 00:19)  HbA1c: A1C with Estimated Average Glucose Result: 6.5 % (12-23-23 @ 08:00)    Glucose: POCT Blood Glucose.: 169 mg/dL (04-30-24 @ 06:33)    BP: 149/70 (05-02-24 @ 16:00) (135/71 - 166/79)Vital Signs Last 24 Hrs  T(C): --  T(F): --  HR: 89 (05-02-24 @ 16:00) (89 - 94)  BP: 149/70 (05-02-24 @ 16:00) (136/68 - 149/70)  BP(mean): --  RR: 17 (05-02-24 @ 16:00) (17 - 17)  SpO2: --      Lipid Panel: Date/Time: 12-23-23 @ 08:00  Cholesterol, Serum: 186  LDL Cholesterol Calculated: 100  HDL Cholesterol, Serum: 46  Total Cholesterol/HDL Ration Measurement: --  Triglycerides, Serum: 202

## 2024-05-02 NOTE — BH INPATIENT PSYCHIATRY PROGRESS NOTE - CURRENT MEDICATION
MEDICATIONS  (STANDING):  allopurinol 100 milliGRAM(s) Oral daily  atorvastatin 10 milliGRAM(s) Oral at bedtime  atropine 1% Solution 1 Drop(s) Both EYES daily  budesonide 160 MICROgram(s)/formoterol 4.5 MICROgram(s) Inhaler 2 Puff(s) Inhalation two times a day  cloZAPine 12.5 milliGRAM(s) Oral two times a day  dextrose 10% Bolus 125 milliLiter(s) IV Bolus once  dextrose 5%. 1000 milliLiter(s) (50 mL/Hr) IV Continuous <Continuous>  dextrose 5%. 1000 milliLiter(s) (100 mL/Hr) IV Continuous <Continuous>  dextrose 50% Injectable 25 Gram(s) IV Push once  dextrose 50% Injectable 12.5 Gram(s) IV Push once  glucagon  Injectable 1 milliGRAM(s) IntraMuscular once  levothyroxine 200 MICROGram(s) Oral daily  linagliptin 5 milliGRAM(s) Oral daily  metFORMIN 500 milliGRAM(s) Oral two times a day  paliperidone ER 3 milliGRAM(s) Oral at bedtime    MEDICATIONS  (PRN):  acetaminophen     Tablet .. 650 milliGRAM(s) Oral every 6 hours PRN Temp greater or equal to 38C (100.4F), Mild Pain (1 - 3)  dextrose Oral Gel 15 Gram(s) Oral once PRN Blood Glucose LESS THAN 70 milliGRAM(s)/deciliter  haloperidol     Tablet 5 milliGRAM(s) Oral every 6 hours PRN Agitation not responding to reassurance  hydrOXYzine hydrochloride 50 milliGRAM(s) Oral every 6 hours PRN anxiety  LORazepam     Tablet 2 milliGRAM(s) Oral every 6 hours PRN Agitation not r responding to reassurance   MEDICATIONS  (STANDING):  allopurinol 100 milliGRAM(s) Oral daily  atorvastatin 10 milliGRAM(s) Oral at bedtime  atropine 1% Solution 1 Drop(s) Both EYES daily  budesonide 160 MICROgram(s)/formoterol 4.5 MICROgram(s) Inhaler 2 Puff(s) Inhalation two times a day  cloZAPine 12.5 milliGRAM(s) Oral two times a day  dextrose 10% Bolus 125 milliLiter(s) IV Bolus once  dextrose 5%. 1000 milliLiter(s) (100 mL/Hr) IV Continuous <Continuous>  dextrose 5%. 1000 milliLiter(s) (50 mL/Hr) IV Continuous <Continuous>  dextrose 50% Injectable 25 Gram(s) IV Push once  dextrose 50% Injectable 12.5 Gram(s) IV Push once  glucagon  Injectable 1 milliGRAM(s) IntraMuscular once  levothyroxine 200 MICROGram(s) Oral daily  linagliptin 5 milliGRAM(s) Oral daily  metFORMIN 500 milliGRAM(s) Oral two times a day  paliperidone ER 3 milliGRAM(s) Oral at bedtime    MEDICATIONS  (PRN):  acetaminophen     Tablet .. 650 milliGRAM(s) Oral every 6 hours PRN Temp greater or equal to 38C (100.4F), Mild Pain (1 - 3)  dextrose Oral Gel 15 Gram(s) Oral once PRN Blood Glucose LESS THAN 70 milliGRAM(s)/deciliter  haloperidol     Tablet 5 milliGRAM(s) Oral every 6 hours PRN Agitation not responding to reassurance  hydrOXYzine hydrochloride 50 milliGRAM(s) Oral every 6 hours PRN anxiety  LORazepam     Tablet 2 milliGRAM(s) Oral every 6 hours PRN Agitation not r responding to reassurance

## 2024-05-02 NOTE — BH INPATIENT PSYCHIATRY PROGRESS NOTE - ADDITIONAL DETAILS / COMMENTS
Please inform the patient of the following abnormal results.  Serum creatinine level slightly elevated but improved.  We will continue him on his current medications as his hemoglobin A1c is stable. Patient overall refused to interact with team. Patient is irritable, which is consistent with his previous baseline. Patient is able to ambulate without difficulty or assistance.  Patient overall refused to interact with team. Patient is irritable, which is consistent with his previous baseline. Patient is able to ambulate without difficulty or assistance. Patient is refusing his clozapine since yesterday morning.

## 2024-05-02 NOTE — BH INPATIENT PSYCHIATRY PROGRESS NOTE - NSBHFUPINTERVALHXFT_PSY_A_CORE
Patient was seen and evaluated this morning. Chart was reviewed and no acute events were noted. No PRN medications were administered overnight. Patient approached the team as we were walking to his room. Patient did not vocally respond to us and is no longer nodding his head to answer our questions. When his medical regimen was mentioned, Mr. Deshpande audibly sighed and walked farther away from the team. Patient did not enter his room, however, and stared at the team until we were visibly out of the hallway. A few minutes later, patient walked past the team and started a phone call. Patient was requesting a  to an unknown second party during the phone call. Patient is alert. Patient is not cooperative.

## 2024-05-02 NOTE — BH INPATIENT PSYCHIATRY PROGRESS NOTE - NSBHASSESSSUMMFT_PSY_ALL_CORE
67 yo male domiciled at [facility], with PMH diabetes, CAD, CLL, parathyroid cancer, PPH of chronic schizophrenia, multiple state hospitalizations, most recently between Feb 2019 and Oct 2023 at [place],  hospitalized here  in late 2023,who presented to the ED on 4/26/24 with an unclear complaint. Based on collateral and exam, pt was found to be disorganized and off meds, and subsequently admitted to [facility] under 9.39 legals for decompensated schizophrenia.    Based on history and ED evaluation  he appeared very limited, with impoverished and illogical thought and speech. Given pt's irritability, disorganized behavior, and psychosis, likely 2/2 medication noncompliance, pt  was assessed to be unable to care for himself and was admitted to the inpatient unit.     4/28: Patient appears euthymic, willing to cooperate with interview. Patient is now taking prescribed medications. Will continue to monitor   4/29- calm, cooperative, taking medications; endorsing vague delusions, but does not appear overtly paranoid; pt has AOT  4/30- refused labs in AM, refused to speak with team; will increase clozaril to 25mg BID once bloodwork available to check for ANC and cardiac markers  5/1- refusing clozaril and labwork; encouraging patient to adhere to medication regimen  5/1- continues to selectively speak with staff; refusing labwork and clozaril    Plan:   #Schizophrenia  - Continue Clozaril 12.5mg BID (did not take dose on 4/28)  - Continue to monitor V/S and weekly CBCs and gradually increase dose appropriately. (Pt was previously on Clozapine 100mg 2 tablets po 9pm.)   - ANC 4230 4/26  - invega ER 3mg qhs    #Anteroseptal infarct (+ECG in November and 12/21/23)  - F/u troponin  - Consult cardiology if elevated    #Hypothyroidism  - F/u TSH and reflex T4  - c/w synthroid 200 mcg qdaily     #T2DM  #HLD  - tradjenta 5mg qdaily  - metformin 500mg bid  - lipitor 10mg qhs  - allopurinol 100mg qdaily         #Agitation  - For severe agitation not responding to behavioral intervention, may give haldol 5 mg po q6h prn, ativan 1 mg po q6h prn, hydroxyzine 50 mg po q6h prn, with escalation to IM if patient refusing PO and remains an imminent danger to self or others. If IM antipsychotic is administered, please perform follow-up ECG for QTc monitoring.  - Can give benadryl 50 mg po q6h for anxiety/insomnia

## 2024-05-03 RX ADMIN — CLOZAPINE 12.5 MILLIGRAM(S): 150 TABLET, ORALLY DISINTEGRATING ORAL at 20:23

## 2024-05-03 RX ADMIN — Medication 200 MICROGRAM(S): at 09:10

## 2024-05-03 RX ADMIN — Medication 100 MILLIGRAM(S): at 09:11

## 2024-05-03 RX ADMIN — LINAGLIPTIN 5 MILLIGRAM(S): 5 TABLET, FILM COATED ORAL at 09:11

## 2024-05-03 RX ADMIN — CLOZAPINE 12.5 MILLIGRAM(S): 150 TABLET, ORALLY DISINTEGRATING ORAL at 09:13

## 2024-05-03 RX ADMIN — ATORVASTATIN CALCIUM 10 MILLIGRAM(S): 80 TABLET, FILM COATED ORAL at 20:24

## 2024-05-03 RX ADMIN — METFORMIN HYDROCHLORIDE 500 MILLIGRAM(S): 850 TABLET ORAL at 09:16

## 2024-05-03 RX ADMIN — PALIPERIDONE 3 MILLIGRAM(S): 1.5 TABLET, EXTENDED RELEASE ORAL at 20:24

## 2024-05-03 RX ADMIN — METFORMIN HYDROCHLORIDE 500 MILLIGRAM(S): 850 TABLET ORAL at 20:24

## 2024-05-03 RX ADMIN — BUDESONIDE AND FORMOTEROL FUMARATE DIHYDRATE 2 PUFF(S): 160; 4.5 AEROSOL RESPIRATORY (INHALATION) at 20:23

## 2024-05-03 RX ADMIN — BUDESONIDE AND FORMOTEROL FUMARATE DIHYDRATE 2 PUFF(S): 160; 4.5 AEROSOL RESPIRATORY (INHALATION) at 09:10

## 2024-05-03 NOTE — BH INPATIENT PSYCHIATRY PROGRESS NOTE - NSBHMETABOLIC_PSY_ALL_CORE_FT
BMI: BMI (kg/m2): 25.4 (04-27-24 @ 00:19)  HbA1c: A1C with Estimated Average Glucose Result: 6.5 % (12-23-23 @ 08:00)    Glucose: POCT Blood Glucose.: 169 mg/dL (04-30-24 @ 06:33)    BP: 149/70 (05-02-24 @ 16:00) (135/71 - 149/70)Vital Signs Last 24 Hrs  T(C): --  T(F): --  HR: 89 (05-02-24 @ 16:00) (89 - 89)  BP: 149/70 (05-02-24 @ 16:00) (149/70 - 149/70)  BP(mean): --  RR: 17 (05-02-24 @ 16:00) (17 - 17)  SpO2: --      Lipid Panel: Date/Time: 12-23-23 @ 08:00  Cholesterol, Serum: 186  LDL Cholesterol Calculated: 100  HDL Cholesterol, Serum: 46  Total Cholesterol/HDL Ration Measurement: --  Triglycerides, Serum: 202

## 2024-05-03 NOTE — BH INPATIENT PSYCHIATRY PROGRESS NOTE - NSBHCHARTREVIEWVS_PSY_A_CORE FT
Vital Signs Last 24 Hrs  T(C): --  T(F): --  HR: 89 (05-02-24 @ 16:00) (89 - 89)  BP: 149/70 (05-02-24 @ 16:00) (149/70 - 149/70)  BP(mean): --  RR: 17 (05-02-24 @ 16:00) (17 - 17)  SpO2: --

## 2024-05-03 NOTE — BH INPATIENT PSYCHIATRY PROGRESS NOTE - ADDITIONAL DETAILS / COMMENTS
Patient interacted well with Dr. Harris as the sole interviewer. Team stayed outside patients room during today's interview. Patient has a noticeable difference in cooperation with Dr. Harris alone as opposed to when any other member of the team interacts with him. Patient agreed to resume his clozaril before his interaction with Dr. Harris, however. His residence states Mr. Deshpande has racial biases at baseline.

## 2024-05-03 NOTE — BH INPATIENT PSYCHIATRY PROGRESS NOTE - NSBHFUPINTERVALHXFT_PSY_A_CORE
Patient was seen and evaluated this morning. Chart was reviewed and no acute events were noted. No PRN medications were administered overnight. Upon approach, patient is lying in bed comfortably. Patient is alert and oriented and engages with interviewer. Patient is cooperative and answers all questions appropriately. Patient took all his medications, including clozaril, early in the morning before the team interviewed him. When prompted, patient notes that he will take his clozaril and will allow a blood draw for labs today. Interview was conducted by Dr. Harris alone.    Patient was seen and evaluated this morning. Chart was reviewed and no acute events were noted. No PRN medications were administered overnight. Upon approach, patient is lying in bed comfortably. Patient is alert and oriented and engages with interviewer. Patient is cooperative and answers all questions appropriately. Patient took all his medications, including clozaril, early in the morning before the team interviewed him. When prompted, patient notes that he will take his clozaril and will allow a blood draw for labs today.

## 2024-05-03 NOTE — BH INPATIENT PSYCHIATRY PROGRESS NOTE - NSBHASSESSSUMMFT_PSY_ALL_CORE
67 yo male domiciled at [facility], with PMH diabetes, CAD, CLL, parathyroid cancer, PPH of chronic schizophrenia, multiple state hospitalizations, most recently between Feb 2019 and Oct 2023 at [place],  hospitalized here  in late 2023,who presented to the ED on 4/26/24 with an unclear complaint. Based on collateral and exam, pt was found to be disorganized and off meds, and subsequently admitted to [facility] under 9.39 legals for decompensated schizophrenia.    Based on history and ED evaluation  he appeared very limited, with impoverished and illogical thought and speech. Given pt's irritability, disorganized behavior, and psychosis, likely 2/2 medication noncompliance, pt  was assessed to be unable to care for himself and was admitted to the inpatient unit.     4/28: Patient appears euthymic, willing to cooperate with interview. Patient is now taking prescribed medications. Will continue to monitor   4/29- calm, cooperative, taking medications; endorsing vague delusions, but does not appear overtly paranoid; pt has AOT  4/30- refused labs in AM, refused to speak with team; will increase clozaril to 25mg BID once bloodwork available to check for ANC and cardiac markers  5/1- refusing clozaril and labwork; encouraging patient to adhere to medication regimen  5/1- continues to selectively speak with staff; refusing labwork and clozaril    Plan:   #Schizophrenia  - Continue Clozaril 12.5mg BID (did not take dose on 4/28)  - Continue to monitor V/S and weekly CBCs and gradually increase dose appropriately. (Pt was previously on Clozapine 100mg 2 tablets po 9pm.)   - ANC 4230 4/26  - invega ER 3mg qhs    #Anteroseptal infarct (+ECG in November and 12/21/23)  - F/u troponin  - Consult cardiology if elevated    #Hypothyroidism  - F/u TSH and reflex T4  - c/w synthroid 200 mcg qdaily     #T2DM  #HLD  - tradjenta 5mg qdaily  - metformin 500mg bid  - lipitor 10mg qhs  - allopurinol 100mg qdaily         #Agitation  - For severe agitation not responding to behavioral intervention, may give haldol 5 mg po q6h prn, ativan 1 mg po q6h prn, hydroxyzine 50 mg po q6h prn, with escalation to IM if patient refusing PO and remains an imminent danger to self or others. If IM antipsychotic is administered, please perform follow-up ECG for QTc monitoring.  - Can give benadryl 50 mg po q6h for anxiety/insomnia   69 yo male domiciled at [facility], with PMH diabetes, CAD, CLL, parathyroid cancer, PPH of chronic schizophrenia, multiple state hospitalizations, most recently between Feb 2019 and Oct 2023 at [place],  hospitalized here  in late 2023,who presented to the ED on 4/26/24 with an unclear complaint. Based on collateral and exam, pt was found to be disorganized and off meds, and subsequently admitted to [facility] under 9.39 legals for decompensated schizophrenia.    Based on history and ED evaluation  he appeared very limited, with impoverished and illogical thought and speech. Given pt's irritability, disorganized behavior, and psychosis, likely 2/2 medication noncompliance, pt  was assessed to be unable to care for himself and was admitted to the inpatient unit.     4/28: Patient appears euthymic, willing to cooperate with interview. Patient is now taking prescribed medications. Will continue to monitor   4/29- calm, cooperative, taking medications; endorsing vague delusions, but does not appear overtly paranoid; pt has AOT  4/30- refused labs in AM, refused to speak with team; will increase clozaril to 25mg BID once bloodwork available to check for ANC and cardiac markers  5/1- refusing clozaril and labwork; encouraging patient to adhere to medication regimen  5/1- continues to selectively speak with staff; refusing labwork and clozaril  5/3- took all medications this morning; will reorder labwork (CBC w/ diff, CRP, trop) prior to increasing clozaril dose    Plan:   #Schizophrenia  - Continue Clozaril 12.5mg BID (did not take dose on 4/28)  - Continue to monitor V/S and weekly CBCs and gradually increase dose appropriately. (Pt was previously on Clozapine 100mg 2 tablets po 9pm.)   - ANC 4230 4/26  - invega ER 3mg qhs    #Anteroseptal infarct (+ECG in November and 12/21/23)  - F/u troponin  - Consult cardiology if elevated    #Hypothyroidism  - F/u TSH and reflex T4  - c/w synthroid 200 mcg qdaily     #T2DM  #HLD  - tradjenta 5mg qdaily  - metformin 500mg bid  - lipitor 10mg qhs  - allopurinol 100mg qdaily         #Agitation  - For severe agitation not responding to behavioral intervention, may give haldol 5 mg po q6h prn, ativan 1 mg po q6h prn, hydroxyzine 50 mg po q6h prn, with escalation to IM if patient refusing PO and remains an imminent danger to self or others. If IM antipsychotic is administered, please perform follow-up ECG for QTc monitoring.  - Can give benadryl 50 mg po q6h for anxiety/insomnia

## 2024-05-04 LAB
BASOPHILS # BLD AUTO: 0.02 K/UL — SIGNIFICANT CHANGE UP (ref 0–0.2)
BASOPHILS NFR BLD AUTO: 0.3 % — SIGNIFICANT CHANGE UP (ref 0–1)
CRP SERPL-MCNC: 28.9 MG/L — HIGH
EOSINOPHIL # BLD AUTO: 0.03 K/UL — SIGNIFICANT CHANGE UP (ref 0–0.7)
EOSINOPHIL NFR BLD AUTO: 0.4 % — SIGNIFICANT CHANGE UP (ref 0–8)
HCT VFR BLD CALC: 38.4 % — LOW (ref 42–52)
HGB BLD-MCNC: 12.8 G/DL — LOW (ref 14–18)
IMM GRANULOCYTES NFR BLD AUTO: 0.1 % — SIGNIFICANT CHANGE UP (ref 0.1–0.3)
LYMPHOCYTES # BLD AUTO: 3.28 K/UL — SIGNIFICANT CHANGE UP (ref 1.2–3.4)
LYMPHOCYTES # BLD AUTO: 45.1 % — SIGNIFICANT CHANGE UP (ref 20.5–51.1)
MCHC RBC-ENTMCNC: 30.9 PG — SIGNIFICANT CHANGE UP (ref 27–31)
MCHC RBC-ENTMCNC: 33.3 G/DL — SIGNIFICANT CHANGE UP (ref 32–37)
MCV RBC AUTO: 92.8 FL — SIGNIFICANT CHANGE UP (ref 80–94)
MONOCYTES # BLD AUTO: 1.76 K/UL — HIGH (ref 0.1–0.6)
MONOCYTES NFR BLD AUTO: 24.2 % — HIGH (ref 1.7–9.3)
NEUTROPHILS # BLD AUTO: 2.18 K/UL — SIGNIFICANT CHANGE UP (ref 1.4–6.5)
NEUTROPHILS NFR BLD AUTO: 29.9 % — LOW (ref 42.2–75.2)
NRBC # BLD: 0 /100 WBCS — SIGNIFICANT CHANGE UP (ref 0–0)
PLATELET # BLD AUTO: 99 K/UL — LOW (ref 130–400)
PMV BLD: 10.7 FL — HIGH (ref 7.4–10.4)
RBC # BLD: 4.14 M/UL — LOW (ref 4.7–6.1)
RBC # FLD: 15.2 % — HIGH (ref 11.5–14.5)
TROPONIN T, HIGH SENSITIVITY RESULT: 23 NG/L — HIGH (ref 6–21)
WBC # BLD: 7.28 K/UL — SIGNIFICANT CHANGE UP (ref 4.8–10.8)
WBC # FLD AUTO: 7.28 K/UL — SIGNIFICANT CHANGE UP (ref 4.8–10.8)

## 2024-05-04 RX ADMIN — Medication 100 MILLIGRAM(S): at 09:03

## 2024-05-04 RX ADMIN — PALIPERIDONE 3 MILLIGRAM(S): 1.5 TABLET, EXTENDED RELEASE ORAL at 20:13

## 2024-05-04 RX ADMIN — CLOZAPINE 12.5 MILLIGRAM(S): 150 TABLET, ORALLY DISINTEGRATING ORAL at 09:03

## 2024-05-04 RX ADMIN — BUDESONIDE AND FORMOTEROL FUMARATE DIHYDRATE 2 PUFF(S): 160; 4.5 AEROSOL RESPIRATORY (INHALATION) at 20:12

## 2024-05-04 RX ADMIN — LINAGLIPTIN 5 MILLIGRAM(S): 5 TABLET, FILM COATED ORAL at 09:03

## 2024-05-04 RX ADMIN — METFORMIN HYDROCHLORIDE 500 MILLIGRAM(S): 850 TABLET ORAL at 20:14

## 2024-05-04 RX ADMIN — BUDESONIDE AND FORMOTEROL FUMARATE DIHYDRATE 2 PUFF(S): 160; 4.5 AEROSOL RESPIRATORY (INHALATION) at 09:03

## 2024-05-04 RX ADMIN — METFORMIN HYDROCHLORIDE 500 MILLIGRAM(S): 850 TABLET ORAL at 09:03

## 2024-05-04 RX ADMIN — Medication 1 DROP(S): at 09:03

## 2024-05-04 RX ADMIN — ATORVASTATIN CALCIUM 10 MILLIGRAM(S): 80 TABLET, FILM COATED ORAL at 20:13

## 2024-05-04 RX ADMIN — CLOZAPINE 12.5 MILLIGRAM(S): 150 TABLET, ORALLY DISINTEGRATING ORAL at 20:14

## 2024-05-05 LAB — GLUCOSE BLDC GLUCOMTR-MCNC: 133 MG/DL — HIGH (ref 70–99)

## 2024-05-05 RX ADMIN — METFORMIN HYDROCHLORIDE 500 MILLIGRAM(S): 850 TABLET ORAL at 20:24

## 2024-05-05 RX ADMIN — Medication 200 MICROGRAM(S): at 09:02

## 2024-05-05 RX ADMIN — BUDESONIDE AND FORMOTEROL FUMARATE DIHYDRATE 2 PUFF(S): 160; 4.5 AEROSOL RESPIRATORY (INHALATION) at 09:03

## 2024-05-05 RX ADMIN — CLOZAPINE 12.5 MILLIGRAM(S): 150 TABLET, ORALLY DISINTEGRATING ORAL at 09:01

## 2024-05-05 RX ADMIN — Medication 100 MILLIGRAM(S): at 09:02

## 2024-05-05 RX ADMIN — BUDESONIDE AND FORMOTEROL FUMARATE DIHYDRATE 2 PUFF(S): 160; 4.5 AEROSOL RESPIRATORY (INHALATION) at 20:23

## 2024-05-05 RX ADMIN — Medication 1 DROP(S): at 09:03

## 2024-05-05 RX ADMIN — CLOZAPINE 12.5 MILLIGRAM(S): 150 TABLET, ORALLY DISINTEGRATING ORAL at 20:23

## 2024-05-05 RX ADMIN — METFORMIN HYDROCHLORIDE 500 MILLIGRAM(S): 850 TABLET ORAL at 09:01

## 2024-05-05 RX ADMIN — LINAGLIPTIN 5 MILLIGRAM(S): 5 TABLET, FILM COATED ORAL at 09:02

## 2024-05-05 RX ADMIN — ATORVASTATIN CALCIUM 10 MILLIGRAM(S): 80 TABLET, FILM COATED ORAL at 20:24

## 2024-05-06 VITALS — HEART RATE: 105 BPM | TEMPERATURE: 98 F | DIASTOLIC BLOOD PRESSURE: 63 MMHG | SYSTOLIC BLOOD PRESSURE: 141 MMHG

## 2024-05-06 RX ORDER — CLOZAPINE 150 MG/1
25 TABLET, ORALLY DISINTEGRATING ORAL
Refills: 0 | Status: DISCONTINUED | OUTPATIENT
Start: 2024-05-06 | End: 2024-05-08

## 2024-05-06 RX ADMIN — BUDESONIDE AND FORMOTEROL FUMARATE DIHYDRATE 2 PUFF(S): 160; 4.5 AEROSOL RESPIRATORY (INHALATION) at 21:07

## 2024-05-06 RX ADMIN — PALIPERIDONE 3 MILLIGRAM(S): 1.5 TABLET, EXTENDED RELEASE ORAL at 21:08

## 2024-05-06 RX ADMIN — ATORVASTATIN CALCIUM 10 MILLIGRAM(S): 80 TABLET, FILM COATED ORAL at 21:07

## 2024-05-06 RX ADMIN — CLOZAPINE 12.5 MILLIGRAM(S): 150 TABLET, ORALLY DISINTEGRATING ORAL at 08:34

## 2024-05-06 RX ADMIN — LINAGLIPTIN 5 MILLIGRAM(S): 5 TABLET, FILM COATED ORAL at 08:35

## 2024-05-06 RX ADMIN — Medication 200 MICROGRAM(S): at 06:37

## 2024-05-06 RX ADMIN — METFORMIN HYDROCHLORIDE 500 MILLIGRAM(S): 850 TABLET ORAL at 08:34

## 2024-05-06 RX ADMIN — BUDESONIDE AND FORMOTEROL FUMARATE DIHYDRATE 2 PUFF(S): 160; 4.5 AEROSOL RESPIRATORY (INHALATION) at 08:35

## 2024-05-06 RX ADMIN — Medication 1 DROP(S): at 08:35

## 2024-05-06 RX ADMIN — Medication 100 MILLIGRAM(S): at 08:35

## 2024-05-06 NOTE — BH INPATIENT PSYCHIATRY PROGRESS NOTE - NSBHASSESSSUMMFT_PSY_ALL_CORE
67 yo male domiciled at [facility] [place], with PMH diabetes, CAD, CLL, parathyroid cancer, PPH of chronic schizophrenia, multiple state hospitalizations, most recently between Feb 2019 and Oct 2023 at [place],  hospitalized here  in late 2023,who presented to the ED on 4/26/24 with an unclear complaint. Based on collateral and exam, pt was found to be disorganized and off meds, and subsequently admitted to [facility] under 9.39 legals for decompensated schizophrenia.    Based on history and ED evaluation  he appeared very limited, with impoverished and illogical thought and speech. Given pt's irritability, disorganized behavior, and psychosis, likely 2/2 medication noncompliance, pt  was assessed to be unable to care for himself and was admitted to the inpatient unit.     4/28: Patient appears euthymic, willing to cooperate with interview. Patient is now taking prescribed medications. Will continue to monitor   4/29- calm, cooperative, taking medications; endorsing vague delusions, but does not appear overtly paranoid; pt has AOT  4/30- refused labs in AM, refused to speak with team; will increase clozaril to 25mg BID once bloodwork available to check for ANC and cardiac markers  5/1- refusing clozaril and labwork; encouraging patient to adhere to medication regimen  5/1- continues to selectively speak with staff; refusing labwork and clozaril  5/3- took all medications this morning; will reorder labwork (CBC w/ diff, CRP, trop) prior to increasing clozaril dose    Plan:   #Schizophrenia  - Continue Clozaril 12.5mg BID (did not take dose on 4/28)  - Continue to monitor V/S and weekly CBCs and gradually increase dose appropriately. (Pt was previously on Clozapine 100mg 2 tablets po 9pm.)   - ANC 4230 4/26  - invega ER 3mg qhs    #Anteroseptal infarct (+ECG in November and 12/21/23)  - F/u troponin  - Consult cardiology if elevated    #Hypothyroidism  - F/u TSH and reflex T4  - c/w synthroid 200 mcg qdaily     #T2DM  #HLD  - tradjenta 5mg qdaily  - metformin 500mg bid  - lipitor 10mg qhs  - allopurinol 100mg qdaily         #Agitation  - For severe agitation not responding to behavioral intervention, may give haldol 5 mg po q6h prn, ativan 1 mg po q6h prn, hydroxyzine 50 mg po q6h prn, with escalation to IM if patient refusing PO and remains an imminent danger to self or others. If IM antipsychotic is administered, please perform follow-up ECG for QTc monitoring.  - Can give benadryl 50 mg po q6h for anxiety/insomnia   69 yo male domiciled at [facility], with PMH diabetes, CAD, CLL, parathyroid cancer, PPH of chronic schizophrenia, multiple state hospitalizations, most recently between Feb 2019 and Oct 2023 at [place],  hospitalized here  in late 2023,who presented to the ED on 4/26/24 with an unclear complaint. Based on collateral and exam, pt was found to be disorganized and off meds, and subsequently admitted to [facility] under 9.39 legals for decompensated schizophrenia.    Based on history and ED evaluation  he appeared very limited, with impoverished and illogical thought and speech. Given pt's irritability, disorganized behavior, and psychosis, likely 2/2 medication noncompliance, pt  was assessed to be unable to care for himself and was admitted to the inpatient unit.     4/28: Patient appears euthymic, willing to cooperate with interview. Patient is now taking prescribed medications. Will continue to monitor   4/29- calm, cooperative, taking medications; endorsing vague delusions, but does not appear overtly paranoid; pt has AOT  4/30- refused labs in AM, refused to speak with team; will increase clozaril to 25mg BID once bloodwork available to check for ANC and cardiac markers  5/1- refusing clozaril and labwork; encouraging patient to adhere to medication regimen  5/1- continues to selectively speak with staff; refusing labwork and clozaril  5/3- took all medications this morning; will reorder labwork (CBC w/ diff, CRP, trop) prior to increasing clozaril dose  5/06 Pt calm, however minimally cooeprative with interview. Increasing Clozaril to 25 BID    Plan:   #Schizophrenia  - Increase Clozaril to 25 mg BID for agression  - Continue to monitor V/S and weekly CBCs and gradually increase dose appropriately. (Pt was previously on Clozapine 100mg 2 tablets po 9pm.)   - ANC 2.18 5/04  - Invega ER 3mg qhs; consider switching to URIAS    #Anteroseptal infarct (+ECG in November and 12/21/23)  - F/u troponin  - Consult cardiology if elevated    #Hypothyroidism  - F/u TSH and reflex T4  - c/w synthroid 200 mcg qdaily     #T2DM  #HLD  - tradjenta 5mg qdaily  - metformin 500mg bid  - lipitor 10mg qhs  - allopurinol 100mg qdaily         #Agitation  - For severe agitation not responding to behavioral intervention, may give haldol 5 mg po q6h prn, ativan 1 mg po q6h prn, hydroxyzine 50 mg po q6h prn, with escalation to IM if patient refusing PO and remains an imminent danger to self or others. If IM antipsychotic is administered, please perform follow-up ECG for QTc monitoring.  - Can give benadryl 50 mg po q6h for anxiety/insomnia

## 2024-05-06 NOTE — BH INPATIENT PSYCHIATRY PROGRESS NOTE - NSBHCHARTREVIEWLAB_PSY_A_CORE FT
POCT Blood Glucose (05.05.24 @ 06:50)   POCT Blood Glucose.: 133 mg/dL    Troponin T, High Sensitivity (05.04.24 @ 08:16)   Troponin T, High Sensitivity Result: 23: A single cardiac troponin result is not definitive in diagnosing   myocardial infarction. Serial measurements are required in suspected   NSTEMI. Elevations are notable in patients with renal impairment,   rhabdomyolysis and polymyositis. Hemolysis may cause falsely low results.   Elevated troponin that is decreasing in serial measurements may be   indicative of resolving ischemia. ng/L    C-Reactive Protein (05.04.24 @ 08:16)   C-Reactive Protein: 28.9 mg/L    Complete Blood Count + Automated Diff (05.04.24 @ 08:16)   WBC Count: 7.28 K/uL  RBC Count: 4.14 M/uL  Hemoglobin: 12.8 g/dL  Hematocrit: 38.4 %  Mean Cell Volume: 92.8 fL  Mean Cell Hemoglobin: 30.9 pg  Mean Cell Hemoglobin Conc: 33.3 g/dL  Red Cell Distrib Width: 15.2 %  Platelet Count - Automated: 99 K/uL  MPV: 10.7 fL  Auto Neutrophil #: 2.18 K/uL  Auto Lymphocyte #: 3.28 K/uL  Auto Monocyte #: 1.76 K/uL  Auto Eosinophil #: 0.03 K/uL  Auto Basophil #: 0.02 K/uL  Auto Neutrophil %: 29.9: Differential percentages must be correlated with absolute numbers for   clinical significance. %  Auto Lymphocyte %: 45.1 %  Auto Monocyte %: 24.2 %  Auto Eosinophil %: 0.4 %  Auto Basophil %: 0.3 %  Auto Immature Granulocyte %: 0.1: (Includes meta, myelo and promyelocytes). Mild elevations in immature   granulocytes may be seen with many inflammatory processes and pregnancy;   clinical correlation suggested. %  Nucleated RBC: 0 /100 WBCs  
Comprehensive Metabolic Panel (04.26.24 @ 12:39)   Sodium: 137 mmol/L  Potassium: 4.7 mmol/L  Chloride: 100 mmol/L  Carbon Dioxide: 26 mmol/L  Anion Gap: 11 mmol/L  Blood Urea Nitrogen: 12 mg/dL  Creatinine: 0.7 mg/dL  Glucose: 95 mg/dL  Calcium: 8.8 mg/dL  Protein Total: 5.9 g/dL  Albumin: 4.4 g/dL  Bilirubin Total: 0.3 mg/dL  Alkaline Phosphatase: 66 U/L  Aspartate Aminotransferase (AST/SGOT): 38 U/L  Alanine Aminotransferase (ALT/SGPT): 24 U/L  eGFR: 102: The estimated glomerular filtration rate (eGFR) is calculated using the   2021 CKD-EPI creatinine equation, which does not have a coefficient for   race and is validated in individuals 18 years of age and older (N Engl J   Med 2021; 385:1463-6619). Creatinine-based eGFR may be inaccurate in   various situations including but not limited to extremes of muscle mass,   altered dietary protein intake, or medications that affect renal tubular   creatinine secretion. mL/min/1.73m2    Complete Blood Count + Automated Diff (04.26.24 @ 12:39)   WBC Count: 13.50 K/uL  RBC Count: 4.21 M/uL  Hemoglobin: 13.1 g/dL  Hematocrit: 39.8 %  Mean Cell Volume: 94.5 fL  Mean Cell Hemoglobin: 31.1 pg  Mean Cell Hemoglobin Conc: 32.9 g/dL  Red Cell Distrib Width: 15.9 %  Platelet Count - Automated: 96: Smear Reviewed, Result Confirmed. K/uL  MPV: 12.8 fL  Auto Neutrophil #: 4.23 K/uL  Auto Lymphocyte #: 8.22 K/uL  Auto Monocyte #: 0.58 K/uL  Auto Eosinophil #: 0.35 K/uL  Auto Basophil #: 0.00 K/uL  Auto Neutrophil %: 31.3: Differential percentages must be correlated with absolute numbers for   clinical significance. %  Auto Lymphocyte %: 60.9 %  Auto Monocyte %: 4.3 %  Auto Eosinophil %: 2.6 %  Auto Basophil %: 0.0 %  
Comprehensive Metabolic Panel (04.26.24 @ 12:39)   Sodium: 137 mmol/L  Potassium: 4.7 mmol/L  Chloride: 100 mmol/L  Carbon Dioxide: 26 mmol/L  Anion Gap: 11 mmol/L  Blood Urea Nitrogen: 12 mg/dL  Creatinine: 0.7 mg/dL  Glucose: 95 mg/dL  Calcium: 8.8 mg/dL  Protein Total: 5.9 g/dL  Albumin: 4.4 g/dL  Bilirubin Total: 0.3 mg/dL  Alkaline Phosphatase: 66 U/L  Aspartate Aminotransferase (AST/SGOT): 38 U/L  Alanine Aminotransferase (ALT/SGPT): 24 U/L  eGFR: 102: The estimated glomerular filtration rate (eGFR) is calculated using the   2021 CKD-EPI creatinine equation, which does not have a coefficient for   race and is validated in individuals 18 years of age and older (N Engl J   Med 2021; 385:4796-9763). Creatinine-based eGFR may be inaccurate in   various situations including but not limited to extremes of muscle mass,   altered dietary protein intake, or medications that affect renal tubular   creatinine secretion. mL/min/1.73m2    Complete Blood Count + Automated Diff (04.26.24 @ 12:39)   WBC Count: 13.50 K/uL  RBC Count: 4.21 M/uL  Hemoglobin: 13.1 g/dL  Hematocrit: 39.8 %  Mean Cell Volume: 94.5 fL  Mean Cell Hemoglobin: 31.1 pg  Mean Cell Hemoglobin Conc: 32.9 g/dL  Red Cell Distrib Width: 15.9 %  Platelet Count - Automated: 96: Smear Reviewed, Result Confirmed. K/uL  MPV: 12.8 fL  Auto Neutrophil #: 4.23 K/uL  Auto Lymphocyte #: 8.22 K/uL  Auto Monocyte #: 0.58 K/uL  Auto Eosinophil #: 0.35 K/uL  Auto Basophil #: 0.00 K/uL  Auto Neutrophil %: 31.3: Differential percentages must be correlated with absolute numbers for   clinical significance. %  Auto Lymphocyte %: 60.9 %  Auto Monocyte %: 4.3 %  Auto Eosinophil %: 2.6 %  Auto Basophil %: 0.0 %  
Comprehensive Metabolic Panel (04.26.24 @ 12:39)   Sodium: 137 mmol/L  Potassium: 4.7 mmol/L  Chloride: 100 mmol/L  Carbon Dioxide: 26 mmol/L  Anion Gap: 11 mmol/L  Blood Urea Nitrogen: 12 mg/dL  Creatinine: 0.7 mg/dL  Glucose: 95 mg/dL  Calcium: 8.8 mg/dL  Protein Total: 5.9 g/dL  Albumin: 4.4 g/dL  Bilirubin Total: 0.3 mg/dL  Alkaline Phosphatase: 66 U/L  Aspartate Aminotransferase (AST/SGOT): 38 U/L  Alanine Aminotransferase (ALT/SGPT): 24 U/L  eGFR: 102: The estimated glomerular filtration rate (eGFR) is calculated using the   2021 CKD-EPI creatinine equation, which does not have a coefficient for   race and is validated in individuals 18 years of age and older (N Engl J   Med 2021; 385:8201-4591). Creatinine-based eGFR may be inaccurate in   various situations including but not limited to extremes of muscle mass,   altered dietary protein intake, or medications that affect renal tubular   creatinine secretion. mL/min/1.73m2    Complete Blood Count + Automated Diff (04.26.24 @ 12:39)   WBC Count: 13.50 K/uL  RBC Count: 4.21 M/uL  Hemoglobin: 13.1 g/dL  Hematocrit: 39.8 %  Mean Cell Volume: 94.5 fL  Mean Cell Hemoglobin: 31.1 pg  Mean Cell Hemoglobin Conc: 32.9 g/dL  Red Cell Distrib Width: 15.9 %  Platelet Count - Automated: 96: Smear Reviewed, Result Confirmed. K/uL  MPV: 12.8 fL  Auto Neutrophil #: 4.23 K/uL  Auto Lymphocyte #: 8.22 K/uL  Auto Monocyte #: 0.58 K/uL  Auto Eosinophil #: 0.35 K/uL  Auto Basophil #: 0.00 K/uL  Auto Neutrophil %: 31.3: Differential percentages must be correlated with absolute numbers for   clinical significance. %  Auto Lymphocyte %: 60.9 %  Auto Monocyte %: 4.3 %  Auto Eosinophil %: 2.6 %  Auto Basophil %: 0.0 %    
Comprehensive Metabolic Panel (04.26.24 @ 12:39)   Sodium: 137 mmol/L  Potassium: 4.7 mmol/L  Chloride: 100 mmol/L  Carbon Dioxide: 26 mmol/L  Anion Gap: 11 mmol/L  Blood Urea Nitrogen: 12 mg/dL  Creatinine: 0.7 mg/dL  Glucose: 95 mg/dL  Calcium: 8.8 mg/dL  Protein Total: 5.9 g/dL  Albumin: 4.4 g/dL  Bilirubin Total: 0.3 mg/dL  Alkaline Phosphatase: 66 U/L  Aspartate Aminotransferase (AST/SGOT): 38 U/L  Alanine Aminotransferase (ALT/SGPT): 24 U/L  eGFR: 102: The estimated glomerular filtration rate (eGFR) is calculated using the   2021 CKD-EPI creatinine equation, which does not have a coefficient for   race and is validated in individuals 18 years of age and older (N Engl J   Med 2021; 385:1552-4080). Creatinine-based eGFR may be inaccurate in   various situations including but not limited to extremes of muscle mass,   altered dietary protein intake, or medications that affect renal tubular   creatinine secretion. mL/min/1.73m2    Complete Blood Count + Automated Diff (04.26.24 @ 12:39)   WBC Count: 13.50 K/uL  RBC Count: 4.21 M/uL  Hemoglobin: 13.1 g/dL  Hematocrit: 39.8 %  Mean Cell Volume: 94.5 fL  Mean Cell Hemoglobin: 31.1 pg  Mean Cell Hemoglobin Conc: 32.9 g/dL  Red Cell Distrib Width: 15.9 %  Platelet Count - Automated: 96: Smear Reviewed, Result Confirmed. K/uL  MPV: 12.8 fL  Auto Neutrophil #: 4.23 K/uL  Auto Lymphocyte #: 8.22 K/uL  Auto Monocyte #: 0.58 K/uL  Auto Eosinophil #: 0.35 K/uL  Auto Basophil #: 0.00 K/uL  Auto Neutrophil %: 31.3: Differential percentages must be correlated with absolute numbers for   clinical significance. %  Auto Lymphocyte %: 60.9 %  Auto Monocyte %: 4.3 %  Auto Eosinophil %: 2.6 %  Auto Basophil %: 0.0 %  
Comprehensive Metabolic Panel (04.26.24 @ 12:39)   Sodium: 137 mmol/L  Potassium: 4.7 mmol/L  Chloride: 100 mmol/L  Carbon Dioxide: 26 mmol/L  Anion Gap: 11 mmol/L  Blood Urea Nitrogen: 12 mg/dL  Creatinine: 0.7 mg/dL  Glucose: 95 mg/dL  Calcium: 8.8 mg/dL  Protein Total: 5.9 g/dL  Albumin: 4.4 g/dL  Bilirubin Total: 0.3 mg/dL  Alkaline Phosphatase: 66 U/L  Aspartate Aminotransferase (AST/SGOT): 38 U/L  Alanine Aminotransferase (ALT/SGPT): 24 U/L  eGFR: 102: The estimated glomerular filtration rate (eGFR) is calculated using the   2021 CKD-EPI creatinine equation, which does not have a coefficient for   race and is validated in individuals 18 years of age and older (N Engl J   Med 2021; 385:3840-7234). Creatinine-based eGFR may be inaccurate in   various situations including but not limited to extremes of muscle mass,   altered dietary protein intake, or medications that affect renal tubular   creatinine secretion. mL/min/1.73m2    Complete Blood Count + Automated Diff (04.26.24 @ 12:39)   WBC Count: 13.50 K/uL  RBC Count: 4.21 M/uL  Hemoglobin: 13.1 g/dL  Hematocrit: 39.8 %  Mean Cell Volume: 94.5 fL  Mean Cell Hemoglobin: 31.1 pg  Mean Cell Hemoglobin Conc: 32.9 g/dL  Red Cell Distrib Width: 15.9 %  Platelet Count - Automated: 96: Smear Reviewed, Result Confirmed. K/uL  MPV: 12.8 fL  Auto Neutrophil #: 4.23 K/uL  Auto Lymphocyte #: 8.22 K/uL  Auto Monocyte #: 0.58 K/uL  Auto Eosinophil #: 0.35 K/uL  Auto Basophil #: 0.00 K/uL  Auto Neutrophil %: 31.3: Differential percentages must be correlated with absolute numbers for   clinical significance. %  Auto Lymphocyte %: 60.9 %  Auto Monocyte %: 4.3 %  Auto Eosinophil %: 2.6 %  Auto Basophil %: 0.0 %

## 2024-05-06 NOTE — BH INPATIENT PSYCHIATRY PROGRESS NOTE - NSBHFUPINTERVALHXFT_PSY_A_CORE
Patient was seen and evaluated this morning. Chart was reviewed and no acute events were noted. No PRN medications were administered overnight. Upon approach, patient is watching TV in the day room. Patient is alert but not orientated. Patient does not appropriately interact with the interviewer. Patient is cooperative and answers all questions appropriately. Patient denies any current passive or active suicidal ideation, intent or plan and denies any homicidal ideation.    Patient was seen and evaluated this morning. Chart was reviewed and no acute events were noted. No PRN medications were administered since last evaluation. He refused last night's Invega.     Upon approach, patient is watching TV in the day room. His affect is constricted, and his engagement with the interview is severely limited as he keeps his focus on the TV throughout. Patient denies any current passive or active suicidal ideation, intent or plan. He did not respond to questions about homicidal ideation.

## 2024-05-06 NOTE — BH INPATIENT PSYCHIATRY PROGRESS NOTE - OTHER
tongue thrusting Patient was somewhat cooperative, more focused on his movie than he was the interaction

## 2024-05-06 NOTE — BH INPATIENT PSYCHIATRY PROGRESS NOTE - PRN MEDS
MEDICATIONS  (PRN):  acetaminophen     Tablet .. 650 milliGRAM(s) Oral every 6 hours PRN Temp greater or equal to 38C (100.4F), Mild Pain (1 - 3)  dextrose Oral Gel 15 Gram(s) Oral once PRN Blood Glucose LESS THAN 70 milliGRAM(s)/deciliter  haloperidol     Tablet 5 milliGRAM(s) Oral every 6 hours PRN Agitation not responding to reassurance  hydrOXYzine hydrochloride 50 milliGRAM(s) Oral every 6 hours PRN anxiety

## 2024-05-06 NOTE — BH INPATIENT PSYCHIATRY PROGRESS NOTE - NSBHMSEAFFQUAL_PSY_A_CORE
It tends to go up with age. In addition, there can be a genetic component. Some people just don't get rid of cholesterol efficiently. Would not worry. Overall lipid panel is fine because her HDL is high.    Euthymic

## 2024-05-06 NOTE — BH INPATIENT PSYCHIATRY PROGRESS NOTE - CURRENT MEDICATION
MEDICATIONS  (STANDING):  allopurinol 100 milliGRAM(s) Oral daily  atorvastatin 10 milliGRAM(s) Oral at bedtime  atropine 1% Solution 1 Drop(s) Both EYES daily  budesonide 160 MICROgram(s)/formoterol 4.5 MICROgram(s) Inhaler 2 Puff(s) Inhalation two times a day  cloZAPine 12.5 milliGRAM(s) Oral two times a day  dextrose 10% Bolus 125 milliLiter(s) IV Bolus once  dextrose 5%. 1000 milliLiter(s) (50 mL/Hr) IV Continuous <Continuous>  dextrose 5%. 1000 milliLiter(s) (100 mL/Hr) IV Continuous <Continuous>  dextrose 50% Injectable 25 Gram(s) IV Push once  dextrose 50% Injectable 12.5 Gram(s) IV Push once  glucagon  Injectable 1 milliGRAM(s) IntraMuscular once  levothyroxine 200 MICROGram(s) Oral daily  linagliptin 5 milliGRAM(s) Oral daily  metFORMIN 500 milliGRAM(s) Oral two times a day  paliperidone ER 3 milliGRAM(s) Oral at bedtime    MEDICATIONS  (PRN):  acetaminophen     Tablet .. 650 milliGRAM(s) Oral every 6 hours PRN Temp greater or equal to 38C (100.4F), Mild Pain (1 - 3)  dextrose Oral Gel 15 Gram(s) Oral once PRN Blood Glucose LESS THAN 70 milliGRAM(s)/deciliter  haloperidol     Tablet 5 milliGRAM(s) Oral every 6 hours PRN Agitation not responding to reassurance  hydrOXYzine hydrochloride 50 milliGRAM(s) Oral every 6 hours PRN anxiety

## 2024-05-06 NOTE — BH INPATIENT PSYCHIATRY PROGRESS NOTE - NSBHCHARTREVIEWVS_PSY_A_CORE FT
Vital Signs Last 24 Hrs  T(C): 36.3 (05-06-24 @ 09:40), Max: 36.3 (05-06-24 @ 09:40)  T(F): 97.4 (05-06-24 @ 09:40), Max: 97.4 (05-06-24 @ 09:40)  HR: 97 (05-06-24 @ 09:40) (97 - 105)  BP: 149/73 (05-06-24 @ 09:40) (145/65 - 149/73)  BP(mean): --  RR: 18 (05-06-24 @ 09:40) (16 - 18)  SpO2: --

## 2024-05-06 NOTE — BH INPATIENT PSYCHIATRY PROGRESS NOTE - NSBHMETABOLIC_PSY_ALL_CORE_FT
BMI: BMI (kg/m2): 25.4 (04-27-24 @ 00:19)  HbA1c: A1C with Estimated Average Glucose Result: 6.5 % (12-23-23 @ 08:00)    Glucose: POCT Blood Glucose.: 133 mg/dL (05-05-24 @ 06:50)    BP: 149/73 (05-06-24 @ 09:40) (129/64 - 149/73)Vital Signs Last 24 Hrs  T(C): 36.3 (05-06-24 @ 09:40), Max: 36.3 (05-06-24 @ 09:40)  T(F): 97.4 (05-06-24 @ 09:40), Max: 97.4 (05-06-24 @ 09:40)  HR: 97 (05-06-24 @ 09:40) (97 - 105)  BP: 149/73 (05-06-24 @ 09:40) (145/65 - 149/73)  BP(mean): --  RR: 18 (05-06-24 @ 09:40) (16 - 18)  SpO2: --      Lipid Panel: Date/Time: 12-23-23 @ 08:00  Cholesterol, Serum: 186  LDL Cholesterol Calculated: 100  HDL Cholesterol, Serum: 46  Total Cholesterol/HDL Ration Measurement: --  Triglycerides, Serum: 202

## 2024-05-06 NOTE — BH INPATIENT PSYCHIATRY PROGRESS NOTE - ADDITIONAL DETAILS / COMMENTS
Patient was preoccupied with his movie. Patient did not interact with interviewer well. Will [place] back to patient in afternoon for further assessment.  Patient was preoccupied with his movie. Patient did not interact with interviewer well.

## 2024-05-07 RX ORDER — ATROPINE SULFATE 1 %
1 DROPS OPHTHALMIC (EYE)
Qty: 1 | Refills: 0
Start: 2024-05-07 | End: 2024-05-20

## 2024-05-07 RX ORDER — CLOZAPINE 150 MG/1
1 TABLET, ORALLY DISINTEGRATING ORAL
Qty: 28 | Refills: 0
Start: 2024-05-07 | End: 2024-05-20

## 2024-05-07 RX ORDER — BUDESONIDE AND FORMOTEROL FUMARATE DIHYDRATE 160; 4.5 UG/1; UG/1
2 AEROSOL RESPIRATORY (INHALATION)
Qty: 1 | Refills: 0
Start: 2024-05-07 | End: 2024-05-20

## 2024-05-07 RX ORDER — METFORMIN HYDROCHLORIDE 850 MG/1
1 TABLET ORAL
Qty: 14 | Refills: 0
Start: 2024-05-07 | End: 2024-05-20

## 2024-05-07 RX ORDER — ATORVASTATIN CALCIUM 80 MG/1
1 TABLET, FILM COATED ORAL
Qty: 14 | Refills: 0
Start: 2024-05-07 | End: 2024-05-20

## 2024-05-07 RX ORDER — PALIPERIDONE 1.5 MG/1
1 TABLET, EXTENDED RELEASE ORAL
Qty: 14 | Refills: 0
Start: 2024-05-07 | End: 2024-05-20

## 2024-05-07 RX ORDER — SITAGLIPTIN 50 MG/1
1 TABLET, FILM COATED ORAL
Qty: 14 | Refills: 0
Start: 2024-05-07 | End: 2024-05-20

## 2024-05-07 RX ORDER — ALLOPURINOL 300 MG
1 TABLET ORAL
Qty: 14 | Refills: 0
Start: 2024-05-07 | End: 2024-05-20

## 2024-05-07 RX ORDER — LEVOTHYROXINE SODIUM 125 MCG
1 TABLET ORAL
Qty: 14 | Refills: 0
Start: 2024-05-07 | End: 2024-05-20

## 2024-05-07 RX ADMIN — BUDESONIDE AND FORMOTEROL FUMARATE DIHYDRATE 2 PUFF(S): 160; 4.5 AEROSOL RESPIRATORY (INHALATION) at 08:23

## 2024-05-07 NOTE — BH DISCHARGE NOTE NURSING/SOCIAL WORK/PSYCH REHAB - NSCDUDCCRISIS_PSY_A_CORE
[facility]  [phone] [phone]  Text "WELL" to [phone]  Website: [email or web address]  [facility]  1 [phone] LIFENET [phone] Suicide and Crisis Lifeline

## 2024-05-07 NOTE — BH INPATIENT PSYCHIATRY DISCHARGE NOTE - NSBHFUPINTERVALHXFT_PSY_A_CORE
Patient was seen and evaluated this morning. Chart was reviewed and no acute events were noted. No PRN medications were administered overnight. Upon approach, patient is in the dayroom watching a film. Patient is alert and oriented and engages with interviewer. Patient is dressed in his clothes. Patient preferred to have the interview in the privacy of his room. Patient is uncooperative. While walking, patient was informed of his discharge later today. Patient began inquiring about his property including his "vitamins". When asked to sign the discharge form, patient became irritable. "I wont sign until I get my property. I am not a faggot. You're treating me like a faggot". Patient then walked away from team and went back to the dayroom. Patient did not respond to anymore of our questions.    Patient was seen and evaluated this morning. Chart was reviewed and no acute events were noted. No PRN medications were administered overnight. Upon approach, patient is in the dayroom watching a film. Patient is alert and oriented and engages with interviewer. Patient is dressed in his clothes. Patient preferred to have the interview in the privacy of his room. While walking, patient was informed of his discharge later today. Patient began inquiring about his property including his "vitamins". When asked to sign the discharge form, patient became irritable. "I wont sign until I get my property. I am not a faggot. You're treating me like a faggot". Patient then walked away from team and went back to the dayroom. Patient did not respond to anymore of our questions.

## 2024-05-07 NOTE — BH INPATIENT PSYCHIATRY PROGRESS NOTE - NSBHASSESSSUMMFT_PSY_ALL_CORE
67 yo male domiciled at [facility], with PMH diabetes, CAD, CLL, parathyroid cancer, PPH of chronic schizophrenia, multiple state hospitalizations, most recently between Feb 2019 and Oct 2023 at [place],  hospitalized here  in late 2023,who presented to the ED on 4/26/24 with an unclear complaint. Based on collateral and exam, pt was found to be disorganized and off meds, and subsequently admitted to [facility] under 9.39 legals for decompensated schizophrenia.    Based on history and ED evaluation  he appeared very limited, with impoverished and illogical thought and speech. Given pt's irritability, disorganized behavior, and psychosis, likely 2/2 medication noncompliance, pt  was assessed to be unable to care for himself and was admitted to the inpatient unit.     4/28: Patient appears euthymic, willing to cooperate with interview. Patient is now taking prescribed medications. Will continue to monitor   4/29- calm, cooperative, taking medications; endorsing vague delusions, but does not appear overtly paranoid; pt has AOT  4/30- refused labs in AM, refused to speak with team; will increase clozaril to 25mg BID once bloodwork available to check for ANC and cardiac markers  5/1- refusing clozaril and labwork; encouraging patient to adhere to medication regimen  5/1- continues to selectively speak with staff; refusing labwork and clozaril  5/3- took all medications this morning; will reorder labwork (CBC w/ diff, CRP, trop) prior to increasing clozaril dose  5/6- Pt calm, however minimally cooperative with interview. Increasing Clozaril to 25 BID  5/7- Pt is irritable and anxious to leave, not cooperative with interview and did not answer questions appropriately. Not adhering to medications new and standing    Plan:   #Schizophrenia  - Increase Clozaril to 25 mg BID for agression  - Continue to monitor V/S and weekly CBCs and gradually increase dose appropriately. (Pt was previously on Clozapine 100mg 2 tablets po 9pm.)   - ANC 2.18 5/04  - Invega ER 3mg qhs; consider switching to URIAS    #Anteroseptal infarct (+ECG in November and 12/21/23)  - F/u troponin  - Consult cardiology if elevated    #Hypothyroidism  - F/u TSH and reflex T4  - c/w synthroid 200 mcg qdaily     #T2DM  #HLD  - tradjenta 5mg qdaily  - metformin 500mg bid  - lipitor 10mg qhs  - allopurinol 100mg qdaily         #Agitation  - For severe agitation not responding to behavioral intervention, may give haldol 5 mg po q6h prn, ativan 1 mg po q6h prn, hydroxyzine 50 mg po q6h prn, with escalation to IM if patient refusing PO and remains an imminent danger to self or others. If IM antipsychotic is administered, please perform follow-up ECG for QTc monitoring.  - Can give benadryl 50 mg po q6h for anxiety/insomnia

## 2024-05-07 NOTE — BH INPATIENT PSYCHIATRY DISCHARGE NOTE - NSBHDCMEDICALFT_PSY_A_CORE
#Anteroseptal infarct (+ECG in November and 12/21/23)  - F/u troponin  - Consult cardiology if elevated    #Hypothyroidism  - F/u TSH and reflex T4  - c/w synthroid 200 mcg qdaily     #T2DM  #HLD  - tradjenta 5mg qdaily  - metformin 500mg bid  - lipitor 10mg qhs  - allopurinol 100mg qdaily

## 2024-05-07 NOTE — BH INPATIENT PSYCHIATRY DISCHARGE NOTE - HOSPITAL COURSE
69 yo male domiciled at [facility], with PMH diabetes, CAD, CLL, parathyroid cancer, PPH of chronic schizophrenia, multiple state hospitalizations, most recently between Feb 2019 and Oct 2023 at [place],  hospitalized here  in late 2023,who presented to the ED on 4/26/24 with an unclear complaint. Based on collateral and exam, pt was found to be disorganized and off meds, and subsequently admitted to [facility] under 9.39 legals for decompensated schizophrenia.    Based on history and ED evaluation  he appeared very limited, with impoverished and illogical thought and speech. Given pt's irritability, disorganized behavior, and psychosis, likely 2/2 medication noncompliance, pt  was assessed to be unable to care for himself and was admitted to the inpatient unit.   Throughout the patient's hospitalization he intermittently refused clozaril and invega. Further towards the end of his hospitalization he was only refusing his Invega (would consider Invega URIAS after discharge), but taking his Clozaril, with his ANC remaining within normal limits. He likely requires continued uptitration of his Clozaril (previously on Clozapine 100 mg 2 tablets PO at 9pm). The patient remained irritable, and generally was minimally cooperative with interview, however did not appear to be at an acutely elevated risk of harm to self or others upon discharge and has been under good behavioral control for the past week.    The patient is stable and ready to be discharged.

## 2024-05-07 NOTE — BH INPATIENT PSYCHIATRY DISCHARGE NOTE - NSBHDCMDCOMP_PSY_ALL_CORE
Detail Level: Detailed Lab: [id number] Notification Instructions: Patient will be notified of biopsy results. However, patient instructed to call the office if not contacted within 2 weeks. Biopsy Method: Personna blade Curettage Text: The wound bed was treated with curettage after the biopsy was performed. Bill For Surgical Tray: no Consent: Verbal consent was obtained and risks were reviewed including but not limited to scarring, infection, bleeding, scabbing, incomplete removal, nerve damage and allergy to anesthesia. Cryotherapy Text: The wound bed was treated with cryotherapy after the biopsy was performed. Anesthesia Type: 1% Xylocaine with [id number] epinephrine and sodium bicarbonate Size Of Lesion In Cm: 0 Lab Facility: [id number] Wound Care: Vaseline Render Post-Care Instructions In Note?: yes Electrodesiccation Text: The wound bed was treated with electrodesiccation after the biopsy was performed. Depth Of Biopsy: dermis Biopsy Type: H and E This section is complete and the patient is ready for discharge Type Of Destruction Used: Curettage Billing Type: Third-Party Bill Electrodesiccation And Curettage Text: The wound bed was treated with electrodesiccation and curettage after the biopsy was performed. Anesthesia Volume In Cc (Will Not Render If 0): 0.6 Dressing: no dressing applied Post-Care Instructions: I reviewed with the patient in detail post-care instructions. Patient is to keep the biopsy site dry overnight, and then apply vaseline twice daily until healed. Silver Nitrate Text: The wound bed was treated with silver nitrate after the biopsy was performed. Hemostasis: Drysol

## 2024-05-07 NOTE — BH INPATIENT PSYCHIATRY PROGRESS NOTE - NSBHFUPINTERVALHXFT_PSY_A_CORE
Patient was seen and evaluated this morning. Chart was reviewed and no acute events were noted. No PRN medications were administered overnight. Upon approach, patient scares the team after popping out of the bathroom abruptly. Patient is alert and oriented and engages with interviewer. Patient is not cooperative and doesn't directly answer questions, but asks it back to the interviewer in a taunting manner. His tone and demeanor was irritable and agitated. When asked if he has taken his medications, he says "I took enough meds, I don't need anymore" and "I took meds for 37 years". Patient appears to have his bed made neatly, and says "I made my bed for the next patient because I'm leaving today" and has been walking around the unit with a bag of his belongings as if he intends to leave.

## 2024-05-07 NOTE — BH INPATIENT PSYCHIATRY DISCHARGE NOTE - NSBHMETABOLIC_PSY_ALL_CORE_FT
BMI: BMI (kg/m2): 25.4 (04-27-24 @ 00:19)  HbA1c: A1C with Estimated Average Glucose Result: 6.5 % (12-23-23 @ 08:00)    Glucose: POCT Blood Glucose.: 133 mg/dL (05-05-24 @ 06:50)    BP: 141/63 (05-06-24 @ 20:20) (129/64 - 150/76)Vital Signs Last 24 Hrs  T(C): 36.6 (05-06-24 @ 20:20), Max: 36.6 (05-06-24 @ 20:20)  T(F): 97.9 (05-06-24 @ 20:20), Max: 97.9 (05-06-24 @ 20:20)  HR: 105 (05-06-24 @ 20:20) (101 - 105)  BP: 141/63 (05-06-24 @ 20:20) (141/63 - 150/76)  BP(mean): --  RR: 18 (05-06-24 @ 16:29) (18 - 18)  SpO2: --      Lipid Panel: Date/Time: 12-23-23 @ 08:00  Cholesterol, Serum: 186  LDL Cholesterol Calculated: 100  HDL Cholesterol, Serum: 46  Total Cholesterol/HDL Ration Measurement: --  Triglycerides, Serum: 202

## 2024-05-07 NOTE — BH TREATMENT PLAN - NSTXPSYCHOINTERSW_PSY_ALL_CORE
SW will continue to provide support contacts, education and referrals for healthy coping skills. SW will encourage patients to be visible on the unit and participate in groups.
SW will continue to provide support contacts, education and referrals for healthy coping skills. SW will encourage patients to be visible on the unit and participate in groups.

## 2024-05-07 NOTE — BH INPATIENT PSYCHIATRY PROGRESS NOTE - NSBHMSEMUSCLE_PSY_A_CORE
Normal muscle tone/strength
Unable to assess
Normal muscle tone/strength
Unable to assess

## 2024-05-07 NOTE — BH INPATIENT PSYCHIATRY DISCHARGE NOTE - NSDCCPCAREPLAN_GEN_ALL_CORE_FT
PRINCIPAL DISCHARGE DIAGNOSIS  Diagnosis: Schizophrenia  Assessment and Plan of Treatment:       SECONDARY DISCHARGE DIAGNOSES  Diagnosis: Agitation  Assessment and Plan of Treatment:

## 2024-05-07 NOTE — BH DISCHARGE NOTE NURSING/SOCIAL WORK/PSYCH REHAB - NSDCVIVACCINE_GEN_ALL_CORE_FT
Tdap; 30-Nov-2023 09:41; Milly Hilton (KRISSY); [employer]; [id number] (Exp. Date: 23-Nov-2025); IntraMuscular; Deltoid Right.; 0.5 milliLiter(s); VIS (VIS Published: 09-May-2013, VIS Presented: 30-Nov-2023);

## 2024-05-07 NOTE — BH INPATIENT PSYCHIATRY PROGRESS NOTE - ADDITIONAL DETAILS / COMMENTS
Patient did not interact with interviewer well. He was irritable, and didn't answer most of the interviewers questions.

## 2024-05-07 NOTE — BH TREATMENT PLAN - NSTXPSYCHOGOAL_PSY_ALL_CORE
Will identify 2 coping skills that help mitigate hallucinations
Will identify 2 coping skills that assist with focus on reality

## 2024-05-07 NOTE — BH INPATIENT PSYCHIATRY PROGRESS NOTE - CURRENT MEDICATION
MEDICATIONS  (STANDING):  allopurinol 100 milliGRAM(s) Oral daily  atorvastatin 10 milliGRAM(s) Oral at bedtime  atropine 1% Solution 1 Drop(s) Both EYES daily  budesonide 160 MICROgram(s)/formoterol 4.5 MICROgram(s) Inhaler 2 Puff(s) Inhalation two times a day  cloZAPine 25 milliGRAM(s) Oral two times a day  dextrose 10% Bolus 125 milliLiter(s) IV Bolus once  dextrose 5%. 1000 milliLiter(s) (50 mL/Hr) IV Continuous <Continuous>  dextrose 5%. 1000 milliLiter(s) (100 mL/Hr) IV Continuous <Continuous>  dextrose 50% Injectable 12.5 Gram(s) IV Push once  dextrose 50% Injectable 25 Gram(s) IV Push once  glucagon  Injectable 1 milliGRAM(s) IntraMuscular once  levothyroxine 200 MICROGram(s) Oral daily  linagliptin 5 milliGRAM(s) Oral daily  metFORMIN 500 milliGRAM(s) Oral two times a day  paliperidone ER 3 milliGRAM(s) Oral at bedtime    MEDICATIONS  (PRN):  acetaminophen     Tablet .. 650 milliGRAM(s) Oral every 6 hours PRN Temp greater or equal to 38C (100.4F), Mild Pain (1 - 3)  dextrose Oral Gel 15 Gram(s) Oral once PRN Blood Glucose LESS THAN 70 milliGRAM(s)/deciliter  haloperidol     Tablet 5 milliGRAM(s) Oral every 6 hours PRN Agitation not responding to reassurance  hydrOXYzine hydrochloride 50 milliGRAM(s) Oral every 6 hours PRN anxiety   MEDICATIONS  (STANDING):  allopurinol 100 milliGRAM(s) Oral daily  atorvastatin 10 milliGRAM(s) Oral at bedtime  atropine 1% Solution 1 Drop(s) Both EYES daily  budesonide 160 MICROgram(s)/formoterol 4.5 MICROgram(s) Inhaler 2 Puff(s) Inhalation two times a day  cloZAPine 25 milliGRAM(s) Oral two times a day  dextrose 10% Bolus 125 milliLiter(s) IV Bolus once  dextrose 5%. 1000 milliLiter(s) (100 mL/Hr) IV Continuous <Continuous>  dextrose 5%. 1000 milliLiter(s) (50 mL/Hr) IV Continuous <Continuous>  dextrose 50% Injectable 12.5 Gram(s) IV Push once  dextrose 50% Injectable 25 Gram(s) IV Push once  glucagon  Injectable 1 milliGRAM(s) IntraMuscular once  levothyroxine 200 MICROGram(s) Oral daily  linagliptin 5 milliGRAM(s) Oral daily  metFORMIN 500 milliGRAM(s) Oral two times a day  paliperidone ER 3 milliGRAM(s) Oral at bedtime    MEDICATIONS  (PRN):  acetaminophen     Tablet .. 650 milliGRAM(s) Oral every 6 hours PRN Temp greater or equal to 38C (100.4F), Mild Pain (1 - 3)  dextrose Oral Gel 15 Gram(s) Oral once PRN Blood Glucose LESS THAN 70 milliGRAM(s)/deciliter  haloperidol     Tablet 5 milliGRAM(s) Oral every 6 hours PRN Agitation not responding to reassurance  hydrOXYzine hydrochloride 50 milliGRAM(s) Oral every 6 hours PRN anxiety  LORazepam     Tablet 2 milliGRAM(s) Oral every 6 hours PRN Agitation not r responding to reassurance

## 2024-05-07 NOTE — BH INPATIENT PSYCHIATRY PROGRESS NOTE - NSBHMSESPEECH_PSY_A_CORE
Non-verabal by choice. Patient can speak, he chose not to/Non-verbal: unable to assess speech further
Non-verabal by choice. Patient can speak, he chose not to/Normal volume, rate, productivity, spontaneity and articulation
Non-verabal by choice. Patient can speak, he chose not to/Normal volume, rate, productivity, spontaneity and articulation
Non-verabal by choice. Patient can speak, he chose not to/Non-verbal: unable to assess speech further
Abnormal as indicated, otherwise normal...
Non-verabal by choice. Patient can speak, he chose not to/Non-verbal: unable to assess speech further
Non-verabal by choice. Patient can speak, he chose not to/Normal volume, rate, productivity, spontaneity and articulation
Abnormal as indicated, otherwise normal...
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
## 2024-05-07 NOTE — BH INPATIENT PSYCHIATRY PROGRESS NOTE - GENERAL APPEARANCE
No deformities present
Unable to assess
No deformities present

## 2024-05-07 NOTE — BH INPATIENT PSYCHIATRY PROGRESS NOTE - NSTXPSYCHOGOAL_PSY_ALL_CORE
1930hrs:  Bedside and Verbal shift change report given to Adia Salas RN (oncoming nurse) by Chantel Gillespie RN (offgoing nurse). Report included the following information Nurse Handoff Report, Index, Adult Overview, Surgery Report, Intake/Output, MAR, Recent Results, Med Rec Status, and Cardiac Rhythm NSR .    0730hrs:  Bedside and Verbal shift change report given to SHONA Gonzalez (oncoming nurse) by Kim Monroy RN (offgoing nurse). Report included the following information Nurse Handoff Report, Index, ED SBAR, Adult Overview, Surgery Report, Intake/Output, MAR, Recent Results, and Cardiac Rhythm NSR .     0826hrs:  Dietary at [address] to confirm is pt is NPO. Pt has a breakfast tray and active food orders, but Dietary stated that pt said he is not allowed to eat because he has a procedure. Paged and spoke with Dr. Jeanine Srivastava regarding concern for pt's NPO status. MD advised RN to contact IR. Called IR and spoke with Elvira Grove. She confirmed pt should be NPO for today's procedure. Elvira Grove stated she would update the pt's NPO order. Andrae Cavazos and pt notified.
Low Risk (score 7-11)
Will identify 2 coping skills that help mitigate hallucinations
Will identify 2 coping skills that help mitigate hallucinations
Make at least 5 reality based statements/requests to staff and/or peers
Will identify 2 coping skills that help mitigate hallucinations

## 2024-05-07 NOTE — BH INPATIENT PSYCHIATRY PROGRESS NOTE - PRN MEDS
MEDICATIONS  (PRN):  acetaminophen     Tablet .. 650 milliGRAM(s) Oral every 6 hours PRN Temp greater or equal to 38C (100.4F), Mild Pain (1 - 3)  dextrose Oral Gel 15 Gram(s) Oral once PRN Blood Glucose LESS THAN 70 milliGRAM(s)/deciliter  haloperidol     Tablet 5 milliGRAM(s) Oral every 6 hours PRN Agitation not responding to reassurance  hydrOXYzine hydrochloride 50 milliGRAM(s) Oral every 6 hours PRN anxiety   MEDICATIONS  (PRN):  acetaminophen     Tablet .. 650 milliGRAM(s) Oral every 6 hours PRN Temp greater or equal to 38C (100.4F), Mild Pain (1 - 3)  dextrose Oral Gel 15 Gram(s) Oral once PRN Blood Glucose LESS THAN 70 milliGRAM(s)/deciliter  haloperidol     Tablet 5 milliGRAM(s) Oral every 6 hours PRN Agitation not responding to reassurance  hydrOXYzine hydrochloride 50 milliGRAM(s) Oral every 6 hours PRN anxiety  LORazepam     Tablet 2 milliGRAM(s) Oral every 6 hours PRN Agitation not r responding to reassurance

## 2024-05-07 NOTE — BH INPATIENT PSYCHIATRY PROGRESS NOTE - NSBHCHARTREVIEWVS_PSY_A_CORE FT
Vital Signs Last 24 Hrs  T(C): 36.6 (05-06-24 @ 20:20), Max: 36.6 (05-06-24 @ 20:20)  T(F): 97.9 (05-06-24 @ 20:20), Max: 97.9 (05-06-24 @ 20:20)  HR: 105 (05-06-24 @ 20:20) (101 - 105)  BP: 141/63 (05-06-24 @ 20:20) (141/63 - 150/76)  BP(mean): --  RR: 18 (05-06-24 @ 16:29) (18 - 18)  SpO2: --     Vital Signs Last 24 Hrs  T(C): --  T(F): --  HR: --  BP: --  BP(mean): --  RR: --  SpO2: --

## 2024-05-07 NOTE — BH INPATIENT PSYCHIATRY DISCHARGE NOTE - NSDCMRMEDTOKEN_GEN_ALL_CORE_FT
allopurinol 100 mg oral tablet: 1 tab(s) orally once a day  atorvastatin 10 mg oral tablet: 1 tab(s) orally once a day (at bedtime)  atropine 1% ophthalmic solution: 1 drop(s) to each affected eye once a day  budesonide-formoterol 160 mcg-4.5 mcg/inh inhalation aerosol: 2 puff(s) inhaled 2 times a day  cloZAPine 25 mg oral tablet: 1 tab(s) orally 2 times a day  Januvia 100 mg oral tablet: 1 tab(s) orally once a day  levothyroxine 200 mcg (0.2 mg) oral tablet: 1 tab(s) orally once a day  metFORMIN 1000 mg oral tablet: 1 tab(s) orally  paliperidone 3 mg oral tablet, extended release: 1 tab(s) orally once a day (at bedtime)

## 2024-05-07 NOTE — BH INPATIENT PSYCHIATRY PROGRESS NOTE - NSBHCHARTREVIEWINVESTIGATE_PSY_A_CORE FT
Ventricular Rate 84 BPM    Atrial Rate 84 BPM    P-R Interval 152 ms    QRS Duration 78 ms    Q-T Interval 376 ms    QTC Calculation(Bazett) 444 ms    P Axis 58 degrees    R Axis -19 degrees    T Axis 94 degrees    Diagnosis Line Normal sinus rhythm  Inferior infarct , age undetermined  Anterolateral infarct , age undetermined  Abnormal ECG    Confirmed by REN TUCKER MD ([id number]) on 12/26/2023 9:25:28 AM  
  Ventricular Rate 81 BPM    Atrial Rate 81 BPM    P-R Interval 156 ms    QRS Duration 78 ms    Q-T Interval 406 ms    QTC Calculation(Bazett) 471 ms    P Axis 64 degrees    R Axis 14 degrees    T Axis 115 degrees    Diagnosis Line Normal sinus rhythm  Inferior infarct , age undetermined  Cannot rule out Anterior infarct , age undetermined  T wave abnormality, consider lateral ischemia  Abnormal ECG    Confirmed by Pedro Murray [id number]) on 5/2/2024 7:37:45 AM  
Ventricular Rate 84 BPM    Atrial Rate 84 BPM    P-R Interval 152 ms    QRS Duration 78 ms    Q-T Interval 376 ms    QTC Calculation(Bazett) 444 ms    P Axis 58 degrees    R Axis -19 degrees    T Axis 94 degrees    Diagnosis Line Normal sinus rhythm  Inferior infarct , age undetermined  Anterolateral infarct , age undetermined  Abnormal ECG    Confirmed by REN TUCKER MD ([id number]) on 12/26/2023 9:25:28 AM  
  Ventricular Rate 84 BPM    Atrial Rate 84 BPM    P-R Interval 152 ms    QRS Duration 78 ms    Q-T Interval 376 ms    QTC Calculation(Bazett) 444 ms    P Axis 58 degrees    R Axis -19 degrees    T Axis 94 degrees    Diagnosis Line Normal sinus rhythm  Inferior infarct , age undetermined  Anterolateral infarct , age undetermined  Abnormal ECG    Confirmed by REN TUCKER MD ([id number]) on 12/26/2023 9:25:28 AM  
Ventricular Rate 81 BPM    Atrial Rate 81 BPM    P-R Interval 156 ms    QRS Duration 78 ms    Q-T Interval 406 ms    QTC Calculation(Bazett) 471 ms    P Axis 64 degrees    R Axis 14 degrees    T Axis 115 degrees    Diagnosis Line Normal sinus rhythm  Inferior infarct , age undetermined  Cannot rule out Anterior infarct , age undetermined  T wave abnormality, consider lateral ischemia  Abnormal ECG    Confirmed by Pedro Murray ([id number]) on 5/2/2024 7:37:45 AM

## 2024-05-07 NOTE — BH DISCHARGE NOTE NURSING/SOCIAL WORK/PSYCH REHAB - PATIENT PORTAL LINK FT
You can access the FollowMyHealth Patient Portal offered by [facility] by registering at the following website: [email or web address]. By joining [facility]’s FollowMyHealth portal, you will also be able to view your health information using other applications (apps) compatible with our system.
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
## 2024-05-07 NOTE — BH INPATIENT PSYCHIATRY PROGRESS NOTE - NSBHATTESTCOMMENTATTENDFT_PSY_A_CORE
Patient was seen and examined by me. I reviewed and agreed with the findings and plan as documented in the Resident's note, unless noted below
I have reviewed case with student and made any necessary changes or additions.  I concur with findings and plan. 
I have reviewed case with resident and made any necessary changes or additions.  I concur with findings and plan. 
I have reviewed case with student and made any necessary changes or additions.  I concur with findings and plan. 

## 2024-05-07 NOTE — BH TREATMENT PLAN - NSTXPLANTHERAPYSESSIONSFT_PSY_ALL_CORE
04-29-24  Type of therapy: Coping skills, Inspiration and motiviation, Leisure development, Self esteem, Social skills training, Stress management, Symptom management  Type of session: Individual  Level of patient participation: Resistance to participation  Duration of participation: Less than 15 minutes  Therapy conducted by: Psych rehab  Therapy Summary: Pt will need increased encouragement / motivation .    04-29-24  Type of therapy: Self esteem  Type of session: Group  Level of patient participation: Quiet  Duration of participation: 45 minutes  Therapy conducted by: Social work  Therapy Summary: Patient attended the Social Work group with SW and peers and were asked to identify times where they have shown positive qualities. Patients were encouraged to consider their strengths and how they can be used to help overcome challenges. The goal of this exercise was to increase self-esteem and positive thoughts. SW facilitated the group and patients provided feedback and support.  
  05-07-24  Type of therapy: Coping skills, Creative arts therapy, Inspiration and motiviation, Leisure development, Social skills training, Stress management, Symptom management  Type of session: Group  Level of patient participation: Engaged, Participated with encouragement  Duration of participation: 30 minutes  Therapy conducted by: Psych rehab  Therapy Summary: Pt is attending RT sessions , mood continues to be irritable , pt has been working on simple creative art projects . Pt may need reminders to remain focused on task.

## 2024-05-07 NOTE — BH INPATIENT PSYCHIATRY PROGRESS NOTE - NSBHATTESTBILLING_PSY_A_CORE
38685-Jbkudafnfi OBS or IP - moderate complexity OR 35-49 mins
[id number]-Rvepdquonc OBS or IP - low complexity OR 25-34 mins
[id number]-Bbffervjnz OBS or IP - low complexity OR 25-34 mins
[id number]-Mvvytjdevb OBS or IP - low complexity OR 25-34 mins
[id number]-Zyfannjsde OBS or IP - low complexity OR 25-34 mins
97902-Ounnllnznw OBS or IP - low complexity OR 25-34 mins
[id number]-Jxuoihutbi OBS or IP - low complexity OR 25-34 mins
09794-Aehgfcsvkr OBS or IP - low complexity OR 25-34 mins

## 2024-05-07 NOTE — BH INPATIENT PSYCHIATRY DISCHARGE NOTE - OTHER
Adequate for short conversations  difficult to assess since she speaks in short sentences  social judgment on the unit has been adequate to maintain safety.  His insistence on leaving the hospital is an indication of impaired judgment.

## 2024-05-07 NOTE — BH INPATIENT PSYCHIATRY PROGRESS NOTE - NSBHMSERECMEM_PSY_A_CORE
Unable to assess
Impaired
Unable to assess
Impaired
Unable to assess
Unable to assess

## 2024-05-07 NOTE — BH INPATIENT PSYCHIATRY PROGRESS NOTE - NSBHMSELANG_PSY_A_CORE
Unable to assess
No abnormalities noted
Unable to assess
No abnormalities noted
Unable to assess

## 2024-05-07 NOTE — BH INPATIENT PSYCHIATRY DISCHARGE NOTE - NSBHASSESSSUMMFT_PSY_ALL_CORE
67 yo male domiciled at [facility], with PMH diabetes, CAD, CLL, parathyroid cancer, PPH of chronic schizophrenia, multiple state hospitalizations, most recently between Feb 2019 and Oct 2023 at [place],  hospitalized here  in late 2023,who presented to the ED on 4/26/24 with an unclear complaint. Based on collateral and exam, pt was found to be disorganized and off meds, and subsequently admitted to [facility] under 9.39 legals for decompensated schizophrenia.    Based on history and ED evaluation  he appeared very limited, with impoverished and illogical thought and speech. Given pt's irritability, disorganized behavior, and psychosis, likely 2/2 medication noncompliance, pt  was assessed to be unable to care for himself and was admitted to the inpatient unit.     4/28: Patient appears euthymic, willing to cooperate with interview. Patient is now taking prescribed medications. Will continue to monitor   4/29- calm, cooperative, taking medications; endorsing vague delusions, but does not appear overtly paranoid; pt has AOT  4/30- refused labs in AM, refused to speak with team; will increase clozaril to 25mg BID once bloodwork available to check for ANC and cardiac markers  5/1- refusing clozaril and labwork; encouraging patient to adhere to medication regimen  5/1- continues to selectively speak with staff; refusing labwork and clozaril  5/3- took all medications this morning; will reorder labwork (CBC w/ diff, CRP, trop) prior to increasing clozaril dose  5/6- Pt calm, however minimally cooperative with interview. Increasing Clozaril to 25 BID  5/7- Pt is irritable and anxious to leave, not cooperative with interview and did not answer questions appropriately. Not adhering to medications new and standing

## 2024-05-07 NOTE — BH INPATIENT PSYCHIATRY PROGRESS NOTE - NSBHMETABOLIC_PSY_ALL_CORE_FT
BMI: BMI (kg/m2): 25.4 (04-27-24 @ 00:19)  HbA1c: A1C with Estimated Average Glucose Result: 6.5 % (12-23-23 @ 08:00)    Glucose: POCT Blood Glucose.: 133 mg/dL (05-05-24 @ 06:50)    BP: 141/63 (05-06-24 @ 20:20) (129/64 - 150/76)Vital Signs Last 24 Hrs  T(C): 36.6 (05-06-24 @ 20:20), Max: 36.6 (05-06-24 @ 20:20)  T(F): 97.9 (05-06-24 @ 20:20), Max: 97.9 (05-06-24 @ 20:20)  HR: 105 (05-06-24 @ 20:20) (101 - 105)  BP: 141/63 (05-06-24 @ 20:20) (141/63 - 150/76)  BP(mean): --  RR: 18 (05-06-24 @ 16:29) (18 - 18)  SpO2: --      Lipid Panel: Date/Time: 12-23-23 @ 08:00  Cholesterol, Serum: 186  LDL Cholesterol Calculated: 100  HDL Cholesterol, Serum: 46  Total Cholesterol/HDL Ration Measurement: --  Triglycerides, Serum: 202   BMI: BMI (kg/m2): 25.4 (04-27-24 @ 00:19)  HbA1c: A1C with Estimated Average Glucose Result: 6.5 % (12-23-23 @ 08:00)    Glucose: POCT Blood Glucose.: 133 mg/dL (05-05-24 @ 06:50)    BP: 141/63 (05-06-24 @ 20:20) (141/63 - 150/76)Vital Signs Last 24 Hrs  T(C): --  T(F): --  HR: --  BP: --  BP(mean): --  RR: --  SpO2: --      Lipid Panel: Date/Time: 12-23-23 @ 08:00  Cholesterol, Serum: 186  LDL Cholesterol Calculated: 100  HDL Cholesterol, Serum: 46  Total Cholesterol/HDL Ration Measurement: --  Triglycerides, Serum: 202   good, to achieve stated therapy goals

## 2024-05-07 NOTE — BH INPATIENT PSYCHIATRY DISCHARGE NOTE - NSBHMSEAFFRANGE_PSY_A_CORE
Patient presents with:  Follow Up: Follow-up Chronic Pancreatitis      Moderate Pain (5)     Pain Medications       Opioid Agonists Refills Start End     traMADol (ULTRAM) 50 MG tablet 0 9/17/2021 --    Sig - Route: Take 1 tablet (50 mg) by mouth 2 times daily as needed for severe pain - Oral    Class: No Print Out    Notes to Pharmacy: Verbal order to [facility] on file            What medications are you using for pain? Tramadol, Ibuprofen Tizanidine, Methocarbamol,     (New patients only) Have you been seen by another pain clinic/ provider? no    (Return Patients only) What refills are you needing today? no     Constricted

## 2024-05-09 LAB
GLUCOSE BLDC GLUCOMTR-MCNC: 126 MG/DL — HIGH (ref 70–99)
GLUCOSE BLDC GLUCOMTR-MCNC: 140 MG/DL — HIGH (ref 70–99)

## 2024-05-13 DIAGNOSIS — C91.11 CHRONIC LYMPHOCYTIC LEUKEMIA OF B-CELL TYPE IN REMISSION: ICD-10-CM

## 2024-05-13 DIAGNOSIS — R45.1 RESTLESSNESS AND AGITATION: ICD-10-CM

## 2024-05-13 DIAGNOSIS — Z85.46 PERSONAL HISTORY OF MALIGNANT NEOPLASM OF PROSTATE: ICD-10-CM

## 2024-05-13 DIAGNOSIS — I25.10 ATHEROSCLEROTIC HEART DISEASE OF NATIVE CORONARY ARTERY WITHOUT ANGINA PECTORIS: ICD-10-CM

## 2024-05-13 DIAGNOSIS — E11.9 TYPE 2 DIABETES MELLITUS WITHOUT COMPLICATIONS: ICD-10-CM

## 2024-05-13 DIAGNOSIS — Z79.890 HORMONE REPLACEMENT THERAPY: ICD-10-CM

## 2024-05-13 DIAGNOSIS — E66.9 OBESITY, UNSPECIFIED: ICD-10-CM

## 2024-05-13 DIAGNOSIS — F20.9 SCHIZOPHRENIA, UNSPECIFIED: ICD-10-CM

## 2024-05-13 DIAGNOSIS — R44.3 HALLUCINATIONS, UNSPECIFIED: ICD-10-CM

## 2024-05-13 DIAGNOSIS — E78.5 HYPERLIPIDEMIA, UNSPECIFIED: ICD-10-CM

## 2024-05-13 DIAGNOSIS — E03.9 HYPOTHYROIDISM, UNSPECIFIED: ICD-10-CM

## 2024-05-20 ENCOUNTER — EMERGENCY (EMERGENCY)
Facility: HOSPITAL | Age: 69
LOS: 0 days | Discharge: LEFT BEFORE DISCHARGE | End: 2024-05-20
Attending: EMERGENCY MEDICINE
Payer: MEDICARE

## 2024-05-20 VITALS
WEIGHT: 167.55 LBS | TEMPERATURE: 98 F | HEIGHT: 67 IN | DIASTOLIC BLOOD PRESSURE: 73 MMHG | RESPIRATION RATE: 22 BRPM | HEART RATE: 85 BPM | OXYGEN SATURATION: 97 % | SYSTOLIC BLOOD PRESSURE: 128 MMHG

## 2024-05-20 DIAGNOSIS — M79.89 OTHER SPECIFIED SOFT TISSUE DISORDERS: ICD-10-CM

## 2024-05-20 DIAGNOSIS — Z53.29 PROCEDURE AND TREATMENT NOT CARRIED OUT BECAUSE OF PATIENT'S DECISION FOR OTHER REASONS: ICD-10-CM

## 2024-05-20 PROCEDURE — 99284 EMERGENCY DEPT VISIT MOD MDM: CPT | Mod: FS

## 2024-05-20 PROCEDURE — 99282 EMERGENCY DEPT VISIT SF MDM: CPT

## 2024-05-20 NOTE — ED PROVIDER NOTE - PHYSICAL EXAMINATION
VITAL SIGNS: I have reviewed nursing notes and confirm.  CONSTITUTIONAL: Well-developed; well-nourished; in no acute distress.   SKIN: skin exam is warm and dry, no acute rash.    HEAD: Normocephalic; atraumatic.  EYES conjunctiva and sclera clear.  ENT: No nasal discharge; airway clear.  EXT: 2+ pitting symmetrical edema lower extremities Normal ROM.  No clubbing, cyanosis or edema.   NEURO: Alert, oriented, grossly unremarkable

## 2024-05-20 NOTE — ED PROVIDER NOTE - CLINICAL SUMMARY MEDICAL DECISION MAKING FREE TEXT BOX
Patient presented with atraumatic b/l LE swelling x 2 days as documented. No further complaints. Otherwise afebrile, HD stable, (+) pitting edema b/l but NAD, lungs clear, well appearing. Plan was to obtain labs, EKG, but patient eloped prior to testing.

## 2024-05-20 NOTE — ED PROVIDER NOTE - OBJECTIVE STATEMENT
Pt is a 69y/o male here for eval of atraumtic, symmetrical lower extremity edema x 2 days. Pt denies fever, chills, CP, SOB, N/V/D

## 2024-05-20 NOTE — ED ADULT NURSE NOTE - NSFALLUNIVINTERV_ED_ALL_ED
Bed/Stretcher in lowest position, wheels locked, appropriate side rails in place/Call bell, personal items and telephone in reach/Instruct patient to call for assistance before getting out of bed/chair/stretcher/Non-slip footwear applied when patient is off stretcher/Surveyor to call system/Physically safe environment - no spills, clutter or unnecessary equipment/Purposeful proactive rounding/Room/bathroom lighting operational, light cord in reach

## 2024-05-20 NOTE — ED ADULT NURSE REASSESSMENT NOTE - NS ED NURSE REASSESS COMMENT FT1
pt refusing iv insertion states he needs a registered nurse to do it  3 different rns attempted and pt is insisting "you're not an RN"   notified SAMMY Guzmán who pt also refused to have draw his blood

## 2024-05-20 NOTE — ED PROVIDER NOTE - NSTIMEPROVIDERCAREINITIATE_GEN_ER
PRINCIPAL DISCHARGE DIAGNOSIS  Diagnosis: Non-ST elevation myocardial infarction (NSTEMI)  Assessment and Plan of Treatment:       SECONDARY DISCHARGE DIAGNOSES  Diagnosis: History of hypertension  Assessment and Plan of Treatment: Continue with blood pressure medication. Maintain a healthy diet that consist of low sugar, low fat, low sodium diet. Exercise frequently if possible.  Follow up with primary care physician in one week after discharge.    Diagnosis: Diabetes  Assessment and Plan of Treatment: Continue with your blood sugar medication. HbAIC was found to be ___ on admission.  You must maintain a healthy diet that consist of low sugar, low fat, low sodium diet. Exercise frequently if possible. Consider repeating your Hemoglobin A1c within 3 months after discharge to monitor your average blood glucose control. Follow up with primary care physician in one week after discharge. PRINCIPAL DISCHARGE DIAGNOSIS  Diagnosis: Acute coronary syndrome  Assessment and Plan of Treatment: You presented with chest tightness after having your tooth pulled.   You were admitted for ACS rule out. EKG showed premature ventricular contractions.  You had trace elevation in troponin. Echocardiogram and stress test were done. Please follow with your primary doctor within a week after discharge.      SECONDARY DISCHARGE DIAGNOSES  Diagnosis: History of hypertension  Assessment and Plan of Treatment: Continue with blood pressure medication. Maintain a healthy diet that consist of low sugar, low fat, low sodium diet. Exercise frequently if possible.  Follow up with primary care physician in one week after discharge.    Diagnosis: Asthma  Assessment and Plan of Treatment: Continue with your nebulizers as intrusted by your primary care physician. Avoid triggers for asthma. If you're allergic to dust mites, pollens or molds, they can make your asthma symptoms get worse. Cold air, exercise, fumes from chemicals or perfume, tobacco or wood smoke, and weather changes can also make asthma symptoms worse. So can common colds and sinus infections. Vaccinate with influenza vaccine yearly. Follow up with primary care physician one week after discharge. 20-May-2024 12:44 PRINCIPAL DISCHARGE DIAGNOSIS  Diagnosis: Acute coronary syndrome  Assessment and Plan of Treatment: You presented with chest tightness after having your tooth pulled.   You were admitted for ACS rule out. EKG showed premature ventricular contractions.  You had trace elevation in troponin. Echocardiogram and stress test were done. Please follow with your primary doctor within a week after discharge. Please follow with Dr Haro cardiologist at [phone] within one week.      SECONDARY DISCHARGE DIAGNOSES  Diagnosis: History of hypertension  Assessment and Plan of Treatment: Continue with blood pressure medication. Maintain a healthy diet that consist of low sugar, low fat, low sodium diet. Exercise frequently if possible.  Follow up with primary care physician in one week after discharge.    Diagnosis: Asthma  Assessment and Plan of Treatment: Continue with your nebulizers as intrusted by your primary care physician. Avoid triggers for asthma. If you're allergic to dust mites, pollens or molds, they can make your asthma symptoms get worse. Cold air, exercise, fumes from chemicals or perfume, tobacco or wood smoke, and weather changes can also make asthma symptoms worse. So can common colds and sinus infections. Vaccinate with influenza vaccine yearly. Follow up with primary care physician one week after discharge.

## 2024-06-12 NOTE — ED PROVIDER NOTE - NSTIMEPROVIDERCAREINITIATE_GEN_ER
Physical Therapy Evaluation    Visit Type: Initial Evaluation  Visit: 1  Referring Provider: Vincenzo Houston MD  Medical Diagnosis (from order): M25.512 - Acute pain of left shoulder   Treatment Diagnosis: left shoulder - increased pain/symptoms, impaired range of motion, impaired muscle length/flexibility, impaired joint play/mobility, impaired mobility, impaired strength and impaired scapulohumeral rhythm.    SUBJECTIVE                                                                                                                 Patient states that she has been having left anterior shoulder pain for 4 weeks. She states that she was having pain in her wrist and then she states that pain traveled up to her shoulder. She notes that she was shoveling in her garden and after that she has had pain. She notes that the pain had started in her shoulder the evening she completed yardwork into the following days. She states that she is having some tingling along the lateral side of her left forearm. Patient denies any specific SUSANA or previous injuries to left upper extremity.     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 She notes that she had to do a lot of lifting & hauling of equipment.     Pain / Symptoms  - Pain/symptom is: constant  - Pain rating (out of 10): Current: 5 ; Best: 2; Worst: 7  - Location: Left anterior shoulder    - Quality / Description: pins and needles, tight, sharp, ache, throbbing, tingling  - Alleviating Factors: over-the-counter medication, prescription medications, heat, rest, relaxation techniques  - Progression since onset: improved    Function:   Limitations / Exacerbation Factors:   - Patient reports pain, difficulty and increased time with function reported below.  - upper body dressing, computer tasks, house/yard work, grocery shopping, reaching, pushing/pulling and lifting/carrying  Prior Level of Function: pain free ADLs and IADLs,    Patient Goals: decreased pain, increased strength,  independence with ADLs/IADLs and increased motion. \"Return back to normal & to not injury myself again\"    Prior treatment  - no therapies Patient had outpatient PT for left hip about ~1-2 years ago  - Discharged from hospital, home health, or skilled nursing facility in last 30 days: no  Home Environment   - Patient lives with: significant other and adult children  - Type of home: multiple level home  - Assistance available: consistent  - Denies 2 or more falls or an unexplained fall with injury in the last year.  - Feel safe at home / work / school: yes      OBJECTIVE                                                                                                                     Range of Motion (ROM)   (degrees unless noted; active unless noted; norms in ( ); negative=lacking to 0, positive=beyond 0)  Shoulder:    - Flexion (180):         Left: pain, WFL          Right: WFL    - Abduction (180):         Left: WFL, pain         Right: WFL    - Internal Rotation:          Left: pain        - at 90° (70-90):             Left: 52            Right: 71    - External Rotation:       - at 90° (90):             Left: 92             Right: 91     Strength  (out of 5 unless noted, standard test position unless noted)   Shoulder:     - Flexion:          Left: 4          Right: 4+     - Abduction:         Left: 4         Right: 4+    - Internal Rotation:           Left (at 0°): 4+         Right (at 0°): 5    - External Rotation:          Left (at 0°): 4+         Right (at 0°): 5   Elbow/Forearm:    - Flexion:         Left: 4+         Right: 5     - Extension:         Left: 5         Right: 5   Comments / Details: Pain with resisted elbow flexion with neutral wrist bias.          Palpation  Left  - Levator Scapulae: tenderness  - Upper Trapezius: tenderness  Right  - Levator Scapulae: tenderness  - Upper Trapezius: tenderness  Shoulder: [place]/Tendon/Bone  - Anterior Shoulder: - Left: tenderness - Right: no tenderness  -  AC Joint: - Left: tenderness - Right: no tenderness  - Biceps Tendon (proximal): - Left: tenderness - Right: no tenderness  - Clavicle: - Left: no tenderness - Right: no tenderness  - SC Joint: - Left: no tenderness - Right: no tenderness  - Posterior Shoulder: - Left: no tenderness - Right: no tenderness  - Bicipital Groove: - Left: tenderness - Right: no tenderness  Joint Play   Shoulder: Left   - Anterior Capsule: hypermobile  - Posterior Capsule: hypomobile  - Inferior Capsule: hypomobile  Shoulder: Right   - Anterior Capsule: hypermobile  - Posterior Capsule: WFL  - Inferior Capsule: WFL    Muscle Flexibility  - Pectoralis Major:  Left: minimal limitation  Right: minimal limitation  - Pectoralis Minor:  Left: minimal limitation  Right: minimal limitation  Noted tightness with left shoulder internal rotation        Special Tests  Shoulder: Tendon/Rotator Cuff  - Crossover Impingement:  Left: positive Right: negative  - Empty Can:  Left: positive Right: negative  - Cheng-Juan Carlos:  Left: positive Right: negative  - Lift Off Sign:  Left: positive Right: negative  - Speed's:  Left: positive Right: negative  - Yergason's:  Left: positive Right: negative  Shoulder: Instability  - Anterior Load and Shift Test:  Left: negative Right: negative  - Anterior Apprehension:  Left: negative Right: negative  Shoulder: Labral  - Crank Test:  Left: negative Right: negative  - Valentine's Active Compression Test:  Left: negative Right: negative  (-) radial, median, & ulnar nerve ULNT            Outcome/Assessments  Outcome Measures:   Quick Disabilities of the Arm, Shoulder and Hand: QuickDash Total Score (Score will not calculate if more then 2 questions are left blank): 43.18   (scored 0-100; a higher score indicates greater disability) see flowsheet for additional documentation      Treatment     Therapeutic Exercise  Instructed in:   - doorway pec stretch at 90 degrees abduction  - seated upper trapezius stretch  - seated  levator scapulae stretch  - standing shoulder & trunk flexion at table     Educated on relevant anatomy and physiology, role and benefits of physical therapy, plan of care.     Patient educated on the importance of attending therapy sessions routinely to address noted impairments. Patient also educated regarding the attendance policy.       Skilled input: verbal instruction/cues, tactile instruction/cues, demonstration, posture correction, as detailed above and facilitation    Writer verbally educated and received verbal consent for hand placement, positioning of patient, and techniques to be performed today from patient for clothing adjustments for techniques, hand placement and palpation for techniques, therapist position for techniques and modality application as described above and how they are pertinent to the patient's plan of care.  Home Exercise Program  Access Code: [email or web address]  URL: [email or web address]  Date: 06/17/2024  Prepared by: Eda Delgado    Exercises  - Doorway Pec Stretch at 90 Degrees Abduction  - 1 x daily - 7 x weekly - 2 sets - 30 hold  - Seated Upper Trapezius Stretch  - 1 x daily - 7 x weekly - 2 sets - 30 hold  - Seated Levator Scapulae Stretch  - 1 x daily - 7 x weekly - 2 sets - 30 hold  - Standing Shoulder and Trunk Flexion at Table  - 1 x daily - 7 x weekly - 2 sets - 10 reps - 2-3 hold    ASSESSMENT                                                                                                          55 year old patient has reported functional limitations listed above impacted by signs and symptoms consistent with treatment diagnosis below.  Treatment Diagnosis:   - Involved: left shoulder.  - Symptoms/impairments: increased pain/symptoms, impaired range of motion, impaired muscle length/flexibility, impaired joint play/mobility, impaired mobility, impaired strength and impaired scapulohumeral rhythm.    Pt is a 55 year old female who presents to the clinic  with anterior shoulder pain that has been going on for about 4 weeks. Patient notes that pain exacerbated by putting on her bra, reaching overhead, lifting & carrying objects. Pt demonstrated limitations with increased tenderness to left first rib, limited left shoulder internal rotation range of motion, and limited bilateral pectoralis length. Pain was provoked with hawkin's hardeep & cross arm impingement tests along with speed's test, empty can, Yergason's, & lift off. Suggesting symptoms consistent with shoulder impingement along with limited shoulder stabilization. Impairments listed above limit patient's ability to perform ADLs & IADLs at full independence. Patient will benefit from skilled physical therapy services to address noted impairments for independence in home & the community.       Prognosis: Patient will benefit from skilled therapy.  Rehabilitative potential is: good.  Predicted patient presentation: Moderate (evolving) - Patient comorbidities and complexities, as defined above, may have varying impact on steady progress for prescribed plan of care.  Education:   - Present and ready to learn: patient  - Results of above outlined education: Verbalizes understanding, Needs reinforcement and Demonstrates understanding    PLAN                                                                                                                         The following skilled interventions to be implemented to achieve goals listed below:  Neuromuscular Re-Education (66438)  Therapeutic Activity (04670)  Therapeutic Exercise (74136)  Gait Training (25176)  Heat/Cold (10967)  Ultrasound (42590)  Electrical Stimulation Attended (31454)  Manual Therapy (52612)  Dry Needling  Performance Test or Measure (01120)    Frequency / Duration  2 times per week tapering as patient progresses for 8 weeks for an estimated total of 16 visits    Patient involved in and agreed to plan of care and goals.  Patient given attendance  policy at time of initial evaluation.    Suggestions for next session as indicated: Progress per plan of care: shoulder stabilization exercises, check scapulo-humeral rhythm, UE stretching, cervical stretching, UE strengthening, check serratus anterior strength    Goals  Decrease pain/symptoms to 0-2/10  Improve involved strength to 4+/5  Improve involved IR ROM to WFL  The above improvements in impairments to assist in obtaining goals listed below  Long Term Goals: to be met by end of plan of care  1. Patient will reach overhead with minimal to no symptoms reported for dishes/objects in and out of cabinets  2. Patient will  and lift a light grocery bag,  steering wheel, perform light home/yard maintenance tasks with minimal to no symptoms reported.   3. Patient will complete independent upper body dressing with minimal to no pain reported for return to PLOF.   4. Quick DASH: Patient will score 29.18 or lower on The Quick DASH to indicate a decreased level of impairment with lifting, carrying, household and self-care activity. (minimal clinically important difference: 14 of calculated score)  5. Patient will be independent with progressed and modified home exercise program.      Therapy procedure time and total treatment time can be found documented on the Time Entry flowsheet     12-Dec-2023 09:49

## 2024-06-21 NOTE — ED PROVIDER NOTE - PATIENT PORTAL LINK FT
Is This A New Presentation, Or A Follow-Up?: Skin Lesions
You can access the FollowMyHealth Patient Portal offered by [facility] by registering at the following website: [email or web address]. By joining [employer]’s FollowMyHealth portal, you will also be able to view your health information using other applications (apps) compatible with our system.

## 2024-08-12 ENCOUNTER — EMERGENCY (EMERGENCY)
Facility: HOSPITAL | Age: 69
LOS: 0 days | Discharge: LEFT BEFORE DISCHARGE | End: 2024-08-12
Attending: EMERGENCY MEDICINE
Payer: MEDICARE

## 2024-08-12 VITALS
HEART RATE: 96 BPM | RESPIRATION RATE: 16 BRPM | SYSTOLIC BLOOD PRESSURE: 127 MMHG | WEIGHT: 171.96 LBS | DIASTOLIC BLOOD PRESSURE: 82 MMHG | TEMPERATURE: 98 F | OXYGEN SATURATION: 90 %

## 2024-08-12 DIAGNOSIS — Z53.21 PROCEDURE AND TREATMENT NOT CARRIED OUT DUE TO PATIENT LEAVING PRIOR TO BEING SEEN BY HEALTH CARE PROVIDER: ICD-10-CM

## 2024-08-12 PROCEDURE — 76870 US EXAM SCROTUM: CPT | Mod: 26

## 2024-08-12 PROCEDURE — 76870 US EXAM SCROTUM: CPT

## 2024-08-12 PROCEDURE — L9991: CPT

## 2024-08-12 NOTE — ED ADULT NURSE NOTE - NSFALLUNIVINTERV_ED_ALL_ED
Bed/Stretcher in lowest position, wheels locked, appropriate side rails in place/Call bell, personal items and telephone in reach/Parks to call system/Physically safe environment - no spills, clutter or unnecessary equipment/Purposeful proactive rounding/Room/bathroom lighting operational, light cord in reach

## 2024-08-13 ENCOUNTER — EMERGENCY (EMERGENCY)
Facility: HOSPITAL | Age: 69
LOS: 0 days | Discharge: HOME | End: 2024-08-13
Attending: EMERGENCY MEDICINE
Payer: MEDICARE

## 2024-08-13 DIAGNOSIS — F20.9 SCHIZOPHRENIA, UNSPECIFIED: ICD-10-CM

## 2024-08-13 DIAGNOSIS — I46.9 CARDIAC ARREST, CAUSE UNSPECIFIED: ICD-10-CM

## 2024-08-13 DIAGNOSIS — E11.9 TYPE 2 DIABETES MELLITUS WITHOUT COMPLICATIONS: ICD-10-CM

## 2024-08-13 PROCEDURE — 99291 CRITICAL CARE FIRST HOUR: CPT | Mod: 25

## 2024-08-13 PROCEDURE — 99285 EMERGENCY DEPT VISIT HI MDM: CPT | Mod: 25

## 2024-08-13 PROCEDURE — 92950 HEART/LUNG RESUSCITATION CPR: CPT

## 2024-08-13 PROCEDURE — 82962 GLUCOSE BLOOD TEST: CPT

## 2024-08-13 NOTE — ED PROVIDER NOTE - PHYSICAL EXAMINATION
VITAL SIGNS: I have reviewed nursing notes and confirm.  CONSTITUTIONAL: cardiac arrest  NEURO: pupils fixed and non-reactive; GCS: 3,

## 2024-08-13 NOTE — ED PROVIDER NOTE - OBJECTIVE STATEMENT
Patient is a 69 year old male with PMH CLL, DM, and Schizophrenia presented in cardiac arrest. Per EMS, patient was found unresponsive at the psychiatric facility; unknown downtime. CPR was initiated in the field approximately 25 minutes prior to arrival to the ER. Initial rhythm Asystole and remained in asystole status post multiple rounds of CPR and Epinephrine x5.
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
## 2024-08-16 LAB — GLUCOSE BLDC GLUCOMTR-MCNC: 300 MG/DL — HIGH (ref 70–99)

## 2024-09-12 NOTE — ED ADULT NURSE NOTE - TEMPLATE
[Joint Pain] : joint pain [Joint Stiffness] : joint stiffness [Joint Swelling] : joint swelling [Negative] : Heme/Lymph [FreeTextEntry9] : b/l knee pain General
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
## 2024-12-15 NOTE — ED CDU PROVIDER INITIAL DAY NOTE - NSICDXNOPASTSURGICALHX_GEN_ALL_ED
Patient presented to ED via [facility] for trach cannula dislodgement.   Patient states he saw ENT on Monday and they placed new cannula then.   States tonight patient couldn't get cannula back in.   Airway intact  Uses trach at night with vent.     Jamarcus Leavitt 8   Respiratory called and at bedside  MD notified    <-- Click to add NO significant Past Surgical History

## 2025-02-12 NOTE — BEHAVIORAL HEALTH ASSESSMENT NOTE - NSBHATTESTNPTRAINEE_PSY_A_CORE
WOUND CARE AFTER SKIN BIOPSY    Keep area dry for 24 hours.    Wash your hands thoroughly with soap and water for at least 20 seconds before any wound care.    After the first 24 hours, gently cleanse the biopsy site 1-2 times each day with soap and water, pat dry and apply ointment and a new bandage to the area until it is healed.  Keeping the area covered will allow it to heal more quickly than if it is left open to the air.    ~ May take 2-4 weeks for biopsies on the trunk and extremities to heal; may take 1-2 weeks for head and neck sites.    If stitches have been placed in the site, continue the daily wound care outlined above until the stitches are removed in our office.    If bleeding at site occurs, hold firm pressure with a clean towel for 15 minutes.  Do not look at area or release pressure for that 15 minutes.  Call our office if the area continues to bleed after holding pressure for 15 minutes.    Call our office immediately if any signs of infection occur at the biopsy site (pain, drainage, pus, or red streaks from the site) or if bleeding or excessive swelling occurs.  A thin pink rim surrounding the site is normal.    You will be notified of results by letter or phone call (or at recheck appointment).  If a month or more has passed since your procedure and you have not received results, please call our office at [phone].        
agree

## 2025-03-07 NOTE — BH PATIENT PROFILE - FUNCTIONAL ASSESSMENT - DAILY ACTIVITY SCORE.
Benefits, risks, and possible complications of procedure explained to patient/caregiver who verbalized understanding and gave written consent. 24

## 2025-03-08 NOTE — PATIENT PROFILE ADULT. - MENTAL HEALTH CONDITIONS/SYMPTOMS, PROFILE
03/08/25 0954   Service Assessment   Patient Orientation Alert and Oriented   Cognition Alert   History Provided By Patient   Primary Caregiver Self   Accompanied By/Relationship Self   Support Systems Children   Patient's Healthcare Decision Maker is: Named in Scanned ACP Document   PCP Verified by CM Yes   Last Visit to PCP Within last 3 months   Current Functional Level Assistance with the following:;Housework;Shopping;Cooking   Can patient return to prior living arrangement Yes   Ability to make needs known: Good   Family able to assist with home care needs: Yes   Would you like for me to discuss the discharge plan with any other family members/significant others, and if so, who? Yes  (Has ACP docs)   Financial Resources Medicare   Community Resources None   CM/SW Referral Other (see comment)   Social/Functional History   Lives With Alone   Type of Home House   Home Layout One level   Home Access Stairs to enter with rails   Entrance Stairs - Number of Steps 3   Entrance Stairs - Rails Both   Bathroom Shower/Tub Tub/Shower unit   Bathroom Toilet Standard   Bathroom Equipment None   Bathroom Accessibility Accessible   Home Equipment None   Receives Help From Family   Prior Level of Assist for ADLs Independent   Prior Level of Assist for Homemaking Needs assistance   Ambulation Assistance Independent   Prior Level of Assist for Transfers Independent   Active  Yes   Mode of Transportation Car   Occupation Self employed   Discharge Planning   Type of Residence House   Living Arrangements Alone   Current Services Prior To Admission None   Potential Assistance Needed N/A   DME Ordered? No   Potential Assistance Purchasing Medications No   Type of Home Care Services None   Patient expects to be discharged to: House   One/Two Story Residence One story   History of falls? 0   Services At/After Discharge   Transition of Care Consult (CM Consult) Discharge Planning   Services At/After Discharge Home Health;DME  schizophrenia/altered thought process

## 2025-07-02 NOTE — BH INPATIENT PSYCHIATRY DISCHARGE NOTE - NSBHMSETHTASSOC_PSY_A_CORE
Patient needs a new sleep study ordered so he can get a new cpap machine. His machine is over 5 years old  
Normal